# Patient Record
Sex: MALE | Race: WHITE | NOT HISPANIC OR LATINO | Employment: FULL TIME | URBAN - METROPOLITAN AREA
[De-identification: names, ages, dates, MRNs, and addresses within clinical notes are randomized per-mention and may not be internally consistent; named-entity substitution may affect disease eponyms.]

---

## 2017-01-07 ENCOUNTER — TRANSCRIBE ORDERS (OUTPATIENT)
Dept: ADMINISTRATIVE | Facility: HOSPITAL | Age: 77
End: 2017-01-07

## 2017-01-07 ENCOUNTER — APPOINTMENT (OUTPATIENT)
Dept: LAB | Facility: HOSPITAL | Age: 77
End: 2017-01-07
Attending: INTERNAL MEDICINE
Payer: COMMERCIAL

## 2017-01-07 DIAGNOSIS — Z79.899 ENCOUNTER FOR LONG-TERM (CURRENT) USE OF OTHER MEDICATIONS: Primary | ICD-10-CM

## 2017-01-07 DIAGNOSIS — E55.9 UNSPECIFIED VITAMIN D DEFICIENCY: ICD-10-CM

## 2017-01-07 DIAGNOSIS — M35.3 POLYMYALGIA RHEUMATICA (HCC): ICD-10-CM

## 2017-01-07 DIAGNOSIS — Z79.899 ENCOUNTER FOR LONG-TERM (CURRENT) USE OF OTHER MEDICATIONS: ICD-10-CM

## 2017-01-07 LAB
25(OH)D3 SERPL-MCNC: 40.8 NG/ML (ref 30–100)
ALBUMIN SERPL BCP-MCNC: 3.2 G/DL (ref 3.5–5)
ALP SERPL-CCNC: 101 U/L (ref 46–116)
ALT SERPL W P-5'-P-CCNC: 20 U/L (ref 12–78)
ANION GAP SERPL CALCULATED.3IONS-SCNC: 8 MMOL/L (ref 4–13)
AST SERPL W P-5'-P-CCNC: 21 U/L (ref 5–45)
BASOPHILS # BLD AUTO: 0.05 THOUSANDS/ΜL (ref 0–0.1)
BASOPHILS NFR BLD AUTO: 1 % (ref 0–1)
BILIRUB SERPL-MCNC: 0.43 MG/DL (ref 0.2–1)
BUN SERPL-MCNC: 23 MG/DL (ref 5–25)
CALCIUM SERPL-MCNC: 8.9 MG/DL (ref 8.3–10.1)
CHLORIDE SERPL-SCNC: 104 MMOL/L (ref 100–108)
CO2 SERPL-SCNC: 27 MMOL/L (ref 21–32)
CREAT SERPL-MCNC: 1.2 MG/DL (ref 0.6–1.3)
CRP SERPL QL: 6.2 MG/L
EOSINOPHIL # BLD AUTO: 0.39 THOUSAND/ΜL (ref 0–0.61)
EOSINOPHIL NFR BLD AUTO: 4 % (ref 0–6)
ERYTHROCYTE [DISTWIDTH] IN BLOOD BY AUTOMATED COUNT: 14.4 % (ref 11.6–15.1)
ERYTHROCYTE [SEDIMENTATION RATE] IN BLOOD: 38 MM/HOUR (ref 0–10)
GFR SERPL CREATININE-BSD FRML MDRD: 58.9 ML/MIN/1.73SQ M
GLUCOSE SERPL-MCNC: 103 MG/DL (ref 65–140)
HCT VFR BLD AUTO: 39.2 % (ref 36.5–49.3)
HGB BLD-MCNC: 12.7 G/DL (ref 12–17)
LYMPHOCYTES # BLD AUTO: 1.3 THOUSANDS/ΜL (ref 0.6–4.47)
LYMPHOCYTES NFR BLD AUTO: 14 % (ref 14–44)
MCH RBC QN AUTO: 30.4 PG (ref 26.8–34.3)
MCHC RBC AUTO-ENTMCNC: 32.4 G/DL (ref 31.4–37.4)
MCV RBC AUTO: 94 FL (ref 82–98)
MONOCYTES # BLD AUTO: 1.22 THOUSAND/ΜL (ref 0.17–1.22)
MONOCYTES NFR BLD AUTO: 13 % (ref 4–12)
NEUTROPHILS # BLD AUTO: 6.2 THOUSANDS/ΜL (ref 1.85–7.62)
NEUTS SEG NFR BLD AUTO: 68 % (ref 43–75)
NRBC BLD AUTO-RTO: 0 /100 WBCS
PLATELET # BLD AUTO: 259 THOUSANDS/UL (ref 149–390)
PMV BLD AUTO: 10.3 FL (ref 8.9–12.7)
POTASSIUM SERPL-SCNC: 4.3 MMOL/L (ref 3.5–5.3)
PROT SERPL-MCNC: 7.5 G/DL (ref 6.4–8.2)
RBC # BLD AUTO: 4.18 MILLION/UL (ref 3.88–5.62)
SODIUM SERPL-SCNC: 139 MMOL/L (ref 136–145)
WBC # BLD AUTO: 9.16 THOUSAND/UL (ref 4.31–10.16)

## 2017-01-07 PROCEDURE — 85652 RBC SED RATE AUTOMATED: CPT

## 2017-01-07 PROCEDURE — 86140 C-REACTIVE PROTEIN: CPT

## 2017-01-07 PROCEDURE — 80053 COMPREHEN METABOLIC PANEL: CPT

## 2017-01-07 PROCEDURE — 36415 COLL VENOUS BLD VENIPUNCTURE: CPT

## 2017-01-07 PROCEDURE — 85025 COMPLETE CBC W/AUTO DIFF WBC: CPT

## 2017-01-07 PROCEDURE — 82306 VITAMIN D 25 HYDROXY: CPT

## 2017-02-10 ENCOUNTER — ALLSCRIPTS OFFICE VISIT (OUTPATIENT)
Dept: OTHER | Facility: OTHER | Age: 77
End: 2017-02-10

## 2017-04-15 ENCOUNTER — APPOINTMENT (OUTPATIENT)
Dept: LAB | Facility: HOSPITAL | Age: 77
End: 2017-04-15
Payer: COMMERCIAL

## 2017-04-15 DIAGNOSIS — I10 ESSENTIAL (PRIMARY) HYPERTENSION: ICD-10-CM

## 2017-04-15 DIAGNOSIS — I25.10 ATHEROSCLEROTIC HEART DISEASE OF NATIVE CORONARY ARTERY WITHOUT ANGINA PECTORIS: ICD-10-CM

## 2017-04-15 DIAGNOSIS — R73.03 PREDIABETES: ICD-10-CM

## 2017-04-15 DIAGNOSIS — E78.5 HYPERLIPIDEMIA: ICD-10-CM

## 2017-04-15 DIAGNOSIS — N40.0 ENLARGED PROSTATE WITHOUT LOWER URINARY TRACT SYMPTOMS (LUTS): ICD-10-CM

## 2017-04-15 LAB
ALBUMIN SERPL BCP-MCNC: 3.1 G/DL (ref 3.5–5)
ALP SERPL-CCNC: 94 U/L (ref 46–116)
ALT SERPL W P-5'-P-CCNC: 20 U/L (ref 12–78)
ANION GAP SERPL CALCULATED.3IONS-SCNC: 7 MMOL/L (ref 4–13)
AST SERPL W P-5'-P-CCNC: 19 U/L (ref 5–45)
BILIRUB SERPL-MCNC: 0.71 MG/DL (ref 0.2–1)
BUN SERPL-MCNC: 18 MG/DL (ref 5–25)
CALCIUM SERPL-MCNC: 8.8 MG/DL (ref 8.3–10.1)
CHLORIDE SERPL-SCNC: 105 MMOL/L (ref 100–108)
CHOLEST SERPL-MCNC: 129 MG/DL (ref 50–200)
CO2 SERPL-SCNC: 29 MMOL/L (ref 21–32)
CREAT SERPL-MCNC: 1.13 MG/DL (ref 0.6–1.3)
EST. AVERAGE GLUCOSE BLD GHB EST-MCNC: 131 MG/DL
GFR SERPL CREATININE-BSD FRML MDRD: >60 ML/MIN/1.73SQ M
GLUCOSE P FAST SERPL-MCNC: 95 MG/DL (ref 65–99)
HBA1C MFR BLD: 6.2 % (ref 4.2–6.3)
HDLC SERPL-MCNC: 64 MG/DL (ref 40–60)
LDLC SERPL CALC-MCNC: 54 MG/DL (ref 0–100)
POTASSIUM SERPL-SCNC: 4.1 MMOL/L (ref 3.5–5.3)
PROT SERPL-MCNC: 7.3 G/DL (ref 6.4–8.2)
SODIUM SERPL-SCNC: 141 MMOL/L (ref 136–145)
TRIGL SERPL-MCNC: 55 MG/DL

## 2017-04-15 PROCEDURE — 80053 COMPREHEN METABOLIC PANEL: CPT

## 2017-04-15 PROCEDURE — 80061 LIPID PANEL: CPT

## 2017-04-15 PROCEDURE — 36415 COLL VENOUS BLD VENIPUNCTURE: CPT

## 2017-04-15 PROCEDURE — 83036 HEMOGLOBIN GLYCOSYLATED A1C: CPT

## 2017-04-20 ENCOUNTER — ALLSCRIPTS OFFICE VISIT (OUTPATIENT)
Dept: OTHER | Facility: OTHER | Age: 77
End: 2017-04-20

## 2017-04-20 DIAGNOSIS — I10 ESSENTIAL (PRIMARY) HYPERTENSION: ICD-10-CM

## 2017-04-20 DIAGNOSIS — I25.10 ATHEROSCLEROTIC HEART DISEASE OF NATIVE CORONARY ARTERY WITHOUT ANGINA PECTORIS: ICD-10-CM

## 2017-04-20 DIAGNOSIS — N40.0 ENLARGED PROSTATE WITHOUT LOWER URINARY TRACT SYMPTOMS (LUTS): ICD-10-CM

## 2017-04-20 DIAGNOSIS — R73.03 PREDIABETES: ICD-10-CM

## 2017-04-20 DIAGNOSIS — E78.5 HYPERLIPIDEMIA: ICD-10-CM

## 2017-05-06 ENCOUNTER — APPOINTMENT (OUTPATIENT)
Dept: LAB | Facility: HOSPITAL | Age: 77
End: 2017-05-06
Attending: INTERNAL MEDICINE
Payer: COMMERCIAL

## 2017-05-06 ENCOUNTER — TRANSCRIBE ORDERS (OUTPATIENT)
Dept: ADMINISTRATIVE | Facility: HOSPITAL | Age: 77
End: 2017-05-06

## 2017-05-06 DIAGNOSIS — M35.3 POLYMYALGIA RHEUMATICA (HCC): Primary | ICD-10-CM

## 2017-05-06 DIAGNOSIS — M35.3 POLYMYALGIA RHEUMATICA (HCC): ICD-10-CM

## 2017-05-06 LAB
ALBUMIN SERPL BCP-MCNC: 3 G/DL (ref 3.5–5)
ALP SERPL-CCNC: 89 U/L (ref 46–116)
ALT SERPL W P-5'-P-CCNC: 21 U/L (ref 12–78)
ANION GAP SERPL CALCULATED.3IONS-SCNC: 7 MMOL/L (ref 4–13)
AST SERPL W P-5'-P-CCNC: 19 U/L (ref 5–45)
BASOPHILS # BLD AUTO: 0.05 THOUSANDS/ΜL (ref 0–0.1)
BASOPHILS NFR BLD AUTO: 1 % (ref 0–1)
BILIRUB SERPL-MCNC: 0.35 MG/DL (ref 0.2–1)
BUN SERPL-MCNC: 20 MG/DL (ref 5–25)
CALCIUM SERPL-MCNC: 9.3 MG/DL (ref 8.3–10.1)
CHLORIDE SERPL-SCNC: 103 MMOL/L (ref 100–108)
CO2 SERPL-SCNC: 30 MMOL/L (ref 21–32)
CREAT SERPL-MCNC: 1.33 MG/DL (ref 0.6–1.3)
CRP SERPL QL: 9.2 MG/L
EOSINOPHIL # BLD AUTO: 0.44 THOUSAND/ΜL (ref 0–0.61)
EOSINOPHIL NFR BLD AUTO: 5 % (ref 0–6)
ERYTHROCYTE [DISTWIDTH] IN BLOOD BY AUTOMATED COUNT: 15 % (ref 11.6–15.1)
ERYTHROCYTE [SEDIMENTATION RATE] IN BLOOD: 50 MM/HOUR (ref 0–10)
GFR SERPL CREATININE-BSD FRML MDRD: 52.3 ML/MIN/1.73SQ M
GLUCOSE P FAST SERPL-MCNC: 155 MG/DL (ref 65–99)
HCT VFR BLD AUTO: 39.1 % (ref 36.5–49.3)
HGB BLD-MCNC: 12.6 G/DL (ref 12–17)
LYMPHOCYTES # BLD AUTO: 1.28 THOUSANDS/ΜL (ref 0.6–4.47)
LYMPHOCYTES NFR BLD AUTO: 15 % (ref 14–44)
MCH RBC QN AUTO: 30.1 PG (ref 26.8–34.3)
MCHC RBC AUTO-ENTMCNC: 32.2 G/DL (ref 31.4–37.4)
MCV RBC AUTO: 93 FL (ref 82–98)
MONOCYTES # BLD AUTO: 0.98 THOUSAND/ΜL (ref 0.17–1.22)
MONOCYTES NFR BLD AUTO: 12 % (ref 4–12)
NEUTROPHILS # BLD AUTO: 5.76 THOUSANDS/ΜL (ref 1.85–7.62)
NEUTS SEG NFR BLD AUTO: 67 % (ref 43–75)
PLATELET # BLD AUTO: 338 THOUSANDS/UL (ref 149–390)
PMV BLD AUTO: 10.2 FL (ref 8.9–12.7)
POTASSIUM SERPL-SCNC: 4.3 MMOL/L (ref 3.5–5.3)
PROT SERPL-MCNC: 7.5 G/DL (ref 6.4–8.2)
RBC # BLD AUTO: 4.19 MILLION/UL (ref 3.88–5.62)
SODIUM SERPL-SCNC: 140 MMOL/L (ref 136–145)
WBC # BLD AUTO: 8.51 THOUSAND/UL (ref 4.31–10.16)

## 2017-05-06 PROCEDURE — 86140 C-REACTIVE PROTEIN: CPT

## 2017-05-06 PROCEDURE — 80053 COMPREHEN METABOLIC PANEL: CPT

## 2017-05-06 PROCEDURE — 85025 COMPLETE CBC W/AUTO DIFF WBC: CPT

## 2017-05-06 PROCEDURE — 36415 COLL VENOUS BLD VENIPUNCTURE: CPT

## 2017-05-06 PROCEDURE — 85652 RBC SED RATE AUTOMATED: CPT

## 2017-07-15 ENCOUNTER — APPOINTMENT (OUTPATIENT)
Dept: LAB | Facility: HOSPITAL | Age: 77
End: 2017-07-15
Payer: COMMERCIAL

## 2017-07-15 ENCOUNTER — TRANSCRIBE ORDERS (OUTPATIENT)
Dept: ADMINISTRATIVE | Facility: HOSPITAL | Age: 77
End: 2017-07-15

## 2017-07-15 ENCOUNTER — APPOINTMENT (OUTPATIENT)
Dept: LAB | Facility: HOSPITAL | Age: 77
End: 2017-07-15
Attending: INTERNAL MEDICINE
Payer: COMMERCIAL

## 2017-07-15 DIAGNOSIS — M35.3 POLYMYALGIA RHEUMATICA (HCC): Primary | ICD-10-CM

## 2017-07-15 DIAGNOSIS — R73.03 PREDIABETES: ICD-10-CM

## 2017-07-15 DIAGNOSIS — N40.0 ENLARGED PROSTATE WITHOUT LOWER URINARY TRACT SYMPTOMS (LUTS): ICD-10-CM

## 2017-07-15 DIAGNOSIS — I10 ESSENTIAL (PRIMARY) HYPERTENSION: ICD-10-CM

## 2017-07-15 DIAGNOSIS — E78.5 HYPERLIPIDEMIA: ICD-10-CM

## 2017-07-15 DIAGNOSIS — M35.3 POLYMYALGIA RHEUMATICA (HCC): ICD-10-CM

## 2017-07-15 DIAGNOSIS — I25.10 ATHEROSCLEROTIC HEART DISEASE OF NATIVE CORONARY ARTERY WITHOUT ANGINA PECTORIS: ICD-10-CM

## 2017-07-15 LAB
ALBUMIN SERPL BCP-MCNC: 3.5 G/DL (ref 3.5–5)
ALP SERPL-CCNC: 88 U/L (ref 46–116)
ALT SERPL W P-5'-P-CCNC: 22 U/L (ref 12–78)
ANION GAP SERPL CALCULATED.3IONS-SCNC: 4 MMOL/L (ref 4–13)
AST SERPL W P-5'-P-CCNC: 22 U/L (ref 5–45)
BILIRUB SERPL-MCNC: 0.74 MG/DL (ref 0.2–1)
BUN SERPL-MCNC: 19 MG/DL (ref 5–25)
CALCIUM SERPL-MCNC: 9.3 MG/DL (ref 8.3–10.1)
CHLORIDE SERPL-SCNC: 105 MMOL/L (ref 100–108)
CO2 SERPL-SCNC: 32 MMOL/L (ref 21–32)
CREAT SERPL-MCNC: 1.27 MG/DL (ref 0.6–1.3)
CRP SERPL QL: 4.1 MG/L
ERYTHROCYTE [SEDIMENTATION RATE] IN BLOOD: 34 MM/HOUR (ref 0–10)
EST. AVERAGE GLUCOSE BLD GHB EST-MCNC: 128 MG/DL
GFR SERPL CREATININE-BSD FRML MDRD: 55.1 ML/MIN/1.73SQ M
GLUCOSE P FAST SERPL-MCNC: 102 MG/DL (ref 65–99)
HBA1C MFR BLD: 6.1 % (ref 4.2–6.3)
POTASSIUM SERPL-SCNC: 5.4 MMOL/L (ref 3.5–5.3)
PROT SERPL-MCNC: 8 G/DL (ref 6.4–8.2)
SODIUM SERPL-SCNC: 141 MMOL/L (ref 136–145)

## 2017-07-15 PROCEDURE — 86140 C-REACTIVE PROTEIN: CPT

## 2017-07-15 PROCEDURE — 85652 RBC SED RATE AUTOMATED: CPT

## 2017-07-15 PROCEDURE — 80053 COMPREHEN METABOLIC PANEL: CPT

## 2017-07-15 PROCEDURE — 83036 HEMOGLOBIN GLYCOSYLATED A1C: CPT

## 2017-07-15 PROCEDURE — 36415 COLL VENOUS BLD VENIPUNCTURE: CPT

## 2017-07-20 ENCOUNTER — ALLSCRIPTS OFFICE VISIT (OUTPATIENT)
Dept: OTHER | Facility: OTHER | Age: 77
End: 2017-07-20

## 2017-08-29 ENCOUNTER — ALLSCRIPTS OFFICE VISIT (OUTPATIENT)
Dept: OTHER | Facility: OTHER | Age: 77
End: 2017-08-29

## 2017-09-02 ENCOUNTER — TRANSCRIBE ORDERS (OUTPATIENT)
Dept: ADMINISTRATIVE | Facility: HOSPITAL | Age: 77
End: 2017-09-02

## 2017-09-02 ENCOUNTER — APPOINTMENT (OUTPATIENT)
Dept: LAB | Facility: HOSPITAL | Age: 77
End: 2017-09-02
Attending: INTERNAL MEDICINE
Payer: COMMERCIAL

## 2017-09-02 DIAGNOSIS — M35.3 POLYMYALGIA RHEUMATICA (HCC): Primary | ICD-10-CM

## 2017-09-02 DIAGNOSIS — M35.3 POLYMYALGIA RHEUMATICA (HCC): ICD-10-CM

## 2017-09-02 LAB
CRP SERPL QL: 3.9 MG/L
ERYTHROCYTE [SEDIMENTATION RATE] IN BLOOD: 45 MM/HOUR (ref 0–10)

## 2017-09-02 PROCEDURE — 86140 C-REACTIVE PROTEIN: CPT

## 2017-09-02 PROCEDURE — 85652 RBC SED RATE AUTOMATED: CPT

## 2017-09-02 PROCEDURE — 36415 COLL VENOUS BLD VENIPUNCTURE: CPT

## 2017-09-06 ENCOUNTER — GENERIC CONVERSION - ENCOUNTER (OUTPATIENT)
Dept: OTHER | Facility: OTHER | Age: 77
End: 2017-09-06

## 2017-09-19 DIAGNOSIS — E78.5 HYPERLIPIDEMIA: ICD-10-CM

## 2017-10-21 ENCOUNTER — APPOINTMENT (OUTPATIENT)
Dept: LAB | Facility: HOSPITAL | Age: 77
End: 2017-10-21
Attending: INTERNAL MEDICINE
Payer: COMMERCIAL

## 2017-10-21 DIAGNOSIS — E78.5 HYPERLIPIDEMIA: ICD-10-CM

## 2017-10-21 LAB
ANION GAP SERPL CALCULATED.3IONS-SCNC: 3 MMOL/L (ref 4–13)
BUN SERPL-MCNC: 26 MG/DL (ref 5–25)
CALCIUM SERPL-MCNC: 9.1 MG/DL (ref 8.3–10.1)
CHLORIDE SERPL-SCNC: 105 MMOL/L (ref 100–108)
CO2 SERPL-SCNC: 30 MMOL/L (ref 21–32)
CREAT SERPL-MCNC: 1.29 MG/DL (ref 0.6–1.3)
GFR SERPL CREATININE-BSD FRML MDRD: 53 ML/MIN/1.73SQ M
GLUCOSE P FAST SERPL-MCNC: 100 MG/DL (ref 65–99)
POTASSIUM SERPL-SCNC: 4.7 MMOL/L (ref 3.5–5.3)
SODIUM SERPL-SCNC: 138 MMOL/L (ref 136–145)

## 2017-10-21 PROCEDURE — 80048 BASIC METABOLIC PNL TOTAL CA: CPT

## 2017-10-21 PROCEDURE — 36415 COLL VENOUS BLD VENIPUNCTURE: CPT

## 2017-10-25 ENCOUNTER — ALLSCRIPTS OFFICE VISIT (OUTPATIENT)
Dept: OTHER | Facility: OTHER | Age: 77
End: 2017-10-25

## 2017-10-25 DIAGNOSIS — I10 ESSENTIAL (PRIMARY) HYPERTENSION: ICD-10-CM

## 2017-10-25 DIAGNOSIS — E78.5 HYPERLIPIDEMIA: ICD-10-CM

## 2017-10-25 DIAGNOSIS — I25.10 ATHEROSCLEROTIC HEART DISEASE OF NATIVE CORONARY ARTERY WITHOUT ANGINA PECTORIS: ICD-10-CM

## 2017-10-25 DIAGNOSIS — R73.03 PREDIABETES: ICD-10-CM

## 2017-10-26 NOTE — PROGRESS NOTES
Assessment  1  Pre-diabetes (790 29) (R73 03)   2  Hyperlipidemia (272 4) (E78 5)   3  Benign essential hypertension (401 1) (I10)   4  3-vessel coronary artery disease (414 00) (I25 10)    Plan  3-vessel coronary artery disease, Benign essential hypertension, Hyperlipidemia,  Pre-diabetes    · (1) COMPREHENSIVE METABOLIC PANEL; Status:Active; Requested [id number];    · (1) HEMOGLOBIN A1C; Status:Active; Requested [id number];    · (1) LIPID PANEL FASTING W DIRECT LDL REFLEX; Status:Active; Requested  JOJ:[id number];    · Follow-up visit in 3 months Evaluation and Treatment  Follow-up  Status: Hold For -  Scheduling  Requested for: 40BMH3952  Flu vaccine need    · Stop: Fluzone High-Dose 0 5 ML Intramuscular Suspension Prefilled  Syringe    Discussion/Summary    Call if any problems  Continue present meds as directed  Call if any problems  HTN stable  Low cholesterol diet encouraged and to f-up with Cardiology as directed  F-up with urology for prostate issues  Monitor anemia, currently stable  Rec  low sugar diet for hx of prediabetes  Recheck 3 months with labs  Refused flu shot today  The patient, patient's family was counseled regarding  Possible side effects of new medications were reviewed with the patient/guardian today  The treatment plan was reviewed with the patient/guardian  The patient/guardian understands and agrees with the treatment plan      Chief Complaint  F/U HTN and glucose  ksd,cma   Patient is here today for follow up of chronic conditions described in HPI  History of Present Illness  PT is here today or a 3 month follow up of cholesterol and blood pressure  No cp or sob, or ha, Sees Cardiology and urology as directed for CAD and also BPH  Here with daughter  Hx of prediabetes and hyperlipidemia and HTN  Trying to eat healthy  Refuses flu shot today  Review of Systems    Constitutional: No fever or chills, feels well, no tiredness, no recent weight gain or weight loss     Eyes: No complaints of eye pain, no red eyes, no discharge from eyes, no itchy eyes  ENT: no complaints of earache, no hearing loss, no nosebleeds, no nasal discharge, no sore throat, no hoarseness  Cardiovascular: No complaints of slow heart rate, no fast heart rate, no chest pain, no palpitations, no leg claudication, no lower extremity-- and-- as noted in HPI  Respiratory: No complaints of shortness of breath, no wheezing, no cough, no SOB on exertion, no orthopnea or PND  Gastrointestinal: No complaints of abdominal pain, no constipation, no nausea or vomiting, no diarrhea or bloody stools  Genitourinary: No complaints of dysuria, no incontinence, no hesitancy, no nocturia, no genital lesion, no testicular pain  Musculoskeletal: No complaints of arthralgia, no myalgias, no joint swelling or stiffness, no limb pain or swelling  Integumentary: No complaints of skin rash or skin lesions, no itching, no skin wound, no dry skin  Neurological: No compliants of headache, no confusion, no convulsions, no numbness or tingling, no dizziness or fainting, no limb weakness, no difficulty walking  Psychiatric: Is not suicidal, no sleep disturbances, no anxiety or depression, no change in personality, no emotional problems  Endocrine: as noted in HPI  Hematologic/Lymphatic: No complaints of swollen glands, no swollen glands in the neck, does not bleed easily, no easy bruising  Active Problems  1  3-vessel coronary artery disease (414 00) (I25 10)   2  Anemia (285 9) (D64 9)   3  Arthralgia (719 40) (M25 50)   4  Arthritis pain (716 90) (M19 90)   5  Benign essential hypertension (401 1) (I10)   6  BPH (benign prostatic hypertrophy) (600 00) (N40 0)   7  CABG   8  Cardiomyopathy (425 4) (I42 9)   9  Chronic fatigue syndrome (780 71) (R53 82)   10  Colon cancer screening (V76 51) (Z12 11)   11  Congestive heart failure (428 0) (I50 9)   12  Elevated platelet count (348 03) (D47 3)   13   Glaucoma screening (V80 1) (Z13 5)   14  History of mitral valve repair (V15 1) (Z98 890)   15  Hyperlipidemia (272 4) (E78 5)   16  Ischemic cardiomyopathy (414 8) (I25 5)   17  Low back pain (724 2) (M54 5)   18  Mitral regurgitation (424 0) (I34 0)   19  Nonischemic cardiomyopathy (425 4) (I42 8)   20  Paroxysmal atrial fibrillation (427 31) (I48 0)   21  Paroxysmal supraventricular tachycardia (427 0) (I47 1)   22  Pre-diabetes (790 29) (R73 03)   23  Screening for depression (V79 0) (Z13 89)   24  Tachycardia (785 0) (R00 0)   25  Thrombocytosis (238 71) (D47 3)   26  UTI (urinary tract infection) (599 0) (N39 0)    Past Medical History  1  History of Need for immunization against influenza (V04 81) (Z23)   2  History of Prediabetes (790 29) (R73 09)   3  History of Screening for genitourinary condition (V81 6) (Z13 89)   4  History of Special screening examination for neoplasm of prostate (V76 44) (Z12 5)   5  History of Special screening for other neurological conditions (V80 09) (Z13 89)    Surgical History  1  History of CABG   2  CABG   3  History of Diagnostic Cystoscopy   4  History of Esophagogastroduodenoscopy With Biopsy   5  History of Mitral Valve Repair   6  History of Tracheostomy    Family History  Mother    1  Family history of Diabetes   2  Family history of Heart disease   3  Family history of Hypertension  Father    4  Family history of Colon cancer   5  Family history of Kidney disease  Sister    10  Family history of Heart disease  Brother    7  Family history of Heart disease  Paternal Aunt    6  Family history of Breast cancer   9  Family history of Cervical ca    Social History   · Being A Social Drinker   · Denied: History of Drug use   · Former smoker (V15 82) (H85 511)   · Marital History -  (V61 03)    Current Meds   1  Aspirin Low Dose 81 MG TABS; Take 1 tablet daily Recorded   2  Atorvastatin Calcium 40 MG Oral Tablet; Take 1 tablet daily;    Therapy: 01XXQ4384 to (Evaluate:64Oqm0758) Requested for: 92DGR7150; Last   Rx:45Awx3878 Ordered   3  [employer] High Potency Vitamin D 1000 UNIT TABS; Take as directed Recorded   4  Finasteride 5 MG Oral Tablet; TAKE 1 TABLET DAILY AS DIRECTED; Therapy: 35EEX1302 to (Evaluate:64Sfa3337)  Requested for: 05Hbm3185; Last   Rx:24Bhn9157 Ordered   5  Fish Oil 1200 MG Oral Capsule; TAKE AS DIRECTED; Therapy: 28MSP6823 to Recorded   6  Iron 325 (65 Fe) MG Oral Tablet; TAKE 1 TABLET TWICE DAILY WITH MEALS; Therapy: 97DAA9890 to Recorded   7  Lisinopril 5 MG Oral Tablet; TAKE 1 TABLET DAILY; Therapy: 27Amb5935 to (Evaluate:21Apr2018)  Requested for: 23Oct2017; Last   Rx:23Oct2017 Ordered   8  Metoprolol Tartrate 25 MG Oral Tablet; take 1/2 tablet twice a day  Requested for:   65SZK4364; Last Rx:65Hlx8971 Ordered   9  Multiple Vitamins TABS; Therapy: (Recorded:21Nov2014) to Recorded   10  Tamsulosin HCl - 0 4 MG Oral Capsule; TAKE 1 CAPSULE AT BEDTIME; Therapy: 91XAE3133 to (Evaluate:01Apr2018)  Requested for: 06Apr2017; Last    Rx:06Apr2017 Ordered    The medication list was reviewed and updated today  Allergies  1  No Known Drug Allergies    Vitals  Vital Signs    Recorded: 68JKU0949 91:20WE   Systolic 772   Diastolic 70   Height 5 ft 6 in   Weight 158 lb 4 oz   BMI Calculated 25 54   BSA Calculated 1 81     Physical Exam    Constitutional   General appearance: No acute distress, well appearing and well nourished  Eyes   Conjunctiva and lids: No swelling, erythema, or discharge  Pupils and irises: Equal, round and reactive to light  Ears, Nose, Mouth, and Throat   External inspection of ears and nose: Normal     Otoscopic examination: Tympanic membrance translucent with normal light reflex  Canals patent without erythema  Nasal mucosa, septum, and turbinates: Normal without edema or erythema  Oropharynx: Normal with no erythema, edema, exudate or lesions      Pulmonary   Respiratory effort: No increased work of breathing or signs of respiratory distress  Auscultation of lungs: Clear to auscultation, equal breath sounds bilaterally, no wheezes, no rales, no rhonci  Cardiovascular   Palpation of heart: Normal PMI, no thrills  Auscultation of heart: Normal rate and rhythm, normal S1 and S2, without murmurs  Examination of extremities for edema and/or varicosities: Normal     Carotid pulses: Normal     Lymphatic   Palpation of lymph nodes in neck: No lymphadenopathy  Musculoskeletal   Gait and station: Normal     Digits and nails: Normal without clubbing or cyanosis  Inspection/palpation of joints, bones, and muscles: Normal     Skin   Skin and subcutaneous tissue: Normal without rashes or lesions  Neurologic   Cranial nerves: Cranial nerves 2-12 intact  Reflexes: 2+ and symmetric  Sensation: No sensory loss  Psychiatric   Orientation to person, place and time: Normal     Mood and affect: Normal          Results/Data  PHQ-2 Adult Depression Screening 25Oct2017 09:19AM User, s     Test Name Result Flag Reference   PHQ-2 Adult Depression Score 0     Over the last two weeks, how often have you been bothered by any of the following problems? Little interest or pleasure in doing things: Not at all - 0  Feeling down, depressed, or hopeless: Not at all - 0   PHQ-2 Adult Depression Screening Negative         Health Management  Colon cancer screening   COLONOSCOPY; every 5 years; Last [phone]; Next Due: 31MAX8212;  Active    Future Appointments    Date/Time Provider Specialty Site   01/31/2018 09:30 AM Smita Ryan DO Family Medicine [facility]   12/13/2017 10:00 AM Annie Rios Urology [address] [facility] UROLOGY  [place]     Signatures   Electronically signed by : Jony García DO; Oct 25 2017  9:38AM EST                       (Author)

## 2017-12-13 ENCOUNTER — ALLSCRIPTS OFFICE VISIT (OUTPATIENT)
Dept: OTHER | Facility: OTHER | Age: 77
End: 2017-12-13

## 2017-12-15 NOTE — PROGRESS NOTES
Discussion/Summary  Discussion Summary:   51-year-old male with bilateral Tamarkin1  BPH2  History of UTIsThe patient's lower urinary tract symptoms are stable and he denies any recent UTIs  His PVR was 186 mL  Normally the patient double voids but he did not do so prior to his PVR today  Encouraged him to continue double voiding, tamsulosin use, and finasteride use  Will follow up in 1 year with a PVR at his visit  Patient understands and agrees to this treatment plan  All questions and concerns have been addressed answered  Goals and Barriers: The patient has the current Goals: Appropriate bladder emptying with double voiding  The patent has the current Barriers: None  Patient's Capacity to Self-Care: Patient is able to Self-Care  Chief Complaint  Chief Complaint Free Text Note Form: Pt here for darshana of BPH      History of Present Illness  HPI: Carlos Ayers is a 51-year-old male here for follow-up evaluation of his BPH  He presents today with no worsening of his lower urinary tract symptoms  He has nocturia 2-3 times per night and feels as though his stream quality varies  He also has history of urinary tract infections but denies any recent infections  PVR at his visit today was 186 mL  He continues on Flomax and finasteride  Review of Systems  Complete-Male Urology:  Constitutional: No fever or chills, feels well, no tiredness, no recent weight gain or weight loss  Respiratory: No complaints of shortness of breath, no wheezing, no cough, no SOB on exertion, no orthopnea or PND  Cardiovascular: No complaints of slow heart rate, no fast heart rate, no chest pain, no palpitations, no leg claudication, no lower extremity  Gastrointestinal: No complaints of abdominal pain, no constipation, no nausea or vomiting, no diarrhea or bloody stools    Genitourinary: stream varies, but-- no dysuria,-- no urinary hesitancy,-- no hematuria,-- no empty sensation,-- no incontinence-- and-- no feelings of urinary urgency--   The patient presents with complaints of nocturia (2-3 x's)  Musculoskeletal: No complaints of arthralgia, no myalgias, no joint swelling or stiffness, no limb pain or swelling  Integumentary: No complaints of skin rash or skin lesions, no itching, no skin wound, no dry skin  Hematologic/Lymphatic: No complaints of swollen glands, no swollen glands in the neck, does not bleed easily, no easy bruising  Neurological: No compliants of headache, no confusion, no convulsions, no numbness or tingling, no dizziness or fainting, no limb weakness, no difficulty walking  ROS Reviewed:   ROS reviewed  Active Problems  1  3-vessel coronary artery disease (414 00) (I25 10)   2  Anemia (285 9) (D64 9)   3  Arthralgia (719 40) (M25 50)   4  Arthritis pain (716 90) (M19 90)   5  Benign essential hypertension (401 1) (I10)   6  BPH (benign prostatic hypertrophy) (600 00) (N40 0)   7  CABG   8  Cardiomyopathy (425 4) (I42 9)   9  Chronic fatigue syndrome (780 71) (R53 82)   10  Colon cancer screening (V76 51) (Z12 11)   11  Congestive heart failure (428 0) (I50 9)   12  Elevated platelet count (418 43) (D47 3)   13  Flu vaccine need (V04 81) (Z23)   14  Glaucoma screening (V80 1) (Z13 5)   15  History of mitral valve repair (V15 1) (Z98 890)   16  Hyperlipidemia (272 4) (E78 5)   17  Ischemic cardiomyopathy (414 8) (I25 5)   18  Low back pain (724 2) (M54 5)   19  Mitral regurgitation (424 0) (I34 0)   20  Nonischemic cardiomyopathy (425 4) (I42 8)   21  Paroxysmal atrial fibrillation (427 31) (I48 0)   22  Paroxysmal supraventricular tachycardia (427 0) (I47 1)   23  Pre-diabetes (790 29) (R73 03)   24  Screening for depression (V79 0) (Z13 89)   25  Tachycardia (785 0) (R00 0)   26  Thrombocytosis (238 71) (D47 3)   27  UTI (urinary tract infection) (599 0) (N39 0)    Past Medical History  1  History of Need for immunization against influenza (V04 81) (Z23)   2  History of Prediabetes (790 29) (R73 09)   3  History of Screening for genitourinary condition (V81 6) (Z13 89)   4  History of Special screening examination for neoplasm of prostate (V76 44) (Z12 5)   5  History of Special screening for other neurological conditions (V80 09) (Z13 89)  Active Problems And Past Medical History Reviewed: The active problems and past medical history were reviewed and updated today  Surgical History  1  History of CABG   2  CABG   3  History of Diagnostic Cystoscopy   4  History of Esophagogastroduodenoscopy With Biopsy   5  History of Mitral Valve Repair   6  History of Tracheostomy  Surgical History Reviewed: The surgical history was reviewed and updated today  Family History  Mother    1  Family history of Diabetes   2  Family history of Heart disease   3  Family history of Hypertension  Father    4  Family history of Colon cancer   5  Family history of Kidney disease  Sister    10  Family history of Heart disease  Brother    7  Family history of Heart disease  Paternal Aunt    6  Family history of Breast cancer   9  Family history of Cervical ca  Family History Reviewed: The family history was reviewed and updated today  Social History   · Being A Social Drinker   · Denied: History of Drug use   · Former smoker (V15 82) (L12 566)   · Marital History -  (V61 03)  Social History Reviewed: The social history was reviewed and updated today  The social history was reviewed and is unchanged  Current Meds   1  Aspirin Low Dose 81 MG TABS; Take 1 tablet daily Recorded   2  Atorvastatin Calcium 40 MG Oral Tablet; Take 1 tablet daily; Therapy: 48ANX7582 to (Evaluate:13Oct2018)  Requested for: 77OFD8290; Last Rx:87Yin5545 Ordered   3  [employer] High Potency Vitamin D 1000 UNIT TABS; Take as directed Recorded   4  Finasteride 5 MG Oral Tablet; TAKE 1 TABLET DAILY AS DIRECTED; Therapy: 63DPW1599 to (Evaluate:20Oqu8410)  Requested for: 34Wpp9905; Last Rx:51Sag2470 Ordered   5   Fish Oil 1200 MG Oral Capsule; TAKE AS DIRECTED; Therapy: 72UGU5649 to Recorded   6  Iron 325 (65 Fe) MG Oral Tablet; TAKE 1 TABLET TWICE DAILY WITH MEALS; Therapy: 80DGF6087 to Recorded   7  Lisinopril 5 MG Oral Tablet; TAKE 1 TABLET DAILY; Therapy: 81Tjp1190 to (Evaluate:21Apr2018)  Requested for: 23Oct2017; Last Rx:23Oct2017 Ordered   8  Metoprolol Tartrate 25 MG Oral Tablet; take 1/2 tablet twice a day  Requested for: 76KMN5812; Last Rx:88Kzy4355 Ordered   9  Multiple Vitamins TABS; Therapy: (Recorded:21Nov2014) to Recorded   10  Tamsulosin HCl - 0 4 MG Oral Capsule; TAKE 1 CAPSULE AT BEDTIME; Therapy: 67KYG5654 to ([phone] 26)  Requested for: 06Apr2017; Last  Rx:06Apr2017 Ordered  Medication List Reviewed: The medication list was reviewed and updated today  Allergies  1  No Known Drug Allergies    Vitals  Vital Signs    Recorded: 63Hok4357 10:16AM   Heart Rate 80   Systolic 124   Diastolic 78   Weight 219 lb    BMI Calculated 25 66   BSA Calculated 1 81     Physical Exam   Constitutional  General appearance: No acute distress, well appearing and well nourished  Eyes  Conjunctiva and lids: No erythema, swelling or discharge  Ears, Nose, Mouth, and Throat  Hearing: Normal    Pulmonary  Respiratory effort: No increased work of breathing or signs of respiratory distress  Abdomen  Abdomen: Non-tender, no masses  Musculoskeletal  Gait and station: Normal    Psychiatric  Mood and affect: Normal        Procedure   Procedure: Bladder Ultrasound Post Void Residual    Equipment And Procedure: The patient unknown  U/S Findings: 186 39 ml with US        Future Appointments    Date/Time Provider Specialty Site   01/31/2018 09:30 AM Gerson Cunningham, [address] [facility]   12/13/2018 09:00 AM Cedric [facility] Urology  [place]AC[place]     Signatures   Electronically signed by : Ayesha Montalvo, ; Dec 13 2017 10:30AM EST                       (Author)    Electronically signed by : Ayesha Montalvo, ; Dec 13 2017 10:33AM EST                       (Author)    Electronically signed by : Josue Jacobson MD; Dec 13 2017  2:55PM EST

## 2018-01-02 ENCOUNTER — APPOINTMENT (OUTPATIENT)
Dept: LAB | Facility: HOSPITAL | Age: 78
End: 2018-01-02
Attending: INTERNAL MEDICINE
Payer: COMMERCIAL

## 2018-01-02 ENCOUNTER — TRANSCRIBE ORDERS (OUTPATIENT)
Dept: ADMINISTRATIVE | Facility: HOSPITAL | Age: 78
End: 2018-01-02

## 2018-01-02 DIAGNOSIS — M35.3 POLYMYALGIA RHEUMATICA (HCC): ICD-10-CM

## 2018-01-02 DIAGNOSIS — Z79.899 NEED FOR PROPHYLACTIC CHEMOTHERAPY: ICD-10-CM

## 2018-01-02 DIAGNOSIS — Z79.899 NEED FOR PROPHYLACTIC CHEMOTHERAPY: Primary | ICD-10-CM

## 2018-01-02 LAB
ALBUMIN SERPL BCP-MCNC: 3.1 G/DL (ref 3.5–5)
ALP SERPL-CCNC: 77 U/L (ref 46–116)
ALT SERPL W P-5'-P-CCNC: 19 U/L (ref 12–78)
ANION GAP SERPL CALCULATED.3IONS-SCNC: 7 MMOL/L (ref 4–13)
AST SERPL W P-5'-P-CCNC: 18 U/L (ref 5–45)
BASOPHILS # BLD AUTO: 0.06 THOUSANDS/ΜL (ref 0–0.1)
BASOPHILS NFR BLD AUTO: 1 % (ref 0–1)
BILIRUB SERPL-MCNC: 0.56 MG/DL (ref 0.2–1)
BUN SERPL-MCNC: 30 MG/DL (ref 5–25)
CALCIUM SERPL-MCNC: 9.2 MG/DL (ref 8.3–10.1)
CHLORIDE SERPL-SCNC: 107 MMOL/L (ref 100–108)
CO2 SERPL-SCNC: 27 MMOL/L (ref 21–32)
CREAT SERPL-MCNC: 1.39 MG/DL (ref 0.6–1.3)
CRP SERPL QL: 14.5 MG/L
EOSINOPHIL # BLD AUTO: 0.51 THOUSAND/ΜL (ref 0–0.61)
EOSINOPHIL NFR BLD AUTO: 7 % (ref 0–6)
ERYTHROCYTE [DISTWIDTH] IN BLOOD BY AUTOMATED COUNT: 14.9 % (ref 11.6–15.1)
ERYTHROCYTE [SEDIMENTATION RATE] IN BLOOD: 43 MM/HOUR (ref 0–10)
GFR SERPL CREATININE-BSD FRML MDRD: 49 ML/MIN/1.73SQ M
GLUCOSE P FAST SERPL-MCNC: 104 MG/DL (ref 65–99)
HCT VFR BLD AUTO: 37.7 % (ref 36.5–49.3)
HGB BLD-MCNC: 12.3 G/DL (ref 12–17)
LYMPHOCYTES # BLD AUTO: 1.52 THOUSANDS/ΜL (ref 0.6–4.47)
LYMPHOCYTES NFR BLD AUTO: 21 % (ref 14–44)
MCH RBC QN AUTO: 31 PG (ref 26.8–34.3)
MCHC RBC AUTO-ENTMCNC: 32.6 G/DL (ref 31.4–37.4)
MCV RBC AUTO: 95 FL (ref 82–98)
MONOCYTES # BLD AUTO: 0.84 THOUSAND/ΜL (ref 0.17–1.22)
MONOCYTES NFR BLD AUTO: 11 % (ref 4–12)
NEUTROPHILS # BLD AUTO: 4.47 THOUSANDS/ΜL (ref 1.85–7.62)
NEUTS SEG NFR BLD AUTO: 60 % (ref 43–75)
NRBC BLD AUTO-RTO: 0 /100 WBCS
PLATELET # BLD AUTO: 298 THOUSANDS/UL (ref 149–390)
PMV BLD AUTO: 10.1 FL (ref 8.9–12.7)
POTASSIUM SERPL-SCNC: 5.2 MMOL/L (ref 3.5–5.3)
PROT SERPL-MCNC: 7.6 G/DL (ref 6.4–8.2)
RBC # BLD AUTO: 3.97 MILLION/UL (ref 3.88–5.62)
SODIUM SERPL-SCNC: 141 MMOL/L (ref 136–145)
WBC # BLD AUTO: 7.4 THOUSAND/UL (ref 4.31–10.16)

## 2018-01-02 PROCEDURE — 85025 COMPLETE CBC W/AUTO DIFF WBC: CPT

## 2018-01-02 PROCEDURE — 85652 RBC SED RATE AUTOMATED: CPT

## 2018-01-02 PROCEDURE — 80053 COMPREHEN METABOLIC PANEL: CPT

## 2018-01-02 PROCEDURE — 86140 C-REACTIVE PROTEIN: CPT

## 2018-01-02 PROCEDURE — 36415 COLL VENOUS BLD VENIPUNCTURE: CPT

## 2018-01-12 VITALS
BODY MASS INDEX: 24.75 KG/M2 | HEART RATE: 72 BPM | DIASTOLIC BLOOD PRESSURE: 80 MMHG | WEIGHT: 158 LBS | SYSTOLIC BLOOD PRESSURE: 130 MMHG | RESPIRATION RATE: 15 BRPM

## 2018-01-13 VITALS
BODY MASS INDEX: 25.09 KG/M2 | HEIGHT: 66 IN | WEIGHT: 156.13 LBS | SYSTOLIC BLOOD PRESSURE: 122 MMHG | DIASTOLIC BLOOD PRESSURE: 76 MMHG

## 2018-01-14 VITALS
BODY MASS INDEX: 25.43 KG/M2 | DIASTOLIC BLOOD PRESSURE: 70 MMHG | HEIGHT: 66 IN | WEIGHT: 158.25 LBS | SYSTOLIC BLOOD PRESSURE: 122 MMHG

## 2018-01-14 VITALS
WEIGHT: 157 LBS | DIASTOLIC BLOOD PRESSURE: 70 MMHG | SYSTOLIC BLOOD PRESSURE: 120 MMHG | HEIGHT: 67 IN | BODY MASS INDEX: 24.64 KG/M2

## 2018-01-14 NOTE — PROGRESS NOTES
Assessment  Assessed    1  3-vessel coronary artery disease (414 00) (I25 10)   2  Cardiomyopathy (425 4) (I42 9)   3  Benign essential hypertension (401 1) (I10)   4  Paroxysmal atrial fibrillation (427 31) (I48 0)    Plan  3-vessel coronary artery disease, CABG    · Follow-up visit in 6 months Evaluation and Treatment  Follow-up  Status: Hold For -  Scheduling  Requested for: [id number]   Ordered; For: 3-vessel coronary artery disease, CABG; Ordered By: Hal Culp Performed:  Due: [id number]  3-vessel coronary artery disease, Cardiomyopathy    · ECHO COMPLETE WITH CONTRAST IF INDICATED, TTE / TRANSTHORACIC;  Status:Need Information - Financial Authorization; Requested for:[id number];    Perform:[facility]; WLU:[id number];OTXUPVH; For:3-vessel coronary artery disease, Cardiomyopathy; Ordered By:Consuelo Trinh; Discussion/Summary  Cardiology Discussion Summary Free Text Note Form [facility]:   Patient remained stable from cardiac standpoint  Fatigue most likely from medications, but has been present since surgery and not worse  No signs or symptoms of recurrent coronary insufficiency or valvular heart disease  Cardiac risk factors, well controlled  No immediate cardiac testing indicated  Medications, diet, activity, reviewed in detail  Patient advised to call if fatigue, significant only worsens, otherwise, will continue current medications  Patient asked to return in 6 months time for clinical reassessment and will repeat echocardiogram on that appointment to establish continued stability of LV function  Counseling Documentation With Imm: The patient, patient's family was counseled regarding diagnostic results, instructions for management, risk factor reductions, prognosis, patient and family education, impressions, risks and benefits of treatment options  total time of encounter was 30 minutes and 20 minutes was spent counseling     Medication SE Review and Pt Understands Tx: Possible side effects of new medications were reviewed with the patient/guardian today  The treatment plan was reviewed with the patient/guardian  The patient/guardian understands and agrees with the treatment plan      Chief Complaint  Chief Complaint Free Text Note Form: 6 month follow up   Coronary disease and mitral regurgitation, status post bypass grafting, and MVR  No complaint   Chief Complaint Chronic Condition [facility]: Patient is here today for follow up of chronic conditions described in HPI  History of Present Illness  Cardiology HPI Free Text Note Form [facility]: 72-year-old, white male, with history of mitral valve repair, and coronary bypass surgery  Admitted for 24 hours to [facility] [place] [facility] [place] in [place] with with tachycardia  Appeared to be atrial tachycardia  No evidence of atrial fibrillation  Patient started on low-dose metoprolol, with good control of cardiac rate and rhythm  No evidence of other cardiac issues, such as acute coronary syndrome, or CHF  Discharged in stable condition  Seen post hospital in stable condition  Now returns for 6 month clinical reassessment  No recurrent palpitations or tachycardia  Does complain of fatigue, which is chronic and unchanged from surgery  No new issues      Review of Systems  Cardiology Male ROS:     Cardiac: has swelling in the LEGS, but no chest pain, no rhythm problems, no fainting/blackouts, no heart murmur present, no palpitations present, no syncope/fainting, no AM fatigue and no witnessed apnea episodes  Skin: No complaints of nonhealing sores or skin rash  Genitourinary: frequent urination at night (2-3x per night) and prostate problems, but no recurrent urinary tract infections, no difficult urination, no blood in urine, no kidney stones, no loss of bladder control, no kidney problems and no erectile dysfunction   Psychological: No complaints of feeling depressed, anxiety, panic attacks, or difficulty concentrating     General: lack of energy/fatigue, but no trouble sleeping, no appetite changes, no changes in weight, no fever, no night sweats and no frequent infections  Respiratory: No complaints of shortness of breath, cough with sputum, or wheezing  HEENT: serious eye problems, but No complaints of serious problems, hearing problems, nose problems, throat problems, or snoring  , no hearing problems, no nose problems, no throat problems and no snoring   Gastrointestinal: No complaints of liver problems, nausea, vomiting, heartburn, constipation, bloody stools, diarrhea, problems swallowing, adbominal pain, or rectal bleeding  Hematologic: anemia, but No complaints of bleeding disorders, anemia, blood clots, or excessive brusing  , no bleeding disorders, no blood clots and no excessive bruising   Neurological: numbnes (hands, occasional), but No complaints of numbness, tingling, dizziness, weakness, seizures, headaches, syncope or fainting, AM fatigue, daytime sleepiness, no witnessed apnea episodes  , no tingling, no weakness, no seizures, no headaches, no dizziness, no diplopia and no daytime sleepiness   Musculoskeletal: swelling/pain, but no arthritis and no back pain   ROS Reviewed:   ROS reviewed  Active Problems  Problems    1  3-vessel coronary artery disease (414 00) (I25 10)   2  Anemia (285 9) (D64 9)   3  Arthralgia (719 40) (M25 50)   4  Arthritis pain (716 90) (M19 90)   5  Benign essential hypertension (401 1) (I10)   6  BPH (benign prostatic hypertrophy) (600 00) (N40 0)   7  CABG   8  Cardiomyopathy (425 4) (I42 9)   9  Chronic fatigue syndrome (780 71) (R53 82)   10  Colon cancer screening (V76 51) (Z12 11)   11  Congestive heart failure (428 0) (I50 9)   12  Elevated platelet count (525 94) (D47 3)   13  Glaucoma screening (V80 1) (Z13 5)   14  History of mitral valve repair (V15 1) (Z98 89)   15  Hyperlipidemia (272 4) (E78 5)   16  Lower back pain (724 2) (M54 5)   17  Mitral regurgitation (424 0) (I34 0)   18   Nonischemic cardiomyopathy (425  4) (I42 9)   19  Paroxysmal atrial fibrillation (427 31) (I48 0)   20  Paroxysmal supraventricular tachycardia (427 0) (I47 1)   21  Prediabetes (790 29) (R73 09)   22  Tachycardia (785 0) (R00 0)   23  Thrombocytosis (238 71) (D47 3)   24  UTI (urinary tract infection) (599 0) (N39 0)    Past Medical History  Problems    1  History of Need for immunization against influenza (V04 81) (Z23)   2  History of Prediabetes (790 29) (R73 09)   3  History of Screening for depression (V79 0) (Z13 89)   4  History of Screening for genitourinary condition (V81 6) (Z13 89)   5  History of Special screening examination for neoplasm of prostate (V76 44) (Z12 5)   6  History of Special screening for other neurological conditions (V80 09) (Z13 89)  Active Problems And Past Medical History Reviewed: The active problems and past medical history were reviewed and updated today  Surgical History  Problems    1  History of CABG   2  CABG   3  History of Diagnostic Cystoscopy   4  History of Esophagogastroduodenoscopy With Biopsy   5  History of Mitral Valve Repair   6  History of Tracheostomy  Surgical History Reviewed: The surgical history was reviewed and updated today  Family History  Mother    1  Family history of Diabetes   2  Family history of Heart disease   3  Family history of Hypertension  Father    4  Family history of Colon cancer   5  Family history of Kidney disease  Sister    10  Family history of Heart disease  Brother    7  Family history of Heart disease  Paternal Aunt    6  Family history of Breast cancer   9  Family history of Cervical ca  Family History Reviewed: The family history was reviewed and updated today  Social History  Problems    · Being A Social Drinker   · Denied: History of Drug use   · Former smoker (V15 82) (Z24 722)   · Marital History -  (V61 03)  Social History Reviewed: The social history was reviewed and updated today   The social history was reviewed and is unchanged  Current Meds   1  Aspirin Low Dose 81 MG Oral Tablet; Take 1 tablet daily Recorded   2  Atorvastatin Calcium 40 MG Oral Tablet; Take 1 tablet daily; Therapy: 92JVN5110 to (Evaluate:78Kcs5473)  Requested for: 38JOD0194; Last   Rx:90Nug6218 Ordered   3  [employer] High Potency Vitamin D 1000 UNIT TABS; Take as directed Recorded   4  Finasteride 5 MG Oral Tablet; TAKE 1 TABLET DAILY AS DIRECTED; Therapy: 81FKD7660 to (Jaron Shi)  Requested for: 29DLE0531; Last   Rx:36Njz3543 Ordered   5  Fish Oil 1200 MG Oral Capsule; TAKE AS DIRECTED; Therapy: 94OHQ2807 to Recorded   6  Iron 325 (65 Fe) MG Oral Tablet; TAKE 1 TABLET DAILY AS DIRECTED; Therapy: 16LUJ3242 to Recorded   7  Meloxicam 7 5 MG Oral Tablet; TAKE 1 TABLET BY MOUTH EVERY DAY AS NEEDED FOR   PAIN;   Therapy: 25ILV7610 to (Last Rx:51Wsi7725)  Requested for: 86Ftn6389 Ordered   8  Metoprolol Tartrate 25 MG Oral Tablet; take 1/2 tablet twice a day  Requested for:   19Qvu2881; Last Rx:60Aex6791 Ordered   9  Multiple Vitamins TABS; Therapy: (Recorded:86Mry4939) to Recorded   10  Tamsulosin HCl - 0 4 MG Oral Capsule; TAKE 1 CAPSULE Bedtime; Therapy: 12JUX3430 to (Jaron Shi)  Requested for: 64VYS8725; Last    Rx:92Ytd9455 Ordered  Medication List Reviewed: The medication list was reviewed and updated today  Allergies  Medication    1  No Known Drug Allergies    Vitals  Vital Signs [Data Includes: Current Encounter]    Recorded: 54SUU5721 08:22AM   Heart Rate 88   Pulse Quality Normal, L Radial   Respiration 16   Respiration Quality Normal   Systolic 534   Diastolic 78   Height 5 ft 7 in   Weight 153 lb 6 oz   BMI Calculated 24 02   BSA Calculated 1 81     Physical Exam    Constitutional   General appearance: No acute distress, well appearing and well nourished  Eyes   Conjunctiva and Sclera examination: Conjunctiva pink, sclera anicteric      Ears, Nose, Mouth, and Throat - Oropharynx: Clear, nares are clear, mucous membranes are moist    Neck   Neck and thyroid: Normal, supple, trachea midline, no thyromegaly  Pulmonary   Respiratory effort: No increased work of breathing or signs of respiratory distress  Auscultation of lungs: Clear to auscultation, no rales, no rhonchi, no wheezing, good air movement  Cardiovascular   Auscultation of heart: Normal rate and rhythm, normal S1 and S2, no murmurs  Carotid pulses: Normal, 2+ bilaterally  Peripheral vascular exam: Normal pulses throughout, no tenderness, erythema or swelling  Pedal pulses: Normal, 2+ bilaterally  Examination of extremities for edema and/or varicosities: Normal     Abdomen   Abdomen: Non-tender and no distention  Liver and spleen: No hepatomegaly or splenomegaly  Musculoskeletal Gait and station: Normal gait  Digits and nails: Normal without clubbing or cyanosis  Inspection/palpation of joints, bones, and muscles: Normal, ROM normal     Skin - Skin and subcutaneous tissue: Normal without rashes or lesions  Skin is warm and well perfused, normal turgor  Neurologic - Cranial nerves: II - XII intact  Speech: Normal     Psychiatric - Orientation to person, place, and time: Normal  Mood and affect: Normal       Results/Data  Diagnostic Studies Reviewed Cardio: I personally reviewed the recording/images in the office today  My interpretation follows  Lab Review: Total cholesterol 127, LDL 51  Hemoglobin A1c 5 8%  Chemistries, and CBC within normal limits      Health Management  Colon cancer screening   COLONOSCOPY; every 5 years; Last [phone]; Next Due: 21UMU6580;  Active    Future Appointments    Date/Time Provider Specialty Site   03/02/2016 09:30 AM Pearl Cortes, [address] [place] [address]   12/12/2016 09:45 AM Bre Zabala MD Urology [address]   Electronically signed by : Justus Silva DO; Jan 27 2016  9:23AM EST                       (Author)

## 2018-01-15 VITALS
HEART RATE: 76 BPM | DIASTOLIC BLOOD PRESSURE: 76 MMHG | WEIGHT: 156.38 LBS | RESPIRATION RATE: 14 BRPM | SYSTOLIC BLOOD PRESSURE: 130 MMHG | BODY MASS INDEX: 25.24 KG/M2

## 2018-01-23 VITALS
DIASTOLIC BLOOD PRESSURE: 78 MMHG | HEART RATE: 80 BPM | BODY MASS INDEX: 25.66 KG/M2 | SYSTOLIC BLOOD PRESSURE: 140 MMHG | WEIGHT: 159 LBS

## 2018-01-27 ENCOUNTER — APPOINTMENT (OUTPATIENT)
Dept: LAB | Facility: HOSPITAL | Age: 78
End: 2018-01-27
Payer: COMMERCIAL

## 2018-01-27 DIAGNOSIS — I10 ESSENTIAL (PRIMARY) HYPERTENSION: ICD-10-CM

## 2018-01-27 DIAGNOSIS — R73.03 PREDIABETES: ICD-10-CM

## 2018-01-27 DIAGNOSIS — E78.5 HYPERLIPIDEMIA: ICD-10-CM

## 2018-01-27 DIAGNOSIS — I25.10 ATHEROSCLEROTIC HEART DISEASE OF NATIVE CORONARY ARTERY WITHOUT ANGINA PECTORIS: ICD-10-CM

## 2018-01-27 LAB
ALBUMIN SERPL BCP-MCNC: 3.1 G/DL (ref 3.5–5)
ALP SERPL-CCNC: 75 U/L (ref 46–116)
ALT SERPL W P-5'-P-CCNC: 15 U/L (ref 12–78)
ANION GAP SERPL CALCULATED.3IONS-SCNC: 7 MMOL/L (ref 4–13)
AST SERPL W P-5'-P-CCNC: 18 U/L (ref 5–45)
BILIRUB SERPL-MCNC: 0.85 MG/DL (ref 0.2–1)
BUN SERPL-MCNC: 19 MG/DL (ref 5–25)
CALCIUM SERPL-MCNC: 8.5 MG/DL (ref 8.3–10.1)
CHLORIDE SERPL-SCNC: 104 MMOL/L (ref 100–108)
CHOLEST SERPL-MCNC: 144 MG/DL (ref 50–200)
CO2 SERPL-SCNC: 29 MMOL/L (ref 21–32)
CREAT SERPL-MCNC: 1.28 MG/DL (ref 0.6–1.3)
EST. AVERAGE GLUCOSE BLD GHB EST-MCNC: 120 MG/DL
GFR SERPL CREATININE-BSD FRML MDRD: 54 ML/MIN/1.73SQ M
GLUCOSE P FAST SERPL-MCNC: 94 MG/DL (ref 65–99)
HBA1C MFR BLD: 5.8 % (ref 4.2–6.3)
HDLC SERPL-MCNC: 54 MG/DL (ref 40–60)
LDLC SERPL CALC-MCNC: 74 MG/DL (ref 0–100)
POTASSIUM SERPL-SCNC: 4.6 MMOL/L (ref 3.5–5.3)
PROT SERPL-MCNC: 7.4 G/DL (ref 6.4–8.2)
SODIUM SERPL-SCNC: 140 MMOL/L (ref 136–145)
TRIGL SERPL-MCNC: 82 MG/DL

## 2018-01-27 PROCEDURE — 36415 COLL VENOUS BLD VENIPUNCTURE: CPT

## 2018-01-27 PROCEDURE — 80061 LIPID PANEL: CPT

## 2018-01-27 PROCEDURE — 83036 HEMOGLOBIN GLYCOSYLATED A1C: CPT

## 2018-01-27 PROCEDURE — 80053 COMPREHEN METABOLIC PANEL: CPT

## 2018-01-30 RX ORDER — MULTIVITAMIN
TABLET ORAL
COMMUNITY

## 2018-01-30 RX ORDER — AMOXICILLIN 500 MG
CAPSULE ORAL
COMMUNITY
Start: 2015-03-26 | End: 2019-05-08 | Stop reason: SDUPTHER

## 2018-01-30 RX ORDER — LISINOPRIL 5 MG/1
1 TABLET ORAL DAILY
COMMUNITY
Start: 2017-08-29 | End: 2018-03-12 | Stop reason: SDUPTHER

## 2018-01-30 RX ORDER — ATORVASTATIN CALCIUM 40 MG/1
1 TABLET, FILM COATED ORAL DAILY
COMMUNITY
Start: 2013-01-10 | End: 2018-10-09 | Stop reason: SDUPTHER

## 2018-01-30 RX ORDER — PNV NO.95/FERROUS FUM/FOLIC AC 28MG-0.8MG
1 TABLET ORAL 2 TIMES DAILY
COMMUNITY
Start: 2015-03-26

## 2018-01-30 RX ORDER — FINASTERIDE 5 MG/1
1 TABLET, FILM COATED ORAL DAILY
COMMUNITY
Start: 2014-12-13 | End: 2019-01-31 | Stop reason: SDUPTHER

## 2018-01-30 RX ORDER — TAMSULOSIN HYDROCHLORIDE 0.4 MG/1
1 CAPSULE ORAL
COMMUNITY
Start: 2014-10-10 | End: 2018-04-22 | Stop reason: SDUPTHER

## 2018-01-31 ENCOUNTER — OFFICE VISIT (OUTPATIENT)
Dept: FAMILY MEDICINE CLINIC | Facility: CLINIC | Age: 78
End: 2018-01-31
Payer: COMMERCIAL

## 2018-01-31 VITALS
DIASTOLIC BLOOD PRESSURE: 86 MMHG | WEIGHT: 156.2 LBS | SYSTOLIC BLOOD PRESSURE: 128 MMHG | HEIGHT: 67 IN | BODY MASS INDEX: 24.52 KG/M2

## 2018-01-31 DIAGNOSIS — I10 HYPERTENSION, UNSPECIFIED TYPE: ICD-10-CM

## 2018-01-31 DIAGNOSIS — E78.5 HYPERLIPIDEMIA, UNSPECIFIED HYPERLIPIDEMIA TYPE: ICD-10-CM

## 2018-01-31 DIAGNOSIS — R73.03 PREDIABETES: ICD-10-CM

## 2018-01-31 DIAGNOSIS — Z12.11 SCREENING FOR COLON CANCER: Primary | ICD-10-CM

## 2018-01-31 PROCEDURE — 99214 OFFICE O/P EST MOD 30 MIN: CPT | Performed by: FAMILY MEDICINE

## 2018-01-31 NOTE — PATIENT INSTRUCTIONS
Call if any problems  Continue present meds as directed  Call if any problems  HTN stable  Low cholesterol diet encouraged and to f-up with Cardiology as directed  F-up with urology for prostate issues  Monitor anemia, currently stable  Rec  low sugar diet for hx of prediabetes  Recheck 3 months

## 2018-01-31 NOTE — PROGRESS NOTES
Assessment/Plan:    No problem-specific Assessment & Plan notes found for this encounter  Diagnoses and all orders for this visit:    Screening for colon cancer  -     Cancel: Ambulatory referral to Gastroenterology; Future    Hypertension, unspecified type  Comments:  HTN stable, take Lisinopril as directed  Prediabetes  Comments:  prediabetes stable and HGA1C at 5 8    Hyperlipidemia, unspecified hyperlipidemia type  Comments:  low cholesterol diet encouraged, lipids stable, call if any mm aches or cramps  Other orders  -     aspirin (ASPIRIN LOW DOSE) 81 MG tablet; Take 1 tablet by mouth daily  -     atorvastatin (LIPITOR) 40 mg tablet; Take 1 tablet by mouth daily  -     Cholecalciferol 1000 units tablet; Take by mouth  -     finasteride (PROSCAR) 5 mg tablet; Take 1 tablet by mouth daily  -     Omega-3 Fatty Acids (FISH OIL) 1200 MG CAPS; Take by mouth  -     Ferrous Sulfate (IRON) 325 (65 Fe) MG TABS; Take 1 tablet by mouth Twice daily  -     lisinopril (ZESTRIL) 5 mg tablet; Take 1 tablet by mouth daily  -     metoprolol tartrate (LOPRESSOR) 25 mg tablet; Take 0 5 tablets by mouth 2 (two) times a day  -     Multiple Vitamin Essential TABS; Take by mouth  -     tamsulosin (FLOMAX) 0 4 mg; Take 1 capsule by mouth          Subjective:      Patient ID: Mariaelena Mosher is a 68 y o  male  Here for routine medical f-up and to review labs, hx of prediabetes and HTN and hyperlipidemia  Here with daughter, no cp or sob, or ha  Review of Systems   Constitutional: Negative  HENT: Negative  Eyes: Negative  Respiratory: Negative  Cardiovascular: Negative  Gastrointestinal: Negative  Endocrine: Negative  Genitourinary: Negative  Musculoskeletal: Negative  Skin: Negative  Allergic/Immunologic: Negative  Neurological: Negative  Hematological: Negative  Psychiatric/Behavioral: Negative            Objective:     Physical Exam   Constitutional: He is oriented to person, place, and time  He appears well-developed and well-nourished  HENT:   Head: Normocephalic and atraumatic  Eyes: Conjunctivae and EOM are normal  Pupils are equal, round, and reactive to light  Neck: Normal range of motion  Neck supple  Cardiovascular: Normal rate, regular rhythm, normal heart sounds and intact distal pulses  Pulmonary/Chest: Effort normal and breath sounds normal    Musculoskeletal: Normal range of motion  Neurological: He is alert and oriented to person, place, and time  He has normal reflexes  Skin: Skin is warm and dry  Psychiatric: He has a normal mood and affect

## 2018-02-15 DIAGNOSIS — I25.810 CORONARY ARTERY DISEASE INVOLVING CORONARY BYPASS GRAFT OF NATIVE HEART WITHOUT ANGINA PECTORIS: Primary | ICD-10-CM

## 2018-02-15 NOTE — TELEPHONE ENCOUNTER
Patient requesting refill with [facility], [address]  90 day supply Metoprolol tart 25mg  1/2 tab bid

## 2018-03-12 ENCOUNTER — OFFICE VISIT (OUTPATIENT)
Dept: CARDIOLOGY CLINIC | Facility: CLINIC | Age: 78
End: 2018-03-12
Payer: COMMERCIAL

## 2018-03-12 VITALS
HEIGHT: 67 IN | WEIGHT: 154 LBS | BODY MASS INDEX: 24.17 KG/M2 | SYSTOLIC BLOOD PRESSURE: 138 MMHG | HEART RATE: 78 BPM | DIASTOLIC BLOOD PRESSURE: 82 MMHG

## 2018-03-12 DIAGNOSIS — I25.5 ACC/AHA STAGE C CONGESTIVE HEART FAILURE DUE TO ISCHEMIC CARDIOMYOPATHY (HCC): Primary | ICD-10-CM

## 2018-03-12 DIAGNOSIS — I50.9 ACC/AHA STAGE C CONGESTIVE HEART FAILURE DUE TO ISCHEMIC CARDIOMYOPATHY (HCC): Primary | ICD-10-CM

## 2018-03-12 DIAGNOSIS — I25.700 CORONARY ARTERY DISEASE INVOLVING CORONARY BYPASS GRAFT OF NATIVE HEART WITH UNSTABLE ANGINA PECTORIS (HCC): ICD-10-CM

## 2018-03-12 PROBLEM — Z98.890 S/P MITRAL VALVE REPAIR: Status: ACTIVE | Noted: 2018-03-12

## 2018-03-12 PROBLEM — Z95.1 HX OF CABG: Status: ACTIVE | Noted: 2018-03-12

## 2018-03-12 PROCEDURE — 99214 OFFICE O/P EST MOD 30 MIN: CPT | Performed by: INTERNAL MEDICINE

## 2018-03-12 RX ORDER — LISINOPRIL 10 MG/1
10 TABLET ORAL DAILY
Qty: 90 TABLET | Refills: 3 | Status: SHIPPED | OUTPATIENT
Start: 2018-03-12 | End: 2018-04-23 | Stop reason: SDUPTHER

## 2018-03-12 NOTE — PROGRESS NOTES
Heart Failure Outpatient Progress Note - Marva Sandoval 68 y o  male MRN: [id number]    @ Encounter: [id number]      Assessment/Plan:    Patient Active Problem List    Diagnosis Date Noted    Ischemic cardiomyopathy 03/12/2018    Hx of CABG 03/12/2018     1  Chronic systolic CHF, STage C, NYHA 2  Etiology: ICM  Echo 7/28/16: EF: 40%, LVEDd 5 9 cm    TODAY'S PLAN:    --2g sodium diet  - Daily weights    Neurohormonal Blockade:  --Beta-Blocker: metoprolol tartrate 12 5 mg BID  --ACEi, ARB or ARNi: lisinopril 5 mg   --Aldosterone Receptor Blocker:  --Diuretic: none    Sudden Cardiac Death Risk Reduction:  --ICD: none, EF > 35%    Electrical Resynchronization:  --Candidacy for BiV device: narrow QRS    Advanced Therapies (If appropriate): --Inotrope:  --LVAD/Transplant Candidacy:    # CAD with CABG x 3 2012TWI in 2,3 - no ischemic eval since CABG- EKG today- same abnormality but unchanged- pt declining stress test again- no cp or SOB  :  # hyperlipidemia: Jan 2018- HDL 54, LDL 74  Atorvastatin 40 mg daily  # Mitral valve repair with 26 mm CE annuloplasty ring   # Post op Trach and peg at that time  # polymyalgia rheumatica  #  anemia of chronic disease  # pre diabetes    HPI:   69 yo male with history of CAD with CABG x 3 in 2012 with mitral valve repair with ring, polymalgia rheumatica, BPH, ICM with last EF: 40% on June 2016 echo and moderate MR on the echo  He reportedly had VDRF after CABG with trach and PEG  Has not been admitted with heart failure  Last eval, pt declined stress test  On follow up 8/29 he denies SOB or CP  EKG today is abnormal but unchanged from FEb EKG  Interval History:  No hospitalizations    Past Medical History:   Diagnosis Date    Prediabetes        Review of Systems - Negative except no chest pain, no edema, no swelling, no dyspnea  No Known Allergies        Current Outpatient Prescriptions:     aspirin (ASPIRIN LOW DOSE) 81 MG tablet, Take 1 tablet by mouth daily, Disp: , Rfl:     atorvastatin (LIPITOR) 40 mg tablet, Take 1 tablet by mouth daily, Disp: , Rfl:     Cholecalciferol 1000 units tablet, Take by mouth, Disp: , Rfl:     Ferrous Sulfate (IRON) 325 (65 Fe) MG TABS, Take 1 tablet by mouth Twice daily, Disp: , Rfl:     finasteride (PROSCAR) 5 mg tablet, Take 1 tablet by mouth daily, Disp: , Rfl:     lisinopril (ZESTRIL) 5 mg tablet, Take 1 tablet by mouth daily, Disp: , Rfl:     metoprolol tartrate (LOPRESSOR) 25 mg tablet, Take 0 5 tablets (12 5 mg total) by mouth 2 (two) times a day, Disp: 90 tablet, Rfl: 2    Multiple Vitamin Essential TABS, Take by mouth, Disp: , Rfl:     Omega-3 Fatty Acids (FISH OIL) 1200 MG CAPS, Take by mouth, Disp: , Rfl:     tamsulosin (FLOMAX) 0 4 mg, Take 1 capsule by mouth, Disp: , Rfl:     Social History     Social History    Marital status:      Spouse name: N/A    Number of children: N/A    Years of education: N/A     Occupational History    Not on file       Social History Main Topics    Smoking status: Former Smoker    Smokeless tobacco: Not on file    Alcohol use Yes      Comment: rare    Drug use: No    Sexual activity: Not on file     Other Topics Concern    Not on file     Social History Narrative    No narrative on file       Family History   Problem Relation Age of Onset    Diabetes Mother     Heart disease Mother     Hypertension Mother     Colon cancer Father     Kidney disease Father     Heart disease Sister     Heart disease Brother     Breast cancer Paternal Aunt     Cervical cancer Paternal Aunt        Physical Exam:    Vitals:   Vitals:    03/12/18 0816   BP: 138/82   Pulse: 78     Wt Readings from Last 3 Encounters:   03/12/18 69 9 kg (154 lb)   01/31/18 70 9 kg (156 lb 3 2 oz)   12/13/17 72 1 kg (159 lb)         Physical Exam:    GEN: Minnie Taylor appears well, alert and oriented x 3, pleasant and cooperative   HEENT: pupils equal, round, and reactive to light; extraocular muscles intact  NECK: supple, no carotid bruits   HEART: regular rhythm, normal S1 and S2, no murmurs, clicks, gallops or rubs, JVP is    LUNGS: clear to auscultation bilaterally; no wheezes, rales, or rhonchi   ABDOMEN: normal bowel sounds, soft, no tenderness, no distention  EXTREMITIES: peripheral pulses normal; no clubbing, cyanosis, or edema  NEURO: no focal findings   SKIN: normal without suspicious lesions on exposed skin    Labs & Results:    Lab Results   Component Value Date    GLUCOSE 103 01/07/2017    CALCIUM 8 5 01/27/2018     01/27/2018    K 4 6 01/27/2018    CO2 29 01/27/2018     01/27/2018    BUN 19 01/27/2018    CREATININE 1 28 01/27/2018     Lab Results   Component Value Date    WBC 7 40 01/02/2018    HGB 12 3 01/02/2018    HCT 37 7 01/02/2018    MCV 95 01/02/2018     01/02/2018     No results found for: NTBNP   Lab Results   Component Value Date    CHOL 144 01/27/2018    CHOL 129 04/15/2017    CHOL 127 11/28/2015     Lab Results   Component Value Date    HDL 54 01/27/2018    HDL 64 (H) 04/15/2017    HDL 63 11/28/2015     Lab Results   Component Value Date    LDLCALC 74 01/27/2018    LDLCALC 54 04/15/2017    LDLCALC 51 11/28/2015     Lab Results   Component Value Date    TRIG 82 01/27/2018    TRIG 55 04/15/2017    TRIG 64 11/28/2015     No components found for: CHOLHDL      EKG personally reviewed by Gloria Beltran,   Counseling / Coordination of Care  Total floor / unit time spent today 25 minutes  Greater than 50% of total time was spent with the patient and / or family counseling and / or coordination of care  A description of the counseling / coordination of care: 15      Thank you for the opportunity to participate in the care of this patient    JEWEL AMEZQUITA [address]

## 2018-03-27 ENCOUNTER — HOSPITAL ENCOUNTER (OUTPATIENT)
Dept: NON INVASIVE DIAGNOSTICS | Facility: CLINIC | Age: 78
Discharge: HOME | End: 2018-03-27
Payer: COMMERCIAL

## 2018-03-27 DIAGNOSIS — I50.9 ACC/AHA STAGE C CONGESTIVE HEART FAILURE DUE TO ISCHEMIC CARDIOMYOPATHY (HCC): ICD-10-CM

## 2018-03-27 DIAGNOSIS — I25.5 ACC/AHA STAGE C CONGESTIVE HEART FAILURE DUE TO ISCHEMIC CARDIOMYOPATHY (HCC): ICD-10-CM

## 2018-03-27 PROCEDURE — 93306 TTE W/DOPPLER COMPLETE: CPT | Performed by: INTERNAL MEDICINE

## 2018-03-27 PROCEDURE — 93306 TTE W/DOPPLER COMPLETE: CPT

## 2018-03-30 ENCOUNTER — APPOINTMENT (OUTPATIENT)
Dept: LAB | Facility: HOSPITAL | Age: 78
End: 2018-03-30
Attending: INTERNAL MEDICINE
Payer: COMMERCIAL

## 2018-03-30 ENCOUNTER — TRANSCRIBE ORDERS (OUTPATIENT)
Dept: ADMINISTRATIVE | Facility: HOSPITAL | Age: 78
End: 2018-03-30

## 2018-03-30 DIAGNOSIS — M35.3 POLYMYALGIA RHEUMATICA (HCC): ICD-10-CM

## 2018-03-30 DIAGNOSIS — Z79.899 ENCOUNTER FOR LONG-TERM (CURRENT) USE OF MEDICATIONS: ICD-10-CM

## 2018-03-30 DIAGNOSIS — Z79.899 ENCOUNTER FOR LONG-TERM (CURRENT) USE OF MEDICATIONS: Primary | ICD-10-CM

## 2018-03-30 LAB
ALBUMIN SERPL BCP-MCNC: 3.2 G/DL (ref 3.5–5)
ALP SERPL-CCNC: 80 U/L (ref 46–116)
ALT SERPL W P-5'-P-CCNC: 17 U/L (ref 12–78)
ANION GAP SERPL CALCULATED.3IONS-SCNC: 7 MMOL/L (ref 4–13)
AST SERPL W P-5'-P-CCNC: 23 U/L (ref 5–45)
BASOPHILS # BLD AUTO: 0.05 THOUSANDS/ΜL (ref 0–0.1)
BASOPHILS NFR BLD AUTO: 1 % (ref 0–1)
BILIRUB SERPL-MCNC: 0.4 MG/DL (ref 0.2–1)
BUN SERPL-MCNC: 21 MG/DL (ref 5–25)
CALCIUM SERPL-MCNC: 8.9 MG/DL (ref 8.3–10.1)
CHLORIDE SERPL-SCNC: 106 MMOL/L (ref 100–108)
CO2 SERPL-SCNC: 27 MMOL/L (ref 21–32)
CREAT SERPL-MCNC: 1.19 MG/DL (ref 0.6–1.3)
CRP SERPL QL: 4.1 MG/L
EOSINOPHIL # BLD AUTO: 0.43 THOUSAND/ΜL (ref 0–0.61)
EOSINOPHIL NFR BLD AUTO: 5 % (ref 0–6)
ERYTHROCYTE [DISTWIDTH] IN BLOOD BY AUTOMATED COUNT: 14.8 % (ref 11.6–15.1)
ERYTHROCYTE [SEDIMENTATION RATE] IN BLOOD: 25 MM/HOUR (ref 0–10)
GFR SERPL CREATININE-BSD FRML MDRD: 59 ML/MIN/1.73SQ M
GLUCOSE SERPL-MCNC: 95 MG/DL (ref 65–140)
HCT VFR BLD AUTO: 37.8 % (ref 36.5–49.3)
HGB BLD-MCNC: 12.4 G/DL (ref 12–17)
LYMPHOCYTES # BLD AUTO: 1.77 THOUSANDS/ΜL (ref 0.6–4.47)
LYMPHOCYTES NFR BLD AUTO: 21 % (ref 14–44)
MCH RBC QN AUTO: 31 PG (ref 26.8–34.3)
MCHC RBC AUTO-ENTMCNC: 32.8 G/DL (ref 31.4–37.4)
MCV RBC AUTO: 95 FL (ref 82–98)
MONOCYTES # BLD AUTO: 0.94 THOUSAND/ΜL (ref 0.17–1.22)
MONOCYTES NFR BLD AUTO: 11 % (ref 4–12)
NEUTROPHILS # BLD AUTO: 5.31 THOUSANDS/ΜL (ref 1.85–7.62)
NEUTS SEG NFR BLD AUTO: 62 % (ref 43–75)
NRBC BLD AUTO-RTO: 0 /100 WBCS
PLATELET # BLD AUTO: 233 THOUSANDS/UL (ref 149–390)
PMV BLD AUTO: 10.7 FL (ref 8.9–12.7)
POTASSIUM SERPL-SCNC: 4.3 MMOL/L (ref 3.5–5.3)
PROT SERPL-MCNC: 7.2 G/DL (ref 6.4–8.2)
RBC # BLD AUTO: 4 MILLION/UL (ref 3.88–5.62)
SODIUM SERPL-SCNC: 140 MMOL/L (ref 136–145)
WBC # BLD AUTO: 8.5 THOUSAND/UL (ref 4.31–10.16)

## 2018-03-30 PROCEDURE — 85025 COMPLETE CBC W/AUTO DIFF WBC: CPT

## 2018-03-30 PROCEDURE — 80053 COMPREHEN METABOLIC PANEL: CPT

## 2018-03-30 PROCEDURE — 86140 C-REACTIVE PROTEIN: CPT

## 2018-03-30 PROCEDURE — 36415 COLL VENOUS BLD VENIPUNCTURE: CPT

## 2018-03-30 PROCEDURE — 85652 RBC SED RATE AUTOMATED: CPT

## 2018-04-22 DIAGNOSIS — N40.1 BENIGN PROSTATIC HYPERPLASIA WITH LOWER URINARY TRACT SYMPTOMS, SYMPTOM DETAILS UNSPECIFIED: Primary | ICD-10-CM

## 2018-04-22 RX ORDER — TAMSULOSIN HYDROCHLORIDE 0.4 MG/1
CAPSULE ORAL
Qty: 90 CAPSULE | Refills: 3 | Status: SHIPPED | OUTPATIENT
Start: 2018-04-22 | End: 2019-04-06 | Stop reason: SDUPTHER

## 2018-04-23 DIAGNOSIS — I50.9 ACC/AHA STAGE C CONGESTIVE HEART FAILURE DUE TO ISCHEMIC CARDIOMYOPATHY (HCC): ICD-10-CM

## 2018-04-23 DIAGNOSIS — I25.5 ACC/AHA STAGE C CONGESTIVE HEART FAILURE DUE TO ISCHEMIC CARDIOMYOPATHY (HCC): ICD-10-CM

## 2018-04-23 RX ORDER — LISINOPRIL 10 MG/1
5 TABLET ORAL DAILY
Qty: 90 TABLET | Refills: 2 | Status: SHIPPED | OUTPATIENT
Start: 2018-04-23 | End: 2018-10-09 | Stop reason: SDUPTHER

## 2018-05-01 ENCOUNTER — OFFICE VISIT (OUTPATIENT)
Dept: FAMILY MEDICINE CLINIC | Facility: CLINIC | Age: 78
End: 2018-05-01
Payer: COMMERCIAL

## 2018-05-01 VITALS
WEIGHT: 154.8 LBS | SYSTOLIC BLOOD PRESSURE: 120 MMHG | BODY MASS INDEX: 24.3 KG/M2 | HEIGHT: 67 IN | DIASTOLIC BLOOD PRESSURE: 78 MMHG

## 2018-05-01 DIAGNOSIS — E78.5 HYPERLIPIDEMIA, UNSPECIFIED HYPERLIPIDEMIA TYPE: ICD-10-CM

## 2018-05-01 DIAGNOSIS — I10 ESSENTIAL HYPERTENSION: Primary | ICD-10-CM

## 2018-05-01 DIAGNOSIS — R73.03 PREDIABETES: ICD-10-CM

## 2018-05-01 PROCEDURE — 99214 OFFICE O/P EST MOD 30 MIN: CPT | Performed by: FAMILY MEDICINE

## 2018-05-01 PROCEDURE — 1101F PT FALLS ASSESS-DOCD LE1/YR: CPT | Performed by: FAMILY MEDICINE

## 2018-05-01 NOTE — PATIENT INSTRUCTIONS
Here for f-up and doing well, Here for history of HTN and prediabetes and also hyperlipidemia  Recheck 3 months with labs  Low sugar and low cholesterol diet and diet and exercise encouraged to help  F-up with Urology as directed  F-up with Cardiology as directed for hx of CABG  He refused stress test by Cardiology recently

## 2018-05-01 NOTE — PROGRESS NOTES
Assessment/Plan:  Chief Complaint   Patient presents with    Follow-up     Patient Instructions   Here for f-up and doing well, Here for history of HTN and prediabetes and also hyperlipidemia  Recheck 3 months with labs  Low sugar and low cholesterol diet and diet and exercise encouraged to help  F-up with Urology as directed  F-up with Cardiology as directed for hx of CABG  He refused stress test by Cardiology recently  Diagnoses and all orders for this visit:    Essential hypertension  -     Comprehensive metabolic panel; Future    Hyperlipidemia, unspecified hyperlipidemia type  -     Comprehensive metabolic panel; Future    Prediabetes  -     Comprehensive metabolic panel; Future  -     HEMOGLOBIN A1C W/ EAG ESTIMATION          Subjective:      Patient ID: Jennifer Lewis is a 68 y o  male  Here for recheck and doing well and also has no cp or sob, or ha  Takes all meds as directed  Pt  With hx of prediabetes and hyperlipidemia, HTN  Takes Vitamin D as directed  Sees Urology as directed for prostate issues  The following portions of the patient's history were reviewed and updated as appropriate: allergies, current medications, past medical history and problem list     Review of Systems   Constitutional: Negative  HENT: Negative  Eyes: Negative  Respiratory: Negative  Cardiovascular: Negative  Gastrointestinal: Negative  Endocrine: Negative  Genitourinary: Negative  Musculoskeletal: Negative  Skin: Negative  Allergic/Immunologic: Negative  Neurological: Negative  Hematological: Negative  Psychiatric/Behavioral: Negative  Objective:      /78 (BP Location: Left arm, Patient Position: Sitting, Cuff Size: Standard)   Ht 5' 6 5" (1 689 m)   Wt 70 2 kg (154 lb 12 8 oz)   BMI 24 61 kg/m²          Physical Exam   Constitutional: He is oriented to person, place, and time  He appears well-developed and well-nourished     HENT:   Head: Normocephalic and atraumatic  Right Ear: External ear normal    Left Ear: External ear normal    Nose: Nose normal    Mouth/Throat: Oropharynx is clear and moist    Eyes: Conjunctivae and EOM are normal  Pupils are equal, round, and reactive to light  Neck: Normal range of motion  Neck supple  Cardiovascular: Normal rate, regular rhythm, normal heart sounds and intact distal pulses  Pulmonary/Chest: Effort normal and breath sounds normal    Musculoskeletal: Normal range of motion  Neurological: He is alert and oriented to person, place, and time  He has normal reflexes  Skin: Skin is warm and dry  Psychiatric: He has a normal mood and affect   His behavior is normal

## 2018-07-28 ENCOUNTER — APPOINTMENT (OUTPATIENT)
Dept: LAB | Facility: HOSPITAL | Age: 78
End: 2018-07-28
Payer: COMMERCIAL

## 2018-07-28 DIAGNOSIS — R73.03 PREDIABETES: ICD-10-CM

## 2018-07-28 DIAGNOSIS — I10 ESSENTIAL HYPERTENSION: ICD-10-CM

## 2018-07-28 DIAGNOSIS — E78.5 HYPERLIPIDEMIA, UNSPECIFIED HYPERLIPIDEMIA TYPE: ICD-10-CM

## 2018-07-28 LAB
ALBUMIN SERPL BCP-MCNC: 3.4 G/DL (ref 3.5–5)
ALP SERPL-CCNC: 85 U/L (ref 46–116)
ALT SERPL W P-5'-P-CCNC: 20 U/L (ref 12–78)
ANION GAP SERPL CALCULATED.3IONS-SCNC: 7 MMOL/L (ref 4–13)
AST SERPL W P-5'-P-CCNC: 21 U/L (ref 5–45)
BILIRUB SERPL-MCNC: 0.89 MG/DL (ref 0.2–1)
BUN SERPL-MCNC: 21 MG/DL (ref 5–25)
CALCIUM SERPL-MCNC: 9.6 MG/DL (ref 8.3–10.1)
CHLORIDE SERPL-SCNC: 103 MMOL/L (ref 100–108)
CO2 SERPL-SCNC: 30 MMOL/L (ref 21–32)
CREAT SERPL-MCNC: 1.24 MG/DL (ref 0.6–1.3)
EST. AVERAGE GLUCOSE BLD GHB EST-MCNC: 126 MG/DL
GFR SERPL CREATININE-BSD FRML MDRD: 56 ML/MIN/1.73SQ M
GLUCOSE P FAST SERPL-MCNC: 92 MG/DL (ref 65–99)
HBA1C MFR BLD: 6 % (ref 4.2–6.3)
POTASSIUM SERPL-SCNC: 4.5 MMOL/L (ref 3.5–5.3)
PROT SERPL-MCNC: 8 G/DL (ref 6.4–8.2)
SODIUM SERPL-SCNC: 140 MMOL/L (ref 136–145)

## 2018-07-28 PROCEDURE — 80053 COMPREHEN METABOLIC PANEL: CPT

## 2018-07-28 PROCEDURE — 36415 COLL VENOUS BLD VENIPUNCTURE: CPT | Performed by: FAMILY MEDICINE

## 2018-07-28 PROCEDURE — 83036 HEMOGLOBIN GLYCOSYLATED A1C: CPT | Performed by: FAMILY MEDICINE

## 2018-08-01 ENCOUNTER — OFFICE VISIT (OUTPATIENT)
Dept: FAMILY MEDICINE CLINIC | Facility: CLINIC | Age: 78
End: 2018-08-01
Payer: COMMERCIAL

## 2018-08-01 VITALS
BODY MASS INDEX: 24.14 KG/M2 | WEIGHT: 153.8 LBS | DIASTOLIC BLOOD PRESSURE: 72 MMHG | SYSTOLIC BLOOD PRESSURE: 126 MMHG | HEIGHT: 67 IN

## 2018-08-01 DIAGNOSIS — R73.03 PREDIABETES: ICD-10-CM

## 2018-08-01 DIAGNOSIS — E78.5 HYPERLIPIDEMIA, UNSPECIFIED HYPERLIPIDEMIA TYPE: ICD-10-CM

## 2018-08-01 DIAGNOSIS — I10 ESSENTIAL HYPERTENSION: Primary | ICD-10-CM

## 2018-08-01 DIAGNOSIS — I25.119 CORONARY ARTERY DISEASE WITH ANGINA PECTORIS, UNSPECIFIED VESSEL OR LESION TYPE, UNSPECIFIED WHETHER NATIVE OR TRANSPLANTED HEART (HCC): ICD-10-CM

## 2018-08-01 PROCEDURE — 99214 OFFICE O/P EST MOD 30 MIN: CPT | Performed by: FAMILY MEDICINE

## 2018-08-01 PROCEDURE — 3008F BODY MASS INDEX DOCD: CPT | Performed by: FAMILY MEDICINE

## 2018-08-01 NOTE — PROGRESS NOTES
Assessment/Plan:  Chief Complaint   Patient presents with    Follow-up    Hypertension    Hyperlipidemia     Patient Instructions   Here for f-up and doing well, Here for history of HTN and prediabetes and also hyperlipidemia  Recheck 6 months with labs and 3 months for BP check  Low sugar and low cholesterol diet and diet and exercise encouraged to help  F-up with Urology as directed  F-up with Cardiology as directed for hx of CABG  He refused stress test by Cardiology in past  His HGA1C is at 6 0 and is worse since last labs, encouraged low sugar diet  No problem-specific Assessment & Plan notes found for this encounter  Diagnoses and all orders for this visit:    Essential hypertension    Hyperlipidemia, unspecified hyperlipidemia type    Prediabetes    Coronary artery disease with angina pectoris, unspecified vessel or lesion type, unspecified whether native or transplanted heart ([facility] [address] )          Subjective:      Patient ID: Gavi Negro is a 68 y o  male  No cp or sob, or ha  Here for HTN and cholesterol recheck  No mm aches and doing well  Hypertension     Hyperlipidemia         The following portions of the patient's history were reviewed and updated as appropriate: allergies, current medications, past family history, past medical history, past social history, past surgical history and problem list     Review of Systems   Constitutional: Negative  HENT: Negative  Eyes: Negative  Respiratory: Negative  Cardiovascular: Negative  Gastrointestinal: Negative  Endocrine: Negative  Genitourinary: Negative  Musculoskeletal: Negative  Skin: Negative  Allergic/Immunologic: Negative  Neurological: Negative  Hematological: Negative  Psychiatric/Behavioral: Negative            Objective:      /72   Ht 5' 6 5" (1 689 m)   Wt 69 8 kg (153 lb 12 8 oz)   BMI 24 45 kg/m²          Physical Exam   Constitutional: He is oriented to person, place, and time  He appears well-developed and well-nourished  HENT:   Head: Normocephalic and atraumatic  Right Ear: External ear normal    Left Ear: External ear normal    Nose: Nose normal    Mouth/Throat: Oropharynx is clear and moist    Eyes: Conjunctivae and EOM are normal  Pupils are equal, round, and reactive to light  Neck: Normal range of motion  Neck supple  Cardiovascular: Normal rate, regular rhythm, normal heart sounds and intact distal pulses  Pulmonary/Chest: Effort normal and breath sounds normal    Musculoskeletal: Normal range of motion  Neurological: He is alert and oriented to person, place, and time  He has normal reflexes  Skin: Skin is warm and dry  Psychiatric: He has a normal mood and affect   His behavior is normal

## 2018-08-01 NOTE — PATIENT INSTRUCTIONS
Here for f-up and doing well, Here for history of HTN and prediabetes and also hyperlipidemia  Recheck 6 months with labs and 3 months for BP check  Low sugar and low cholesterol diet and diet and exercise encouraged to help  F-up with Urology as directed  F-up with Cardiology as directed for hx of CABG  He refused stress test by Cardiology in past  His HGA1C is at 6 0 and is worse since last labs, encouraged low sugar diet

## 2018-08-03 DIAGNOSIS — N40.0 BENIGN PROSTATIC HYPERPLASIA, UNSPECIFIED WHETHER LOWER URINARY TRACT SYMPTOMS PRESENT: Primary | ICD-10-CM

## 2018-08-06 RX ORDER — FINASTERIDE 5 MG/1
5 TABLET, FILM COATED ORAL DAILY
Qty: 90 TABLET | Refills: 1 | Status: SHIPPED | OUTPATIENT
Start: 2018-08-06 | End: 2018-10-16 | Stop reason: SDUPTHER

## 2018-09-29 ENCOUNTER — APPOINTMENT (OUTPATIENT)
Dept: LAB | Facility: HOSPITAL | Age: 78
End: 2018-09-29
Attending: INTERNAL MEDICINE
Payer: COMMERCIAL

## 2018-09-29 ENCOUNTER — TRANSCRIBE ORDERS (OUTPATIENT)
Dept: ADMINISTRATIVE | Facility: HOSPITAL | Age: 78
End: 2018-09-29

## 2018-09-29 DIAGNOSIS — E55.9 VITAMIN D DEFICIENCY DISEASE: ICD-10-CM

## 2018-09-29 DIAGNOSIS — M35.3 POLYMYALGIA RHEUMATICA (HCC): ICD-10-CM

## 2018-09-29 DIAGNOSIS — Z79.899 ENCOUNTER FOR LONG-TERM (CURRENT) USE OF MEDICATIONS: ICD-10-CM

## 2018-09-29 DIAGNOSIS — Z79.899 NEED FOR PROPHYLACTIC CHEMOTHERAPY: ICD-10-CM

## 2018-09-29 DIAGNOSIS — Z79.899 ENCOUNTER FOR LONG-TERM (CURRENT) USE OF MEDICATIONS: Primary | ICD-10-CM

## 2018-09-29 LAB
25(OH)D3 SERPL-MCNC: 50.7 NG/ML (ref 30–100)
ALBUMIN SERPL BCP-MCNC: 3.4 G/DL (ref 3.5–5)
ALP SERPL-CCNC: 89 U/L (ref 46–116)
ALT SERPL W P-5'-P-CCNC: 22 U/L (ref 12–78)
ANION GAP SERPL CALCULATED.3IONS-SCNC: 0 MMOL/L (ref 4–13)
AST SERPL W P-5'-P-CCNC: 21 U/L (ref 5–45)
BASOPHILS # BLD AUTO: 0.05 THOUSANDS/ΜL (ref 0–0.1)
BASOPHILS NFR BLD AUTO: 1 % (ref 0–1)
BILIRUB SERPL-MCNC: 0.42 MG/DL (ref 0.2–1)
BUN SERPL-MCNC: 24 MG/DL (ref 5–25)
CALCIUM SERPL-MCNC: 9.3 MG/DL (ref 8.3–10.1)
CHLORIDE SERPL-SCNC: 107 MMOL/L (ref 100–108)
CO2 SERPL-SCNC: 34 MMOL/L (ref 21–32)
CREAT SERPL-MCNC: 1.34 MG/DL (ref 0.6–1.3)
CRP SERPL QL: 4.7 MG/L
EOSINOPHIL # BLD AUTO: 0.45 THOUSAND/ΜL (ref 0–0.61)
EOSINOPHIL NFR BLD AUTO: 7 % (ref 0–6)
ERYTHROCYTE [DISTWIDTH] IN BLOOD BY AUTOMATED COUNT: 14.1 % (ref 11.6–15.1)
ERYTHROCYTE [SEDIMENTATION RATE] IN BLOOD: 33 MM/HOUR (ref 0–10)
GFR SERPL CREATININE-BSD FRML MDRD: 50 ML/MIN/1.73SQ M
GLUCOSE SERPL-MCNC: 103 MG/DL (ref 65–140)
HCT VFR BLD AUTO: 40.4 % (ref 36.5–49.3)
HGB BLD-MCNC: 13.2 G/DL (ref 12–17)
IMM GRANULOCYTES # BLD AUTO: 0.02 THOUSAND/UL (ref 0–0.2)
IMM GRANULOCYTES NFR BLD AUTO: 0 % (ref 0–2)
LYMPHOCYTES # BLD AUTO: 1.69 THOUSANDS/ΜL (ref 0.6–4.47)
LYMPHOCYTES NFR BLD AUTO: 24 % (ref 14–44)
MCH RBC QN AUTO: 31.4 PG (ref 26.8–34.3)
MCHC RBC AUTO-ENTMCNC: 32.7 G/DL (ref 31.4–37.4)
MCV RBC AUTO: 96 FL (ref 82–98)
MONOCYTES # BLD AUTO: 0.9 THOUSAND/ΜL (ref 0.17–1.22)
MONOCYTES NFR BLD AUTO: 13 % (ref 4–12)
NEUTROPHILS # BLD AUTO: 3.83 THOUSANDS/ΜL (ref 1.85–7.62)
NEUTS SEG NFR BLD AUTO: 55 % (ref 43–75)
NRBC BLD AUTO-RTO: 0 /100 WBCS
PLATELET # BLD AUTO: 246 THOUSANDS/UL (ref 149–390)
PMV BLD AUTO: 9.6 FL (ref 8.9–12.7)
POTASSIUM SERPL-SCNC: 5.8 MMOL/L (ref 3.5–5.3)
PROT SERPL-MCNC: 7.9 G/DL (ref 6.4–8.2)
RBC # BLD AUTO: 4.2 MILLION/UL (ref 3.88–5.62)
SODIUM SERPL-SCNC: 141 MMOL/L (ref 136–145)
WBC # BLD AUTO: 6.94 THOUSAND/UL (ref 4.31–10.16)

## 2018-09-29 PROCEDURE — 86140 C-REACTIVE PROTEIN: CPT

## 2018-09-29 PROCEDURE — 85025 COMPLETE CBC W/AUTO DIFF WBC: CPT

## 2018-09-29 PROCEDURE — 36415 COLL VENOUS BLD VENIPUNCTURE: CPT

## 2018-09-29 PROCEDURE — 82306 VITAMIN D 25 HYDROXY: CPT

## 2018-09-29 PROCEDURE — 85652 RBC SED RATE AUTOMATED: CPT

## 2018-09-29 PROCEDURE — 80053 COMPREHEN METABOLIC PANEL: CPT

## 2018-10-06 ENCOUNTER — TRANSCRIBE ORDERS (OUTPATIENT)
Dept: ADMINISTRATIVE | Facility: HOSPITAL | Age: 78
End: 2018-10-06

## 2018-10-06 ENCOUNTER — APPOINTMENT (OUTPATIENT)
Dept: LAB | Facility: HOSPITAL | Age: 78
End: 2018-10-06
Attending: INTERNAL MEDICINE
Payer: COMMERCIAL

## 2018-10-06 DIAGNOSIS — E87.5 HYPERPOTASSEMIA: Primary | ICD-10-CM

## 2018-10-06 DIAGNOSIS — E87.5 HYPERPOTASSEMIA: ICD-10-CM

## 2018-10-06 LAB — POTASSIUM SERPL-SCNC: 5.6 MMOL/L (ref 3.5–5.3)

## 2018-10-06 PROCEDURE — 84132 ASSAY OF SERUM POTASSIUM: CPT

## 2018-10-06 PROCEDURE — 36415 COLL VENOUS BLD VENIPUNCTURE: CPT

## 2018-10-09 DIAGNOSIS — I50.9 ACC/AHA STAGE C CONGESTIVE HEART FAILURE DUE TO ISCHEMIC CARDIOMYOPATHY (HCC): ICD-10-CM

## 2018-10-09 DIAGNOSIS — E78.5 HYPERLIPIDEMIA, UNSPECIFIED HYPERLIPIDEMIA TYPE: Primary | ICD-10-CM

## 2018-10-09 DIAGNOSIS — I25.5 ACC/AHA STAGE C CONGESTIVE HEART FAILURE DUE TO ISCHEMIC CARDIOMYOPATHY (HCC): ICD-10-CM

## 2018-10-09 RX ORDER — LISINOPRIL 10 MG/1
10 TABLET ORAL DAILY
Qty: 90 TABLET | Refills: 0 | Status: SHIPPED | OUTPATIENT
Start: 2018-10-09 | End: 2018-10-16 | Stop reason: SDUPTHER

## 2018-10-09 RX ORDER — ATORVASTATIN CALCIUM 40 MG/1
40 TABLET, FILM COATED ORAL DAILY
Qty: 90 TABLET | Refills: 3 | Status: SHIPPED | OUTPATIENT
Start: 2018-10-09 | End: 2019-10-01 | Stop reason: SDUPTHER

## 2018-10-09 NOTE — TELEPHONE ENCOUNTER
Fax request from [facility] 90 day supply  Atorvastatin 40mg daily  Lisinopril 10mg daily    Pending ov 11/9/18

## 2018-10-16 ENCOUNTER — OFFICE VISIT (OUTPATIENT)
Dept: FAMILY MEDICINE CLINIC | Facility: CLINIC | Age: 78
End: 2018-10-16
Payer: COMMERCIAL

## 2018-10-16 VITALS
DIASTOLIC BLOOD PRESSURE: 78 MMHG | SYSTOLIC BLOOD PRESSURE: 118 MMHG | HEIGHT: 67 IN | WEIGHT: 154.6 LBS | BODY MASS INDEX: 24.27 KG/M2

## 2018-10-16 DIAGNOSIS — Z23 NEED FOR PNEUMOCOCCAL VACCINATION: Primary | ICD-10-CM

## 2018-10-16 DIAGNOSIS — R73.03 PREDIABETES: ICD-10-CM

## 2018-10-16 DIAGNOSIS — I10 ESSENTIAL HYPERTENSION: ICD-10-CM

## 2018-10-16 DIAGNOSIS — Z23 NEED FOR INFLUENZA VACCINATION: ICD-10-CM

## 2018-10-16 DIAGNOSIS — I25.5 ACC/AHA STAGE C CONGESTIVE HEART FAILURE DUE TO ISCHEMIC CARDIOMYOPATHY (HCC): ICD-10-CM

## 2018-10-16 DIAGNOSIS — E87.5 HYPERKALEMIA: ICD-10-CM

## 2018-10-16 DIAGNOSIS — I50.9 ACC/AHA STAGE C CONGESTIVE HEART FAILURE DUE TO ISCHEMIC CARDIOMYOPATHY (HCC): ICD-10-CM

## 2018-10-16 PROCEDURE — 3008F BODY MASS INDEX DOCD: CPT | Performed by: FAMILY MEDICINE

## 2018-10-16 PROCEDURE — 99214 OFFICE O/P EST MOD 30 MIN: CPT | Performed by: FAMILY MEDICINE

## 2018-10-16 PROCEDURE — 1160F RVW MEDS BY RX/DR IN RCRD: CPT | Performed by: FAMILY MEDICINE

## 2018-10-16 RX ORDER — LISINOPRIL 5 MG/1
5 TABLET ORAL DAILY
Qty: 30 TABLET | Refills: 5 | Status: SHIPPED | OUTPATIENT
Start: 2018-10-16 | End: 2019-04-07 | Stop reason: SDUPTHER

## 2018-10-16 NOTE — PROGRESS NOTES
Assessment/Plan:  Chief Complaint   Patient presents with    Discuss Lab Work     Patient Instructions   Here for f-up and doing well, Here for history of HTN and prediabetes and also hyperlipidemia  Recheck 3 months for BP check  Low sugar and low cholesterol diet and diet and exercise encouraged to help  F-up with Urology as directed  F-up with Cardiology as directed for hx of CABG  He refused stress test by Cardiology in past  His HGA1C is at 6 0 and is worse since last labs, encouraged low sugar diet  No problem-specific Assessment & Plan notes found for this encounter  Diagnoses and all orders for this visit:    Need for pneumococcal vaccination  -     PNEUMOCOCCAL POLYSACCHARIDE VACCINE 23-VALENT =>1YO SQ IM    Need for influenza vaccination  -     influenza vaccine, [id number], high-dose, PF 0 5 mL, for patients 65 yr+ (FLUZONE HIGH-DOSE)    Hyperkalemia    Essential hypertension    Prediabetes  -     Comprehensive metabolic panel; Future  -     Hemoglobin A1C    ACC/AHA stage C congestive heart failure due to ischemic cardiomyopathy (HCC)  -     lisinopril (ZESTRIL) 5 mg tablet; Take 1 tablet (5 mg total) by mouth daily          Subjective:      Patient ID: Jannet Fishman is a 66 y o  male  Here for recheck and states his potassium was high  No cp or sob, or ha  He also has a hx of prediabetes and hyperlipidemia and HTN  His orthopedic doctor rec  He f-up with our office for elevated potassium which has gotten better recently  It is now 5 6 from 5 8  No palpitations  He does not eat any OJ or bananas daily  He sees Cardiology in 2 weeks  The following portions of the patient's history were reviewed and updated as appropriate: allergies, current medications, past family history, past medical history, past social history, past surgical history and problem list     Review of Systems   Constitutional: Negative  HENT: Negative  Eyes: Negative  Respiratory: Negative  Cardiovascular: Negative  Gastrointestinal: Negative  Endocrine: Negative  Genitourinary: Negative  Musculoskeletal: Negative  Skin: Negative  Allergic/Immunologic: Negative  Neurological: Negative  Hematological: Negative  Psychiatric/Behavioral: Negative  Objective:      /78 (BP Location: Left arm, Patient Position: Sitting, Cuff Size: Standard)   Ht 5' 6 5" (1 689 m)   Wt 70 1 kg (154 lb 9 6 oz)   BMI 24 58 kg/m²          Physical Exam   Constitutional: He is oriented to person, place, and time  He appears well-developed and well-nourished  HENT:   Head: Normocephalic and atraumatic  Right Ear: External ear normal    Left Ear: External ear normal    Nose: Nose normal    Mouth/Throat: Oropharynx is clear and moist    Eyes: Pupils are equal, round, and reactive to light  Conjunctivae and EOM are normal    Neck: Normal range of motion  Neck supple  Cardiovascular: Normal rate, regular rhythm, normal heart sounds and intact distal pulses  Pulmonary/Chest: Effort normal and breath sounds normal    Musculoskeletal: Normal range of motion  Neurological: He is alert and oriented to person, place, and time  He has normal reflexes  Skin: Skin is warm and dry  Psychiatric: He has a normal mood and affect   His behavior is normal

## 2018-10-16 NOTE — PATIENT INSTRUCTIONS
Here for f-up and doing well, Here for history of hyperkalemia recently and hx of HTN and prediabetes and also hyperlipidemia  Recheck 2 weeks for BP check with lower dose of lisinopril at 5 mg daily  F-up with Cardiology as directed for hx of CABG and also he will notify them of his elevated potassium

## 2018-10-20 ENCOUNTER — APPOINTMENT (OUTPATIENT)
Dept: LAB | Facility: HOSPITAL | Age: 78
End: 2018-10-20
Payer: COMMERCIAL

## 2018-10-20 DIAGNOSIS — R73.03 PREDIABETES: ICD-10-CM

## 2018-10-20 LAB
ALBUMIN SERPL BCP-MCNC: 3.3 G/DL (ref 3.5–5)
ALP SERPL-CCNC: 81 U/L (ref 46–116)
ALT SERPL W P-5'-P-CCNC: 25 U/L (ref 12–78)
ANION GAP SERPL CALCULATED.3IONS-SCNC: 5 MMOL/L (ref 4–13)
AST SERPL W P-5'-P-CCNC: 24 U/L (ref 5–45)
BILIRUB SERPL-MCNC: 0.68 MG/DL (ref 0.2–1)
BUN SERPL-MCNC: 25 MG/DL (ref 5–25)
CALCIUM SERPL-MCNC: 9.2 MG/DL (ref 8.3–10.1)
CHLORIDE SERPL-SCNC: 107 MMOL/L (ref 100–108)
CO2 SERPL-SCNC: 29 MMOL/L (ref 21–32)
CREAT SERPL-MCNC: 1.25 MG/DL (ref 0.6–1.3)
EST. AVERAGE GLUCOSE BLD GHB EST-MCNC: 128 MG/DL
GFR SERPL CREATININE-BSD FRML MDRD: 55 ML/MIN/1.73SQ M
GLUCOSE P FAST SERPL-MCNC: 90 MG/DL (ref 65–99)
HBA1C MFR BLD: 6.1 % (ref 4.2–6.3)
POTASSIUM SERPL-SCNC: 5 MMOL/L (ref 3.5–5.3)
PROT SERPL-MCNC: 7.5 G/DL (ref 6.4–8.2)
SODIUM SERPL-SCNC: 141 MMOL/L (ref 136–145)

## 2018-10-20 PROCEDURE — 83036 HEMOGLOBIN GLYCOSYLATED A1C: CPT | Performed by: FAMILY MEDICINE

## 2018-10-20 PROCEDURE — 80053 COMPREHEN METABOLIC PANEL: CPT

## 2018-10-20 PROCEDURE — 36415 COLL VENOUS BLD VENIPUNCTURE: CPT | Performed by: FAMILY MEDICINE

## 2018-11-07 ENCOUNTER — OFFICE VISIT (OUTPATIENT)
Dept: FAMILY MEDICINE CLINIC | Facility: CLINIC | Age: 78
End: 2018-11-07
Payer: COMMERCIAL

## 2018-11-07 VITALS
BODY MASS INDEX: 24.33 KG/M2 | HEIGHT: 67 IN | WEIGHT: 155 LBS | SYSTOLIC BLOOD PRESSURE: 132 MMHG | DIASTOLIC BLOOD PRESSURE: 86 MMHG

## 2018-11-07 DIAGNOSIS — N40.0 BENIGN PROSTATIC HYPERPLASIA, UNSPECIFIED WHETHER LOWER URINARY TRACT SYMPTOMS PRESENT: ICD-10-CM

## 2018-11-07 DIAGNOSIS — I25.5 ISCHEMIC CARDIOMYOPATHY: ICD-10-CM

## 2018-11-07 DIAGNOSIS — I10 ESSENTIAL HYPERTENSION: Primary | ICD-10-CM

## 2018-11-07 DIAGNOSIS — E78.5 HYPERLIPIDEMIA, UNSPECIFIED HYPERLIPIDEMIA TYPE: ICD-10-CM

## 2018-11-07 DIAGNOSIS — R73.03 PREDIABETES: ICD-10-CM

## 2018-11-07 PROCEDURE — 3075F SYST BP GE 130 - 139MM HG: CPT | Performed by: FAMILY MEDICINE

## 2018-11-07 PROCEDURE — 3079F DIAST BP 80-89 MM HG: CPT | Performed by: FAMILY MEDICINE

## 2018-11-07 PROCEDURE — 99214 OFFICE O/P EST MOD 30 MIN: CPT | Performed by: FAMILY MEDICINE

## 2018-11-07 PROCEDURE — 1036F TOBACCO NON-USER: CPT | Performed by: FAMILY MEDICINE

## 2018-11-07 NOTE — PROGRESS NOTES
Assessment/Plan:  Chief Complaint   Patient presents with    Follow-up    Hypertension     Patient Instructions   Here for f-up and doing well, Here for history of HTN and prediabetes and also hyperlipidemia  Recheck 3 months for BP check and updated labs for hyperlipidemia and prediabetes   Low sugar and low cholesterol diet and diet and exercise encouraged to help  F-up with Urology as directed - they were going to call him in the next month for hx of BPH  F-up with Cardiology as directed for hx of CABG this Friday  He refused stress test by Cardiology in past  His HGA1C is at 6 1 and is worse since last labs, encouraged low sugar diet  Call if any problems  No problem-specific Assessment & Plan notes found for this encounter  Diagnoses and all orders for this visit:    Essential hypertension  -     Comprehensive metabolic panel; Future    Prediabetes  -     Comprehensive metabolic panel; Future  -     Hemoglobin A1C    Ischemic cardiomyopathy    Hyperlipidemia, unspecified hyperlipidemia type  -     Comprehensive metabolic panel; Future  -     Lipid Panel with Direct LDL reflex; Future    Benign prostatic hyperplasia, unspecified whether lower urinary tract symptoms present          Subjective:      Patient ID: Kathleen Hi is a 66 y o  male  Here for f-up and doing well and does not get flu shot, takes all meds as directed and also he has a hx of         The following portions of the patient's history were reviewed and updated as appropriate: allergies, current medications, past family history, past medical history, past social history, past surgical history and problem list     Review of Systems   Constitutional: Negative  HENT: Negative  Eyes: Negative  Respiratory: Negative  Cardiovascular: Negative  Gastrointestinal: Negative  Endocrine: Negative  Genitourinary: Negative  BPH symptoms stable   Musculoskeletal: Negative  Skin: Negative  Allergic/Immunologic: Negative  Neurological: Negative  Hematological: Negative  Psychiatric/Behavioral: Negative  Objective:      /86   Ht 5' 7" (1 702 m)   Wt 70 3 kg (155 lb)   BMI 24 28 kg/m²          Physical Exam   Constitutional: He is oriented to person, place, and time  He appears well-developed and well-nourished  HENT:   Head: Normocephalic and atraumatic  Right Ear: External ear normal    Left Ear: External ear normal    Nose: Nose normal    Mouth/Throat: Oropharynx is clear and moist    Eyes: Pupils are equal, round, and reactive to light  Conjunctivae and EOM are normal    Neck: Normal range of motion  Neck supple  Cardiovascular: Normal rate, regular rhythm, normal heart sounds and intact distal pulses  Pulmonary/Chest: Effort normal and breath sounds normal    Musculoskeletal: Normal range of motion  Neurological: He is alert and oriented to person, place, and time  He has normal reflexes  Skin: Skin is warm and dry  Psychiatric: He has a normal mood and affect   His behavior is normal

## 2018-11-07 NOTE — PATIENT INSTRUCTIONS
Here for f-up and doing well, Here for history of HTN and prediabetes and also hyperlipidemia  Recheck 3 months for BP check and updated labs for hyperlipidemia and prediabetes   Low sugar and low cholesterol diet and diet and exercise encouraged to help  F-up with Urology as directed - they were going to call him in the next month for hx of BPH  F-up with Cardiology as directed for hx of CABG this Friday  He refused stress test by Cardiology in past  His HGA1C is at 6 1 and is worse since last labs, encouraged low sugar diet  Call if any problems

## 2018-11-08 DIAGNOSIS — I25.810 CORONARY ARTERY DISEASE INVOLVING CORONARY BYPASS GRAFT OF NATIVE HEART WITHOUT ANGINA PECTORIS: ICD-10-CM

## 2018-11-08 NOTE — PROGRESS NOTES
Heart Failure Outpatient Progress Note - Marlyn Sandoval 66 y o  male MRN: [id number]    @ Encounter: [id number]      Assessment/Plan:    Patient Active Problem List    Diagnosis Date Noted    Benign prostatic hyperplasia 11/07/2018    Hyperlipidemia 11/07/2018    Prediabetes 11/07/2018    Essential hypertension 11/07/2018    Ischemic cardiomyopathy 03/12/2018    Hx of CABG 03/12/2018    S/P mitral valve repair 03/12/2018     1  Chronic systolic CHF, STage C, NYHA 2  Etiology: ICM  Echo 3/27/18: EF: 45%, LVEDd 6 1 cm  Moderate to severe MR, eccentric  PASP 30 mmHg  Weight: 155 lbs, stable    Neurohormonal Blockade:  --Beta-Blocker: metoprolol tartrate 12 5 mg BID  --ACEi, ARB or ARNi: lisinopril 5 mg   --Aldosterone Receptor Blocker:  --Diuretic: none    Sudden Cardiac Death Risk Reduction:  --ICD: none, EF > 35%    Electrical Resynchronization:  --Candidacy for BiV device: narrow QRS    Advanced Therapies (If appropriate): --Inotrope:  --LVAD/Transplant Candidacy:    # CAD with CABG x 3 2012  TWI in 2,3 - no ischemic eval since CABG- EKG today- same abnormality but unchanged- pt declining stress test again- no cp or SOB  :  # hyperlipidemia: Jan 2018- HDL 54, LDL 74  Atorvastatin 40 mg daily  # Mitral valve repair with 26 mm CE annuloplasty ring   Has moderate to severe MR on 3/27/18 echo - eccentric  # Post op Trach and peg at that time  # polymyalgia rheumatica  #  anemia of chronic disease  # pre diabetes    TODAY'S PLAN:  No change in meds, stable  --2g sodium diet  - Daily weights    HPI:   65 yo male with history of CAD with CABG x 3 in 2012 with mitral valve repair with ring, polymalgia rheumatica, BPH, ICM with last EF: 40% on June 2016 echo and moderate MR on the echo  He reportedly had VDRF after CABG with trach and PEG  Has not been admitted with heart failure  Last eval, pt declined stress test  On follow up 8/29 he denies SOB or CP  EKG today is abnormal but unchanged from Feb EKG  Interval History:  K was 5 on 10/20, lisinopril down to 5 mg    Past Medical History:   Diagnosis Date    Prediabetes        Review of Systems - no new SOB, no lightheadedness, no chest pain  Still working    No Known Allergies    Current Outpatient Prescriptions:     aspirin (ASPIRIN LOW DOSE) 81 MG tablet, Take 1 tablet by mouth daily, Disp: , Rfl:     atorvastatin (LIPITOR) 40 mg tablet, Take 1 tablet (40 mg total) by mouth daily, Disp: 90 tablet, Rfl: 3    Cholecalciferol 1000 units tablet, Take by mouth, Disp: , Rfl:     Ferrous Sulfate (IRON) 325 (65 Fe) MG TABS, Take 1 tablet by mouth Twice daily, Disp: , Rfl:     finasteride (PROSCAR) 5 mg tablet, Take 1 tablet by mouth daily, Disp: , Rfl:     lisinopril (ZESTRIL) 5 mg tablet, Take 1 tablet (5 mg total) by mouth daily, Disp: 30 tablet, Rfl: 5    metoprolol tartrate (LOPRESSOR) 25 mg tablet, TAKE 0 5 TABLETS (12 5 MG TOTAL) BY MOUTH 2 (TWO) TIMES A DAY, Disp: 90 tablet, Rfl: 2    Multiple Vitamin Essential TABS, Take by mouth, Disp: , Rfl:     Omega-3 Fatty Acids (FISH OIL) 1200 MG CAPS, Take by mouth, Disp: , Rfl:     tamsulosin (FLOMAX) 0 4 mg, TAKE 1 CAPSULE AT BEDTIME, Disp: 90 capsule, Rfl: 3    Social History     Social History    Marital status:      Spouse name: N/A    Number of children: N/A    Years of education: N/A     Occupational History    Not on file       Social History Main Topics    Smoking status: Former Smoker    Smokeless tobacco: Never Used    Alcohol use Yes      Comment: rare    Drug use: No    Sexual activity: Not on file     Other Topics Concern    Not on file     Social History Narrative    No narrative on file       Family History   Problem Relation Age of Onset    Diabetes Mother     Heart disease Mother     Hypertension Mother     Colon cancer Father     Kidney disease Father     Heart disease Sister     Heart disease Brother     Breast cancer Paternal Aunt     Cervical cancer Paternal Aunt        Physical Exam:    Vitals:   Vitals:    11/09/18 0806   BP: 115/70   Pulse: 79     Wt Readings from Last 3 Encounters:   11/09/18 70 7 kg (155 lb 12 8 oz)   11/07/18 70 3 kg (155 lb)   10/16/18 70 1 kg (154 lb 9 6 oz)       Physical Exam:    GEN: Winnie Alex appears well, alert and oriented x 3, pleasant and cooperative   HEENT: pupils equal, round, and reactive to light; extraocular muscles intact  NECK: supple, no carotid bruits   HEART: regular rhythm, normal S1 and S2, no murmurs, clicks, gallops or rubs, JVP is    LUNGS: clear to auscultation bilaterally; no wheezes, rales, or rhonchi   ABDOMEN: normal bowel sounds, soft, no tenderness, no distention  EXTREMITIES: peripheral pulses normal; no clubbing, cyanosis, or edema  NEURO: no focal findings   SKIN: normal without suspicious lesions on exposed skin    Labs & Results:    Lab Results   Component Value Date    GLUCOSE 104 01/08/2016    CALCIUM 9 2 10/20/2018     01/08/2016    K 5 0 10/20/2018    CO2 29 10/20/2018     10/20/2018    BUN 25 10/20/2018    CREATININE 1 25 10/20/2018     Lab Results   Component Value Date    WBC 6 94 09/29/2018    HGB 13 2 09/29/2018    HCT 40 4 09/29/2018    MCV 96 09/29/2018     09/29/2018     No results found for: NTBNP   Lab Results   Component Value Date    CHOL 127 11/28/2015    CHOL 105 10/11/2014    CHOL 132 07/14/2014     Lab Results   Component Value Date    HDL 54 01/27/2018    HDL 64 (H) 04/15/2017    HDL 63 11/28/2015     Lab Results   Component Value Date    LDLCALC 74 01/27/2018    LDLCALC 54 04/15/2017    LDLCALC 51 11/28/2015     Lab Results   Component Value Date    TRIG 82 01/27/2018    TRIG 55 04/15/2017    TRIG 64 11/28/2015     No results found for: CHOLHDL      EKG personally reviewed by Viktor Carver DO  Counseling / Coordination of Care  Total floor / unit time spent today 25 minutes    Greater than 50% of total time was spent with the patient and / or family counseling and / or coordination of care  A description of the counseling / coordination of care: 15      Thank you for the opportunity to participate in the care of this patient    JEWEL AMEZQUITA [address]

## 2018-11-09 ENCOUNTER — OFFICE VISIT (OUTPATIENT)
Dept: CARDIOLOGY CLINIC | Facility: CLINIC | Age: 78
End: 2018-11-09
Payer: COMMERCIAL

## 2018-11-09 VITALS
BODY MASS INDEX: 24.45 KG/M2 | HEART RATE: 79 BPM | HEIGHT: 67 IN | DIASTOLIC BLOOD PRESSURE: 70 MMHG | SYSTOLIC BLOOD PRESSURE: 115 MMHG | WEIGHT: 155.8 LBS

## 2018-11-09 DIAGNOSIS — Z98.890 S/P MITRAL VALVE REPAIR: ICD-10-CM

## 2018-11-09 DIAGNOSIS — I25.810 CORONARY ARTERY DISEASE INVOLVING CORONARY BYPASS GRAFT OF NATIVE HEART WITHOUT ANGINA PECTORIS: ICD-10-CM

## 2018-11-09 DIAGNOSIS — I25.10 CORONARY ARTERY DISEASE INVOLVING NATIVE CORONARY ARTERY OF NATIVE HEART WITHOUT ANGINA PECTORIS: ICD-10-CM

## 2018-11-09 DIAGNOSIS — I50.22 CHRONIC SYSTOLIC CHF (CONGESTIVE HEART FAILURE), NYHA CLASS 2 (HCC): Primary | ICD-10-CM

## 2018-11-09 DIAGNOSIS — I25.5 ISCHEMIC CARDIOMYOPATHY: ICD-10-CM

## 2018-11-09 PROCEDURE — 99214 OFFICE O/P EST MOD 30 MIN: CPT | Performed by: INTERNAL MEDICINE

## 2018-12-11 NOTE — PROGRESS NOTES
12/13/2018      Chief Complaint   Patient presents with    Benign Prostatic Hypertrophy     1 yr f/u       Assessment and Plan    66 y o  male managed by Dr Cailin Queen    1  BPH  -  mL and stable   - continue Flomax 0 4 mg nightly  - continue finasteride 5 mg daily  - the patient's lower urinary tract symptoms are stable, did discuss anticholinergic versus beta agonists with their risks, benefits, and side effect profiles, patient wishes to continue as is  - did also discuss being aggressive with cystoscopic evaluation but the patient also defers this    2  History of UTIs  - no recent infection    FU 1 year with PVR at visit       History of Present Illness  Shelbi Oglesby is a 66 y o  male here for follow up evaluation of BPH with incomplete bladder emptying and history of urinary tract infections  The patient's lower urinary tract symptoms remain stable  These in his AUA symptom score are listed below  His AUA score states that he is unhappy but he states that this is something he can live with and is only bothersome at times  He has had no recent urinary tract infections  His PVR is 198 mL  He continues on Flomax and finasteride  Review of Systems   Constitutional: Negative for activity change, chills and fever  Gastrointestinal: Negative for abdominal distention and abdominal pain  Musculoskeletal: Negative for back pain and gait problem  Psychiatric/Behavioral: Negative for behavioral problems and confusion  Urinary Incontinence Screening      Most Recent Value   Urinary Incontinence   Urinary Incontinence? Yes ["If I wait to long to get to the bathroom"]   Incomplete emptying? Yes   Urinary frequency? Yes   Urinary urgency? Yes   Urinary hesitancy? Yes   Dysuria (painful difficult urination)? Yes ["sometimes a burning feeling"]   Nocturia (waking up to use the bathroom)? Yes [twice per night]   Straining (having to push to go)?   Yes   Weak stream?  Yes   Intermittent stream?  Yes   Post void dribbling? Yes ["once in awhile"]          AUA SYMPTOM SCORE      Most Recent Value   AUA SYMPTOM SCORE   How often have you had a sensation of not emptying your bladder completely after you finished urinating? 4   How often have you had to urinate again less than two hours after you finished urinating? 2   How often have you found you stopped and started again several times when you urinate? 2   How often have you found it difficult to postpone urination? 4   How often have you had a weak urinary stream?  3   How often have you had to push or strain to begin urination? 1   How many times did you most typically get up to urinate from the time you went to bed at night until the time you got up in the morning? 2   Quality of Life: If you were to spend the rest of your life with your urinary condition just the way it is now, how would you feel about that?  5   AUA SYMPTOM SCORE  18          Past Medical History  Past Medical History:   Diagnosis Date    Prediabetes        Past Social History  Past Surgical History:   Procedure Laterality Date    CORONARY ARTERY BYPASS GRAFT      x3 LIMA-LAD, SVG-OM, SVG-DIAG with mitral valve repair by nicole    CYSTOSCOPY  12/13/2014    Diagnostic    ESOPHAGOGASTRODUODENOSCOPY  04/07/2015    with biopsy   Dr Salazar Staff      26 mm fatmata hernandez physi annuloplasty ring    TRACHEOSTOMY       History   Smoking Status    Former Smoker   Smokeless Tobacco    Never Used       Past Family History  Family History   Problem Relation Age of Onset    Diabetes Mother     Heart disease Mother     Hypertension Mother     Colon cancer Father     Kidney disease Father     Heart disease Sister     Heart disease Brother     Breast cancer Paternal Aunt     Cervical cancer Paternal Aunt        Past Social history  Social History     Social History    Marital status:      Spouse name: N/A    Number of children: N/A    Years of education: N/A     Occupational History    Not on file  Social History Main Topics    Smoking status: Former Smoker    Smokeless tobacco: Never Used    Alcohol use Yes      Comment: rare    Drug use: No    Sexual activity: Not on file     Other Topics Concern    Not on file     Social History Narrative    No narrative on file       Current Medications  Current Outpatient Prescriptions   Medication Sig Dispense Refill    aspirin (ASPIRIN LOW DOSE) 81 MG tablet Take 1 tablet by mouth daily      atorvastatin (LIPITOR) 40 mg tablet Take 1 tablet (40 mg total) by mouth daily 90 tablet 3    Cholecalciferol 1000 units tablet Take by mouth      Ferrous Sulfate (IRON) 325 (65 Fe) MG TABS Take 1 tablet by mouth Twice daily      finasteride (PROSCAR) 5 mg tablet Take 1 tablet by mouth daily      lisinopril (ZESTRIL) 5 mg tablet Take 1 tablet (5 mg total) by mouth daily 30 tablet 5    metoprolol tartrate (LOPRESSOR) 25 mg tablet Take 0 5 tablets (12 5 mg total) by mouth 2 (two) times a day 90 tablet 3    Multiple Vitamin Essential TABS Take by mouth      Omega-3 Fatty Acids (FISH OIL) 1200 MG CAPS Take by mouth      tamsulosin (FLOMAX) 0 4 mg TAKE 1 CAPSULE AT BEDTIME 90 capsule 3     No current facility-administered medications for this visit  Allergies  No Known Allergies      The following portions of the patient's history were reviewed and updated as appropriate: allergies, current medications, past medical history, past social history, past surgical history and problem list       Vitals  Vitals:    12/13/18 0843   BP: 122/70   BP Location: Left arm   Patient Position: Sitting   Cuff Size: Adult   Pulse: 70   Weight: 70 9 kg (156 lb 4 8 oz)   Height: 5' 7" (1 702 m)       Physical Exam  Constitutional   General appearance: Patient is seated and in no acute distress, well appearing and well nourished     Head and Face   Head and face: Normal     Eyes   Conjunctiva and lids: No erythema, swelling or discharge  Ears, Nose, Mouth, and Throat   Hearing: Normal     Pulmonary   Respiratory effort: No increased work of breathing or signs of respiratory distress  Cardiovascular   Examination of extremities for edema and/or varicosities: Normal     Abdomen   Abdomen: Non-tender, no masses  Musculoskeletal   Gait and station: Normal     Skin   Skin and subcutaneous tissue: Warm, dry, and intact  No visible lesions or rashes    Psychiatric   Judgment and insight: Normal  Recent and remote memory:  Normal  Mood and affect: Normal      Results  Recent Results (from the past 1 hour(s))   POCT Measure PVR    Collection Time: 12/13/18  8:51 AM   Result Value Ref Range    POST-VOID RESIDUAL VOLUME, ML  mL   ]  Lab Results   Component Value Date    PSA 0 4 12/03/2015    PSA 1 7 10/11/2014     Lab Results   Component Value Date    GLUCOSE 104 01/08/2016    CALCIUM 9 2 10/20/2018     01/08/2016    K 5 0 10/20/2018    CO2 29 10/20/2018     10/20/2018    BUN 25 10/20/2018    CREATININE 1 25 10/20/2018     Lab Results   Component Value Date    WBC 6 94 09/29/2018    HGB 13 2 09/29/2018    HCT 40 4 09/29/2018    MCV 96 09/29/2018     09/29/2018           Orders  Orders Placed This Encounter   Procedures    POCT Measure PVR

## 2018-12-13 ENCOUNTER — OFFICE VISIT (OUTPATIENT)
Dept: UROLOGY | Facility: CLINIC | Age: 78
End: 2018-12-13
Payer: COMMERCIAL

## 2018-12-13 VITALS
HEART RATE: 70 BPM | HEIGHT: 67 IN | WEIGHT: 156.3 LBS | SYSTOLIC BLOOD PRESSURE: 122 MMHG | BODY MASS INDEX: 24.53 KG/M2 | DIASTOLIC BLOOD PRESSURE: 70 MMHG

## 2018-12-13 DIAGNOSIS — N40.1 BENIGN PROSTATIC HYPERPLASIA WITH LOWER URINARY TRACT SYMPTOMS, SYMPTOM DETAILS UNSPECIFIED: Primary | ICD-10-CM

## 2018-12-13 LAB — POST-VOID RESIDUAL VOLUME, ML POC: 198 ML

## 2018-12-13 PROCEDURE — 51798 US URINE CAPACITY MEASURE: CPT | Performed by: PHYSICIAN ASSISTANT

## 2018-12-13 PROCEDURE — 99213 OFFICE O/P EST LOW 20 MIN: CPT | Performed by: PHYSICIAN ASSISTANT

## 2019-01-31 ENCOUNTER — TELEPHONE (OUTPATIENT)
Dept: UROLOGY | Facility: MEDICAL CENTER | Age: 79
End: 2019-01-31

## 2019-01-31 DIAGNOSIS — N40.1 BPH WITH OBSTRUCTION/LOWER URINARY TRACT SYMPTOMS: Primary | ICD-10-CM

## 2019-01-31 DIAGNOSIS — N13.8 BPH WITH OBSTRUCTION/LOWER URINARY TRACT SYMPTOMS: Primary | ICD-10-CM

## 2019-01-31 RX ORDER — FINASTERIDE 5 MG/1
5 TABLET, FILM COATED ORAL DAILY
Qty: 90 TABLET | Refills: 3 | Status: SHIPPED | OUTPATIENT
Start: 2019-01-31 | End: 2020-01-09 | Stop reason: SDUPTHER

## 2019-02-13 ENCOUNTER — OFFICE VISIT (OUTPATIENT)
Dept: FAMILY MEDICINE CLINIC | Facility: CLINIC | Age: 79
End: 2019-02-13
Payer: COMMERCIAL

## 2019-02-13 VITALS
HEIGHT: 67 IN | BODY MASS INDEX: 24.17 KG/M2 | SYSTOLIC BLOOD PRESSURE: 148 MMHG | HEART RATE: 66 BPM | DIASTOLIC BLOOD PRESSURE: 82 MMHG | WEIGHT: 154 LBS

## 2019-02-13 DIAGNOSIS — R73.03 PREDIABETES: Primary | ICD-10-CM

## 2019-02-13 DIAGNOSIS — E78.5 HYPERLIPIDEMIA, UNSPECIFIED HYPERLIPIDEMIA TYPE: ICD-10-CM

## 2019-02-13 DIAGNOSIS — I10 ESSENTIAL HYPERTENSION: ICD-10-CM

## 2019-02-13 PROCEDURE — 1036F TOBACCO NON-USER: CPT | Performed by: FAMILY MEDICINE

## 2019-02-13 PROCEDURE — 99214 OFFICE O/P EST MOD 30 MIN: CPT | Performed by: FAMILY MEDICINE

## 2019-02-13 PROCEDURE — 1160F RVW MEDS BY RX/DR IN RCRD: CPT | Performed by: FAMILY MEDICINE

## 2019-02-13 NOTE — PROGRESS NOTES
Assessment/Plan:  Chief Complaint   Patient presents with    Follow-up     regarding HTN, HL and pre diabetes  Patient Instructions   Here for f-up and doing well, Here for history of HTN and prediabetes and also hyperlipidemia  Recheck 3 months for BP check and updated labs for hyperlipidemia and prediabetes   Low sugar and low cholesterol diet and diet and exercise encouraged to help  F-up with Urology as directed for hx of BPH  F-up with Cardiology as directed for hx of CABG  He refused stress test by Cardiology in past  His HGA1C was 6 1 and needs rechecked at next office visit in 3 months, encouraged low sugar diet  Call if any problems  Take BP med as directed as he has yet to take it today  Refuses flu shot  No problem-specific Assessment & Plan notes found for this encounter  Diagnoses and all orders for this visit:    Prediabetes  -     Hemoglobin A1C  -     Comprehensive metabolic panel; Future    Hyperlipidemia, unspecified hyperlipidemia type  -     Hemoglobin A1C  -     Lipid Panel with Direct LDL reflex; Future    Essential hypertension  -     Comprehensive metabolic panel; Future          Subjective:      Patient ID: Kareem Reyes is a 66 y o  male  Follow-up (regarding HTN, HL and pre diabetes  ) Did not take it this am his BP med  No cp or sob, or ha  Here with daughter, has yet to take BP med today, he does see his cardiologist and urologist as directed  The following portions of the patient's history were reviewed and updated as appropriate: allergies, current medications, past family history, past medical history, past social history, past surgical history and problem list     Review of Systems   Constitutional: Negative  HENT: Negative  Eyes: Negative  Respiratory: Negative  Cardiovascular: Negative  Gastrointestinal: Negative  Endocrine: Negative  Genitourinary: Negative  Musculoskeletal: Negative  Skin: Negative  Allergic/Immunologic: Negative  Neurological: Negative  Hematological: Negative  Psychiatric/Behavioral: Negative  Objective:      /82 (BP Location: Left arm, Patient Position: Sitting, Cuff Size: Standard)   Pulse 66   Ht 5' 6 5" (1 689 m)   Wt 69 9 kg (154 lb)   BMI 24 48 kg/m²          Physical Exam   Constitutional: He is oriented to person, place, and time  He appears well-developed and well-nourished  HENT:   Head: Normocephalic and atraumatic  Right Ear: External ear normal    Left Ear: External ear normal    Nose: Nose normal    Mouth/Throat: Oropharynx is clear and moist    Eyes: Pupils are equal, round, and reactive to light  Conjunctivae and EOM are normal    Neck: Normal range of motion  Neck supple  Cardiovascular: Normal rate, regular rhythm, normal heart sounds and intact distal pulses  Pulmonary/Chest: Effort normal and breath sounds normal    Musculoskeletal: Normal range of motion  Neurological: He is alert and oriented to person, place, and time  He has normal reflexes  Skin: Skin is warm and dry  Psychiatric: He has a normal mood and affect   His behavior is normal

## 2019-02-13 NOTE — PATIENT INSTRUCTIONS
Here for f-up and doing well, Here for history of HTN and prediabetes and also hyperlipidemia  Recheck 3 months for BP check and updated labs for hyperlipidemia and prediabetes   Low sugar and low cholesterol diet and diet and exercise encouraged to help  F-up with Urology as directed for hx of BPH  F-up with Cardiology as directed for hx of CABG  He refused stress test by Cardiology in past  His HGA1C was 6 1 and needs rechecked at next office visit in 3 months, encouraged low sugar diet  Call if any problems  Take BP med as directed as he has yet to take it today  Refuses flu shot

## 2019-04-06 DIAGNOSIS — N40.1 BENIGN PROSTATIC HYPERPLASIA WITH LOWER URINARY TRACT SYMPTOMS, SYMPTOM DETAILS UNSPECIFIED: ICD-10-CM

## 2019-04-07 DIAGNOSIS — I25.5 ACC/AHA STAGE C CONGESTIVE HEART FAILURE DUE TO ISCHEMIC CARDIOMYOPATHY (HCC): ICD-10-CM

## 2019-04-07 DIAGNOSIS — I50.9 ACC/AHA STAGE C CONGESTIVE HEART FAILURE DUE TO ISCHEMIC CARDIOMYOPATHY (HCC): ICD-10-CM

## 2019-04-08 RX ORDER — TAMSULOSIN HYDROCHLORIDE 0.4 MG/1
CAPSULE ORAL
Qty: 90 CAPSULE | Refills: 3 | Status: SHIPPED | OUTPATIENT
Start: 2019-04-08 | End: 2020-01-09 | Stop reason: SDUPTHER

## 2019-04-08 RX ORDER — LISINOPRIL 5 MG/1
TABLET ORAL
Qty: 30 TABLET | Refills: 5 | Status: SHIPPED | OUTPATIENT
Start: 2019-04-08 | End: 2019-09-20 | Stop reason: SDUPTHER

## 2019-04-11 ENCOUNTER — OFFICE VISIT (OUTPATIENT)
Dept: CARDIOLOGY CLINIC | Facility: CLINIC | Age: 79
End: 2019-04-11
Payer: COMMERCIAL

## 2019-04-11 VITALS
DIASTOLIC BLOOD PRESSURE: 70 MMHG | BODY MASS INDEX: 24.74 KG/M2 | HEART RATE: 68 BPM | WEIGHT: 157.6 LBS | HEIGHT: 67 IN | SYSTOLIC BLOOD PRESSURE: 160 MMHG

## 2019-04-11 DIAGNOSIS — I50.22 CHRONIC SYSTOLIC CHF (CONGESTIVE HEART FAILURE), NYHA CLASS 2 (HCC): Primary | ICD-10-CM

## 2019-04-11 DIAGNOSIS — I25.5 ISCHEMIC CARDIOMYOPATHY: ICD-10-CM

## 2019-04-11 DIAGNOSIS — I25.10 CORONARY ARTERY DISEASE INVOLVING NATIVE CORONARY ARTERY OF NATIVE HEART WITHOUT ANGINA PECTORIS: ICD-10-CM

## 2019-04-11 DIAGNOSIS — Z98.890 S/P MITRAL VALVE REPAIR: ICD-10-CM

## 2019-04-11 PROCEDURE — 99214 OFFICE O/P EST MOD 30 MIN: CPT | Performed by: INTERNAL MEDICINE

## 2019-04-11 RX ORDER — CARVEDILOL 6.25 MG/1
6.25 TABLET ORAL 2 TIMES DAILY WITH MEALS
Qty: 60 TABLET | Refills: 3 | Status: SHIPPED | OUTPATIENT
Start: 2019-04-11 | End: 2019-08-08 | Stop reason: SDUPTHER

## 2019-05-04 ENCOUNTER — APPOINTMENT (OUTPATIENT)
Dept: LAB | Facility: HOSPITAL | Age: 79
End: 2019-05-04
Payer: COMMERCIAL

## 2019-05-04 DIAGNOSIS — I10 ESSENTIAL HYPERTENSION: ICD-10-CM

## 2019-05-04 DIAGNOSIS — E78.5 HYPERLIPIDEMIA, UNSPECIFIED HYPERLIPIDEMIA TYPE: ICD-10-CM

## 2019-05-04 DIAGNOSIS — R73.03 PREDIABETES: ICD-10-CM

## 2019-05-04 LAB
ALBUMIN SERPL BCP-MCNC: 3.2 G/DL (ref 3.5–5)
ALP SERPL-CCNC: 79 U/L (ref 46–116)
ALT SERPL W P-5'-P-CCNC: 21 U/L (ref 12–78)
ANION GAP SERPL CALCULATED.3IONS-SCNC: 8 MMOL/L (ref 4–13)
AST SERPL W P-5'-P-CCNC: 24 U/L (ref 5–45)
BILIRUB SERPL-MCNC: 0.65 MG/DL (ref 0.2–1)
BUN SERPL-MCNC: 20 MG/DL (ref 5–25)
CALCIUM SERPL-MCNC: 9.3 MG/DL (ref 8.3–10.1)
CHLORIDE SERPL-SCNC: 106 MMOL/L (ref 100–108)
CHOLEST SERPL-MCNC: 139 MG/DL (ref 50–200)
CO2 SERPL-SCNC: 28 MMOL/L (ref 21–32)
CREAT SERPL-MCNC: 1.24 MG/DL (ref 0.6–1.3)
EST. AVERAGE GLUCOSE BLD GHB EST-MCNC: 128 MG/DL
GFR SERPL CREATININE-BSD FRML MDRD: 55 ML/MIN/1.73SQ M
GLUCOSE P FAST SERPL-MCNC: 91 MG/DL (ref 65–99)
HBA1C MFR BLD: 6.1 % (ref 4.2–6.3)
HDLC SERPL-MCNC: 59 MG/DL (ref 40–60)
LDLC SERPL CALC-MCNC: 65 MG/DL (ref 0–100)
POTASSIUM SERPL-SCNC: 4.3 MMOL/L (ref 3.5–5.3)
PROT SERPL-MCNC: 7.4 G/DL (ref 6.4–8.2)
SODIUM SERPL-SCNC: 142 MMOL/L (ref 136–145)
TRIGL SERPL-MCNC: 75 MG/DL

## 2019-05-04 PROCEDURE — 80061 LIPID PANEL: CPT

## 2019-05-04 PROCEDURE — 83036 HEMOGLOBIN GLYCOSYLATED A1C: CPT | Performed by: FAMILY MEDICINE

## 2019-05-04 PROCEDURE — 80053 COMPREHEN METABOLIC PANEL: CPT

## 2019-05-04 PROCEDURE — 36415 COLL VENOUS BLD VENIPUNCTURE: CPT

## 2019-05-08 ENCOUNTER — OFFICE VISIT (OUTPATIENT)
Dept: FAMILY MEDICINE CLINIC | Facility: CLINIC | Age: 79
End: 2019-05-08
Payer: COMMERCIAL

## 2019-05-08 VITALS
BODY MASS INDEX: 24.58 KG/M2 | HEIGHT: 67 IN | DIASTOLIC BLOOD PRESSURE: 82 MMHG | SYSTOLIC BLOOD PRESSURE: 138 MMHG | WEIGHT: 156.6 LBS

## 2019-05-08 DIAGNOSIS — I10 ESSENTIAL HYPERTENSION: Primary | ICD-10-CM

## 2019-05-08 DIAGNOSIS — R73.03 PREDIABETES: ICD-10-CM

## 2019-05-08 DIAGNOSIS — N40.1 BENIGN PROSTATIC HYPERPLASIA WITH LOWER URINARY TRACT SYMPTOMS, SYMPTOM DETAILS UNSPECIFIED: ICD-10-CM

## 2019-05-08 DIAGNOSIS — Z98.890 S/P MITRAL VALVE REPAIR: ICD-10-CM

## 2019-05-08 DIAGNOSIS — Z01.818 PREOP EXAMINATION: ICD-10-CM

## 2019-05-08 DIAGNOSIS — I25.5 ISCHEMIC CARDIOMYOPATHY: ICD-10-CM

## 2019-05-08 DIAGNOSIS — Z95.1 HX OF CABG: ICD-10-CM

## 2019-05-08 DIAGNOSIS — E78.5 HYPERLIPIDEMIA, UNSPECIFIED HYPERLIPIDEMIA TYPE: ICD-10-CM

## 2019-05-08 DIAGNOSIS — H26.9 CATARACT OF BOTH EYES, UNSPECIFIED CATARACT TYPE: ICD-10-CM

## 2019-05-08 PROCEDURE — 99242 OFF/OP CONSLTJ NEW/EST SF 20: CPT | Performed by: FAMILY MEDICINE

## 2019-05-08 RX ORDER — MELOXICAM 7.5 MG/1
TABLET ORAL
COMMUNITY
End: 2020-12-10

## 2019-08-08 DIAGNOSIS — I50.22 CHRONIC SYSTOLIC CHF (CONGESTIVE HEART FAILURE), NYHA CLASS 2 (HCC): ICD-10-CM

## 2019-08-12 ENCOUNTER — OFFICE VISIT (OUTPATIENT)
Dept: FAMILY MEDICINE CLINIC | Facility: CLINIC | Age: 79
End: 2019-08-12
Payer: COMMERCIAL

## 2019-08-12 VITALS
BODY MASS INDEX: 24.2 KG/M2 | DIASTOLIC BLOOD PRESSURE: 76 MMHG | OXYGEN SATURATION: 99 % | TEMPERATURE: 97.7 F | HEIGHT: 67 IN | SYSTOLIC BLOOD PRESSURE: 124 MMHG | WEIGHT: 154.2 LBS | HEART RATE: 76 BPM

## 2019-08-12 DIAGNOSIS — I10 ESSENTIAL HYPERTENSION: Primary | ICD-10-CM

## 2019-08-12 DIAGNOSIS — E78.5 HYPERLIPIDEMIA, UNSPECIFIED HYPERLIPIDEMIA TYPE: ICD-10-CM

## 2019-08-12 DIAGNOSIS — M25.512 LEFT SHOULDER PAIN, UNSPECIFIED CHRONICITY: ICD-10-CM

## 2019-08-12 DIAGNOSIS — R73.03 PREDIABETES: ICD-10-CM

## 2019-08-12 PROCEDURE — 99214 OFFICE O/P EST MOD 30 MIN: CPT | Performed by: FAMILY MEDICINE

## 2019-08-12 RX ORDER — PREDNISONE 10 MG/1
TABLET ORAL
Qty: 21 TABLET | Refills: 0 | Status: SHIPPED | OUTPATIENT
Start: 2019-08-12 | End: 2019-10-21

## 2019-08-12 RX ORDER — CARVEDILOL 6.25 MG/1
6.25 TABLET ORAL 2 TIMES DAILY WITH MEALS
Qty: 60 TABLET | Refills: 5 | Status: SHIPPED | OUTPATIENT
Start: 2019-08-12 | End: 2020-01-08

## 2019-08-12 NOTE — PROGRESS NOTES
Assessment/Plan:  Chief Complaint   Patient presents with    Follow-up     Here for follow up of BP, complaining of a sore left shoulder  Patient Instructions   Take all meds as directed for HTN and low sugar diet encouraged for hx of prediabetes and also rec rechecking labs for prediabetes in 3 months  Trial of prednisone for left shoulder pain  Call if not better  Monitor stress in life, he has been feeling more stressed recently  F-up with urology as directed for BPH  No problem-specific Assessment & Plan notes found for this encounter  Diagnoses and all orders for this visit:    Essential hypertension  -     Comprehensive metabolic panel; Future    Prediabetes  -     Comprehensive metabolic panel; Future  -     Hemoglobin A1C    Hyperlipidemia, unspecified hyperlipidemia type  -     Comprehensive metabolic panel; Future    Left shoulder pain, unspecified chronicity  -     predniSONE 10 mg tablet; Take 60 mg po day#1, 50 mg po day#2, 40 mg po day#3, 30 mg po day#4, 20 mg po day#5m and 10 mg po day#6          Subjective:      Patient ID: Arpita Yañez is a 66 y o  male  Here for BP check and left shoulder pain  Hx of PMR and was given pain medication for this by Dr Macho Boone  The following portions of the patient's history were reviewed and updated as appropriate: allergies, current medications, past family history, past medical history, past social history, past surgical history and problem list     Review of Systems   Constitutional: Negative  HENT: Negative  Eyes: Negative  Respiratory: Negative  Cardiovascular: Negative  Gastrointestinal: Negative  Endocrine: Negative  Genitourinary: Negative  Musculoskeletal: Negative  Left shoulder pain   Skin: Negative  Allergic/Immunologic: Negative  Neurological: Negative  Hematological: Negative  Psychiatric/Behavioral: Negative            Objective:      /76   Pulse 76   Temp 97 7 °F (36 5 °C) (Temporal)   Ht 5' 6 5" (1 689 m)   Wt 69 9 kg (154 lb 3 2 oz)   SpO2 99%   BMI 24 52 kg/m²          Physical Exam   Constitutional: He is oriented to person, place, and time  He appears well-developed and well-nourished  HENT:   Head: Normocephalic and atraumatic  Right Ear: External ear normal    Left Ear: External ear normal    Nose: Nose normal    Mouth/Throat: Oropharynx is clear and moist    Eyes: Pupils are equal, round, and reactive to light  Conjunctivae and EOM are normal    Neck: Normal range of motion  Neck supple  Cardiovascular: Normal rate, regular rhythm, normal heart sounds and intact distal pulses  Pulmonary/Chest: Effort normal and breath sounds normal    Musculoskeletal: Normal range of motion  Neurological: He is alert and oriented to person, place, and time  He has normal reflexes  Skin: Skin is warm and dry  Psychiatric: He has a normal mood and affect   His behavior is normal

## 2019-08-12 NOTE — PATIENT INSTRUCTIONS
Take all meds as directed for HTN and low sugar diet encouraged for hx of prediabetes and also rec rechecking labs for prediabetes in 3 months  Trial of prednisone for left shoulder pain  Call if not better  Monitor stress in life, he has been feeling more stressed recently  F-up with urology as directed for BPH

## 2019-08-29 ENCOUNTER — OFFICE VISIT (OUTPATIENT)
Dept: FAMILY MEDICINE CLINIC | Facility: CLINIC | Age: 79
End: 2019-08-29
Payer: COMMERCIAL

## 2019-08-29 VITALS
OXYGEN SATURATION: 97 % | WEIGHT: 151 LBS | BODY MASS INDEX: 23.7 KG/M2 | TEMPERATURE: 97.7 F | SYSTOLIC BLOOD PRESSURE: 120 MMHG | HEIGHT: 67 IN | HEART RATE: 80 BPM | DIASTOLIC BLOOD PRESSURE: 62 MMHG

## 2019-08-29 DIAGNOSIS — J06.9 UPPER RESPIRATORY TRACT INFECTION, UNSPECIFIED TYPE: ICD-10-CM

## 2019-08-29 DIAGNOSIS — R05.9 COUGH: ICD-10-CM

## 2019-08-29 DIAGNOSIS — I10 ESSENTIAL HYPERTENSION: Primary | ICD-10-CM

## 2019-08-29 PROCEDURE — 1036F TOBACCO NON-USER: CPT | Performed by: FAMILY MEDICINE

## 2019-08-29 PROCEDURE — 1160F RVW MEDS BY RX/DR IN RCRD: CPT | Performed by: FAMILY MEDICINE

## 2019-08-29 PROCEDURE — 99213 OFFICE O/P EST LOW 20 MIN: CPT | Performed by: FAMILY MEDICINE

## 2019-08-29 PROCEDURE — 3074F SYST BP LT 130 MM HG: CPT | Performed by: FAMILY MEDICINE

## 2019-08-29 RX ORDER — AZITHROMYCIN 250 MG/1
TABLET, FILM COATED ORAL
Qty: 6 TABLET | Refills: 0 | Status: SHIPPED | OUTPATIENT
Start: 2019-08-29 | End: 2019-09-03

## 2019-08-29 NOTE — PROGRESS NOTES
Assessment/Plan:  Chief Complaint   Patient presents with    Cold Like Symptoms     has runny nose, cough, ears hurt for about 10 days  Patient Instructions   Rest and fluids, start abx and also start Dimetapp DM cold and cough prn, call if worse and take BP med as directed  Call if worse  No problem-specific Assessment & Plan notes found for this encounter  Diagnoses and all orders for this visit:    Essential hypertension    Upper respiratory tract infection, unspecified type  -     azithromycin (ZITHROMAX) 250 mg tablet; Take 2 tablets today then 1 tablet daily x 4 days    Cough  -     azithromycin (ZITHROMAX) 250 mg tablet; Take 2 tablets today then 1 tablet daily x 4 days          Subjective:      Patient ID: Marla Jordan is a 66 y o  male  Cold Like Symptoms (has runny nose, cough, ears hurt for about 10 days ) Poor taste  No cp or sob, or ha  The following portions of the patient's history were reviewed and updated as appropriate: allergies, current medications, past family history, past medical history, past social history, past surgical history and problem list     Review of Systems   Constitutional: Negative  HENT: Positive for ear pain and rhinorrhea  Eyes: Negative  Respiratory: Positive for cough  Cardiovascular: Negative  Gastrointestinal: Negative  Endocrine: Negative  Genitourinary: Negative  Musculoskeletal: Negative  Skin: Negative  Allergic/Immunologic: Negative  Neurological: Negative  Hematological: Negative  Psychiatric/Behavioral: Negative  Objective:      /62   Pulse 80   Temp 97 7 °F (36 5 °C) (Temporal)   Ht 5' 6 5" (1 689 m)   Wt 68 5 kg (151 lb)   SpO2 97%   BMI 24 01 kg/m²          Physical Exam   Constitutional: He is oriented to person, place, and time  He appears well-developed and well-nourished  HENT:   Head: Normocephalic and atraumatic     Right Ear: External ear normal    Left Ear: External ear normal    pnd and rhinorrhea   Eyes: Pupils are equal, round, and reactive to light  Conjunctivae and EOM are normal    Neck: Normal range of motion  Neck supple  Cardiovascular: Normal rate, regular rhythm, normal heart sounds and intact distal pulses  Pulmonary/Chest: Effort normal and breath sounds normal    cough   Musculoskeletal: Normal range of motion  Neurological: He is alert and oriented to person, place, and time  He has normal reflexes  Skin: Skin is warm and dry  Psychiatric: He has a normal mood and affect   His behavior is normal

## 2019-09-04 ENCOUNTER — OFFICE VISIT (OUTPATIENT)
Dept: FAMILY MEDICINE CLINIC | Facility: CLINIC | Age: 79
End: 2019-09-04
Payer: COMMERCIAL

## 2019-09-04 VITALS
HEART RATE: 93 BPM | BODY MASS INDEX: 22.7 KG/M2 | HEIGHT: 67 IN | SYSTOLIC BLOOD PRESSURE: 124 MMHG | DIASTOLIC BLOOD PRESSURE: 76 MMHG | TEMPERATURE: 97.8 F | WEIGHT: 144.6 LBS | OXYGEN SATURATION: 97 %

## 2019-09-04 DIAGNOSIS — I10 ESSENTIAL HYPERTENSION: Primary | ICD-10-CM

## 2019-09-04 DIAGNOSIS — J32.9 SINUSITIS, UNSPECIFIED CHRONICITY, UNSPECIFIED LOCATION: ICD-10-CM

## 2019-09-04 DIAGNOSIS — R05.9 COUGH: ICD-10-CM

## 2019-09-04 PROCEDURE — 3078F DIAST BP <80 MM HG: CPT | Performed by: FAMILY MEDICINE

## 2019-09-04 PROCEDURE — 3074F SYST BP LT 130 MM HG: CPT | Performed by: FAMILY MEDICINE

## 2019-09-04 PROCEDURE — 99213 OFFICE O/P EST LOW 20 MIN: CPT | Performed by: FAMILY MEDICINE

## 2019-09-04 RX ORDER — PREDNISONE 10 MG/1
TABLET ORAL
Qty: 21 TABLET | Refills: 0 | Status: SHIPPED | OUTPATIENT
Start: 2019-09-04 | End: 2019-10-21

## 2019-09-04 RX ORDER — CEFDINIR 300 MG/1
300 CAPSULE ORAL EVERY 12 HOURS SCHEDULED
Qty: 14 CAPSULE | Refills: 0 | Status: SHIPPED | OUTPATIENT
Start: 2019-09-04 | End: 2019-09-11

## 2019-09-04 NOTE — PATIENT INSTRUCTIONS
Rest and fluids, start cefdinir 300 mg po bid times 7 days and start prednisone 10 mg 6-5-4-3-2-1 and use Dimetapp DM cold and cough, and Flonase 2 sprays q nostril once daily  Take BP med as directed

## 2019-09-04 NOTE — PROGRESS NOTES
Assessment/Plan:  Chief Complaint   Patient presents with    Cold Like Symptoms     Complaining of worsening symptoms, pressure in his ears, sinuses draining, tightness in his chest, not eating well, losing weight, feeling weak  Only took 2 days of Azithromycin due to diarrhea  Patient Instructions   Rest and fluids, start cefdinir 300 mg po bid times 7 days and start prednisone 10 mg 6-5-4-3-2-1 and use Dimetapp DM cold and cough, and Flonase 2 sprays q nostril once daily  Take BP med as directed  No problem-specific Assessment & Plan notes found for this encounter  Diagnoses and all orders for this visit:    Essential hypertension    Sinusitis, unspecified chronicity, unspecified location  -     cefdinir (OMNICEF) 300 mg capsule; Take 1 capsule (300 mg total) by mouth every 12 (twelve) hours for 7 days  -     predniSONE 10 mg tablet; Take 60 mg po day#1, 50 mg po day#2, 40 mg po day#3, 30 mg po day#4, 20 mg po day#5m and 10 mg po day#6    Cough  -     cefdinir (OMNICEF) 300 mg capsule; Take 1 capsule (300 mg total) by mouth every 12 (twelve) hours for 7 days  -     predniSONE 10 mg tablet; Take 60 mg po day#1, 50 mg po day#2, 40 mg po day#3, 30 mg po day#4, 20 mg po day#5m and 10 mg po day#6          Subjective:      Patient ID: Aubrie Arroyo is a 78 y o  male  Cold Like Symptoms (Complaining of worsening symptoms, pressure in his ears, sinuses draining, tightness in his chest, not eating well, losing weight, feeling weak  Only took 2 days of Azithromycin due to diarrhea  ) No other complaints  The following portions of the patient's history were reviewed and updated as appropriate: allergies, current medications, past family history, past medical history, past social history, past surgical history and problem list     Review of Systems   Constitutional: Positive for appetite change (decreased appetite)     HENT: Positive for congestion, postnasal drip, rhinorrhea, sinus pressure and sinus pain  Pressure in ears and sinusitis and rhinorrhea   Eyes: Negative  Respiratory: Negative  Cardiovascular: Negative  Gastrointestinal: Negative  Endocrine: Negative  Genitourinary: Negative  Musculoskeletal: Negative  Skin: Negative  Allergic/Immunologic: Negative  Neurological: Negative  Hematological: Negative  Psychiatric/Behavioral: Negative  Objective:      /76   Pulse 93   Temp 97 8 °F (36 6 °C) (Temporal)   Ht 5' 6 5" (1 689 m)   Wt 65 6 kg (144 lb 9 6 oz)   SpO2 97%   BMI 22 99 kg/m²          Physical Exam   Constitutional: He is oriented to person, place, and time  He appears well-developed and well-nourished  HENT:   Head: Normocephalic and atraumatic  Right Ear: External ear normal    Left Ear: External ear normal    Sinus pressure and drainage    Eyes: Pupils are equal, round, and reactive to light  Conjunctivae and EOM are normal    Neck: Normal range of motion  Neck supple  Cardiovascular: Normal rate, regular rhythm, normal heart sounds and intact distal pulses  Pulmonary/Chest: Effort normal and breath sounds normal    cough   Musculoskeletal: Normal range of motion  Neurological: He is alert and oriented to person, place, and time  He has normal reflexes  Skin: Skin is warm and dry  Psychiatric: He has a normal mood and affect   His behavior is normal

## 2019-09-20 DIAGNOSIS — I50.9 ACC/AHA STAGE C CONGESTIVE HEART FAILURE DUE TO ISCHEMIC CARDIOMYOPATHY (HCC): ICD-10-CM

## 2019-09-20 DIAGNOSIS — I25.5 ACC/AHA STAGE C CONGESTIVE HEART FAILURE DUE TO ISCHEMIC CARDIOMYOPATHY (HCC): ICD-10-CM

## 2019-09-20 RX ORDER — LISINOPRIL 5 MG/1
5 TABLET ORAL DAILY
Qty: 90 TABLET | Refills: 1 | Status: SHIPPED | OUTPATIENT
Start: 2019-09-20 | End: 2020-03-18 | Stop reason: SDUPTHER

## 2019-10-01 DIAGNOSIS — E78.5 HYPERLIPIDEMIA, UNSPECIFIED HYPERLIPIDEMIA TYPE: ICD-10-CM

## 2019-10-01 RX ORDER — ATORVASTATIN CALCIUM 40 MG/1
TABLET, FILM COATED ORAL
Qty: 90 TABLET | Refills: 3 | Status: SHIPPED | OUTPATIENT
Start: 2019-10-01 | End: 2020-09-03 | Stop reason: SDUPTHER

## 2019-10-19 NOTE — PROGRESS NOTES
Heart Failure Outpatient Progress Note - Dilcia Sandoval 78 y o  male MRN: [id number]    @ Encounter: [id number]      Assessment/Plan:    Patient Active Problem List    Diagnosis Date Noted    Ischemic cardiomyopathy 03/12/2018     Priority: High    Benign prostatic hyperplasia 11/07/2018     Priority: Low    Hyperlipidemia 11/07/2018     Priority: Low    Prediabetes 11/07/2018     Priority: Low    Essential hypertension 11/07/2018     Priority: Low    Hx of CABG 03/12/2018     Priority: Low    S/P mitral valve repair 03/12/2018     Priority: Low     1  Chronic systolic CHF, Stage C, NYHA 2  Etiology: iCM  Echo 3/27/18: EF: 45%, LVEDd 6 1 cm  Moderate to severe MR, eccentric  PASP 30 mmHg  Weight: 139 lbs, weight is down, poor appetite    Neurohormonal Blockade:  --Beta-Blocker: coreg 6 25 mg BID  --ACEi, ARB or ARNi: lisinopril 5 mg   --Aldosterone Receptor Blocker:  --Diuretic: none     Sudden Cardiac Death Risk Reduction:  --ICD: none, EF > 35%    Electrical Resynchronization:  --Candidacy for BiV device: narrow QRS    Advanced Therapies (If appropriate): --Inotrope:  --LVAD/Transplant Candidacy:    # CAD with CABG x 3 2012  TWI in 2,3 - no ischemic eval since CABG- EKG today- same abnormality but unchanged- pt declining stress test again- no cp or SOB  :  # hyperlipidemia: 5/4/19- HDL 59, LDL 65  Atorvastatin 40 mg daily  # Mitral valve repair with 26 mm CE annuloplasty ring   Has moderate to severe MR on 3/27/18 echo - eccentric  # Post op Trach and peg at that time  # polymyalgia rheumatica  #  anemia of chronic disease  # pre diabetes    TODAY'S PLAN:  Blood work  No cardiac testing needed  --2g sodium diet  - Daily weights    HPI:   77 yo male with history of CAD with CABG x 3 in 2012 with mitral valve repair with ring, polymalgia rheumatica, BPH, ICM with last EF: 40% on June 2016 echo and moderate MR on the echo  He reportedly had VDRF after CABG with trach and PEG   Has not been admitted with heart failure  Last eval, pt declined stress test  On follow up 8/29 he denies SOB or CP  EKG today is abnormal but unchanged from Feb EKG  Interval History:  BP was high so changed metoprolol to coreg- now BP on low side    Review of Systems   Constitutional: Negative for activity change, appetite change, fatigue and unexpected weight change  HENT: Negative for congestion and nosebleeds  Eyes: Negative  Respiratory: Negative for cough, chest tightness and shortness of breath  Cardiovascular: Negative for chest pain, palpitations and leg swelling  Gastrointestinal: Negative for abdominal distention  Endocrine: Negative  Genitourinary: Negative  Musculoskeletal: Negative  Skin: Negative  Neurological: Negative for dizziness, syncope and weakness  Hematological: Negative  Psychiatric/Behavioral: Negative  Past Medical History:   Diagnosis Date    Prediabetes          No Known Allergies        Current Outpatient Medications:     aspirin (ASPIRIN LOW DOSE) 81 MG tablet, Take 1 tablet by mouth daily, Disp: , Rfl:     atorvastatin (LIPITOR) 40 mg tablet, TAKE 1 TABLET DAILY, Disp: 90 tablet, Rfl: 3    carvedilol (COREG) 6 25 mg tablet, Take 1 tablet (6 25 mg total) by mouth 2 (two) times a day with meals, Disp: 60 tablet, Rfl: 5    Cholecalciferol 1000 units tablet, Take by mouth, Disp: , Rfl:     Ferrous Sulfate (IRON) 325 (65 Fe) MG TABS, Take 1 tablet by mouth Twice daily, Disp: , Rfl:     finasteride (PROSCAR) 5 mg tablet, Take 1 tablet (5 mg total) by mouth daily, Disp: 90 tablet, Rfl: 3    lisinopril (ZESTRIL) 5 mg tablet, Take 1 tablet (5 mg total) by mouth daily, Disp: 90 tablet, Rfl: 1    Omega-3 Fatty Acids (FISH OIL OMEGA-3 PO), Fish Oil, Disp: , Rfl:     tamsulosin (FLOMAX) 0 4 mg, TAKE 1 CAPSULE AT BEDTIME, Disp: 90 capsule, Rfl: 3    meloxicam (MOBIC) 7 5 mg tablet, meloxicam 7 5 mg tablet, Disp: , Rfl:     Multiple Vitamin Essential TABS, Take by mouth, Disp: , Rfl:     Social History     Socioeconomic History    Marital status:      Spouse name: Not on file    Number of children: Not on file    Years of education: Not on file    Highest education level: Not on file   Occupational History    Not on file   Social Needs    Financial resource strain: Not on file    Food insecurity:     Worry: Not on file     Inability: Not on file    Transportation needs:     Medical: Not on file     Non-medical: Not on file   Tobacco Use    Smoking status: Former Smoker     Types: Cigarettes     Last attempt to quit:      Years since quittin 8    Smokeless tobacco: Never Used   Substance and Sexual Activity    Alcohol use: Yes     Comment: rare    Drug use: No    Sexual activity: Not on file   Lifestyle    Physical activity:     Days per week: Not on file     Minutes per session: Not on file    Stress: Not on file   Relationships    Social connections:     Talks on phone: Not on file     Gets together: Not on file     Attends Christianity service: Not on file     Active member of club or organization: Not on file     Attends meetings of clubs or organizations: Not on file     Relationship status: Not on file    Intimate partner violence:     Fear of current or ex partner: Not on file     Emotionally abused: Not on file     Physically abused: Not on file     Forced sexual activity: Not on file   Other Topics Concern    Not on file   Social History Narrative    Not on file       Family History   Problem Relation Age of Onset    Diabetes Mother     Heart disease Mother     Hypertension Mother     Colon cancer Father     Kidney disease Father     Heart disease Sister     Heart disease Brother     Breast cancer Paternal Aunt     Cervical cancer Paternal Aunt        Physical Exam:    Vitals:   Vitals:    10/21/19 0809   BP: 100/58   Pulse: 72     Wt Readings from Last 3 Encounters:   10/21/19 63 4 kg (139 lb 12 8 oz)   19 65 6 kg (144 lb 9 6 oz)   08/29/19 68 5 kg (151 lb)       Physical Exam:    GEN: Bryon Stone appears well, alert and oriented x 3, pleasant and cooperative   HEENT: pupils equal, round, and reactive to light; extraocular muscles intact  NECK: supple, no carotid bruits   HEART: regular rhythm, normal S1 and S2, no murmurs, clicks, gallops or rubs, JVP is    LUNGS: clear to auscultation bilaterally; no wheezes, rales, or rhonchi   ABDOMEN: normal bowel sounds, soft, no tenderness, no distention  EXTREMITIES: peripheral pulses normal; no clubbing, cyanosis, or edema  NEURO: no focal findings   SKIN: normal without suspicious lesions on exposed skin    Labs & Results:    Lab Results   Component Value Date    GLUCOSE 104 01/08/2016    CALCIUM 9 3 05/04/2019     01/08/2016    K 4 3 05/04/2019    CO2 28 05/04/2019     05/04/2019    BUN 20 05/04/2019    CREATININE 1 24 05/04/2019     Lab Results   Component Value Date    WBC 6 94 09/29/2018    HGB 13 2 09/29/2018    HCT 40 4 09/29/2018    MCV 96 09/29/2018     09/29/2018     No results found for: NTBNP   Lab Results   Component Value Date    CHOL 127 11/28/2015    CHOL 105 10/11/2014    CHOL 132 07/14/2014     Lab Results   Component Value Date    HDL 59 05/04/2019    HDL 54 01/27/2018    HDL 64 (H) 04/15/2017     Lab Results   Component Value Date    LDLCALC 65 05/04/2019    LDLCALC 74 01/27/2018    LDLCALC 54 04/15/2017     Lab Results   Component Value Date    TRIG 75 05/04/2019    TRIG 82 01/27/2018    TRIG 55 04/15/2017     No results found for: CHOLHDL      EKG personally reviewed by Simon Batista DO  Counseling / Coordination of Care  Total floor / unit time spent today 25 minutes  Greater than 50% of total time was spent with the patient and / or family counseling and / or coordination of care  A description of the counseling / coordination of care: 15      Thank you for the opportunity to participate in the care of this patient    JEWEL Mao ASHWIN Chan

## 2019-10-21 ENCOUNTER — OFFICE VISIT (OUTPATIENT)
Dept: CARDIOLOGY CLINIC | Facility: CLINIC | Age: 79
End: 2019-10-21
Payer: COMMERCIAL

## 2019-10-21 VITALS
BODY MASS INDEX: 21.94 KG/M2 | HEART RATE: 72 BPM | SYSTOLIC BLOOD PRESSURE: 100 MMHG | DIASTOLIC BLOOD PRESSURE: 58 MMHG | WEIGHT: 139.8 LBS | HEIGHT: 67 IN

## 2019-10-21 DIAGNOSIS — I25.5 ISCHEMIC CARDIOMYOPATHY: ICD-10-CM

## 2019-10-21 DIAGNOSIS — I25.10 CORONARY ARTERY DISEASE INVOLVING NATIVE CORONARY ARTERY OF NATIVE HEART WITHOUT ANGINA PECTORIS: ICD-10-CM

## 2019-10-21 DIAGNOSIS — I50.22 CHRONIC SYSTOLIC CHF (CONGESTIVE HEART FAILURE), NYHA CLASS 2 (HCC): Primary | ICD-10-CM

## 2019-10-21 PROCEDURE — 99214 OFFICE O/P EST MOD 30 MIN: CPT | Performed by: INTERNAL MEDICINE

## 2019-11-02 ENCOUNTER — APPOINTMENT (OUTPATIENT)
Dept: LAB | Facility: HOSPITAL | Age: 79
End: 2019-11-02
Payer: COMMERCIAL

## 2019-11-02 DIAGNOSIS — I10 ESSENTIAL HYPERTENSION: ICD-10-CM

## 2019-11-02 DIAGNOSIS — R73.03 PREDIABETES: ICD-10-CM

## 2019-11-02 DIAGNOSIS — E78.5 HYPERLIPIDEMIA, UNSPECIFIED HYPERLIPIDEMIA TYPE: ICD-10-CM

## 2019-11-02 LAB
ALBUMIN SERPL BCP-MCNC: 3.1 G/DL (ref 3.5–5)
ALP SERPL-CCNC: 71 U/L (ref 46–116)
ALT SERPL W P-5'-P-CCNC: 14 U/L (ref 12–78)
ANION GAP SERPL CALCULATED.3IONS-SCNC: 7 MMOL/L (ref 4–13)
AST SERPL W P-5'-P-CCNC: 25 U/L (ref 5–45)
BILIRUB SERPL-MCNC: 0.91 MG/DL (ref 0.2–1)
BUN SERPL-MCNC: 17 MG/DL (ref 5–25)
CALCIUM SERPL-MCNC: 9.6 MG/DL (ref 8.3–10.1)
CHLORIDE SERPL-SCNC: 104 MMOL/L (ref 100–108)
CO2 SERPL-SCNC: 29 MMOL/L (ref 21–32)
CREAT SERPL-MCNC: 1.13 MG/DL (ref 0.6–1.3)
EST. AVERAGE GLUCOSE BLD GHB EST-MCNC: 126 MG/DL
GFR SERPL CREATININE-BSD FRML MDRD: 61 ML/MIN/1.73SQ M
GLUCOSE P FAST SERPL-MCNC: 90 MG/DL (ref 65–99)
HBA1C MFR BLD: 6 % (ref 4.2–6.3)
POTASSIUM SERPL-SCNC: 4.5 MMOL/L (ref 3.5–5.3)
PROT SERPL-MCNC: 6.9 G/DL (ref 6.4–8.2)
SODIUM SERPL-SCNC: 140 MMOL/L (ref 136–145)

## 2019-11-02 PROCEDURE — 83036 HEMOGLOBIN GLYCOSYLATED A1C: CPT | Performed by: FAMILY MEDICINE

## 2019-11-02 PROCEDURE — 36415 COLL VENOUS BLD VENIPUNCTURE: CPT | Performed by: FAMILY MEDICINE

## 2019-11-02 PROCEDURE — 80053 COMPREHEN METABOLIC PANEL: CPT

## 2019-11-11 ENCOUNTER — OFFICE VISIT (OUTPATIENT)
Dept: FAMILY MEDICINE CLINIC | Facility: CLINIC | Age: 79
End: 2019-11-11
Payer: COMMERCIAL

## 2019-11-11 VITALS
OXYGEN SATURATION: 98 % | HEART RATE: 77 BPM | BODY MASS INDEX: 21.69 KG/M2 | HEIGHT: 66 IN | SYSTOLIC BLOOD PRESSURE: 120 MMHG | TEMPERATURE: 97.9 F | DIASTOLIC BLOOD PRESSURE: 78 MMHG | WEIGHT: 135 LBS

## 2019-11-11 DIAGNOSIS — R73.03 PREDIABETES: Primary | ICD-10-CM

## 2019-11-11 DIAGNOSIS — R63.4 WEIGHT LOSS: ICD-10-CM

## 2019-11-11 DIAGNOSIS — R43.2 LOSS OF TASTE: ICD-10-CM

## 2019-11-11 DIAGNOSIS — R09.82 PND (POST-NASAL DRIP): ICD-10-CM

## 2019-11-11 DIAGNOSIS — R63.0 LOSS OF APPETITE: ICD-10-CM

## 2019-11-11 DIAGNOSIS — E78.5 HYPERLIPIDEMIA, UNSPECIFIED HYPERLIPIDEMIA TYPE: ICD-10-CM

## 2019-11-11 DIAGNOSIS — J32.9 SINUSITIS, UNSPECIFIED CHRONICITY, UNSPECIFIED LOCATION: ICD-10-CM

## 2019-11-11 DIAGNOSIS — I10 ESSENTIAL HYPERTENSION: ICD-10-CM

## 2019-11-11 PROCEDURE — 1036F TOBACCO NON-USER: CPT | Performed by: FAMILY MEDICINE

## 2019-11-11 PROCEDURE — 99214 OFFICE O/P EST MOD 30 MIN: CPT | Performed by: FAMILY MEDICINE

## 2019-11-11 PROCEDURE — 1160F RVW MEDS BY RX/DR IN RCRD: CPT | Performed by: FAMILY MEDICINE

## 2019-11-11 RX ORDER — CEFDINIR 300 MG/1
300 CAPSULE ORAL EVERY 12 HOURS SCHEDULED
Qty: 14 CAPSULE | Refills: 0 | Status: SHIPPED | OUTPATIENT
Start: 2019-11-11 | End: 2019-11-18

## 2019-11-11 NOTE — PATIENT INSTRUCTIONS
Here for loss of taste and also loss of appetite  Recommend starting abx and also if not better consult ENT  Take BP med and also low sugar diet for hx of prediabetes  Take cholesterol med and follow low cholesterol diet as directed  May take Flonase prn sinusitis  Recheck in 3 months  Monitor weight loss

## 2019-11-11 NOTE — PROGRESS NOTES
Assessment/Plan:  Chief Complaint   Patient presents with    Follow-up     regarding HTN     Patient Instructions   Here for loss of taste and also loss of appetite  Recommend starting abx and also if not better consult ENT  Take BP med and also low sugar diet for hx of prediabetes  Take cholesterol med and follow low cholesterol diet as directed  May take Flonase prn sinusitis  Recheck in 3 months  Monitor weight loss  No problem-specific Assessment & Plan notes found for this encounter  Diagnoses and all orders for this visit:    Prediabetes    Hyperlipidemia, unspecified hyperlipidemia type    Essential hypertension    Loss of appetite    PND (post-nasal drip)    Sinusitis, unspecified chronicity, unspecified location  -     cefdinir (OMNICEF) 300 mg capsule; Take 1 capsule (300 mg total) by mouth every 12 (twelve) hours for 7 days    Loss of taste    Weight loss          Subjective:      Patient ID: Yessica Guerrapool is a 78 y o  male  Here for BP check and also complains of loss of taste possibly from sinusitis for 2 months  No cp or sob, or ha  Has tonja nose and pnd  His lack of taste has decreased his appetite  The following portions of the patient's history were reviewed and updated as appropriate: allergies, current medications, past family history, past medical history, past social history, past surgical history and problem list     Review of Systems   Constitutional: Positive for appetite change (loss)  HENT: Positive for postnasal drip and rhinorrhea  Loss of taste   Eyes: Negative  Respiratory: Negative  Cardiovascular: Negative  Gastrointestinal: Negative  Endocrine: Negative  Genitourinary: Negative  Musculoskeletal: Negative  Skin: Negative  Allergic/Immunologic: Negative  Neurological: Negative  Hematological: Negative  Psychiatric/Behavioral: Negative            Objective:      /78 (BP Location: Left arm, Patient Position: Sitting, Cuff Size: Standard)   Pulse 77   Temp 97 9 °F (36 6 °C) (Temporal)   Ht 5' 6 25" (1 683 m)   Wt 61 2 kg (135 lb)   SpO2 98%   BMI 21 63 kg/m²          Physical Exam   Constitutional: He is oriented to person, place, and time  He appears well-developed and well-nourished  HENT:   Head: Normocephalic and atraumatic  Right Ear: External ear normal    Left Ear: External ear normal    Nose: Nose normal    Mouth/Throat: Oropharynx is clear and moist    Eyes: Pupils are equal, round, and reactive to light  Conjunctivae and EOM are normal    Neck: Normal range of motion  Neck supple  Cardiovascular: Normal rate, regular rhythm, normal heart sounds and intact distal pulses  Pulmonary/Chest: Effort normal and breath sounds normal    Musculoskeletal: Normal range of motion  Neurological: He is alert and oriented to person, place, and time  He has normal reflexes  Skin: Skin is warm and dry  Psychiatric: He has a normal mood and affect   His behavior is normal

## 2020-01-08 DIAGNOSIS — I50.22 CHRONIC SYSTOLIC CHF (CONGESTIVE HEART FAILURE), NYHA CLASS 2 (HCC): ICD-10-CM

## 2020-01-08 PROBLEM — Z87.440 HISTORY OF UTI: Status: ACTIVE | Noted: 2020-01-08

## 2020-01-08 RX ORDER — CARVEDILOL 6.25 MG/1
TABLET ORAL
Qty: 60 TABLET | Refills: 5 | Status: SHIPPED | OUTPATIENT
Start: 2020-01-08 | End: 2020-07-20

## 2020-01-08 NOTE — PROGRESS NOTES
1/9/2020      Chief Complaint   Patient presents with    Benign Prostatic Hypertrophy       Assessment and Plan    78 y o  male managed by Dr Sania Pruitt    1  BPH  - PVR 157mL and stable   - continue Flomax 0 4 mg nightly  - continue finasteride 5 mg daily  - the patient's lower urinary tract symptoms are stable but he continues with some overactivity, he continues to defer further management of this, he is aware to call if he changes his mind in regards to this     2  History of UTIs  - no recent infection    FU 1 year with PVR at visit       History of Present Illness  Curtis Law is a 78 y o  male here for follow up evaluation of BPH and history of urinary tract infections  The patient presents today doing well  He continues on both Flomax and finasteride  His PVR is 157 mL in his AUA symptom score 17  He does have some overactivity of the bladder but he does not wish to pursue therapy for this  He has had no recent UTIs and is otherwise doing well  Review of Systems   Constitutional: Negative for activity change, chills and fever  Gastrointestinal: Negative for abdominal distention and abdominal pain  Musculoskeletal: Negative for back pain and gait problem  Psychiatric/Behavioral: Negative for behavioral problems and confusion  AUA SYMPTOM SCORE      Most Recent Value   AUA SYMPTOM SCORE   How often have you had a sensation of not emptying your bladder completely after you finished urinating? 4   How often have you had to urinate again less than two hours after you finished urinating? 2   How often have you found you stopped and started again several times when you urinate? 5   How often have you found it difficult to postpone urination? 0   How often have you had a weak urinary stream?  3   How often have you had to push or strain to begin urination?   0   How many times did you most typically get up to urinate from the time you went to bed at night until the time you got up in the morning? 3   Quality of Life: If you were to spend the rest of your life with your urinary condition just the way it is now, how would you feel about that?  2   AUA SYMPTOM SCORE  17          Past Medical History  Past Medical History:   Diagnosis Date    Prediabetes        Past Social History  Past Surgical History:   Procedure Laterality Date    CORONARY ARTERY BYPASS GRAFT      x3 LIMA-LAD, SVG-OM, SVG-DIAG with mitral valve repair by olenchock    CYSTOSCOPY  2014    Diagnostic    ESOPHAGOGASTRODUODENOSCOPY  2015    with biopsy   Dr Sibley Liter      26 mm fatmata hernandez physi annuloplasty ring    TRACHEOSTOMY       Social History     Tobacco Use   Smoking Status Former Smoker    Types: Cigarettes    Last attempt to quit: Saadia Feeling Years since quittin 0   Smokeless Tobacco Never Used       Past Family History  Family History   Problem Relation Age of Onset    Diabetes Mother     Heart disease Mother     Hypertension Mother     Colon cancer Father     Kidney disease Father     Heart disease Sister     Heart disease Brother     Breast cancer Paternal Aunt     Cervical cancer Paternal Aunt        Past Social history  Social History     Socioeconomic History    Marital status:      Spouse name: Not on file    Number of children: Not on file    Years of education: Not on file    Highest education level: Not on file   Occupational History    Not on file   Social Needs    Financial resource strain: Not on file    Food insecurity:     Worry: Not on file     Inability: Not on file    Transportation needs:     Medical: Not on file     Non-medical: Not on file   Tobacco Use    Smoking status: Former Smoker     Types: Cigarettes     Last attempt to quit:      Years since quittin 0    Smokeless tobacco: Never Used   Substance and Sexual Activity    Alcohol use: Yes     Comment: rare    Drug use: No    Sexual activity: Not on file Lifestyle    Physical activity:     Days per week: Not on file     Minutes per session: Not on file    Stress: Not on file   Relationships    Social connections:     Talks on phone: Not on file     Gets together: Not on file     Attends Jain service: Not on file     Active member of club or organization: Not on file     Attends meetings of clubs or organizations: Not on file     Relationship status: Not on file    Intimate partner violence:     Fear of current or ex partner: Not on file     Emotionally abused: Not on file     Physically abused: Not on file     Forced sexual activity: Not on file   Other Topics Concern    Not on file   Social History Narrative    Not on file       Current Medications  Current Outpatient Medications   Medication Sig Dispense Refill    aspirin (ASPIRIN LOW DOSE) 81 MG tablet Take 1 tablet by mouth daily      atorvastatin (LIPITOR) 40 mg tablet TAKE 1 TABLET DAILY 90 tablet 3    carvedilol (COREG) 6 25 mg tablet TAKE 1 TABLET BY MOUTH TWICE A DAY WITH MEALS 60 tablet 5    Cholecalciferol 1000 units tablet Take by mouth      Ferrous Sulfate (IRON) 325 (65 Fe) MG TABS Take 1 tablet by mouth Twice daily      finasteride (PROSCAR) 5 mg tablet Take 1 tablet (5 mg total) by mouth daily 90 tablet 4    lisinopril (ZESTRIL) 5 mg tablet Take 1 tablet (5 mg total) by mouth daily 90 tablet 1    Multiple Vitamin Essential TABS Take by mouth      Omega-3 Fatty Acids (FISH OIL OMEGA-3 PO) Fish Oil      tamsulosin (FLOMAX) 0 4 mg Take 1 capsule (0 4 mg total) by mouth daily at bedtime 90 capsule 4    meloxicam (MOBIC) 7 5 mg tablet meloxicam 7 5 mg tablet       No current facility-administered medications for this visit          Allergies  No Known Allergies      The following portions of the patient's history were reviewed and updated as appropriate: allergies, current medications, past medical history, past social history, past surgical history and problem list       Vitals  Vitals:    01/09/20 0749   BP: 132/78   BP Location: Left arm   Patient Position: Sitting   Cuff Size: Adult   Pulse: 68   Weight: 59 9 kg (132 lb)   Height: 5' 6" (1 676 m)       Physical Exam  Constitutional   General appearance: Patient is seated and in no acute distress, well appearing and well nourished  Head and Face   Head and face: Normal     Eyes   Conjunctiva and lids: No erythema, swelling or discharge  Ears, Nose, Mouth, and Throat   Hearing: Normal     Pulmonary   Respiratory effort: No increased work of breathing or signs of respiratory distress  Cardiovascular   Examination of extremities for edema and/or varicosities: Normal     Abdomen   Abdomen: Non-tender, no masses  Musculoskeletal   Gait and station: Normal     Skin   Skin and subcutaneous tissue: Warm, dry, and intact  No visible lesions or rashes    Psychiatric   Judgment and insight: Normal  Recent and remote memory:  Normal  Mood and affect: Normal      Results  Recent Results (from the past 1 hour(s))   POCT Measure PVR    Collection Time: 01/09/20  7:59 AM   Result Value Ref Range    POST-VOID RESIDUAL VOLUME, ML  mL   ]  Lab Results   Component Value Date    PSA 0 4 12/03/2015    PSA 1 7 10/11/2014     Lab Results   Component Value Date    GLUCOSE 104 01/08/2016    CALCIUM 9 6 11/02/2019     01/08/2016    K 4 5 11/02/2019    CO2 29 11/02/2019     11/02/2019    BUN 17 11/02/2019    CREATININE 1 13 11/02/2019     Lab Results   Component Value Date    WBC 6 94 09/29/2018    HGB 13 2 09/29/2018    HCT 40 4 09/29/2018    MCV 96 09/29/2018     09/29/2018       Orders  Orders Placed This Encounter   Procedures    POCT Measure PVR

## 2020-01-09 ENCOUNTER — OFFICE VISIT (OUTPATIENT)
Dept: UROLOGY | Facility: CLINIC | Age: 80
End: 2020-01-09
Payer: COMMERCIAL

## 2020-01-09 VITALS
BODY MASS INDEX: 21.21 KG/M2 | WEIGHT: 132 LBS | DIASTOLIC BLOOD PRESSURE: 78 MMHG | HEART RATE: 68 BPM | HEIGHT: 66 IN | SYSTOLIC BLOOD PRESSURE: 132 MMHG

## 2020-01-09 DIAGNOSIS — N13.8 BPH WITH OBSTRUCTION/LOWER URINARY TRACT SYMPTOMS: ICD-10-CM

## 2020-01-09 DIAGNOSIS — Z87.440 HISTORY OF UTI: Primary | ICD-10-CM

## 2020-01-09 DIAGNOSIS — N40.1 BENIGN PROSTATIC HYPERPLASIA WITH LOWER URINARY TRACT SYMPTOMS, SYMPTOM DETAILS UNSPECIFIED: ICD-10-CM

## 2020-01-09 DIAGNOSIS — N40.1 BPH WITH OBSTRUCTION/LOWER URINARY TRACT SYMPTOMS: ICD-10-CM

## 2020-01-09 LAB — POST-VOID RESIDUAL VOLUME, ML POC: 157 ML

## 2020-01-09 PROCEDURE — 99213 OFFICE O/P EST LOW 20 MIN: CPT | Performed by: PHYSICIAN ASSISTANT

## 2020-01-09 PROCEDURE — 51798 US URINE CAPACITY MEASURE: CPT | Performed by: PHYSICIAN ASSISTANT

## 2020-01-09 RX ORDER — FINASTERIDE 5 MG/1
5 TABLET, FILM COATED ORAL DAILY
Qty: 90 TABLET | Refills: 4 | Status: SHIPPED | OUTPATIENT
Start: 2020-01-09 | End: 2021-01-26 | Stop reason: SDUPTHER

## 2020-01-09 RX ORDER — TAMSULOSIN HYDROCHLORIDE 0.4 MG/1
0.4 CAPSULE ORAL
Qty: 90 CAPSULE | Refills: 4 | Status: SHIPPED | OUTPATIENT
Start: 2020-01-09 | End: 2021-01-26 | Stop reason: SDUPTHER

## 2020-02-11 ENCOUNTER — OFFICE VISIT (OUTPATIENT)
Dept: FAMILY MEDICINE CLINIC | Facility: CLINIC | Age: 80
End: 2020-02-11
Payer: COMMERCIAL

## 2020-02-11 ENCOUNTER — HOSPITAL ENCOUNTER (OUTPATIENT)
Dept: CT IMAGING | Facility: HOSPITAL | Age: 80
Discharge: HOME | End: 2020-02-11
Payer: COMMERCIAL

## 2020-02-11 ENCOUNTER — TELEPHONE (OUTPATIENT)
Dept: FAMILY MEDICINE CLINIC | Facility: CLINIC | Age: 80
End: 2020-02-11

## 2020-02-11 ENCOUNTER — APPOINTMENT (OUTPATIENT)
Dept: LAB | Facility: HOSPITAL | Age: 80
End: 2020-02-11
Payer: COMMERCIAL

## 2020-02-11 VITALS
BODY MASS INDEX: 20.6 KG/M2 | OXYGEN SATURATION: 98 % | HEIGHT: 66 IN | SYSTOLIC BLOOD PRESSURE: 122 MMHG | WEIGHT: 128.2 LBS | HEART RATE: 80 BPM | TEMPERATURE: 97.5 F | DIASTOLIC BLOOD PRESSURE: 60 MMHG

## 2020-02-11 DIAGNOSIS — R10.31 RLQ ABDOMINAL PAIN: ICD-10-CM

## 2020-02-11 DIAGNOSIS — R10.31 RLQ ABDOMINAL PAIN: Primary | ICD-10-CM

## 2020-02-11 DIAGNOSIS — E78.5 HYPERLIPIDEMIA, UNSPECIFIED HYPERLIPIDEMIA TYPE: ICD-10-CM

## 2020-02-11 DIAGNOSIS — R73.03 PREDIABETES: ICD-10-CM

## 2020-02-11 DIAGNOSIS — Z28.21 IMMUNIZATION NOT CARRIED OUT BECAUSE OF PATIENT REFUSAL: Primary | ICD-10-CM

## 2020-02-11 DIAGNOSIS — R30.0 DYSURIA: ICD-10-CM

## 2020-02-11 DIAGNOSIS — Z87.440 HISTORY OF UTI: Primary | ICD-10-CM

## 2020-02-11 DIAGNOSIS — I10 ESSENTIAL HYPERTENSION: ICD-10-CM

## 2020-02-11 LAB
ALBUMIN SERPL BCP-MCNC: 3.2 G/DL (ref 3.5–5)
ALP SERPL-CCNC: 73 U/L (ref 46–116)
ALT SERPL W P-5'-P-CCNC: 17 U/L (ref 12–78)
ANION GAP SERPL CALCULATED.3IONS-SCNC: 4 MMOL/L (ref 4–13)
AST SERPL W P-5'-P-CCNC: 16 U/L (ref 5–45)
BACTERIA UR QL AUTO: ABNORMAL /HPF
BASOPHILS # BLD AUTO: 0.05 THOUSANDS/ΜL (ref 0–0.1)
BASOPHILS NFR BLD AUTO: 1 % (ref 0–1)
BILIRUB SERPL-MCNC: 0.51 MG/DL (ref 0.2–1)
BILIRUB UR QL STRIP: NEGATIVE
BUN SERPL-MCNC: 21 MG/DL (ref 5–25)
CALCIUM SERPL-MCNC: 9.2 MG/DL (ref 8.3–10.1)
CHLORIDE SERPL-SCNC: 105 MMOL/L (ref 100–108)
CLARITY UR: ABNORMAL
CO2 SERPL-SCNC: 32 MMOL/L (ref 21–32)
COLOR UR: YELLOW
CREAT SERPL-MCNC: 1.2 MG/DL (ref 0.6–1.3)
EOSINOPHIL # BLD AUTO: 0.38 THOUSAND/ΜL (ref 0–0.61)
EOSINOPHIL NFR BLD AUTO: 4 % (ref 0–6)
ERYTHROCYTE [DISTWIDTH] IN BLOOD BY AUTOMATED COUNT: 13.2 % (ref 11.6–15.1)
GFR SERPL CREATININE-BSD FRML MDRD: 57 ML/MIN/1.73SQ M
GLUCOSE SERPL-MCNC: 124 MG/DL (ref 65–140)
GLUCOSE UR STRIP-MCNC: NEGATIVE MG/DL
HCT VFR BLD AUTO: 41 % (ref 36.5–49.3)
HGB BLD-MCNC: 13.1 G/DL (ref 12–17)
HGB UR QL STRIP.AUTO: ABNORMAL
IMM GRANULOCYTES # BLD AUTO: 0.02 THOUSAND/UL (ref 0–0.2)
IMM GRANULOCYTES NFR BLD AUTO: 0 % (ref 0–2)
KETONES UR STRIP-MCNC: NEGATIVE MG/DL
LEUKOCYTE ESTERASE UR QL STRIP: ABNORMAL
LYMPHOCYTES # BLD AUTO: 1.36 THOUSANDS/ΜL (ref 0.6–4.47)
LYMPHOCYTES NFR BLD AUTO: 16 % (ref 14–44)
MCH RBC QN AUTO: 31.6 PG (ref 26.8–34.3)
MCHC RBC AUTO-ENTMCNC: 32 G/DL (ref 31.4–37.4)
MCV RBC AUTO: 99 FL (ref 82–98)
MONOCYTES # BLD AUTO: 0.77 THOUSAND/ΜL (ref 0.17–1.22)
MONOCYTES NFR BLD AUTO: 9 % (ref 4–12)
NEUTROPHILS # BLD AUTO: 6.14 THOUSANDS/ΜL (ref 1.85–7.62)
NEUTS SEG NFR BLD AUTO: 70 % (ref 43–75)
NITRITE UR QL STRIP: NEGATIVE
NON-SQ EPI CELLS URNS QL MICRO: ABNORMAL /HPF
NRBC BLD AUTO-RTO: 0 /100 WBCS
PH UR STRIP.AUTO: 6 [PH]
PLATELET # BLD AUTO: 254 THOUSANDS/UL (ref 149–390)
PMV BLD AUTO: 9 FL (ref 8.9–12.7)
POTASSIUM SERPL-SCNC: 5.1 MMOL/L (ref 3.5–5.3)
PROT SERPL-MCNC: 7.4 G/DL (ref 6.4–8.2)
PROT UR STRIP-MCNC: ABNORMAL MG/DL
RBC # BLD AUTO: 4.15 MILLION/UL (ref 3.88–5.62)
RBC #/AREA URNS AUTO: ABNORMAL /HPF
SODIUM SERPL-SCNC: 141 MMOL/L (ref 136–145)
SP GR UR STRIP.AUTO: 1.01 (ref 1–1.03)
UROBILINOGEN UR QL STRIP.AUTO: 0.2 E.U./DL
WBC # BLD AUTO: 8.72 THOUSAND/UL (ref 4.31–10.16)
WBC #/AREA URNS AUTO: ABNORMAL /HPF

## 2020-02-11 PROCEDURE — 85025 COMPLETE CBC W/AUTO DIFF WBC: CPT

## 2020-02-11 PROCEDURE — 1036F TOBACCO NON-USER: CPT | Performed by: FAMILY MEDICINE

## 2020-02-11 PROCEDURE — 87086 URINE CULTURE/COLONY COUNT: CPT

## 2020-02-11 PROCEDURE — 99214 OFFICE O/P EST MOD 30 MIN: CPT | Performed by: FAMILY MEDICINE

## 2020-02-11 PROCEDURE — 3008F BODY MASS INDEX DOCD: CPT | Performed by: FAMILY MEDICINE

## 2020-02-11 PROCEDURE — 74177 CT ABD & PELVIS W/CONTRAST: CPT

## 2020-02-11 PROCEDURE — 1160F RVW MEDS BY RX/DR IN RCRD: CPT | Performed by: FAMILY MEDICINE

## 2020-02-11 PROCEDURE — 3078F DIAST BP <80 MM HG: CPT | Performed by: FAMILY MEDICINE

## 2020-02-11 PROCEDURE — 3074F SYST BP LT 130 MM HG: CPT | Performed by: FAMILY MEDICINE

## 2020-02-11 PROCEDURE — 36415 COLL VENOUS BLD VENIPUNCTURE: CPT

## 2020-02-11 PROCEDURE — 81001 URINALYSIS AUTO W/SCOPE: CPT

## 2020-02-11 PROCEDURE — 80053 COMPREHEN METABOLIC PANEL: CPT

## 2020-02-11 RX ORDER — CIPROFLOXACIN 500 MG/1
500 TABLET, FILM COATED ORAL EVERY 12 HOURS SCHEDULED
Qty: 10 TABLET | Refills: 0 | Status: SHIPPED | OUTPATIENT
Start: 2020-02-11 | End: 2020-02-12 | Stop reason: SDUPTHER

## 2020-02-11 RX ADMIN — IOHEXOL 100 ML: 350 INJECTION, SOLUTION INTRAVENOUS at 11:24

## 2020-02-11 NOTE — PATIENT INSTRUCTIONS
Here for recheck and also pt  States he has occassional RLQ abdominal pain and some burning when he urinates only at start of urination  He currently has no abdominal pain and only occurs while at work  Rec checking labs and also CT scan abdomen for RLQ abdominal pain on occasion  Rec consult with General Surgery  He has lost some weight since last office visit  Take BP med and also low sugar diet for hx of prediabetes  Take cholesterol med and follow low cholesterol diet as directed  May take Flonase prn rhinitis  Recheck in 3 months  Monitor weight loss  F-up with urology as directed for enlarged prostate and pt  Continue to take his Flomax

## 2020-02-11 NOTE — TELEPHONE ENCOUNTER
----- Message from Se Valencia DO sent at 2/11/2020 11:15 AM EST -----  These labs are normal, please notify patient of normal results  CMP and cbc wnl, UA was abnormal and indicative of UTI, start abx Cipro 500 mg 1 po BID times 5 days as stated in recent task re abnormal UA

## 2020-02-11 NOTE — TELEPHONE ENCOUNTER
----- Message from David Ruff, DO sent at 2/11/2020 11:14 AM EST -----  Please call the patient regarding his abnormal result  Start Cipro 500 mg 1 po BID times 5 days for UTI as it has leuks and small blood and his dysuria may be from UTI

## 2020-02-11 NOTE — PROGRESS NOTES
Assessment/Plan:  Chief Complaint   Patient presents with    Follow-up     Here for routine follow up, having pain in his lower left abdomen off and on  Patient Instructions   Here for recheck and also pt  States he has occassional RLQ abdominal pain and some burning when he urinates only at start of urination  He currently has no abdominal pain and only occurs while at work  Rec checking labs and also CT scan abdomen for RLQ abdominal pain on occasion  Rec consult with General Surgery  He has lost some weight since last office visit  Take BP med and also low sugar diet for hx of prediabetes  Take cholesterol med and follow low cholesterol diet as directed  May take Flonase prn rhinitis  Recheck in 3 months  Monitor weight loss  F-up with urology as directed for enlarged prostate and pt  Continue to take his Flomax  No problem-specific Assessment & Plan notes found for this encounter  Diagnoses and all orders for this visit:    Immunization not carried out because of patient refusal  -     influenza vaccine, 2082-2932, high-dose, PF 0 5 mL (FLUZONE HIGH-DOSE)    Prediabetes  -     Comprehensive metabolic panel; Future    Hyperlipidemia, unspecified hyperlipidemia type  -     Comprehensive metabolic panel; Future    Essential hypertension  -     Comprehensive metabolic panel; Future    RLQ abdominal pain  -     UA w Reflex to Microscopic w Reflex to Culture -Lab Collect  -     Comprehensive metabolic panel; Future  -     CBC and differential; Future    Dysuria  -     UA w Reflex to Microscopic w Reflex to Culture -Lab Collect          Subjective:      Patient ID: Sylvia Chan is a 78 y o  male      Follow-up (Here for routine follow up, having pain in his lower left abdomen off and on ) Carried some heavy objects up steps a couple weeks ago, pain in right inguinal and lower right abdomen which starts and stops and only when working and currently not in office today and when urinating he gets a burning sensation  No fever or rectal bleeding  The following portions of the patient's history were reviewed and updated as appropriate: allergies, current medications, past family history, past medical history, past social history, past surgical history and problem list     Review of Systems   Constitutional: Negative  HENT: Negative  Eyes: Negative  Respiratory: Negative  Cardiovascular: Negative  Gastrointestinal: Positive for abdominal pain (right lower abdomen and inguinal area)  Endocrine: Negative  Genitourinary: Negative  Burning sensation when urinating only at start for 1-2 weeks  Musculoskeletal: Negative  Skin: Negative  Allergic/Immunologic: Negative  Neurological: Negative  Hematological: Negative  Psychiatric/Behavioral: Negative  Objective:      /60   Pulse 80   Temp 97 5 °F (36 4 °C) (Temporal)   Ht 5' 6" (1 676 m)   Wt 58 2 kg (128 lb 3 2 oz)   SpO2 98%   BMI 20 69 kg/m²          Physical Exam   Constitutional: He is oriented to person, place, and time  He appears well-developed and well-nourished  HENT:   Head: Normocephalic and atraumatic  Right Ear: External ear normal    Left Ear: External ear normal    Nose: Nose normal    Mouth/Throat: Oropharynx is clear and moist    Eyes: Pupils are equal, round, and reactive to light  Conjunctivae and EOM are normal    Neck: Normal range of motion  Neck supple  Cardiovascular: Normal rate, regular rhythm, normal heart sounds and intact distal pulses  Pulmonary/Chest: Effort normal and breath sounds normal    Abdominal: Soft  Bowel sounds are normal    Musculoskeletal: Normal range of motion  Neurological: He is alert and oriented to person, place, and time  He has normal reflexes  Skin: Skin is warm and dry  Psychiatric: He has a normal mood and affect   His behavior is normal

## 2020-02-12 ENCOUNTER — TELEPHONE (OUTPATIENT)
Dept: FAMILY MEDICINE CLINIC | Facility: CLINIC | Age: 80
End: 2020-02-12

## 2020-02-12 DIAGNOSIS — J90 PLEURAL EFFUSION: Primary | ICD-10-CM

## 2020-02-12 DIAGNOSIS — Z87.440 HISTORY OF UTI: ICD-10-CM

## 2020-02-12 DIAGNOSIS — K80.20 GALLSTONES: ICD-10-CM

## 2020-02-12 DIAGNOSIS — K44.9 HIATAL HERNIA: Primary | ICD-10-CM

## 2020-02-12 LAB — BACTERIA UR CULT: NORMAL

## 2020-02-12 RX ORDER — CIPROFLOXACIN 500 MG/1
500 TABLET, FILM COATED ORAL EVERY 12 HOURS SCHEDULED
Qty: 10 TABLET | Refills: 0 | Status: SHIPPED | OUTPATIENT
Start: 2020-02-12 | End: 2020-02-17

## 2020-02-21 ENCOUNTER — OFFICE VISIT (OUTPATIENT)
Dept: SURGERY | Facility: CLINIC | Age: 80
End: 2020-02-21
Payer: COMMERCIAL

## 2020-02-21 VITALS
RESPIRATION RATE: 18 BRPM | HEIGHT: 66 IN | TEMPERATURE: 97.3 F | DIASTOLIC BLOOD PRESSURE: 76 MMHG | WEIGHT: 126 LBS | SYSTOLIC BLOOD PRESSURE: 120 MMHG | HEART RATE: 78 BPM | BODY MASS INDEX: 20.25 KG/M2

## 2020-02-21 DIAGNOSIS — K40.90 INGUINAL HERNIA: Primary | ICD-10-CM

## 2020-02-21 DIAGNOSIS — K80.20 GALLSTONES: ICD-10-CM

## 2020-02-21 DIAGNOSIS — K44.9 HIATAL HERNIA: ICD-10-CM

## 2020-02-21 PROCEDURE — 99244 OFF/OP CNSLTJ NEW/EST MOD 40: CPT | Performed by: PHYSICIAN ASSISTANT

## 2020-02-21 NOTE — PROGRESS NOTES
Assessment/Plan:   Lola Portillo is a 78 y o male who is here for Diagnoses of Hiatal hernia and Gallstones were pertinent to this visit  Patient referred to us for evaluation of his abnormal CT scan and right lower quadrant abdominal pain  Patient with right lower quadrant abdominal pain associated with bulge  Pain is worse at night after walking  No associated symptoms of nausea, vomiting, change in bowel or bladder habits  Patient admits to poor appetite and weight loss and overall malaise  Patient pain in lower abdomen does not interfere with his activity of daily living  CT scan abdomen pelvis with findings of cholelithiasis with no acute cholecystitis or biliary ductal obstruction, there is a large hiatal hernia, there is a left pleural effusion and a fat containing inguinal in supra umbilical hernia noted  Plan:  Patient is not ready to schedule surgical intervention for his hernias and is waiting further workup of his pleural effusion  Because of his weight last would consider upper endoscopy and colonoscopy which can either be performed by us or he can follow up with his GI physician  Signs and symptoms of incarceration that would make hernia repair more urgent discussed with patient and his daughter  Patient is aware to return to office for further evaluation if becomes more symptomatic  Patient would need medical and cardiac clearance before surgical intervention  Incidental findings of gallstones  patient is asymptomatic from these will just continue to monitor for signs and symptoms of acute cholecystitis  Preoperative Clearance: Medical and Cardiac          ______________________________________________________  CC:Abdominal Pain (Gallbladder and hiatal hernia)    HPI:  Lola Portillo is a 78 y o male who was referred for evaluation of Abdominal Pain (Gallbladder and hiatal hernia)        Currently weight loss and poor appetite with right lower abdominal pain and abnormal CT scan findings  Right lower quadrant pain worse at night after walking and activity  Intermittent bulge noted  No difficulty swallowing or pain after eating  Rest improves pain  CT scan noted a high a large hiatal hernia, a supraumbilical hernia containing fat and I right inguinal hernia containing fat  There is also noted gallstones without acute changes  Colonoscopy 5 years ago with no polyps noted      ROS:  General ROS: negative  negative for - chills, fatigue, fever or night sweats, weight loss  Respiratory ROS: no cough, shortness of breath, or wheezing  Cardiovascular ROS: no chest pain or dyspnea on exertion  Genito-Urinary ROS: no dysuria, trouble voiding, or hematuria  Musculoskeletal ROS: negative for - gait disturbance, joint pain or muscle pain  Neurological ROS: no TIA or stroke symptoms  Gastrointestinal: see HPI  No abdominal pain, melena, BRB unless specified in HPI  Skin ROS: no new rashes or lesions   Lymphatic ROS: no new adenopathy noted by pt  GYN ROS: see HPI, no new GYN history or bleeding noted  Psy ROS: no new mental or behavioral disturbances       Patient Active Problem List   Diagnosis    Ischemic cardiomyopathy    Hx of CABG    S/P mitral valve repair    Benign prostatic hyperplasia    Hyperlipidemia    Prediabetes    Essential hypertension    History of UTI         Allergies:  Patient has no known allergies        Current Outpatient Medications:     aspirin (ASPIRIN LOW DOSE) 81 MG tablet, Take 1 tablet by mouth daily, Disp: , Rfl:     atorvastatin (LIPITOR) 40 mg tablet, TAKE 1 TABLET DAILY, Disp: 90 tablet, Rfl: 3    carvedilol (COREG) 6 25 mg tablet, TAKE 1 TABLET BY MOUTH TWICE A DAY WITH MEALS, Disp: 60 tablet, Rfl: 5    Cholecalciferol 1000 units tablet, Take by mouth, Disp: , Rfl:     Ferrous Sulfate (IRON) 325 (65 Fe) MG TABS, Take 1 tablet by mouth Twice daily, Disp: , Rfl:     finasteride (PROSCAR) 5 mg tablet, Take 1 tablet (5 mg total) by mouth daily, Disp: 90 tablet, Rfl: 4    lisinopril (ZESTRIL) 5 mg tablet, Take 1 tablet (5 mg total) by mouth daily, Disp: 90 tablet, Rfl: 1    Multiple Vitamin Essential TABS, Take by mouth, Disp: , Rfl:     Omega-3 Fatty Acids (FISH OIL OMEGA-3 PO), Fish Oil, Disp: , Rfl:     tamsulosin (FLOMAX) 0 4 mg, Take 1 capsule (0 4 mg total) by mouth daily at bedtime, Disp: 90 capsule, Rfl: 4    meloxicam (MOBIC) 7 5 mg tablet, meloxicam 7 5 mg tablet, Disp: , Rfl:     Past Medical History:   Diagnosis Date    Prediabetes        Past Surgical History:   Procedure Laterality Date    CORONARY ARTERY BYPASS GRAFT      x3 LIMA-LAD, SVG-OM, SVG-DIAG with mitral valve repair by olenchock    CYSTOSCOPY  12/13/2014    Diagnostic    ESOPHAGOGASTRODUODENOSCOPY  04/07/2015    with biopsy  Dr Gwendolyn Sahni      26 mm fatmata hernandez physi annuloplasty ring    TRACHEOSTOMY         Family History   Problem Relation Age of Onset    Diabetes Mother     Heart disease Mother     Hypertension Mother     Colon cancer Father     Kidney disease Father     Heart disease Sister     Heart disease Brother     Breast cancer Paternal Aunt     Cervical cancer Paternal Aunt         reports that he quit smoking about 42 years ago  His smoking use included cigarettes  He has never used smokeless tobacco  He reports that he drinks alcohol  He reports that he does not use drugs      Labs:   Lab Results   Component Value Date    WBC 8 72 02/11/2020    HGB 13 1 02/11/2020    HCT 41 0 02/11/2020    MCV 99 (H) 02/11/2020     02/11/2020     Lab Results   Component Value Date     01/08/2016    K 5 1 02/11/2020     02/11/2020    CO2 32 02/11/2020    ANIONGAP 8 01/08/2016    BUN 21 02/11/2020    CREATININE 1 20 02/11/2020    GLUCOSE 104 01/08/2016    GLUF 90 11/02/2019    CALCIUM 9 2 02/11/2020    AST 16 02/11/2020    ALT 17 02/11/2020    ALKPHOS 73 02/11/2020    PROT 7 5 01/08/2016    BILITOT 0 77 01/08/2016    EGFR 57 02/11/2020         Imaging: No new pertinent imaging studies  PHYSICAL EXAM    Vitals:    02/21/20 0850   BP: 120/76   Pulse: 78   Resp: 18   Temp: (!) 97 3 °F (36 3 °C)          PHYSICAL EXAM  General Appearance:    Alert, cooperative, no distress, healthy   Head:    Normocephalic without obvious abnormality   Eyes:    PERRL, conjunctiva/corneas clear, EOM's intact        Neck:   Supple, no adenopathy, no JVD   Back:     Symmetric, no spinal or CVA tenderness   Lungs:     Clear to auscultation bilaterally, no wheezing or rhonchi   Heart:    Regular rate and rhythm, S1 and S2 normal, no murmur   Abdomen:     Soft, NTND, small RIH noted nontender, +BS   Extremities:   Extremities normal  No clubbing, cyanosis or edema   Psych:   Normal Affect   Neurologic:   CNII-XII intact  Strength symmetric, speech intact                                           Some portions of this record may have been generated with voice recognition software  There may be translation, syntax,  or grammatical errors  Occasional wrong word or "sound-a-like" substitutions may have occurred due to the inherent limitations of the voice recognition software  Read the chart carefully and recognize, using context, where substitutions may have occurred  If you have any questions, please contact the dictating provider for clarification or correction, as needed  This encounter has been coded by a non-certified coder         Luis Porter MD    Date: 2/21/2020 Time: 9:19 AM

## 2020-02-25 ENCOUNTER — HOSPITAL ENCOUNTER (OUTPATIENT)
Dept: RADIOLOGY | Facility: HOSPITAL | Age: 80
Discharge: HOME | End: 2020-02-25
Payer: COMMERCIAL

## 2020-02-25 DIAGNOSIS — J90 PLEURAL EFFUSION: ICD-10-CM

## 2020-02-25 PROCEDURE — 71046 X-RAY EXAM CHEST 2 VIEWS: CPT

## 2020-02-28 DIAGNOSIS — J90 PLEURAL EFFUSION: Primary | ICD-10-CM

## 2020-03-02 ENCOUNTER — OFFICE VISIT (OUTPATIENT)
Dept: PULMONOLOGY | Facility: CLINIC | Age: 80
End: 2020-03-02
Payer: COMMERCIAL

## 2020-03-02 VITALS
BODY MASS INDEX: 20.76 KG/M2 | HEIGHT: 66 IN | OXYGEN SATURATION: 97 % | TEMPERATURE: 97.2 F | RESPIRATION RATE: 18 BRPM | HEART RATE: 75 BPM | DIASTOLIC BLOOD PRESSURE: 70 MMHG | WEIGHT: 129.2 LBS | SYSTOLIC BLOOD PRESSURE: 128 MMHG

## 2020-03-02 DIAGNOSIS — R09.82 POST-NASAL DRIP: Primary | ICD-10-CM

## 2020-03-02 DIAGNOSIS — J90 PLEURAL EFFUSION: ICD-10-CM

## 2020-03-02 PROCEDURE — 1160F RVW MEDS BY RX/DR IN RCRD: CPT | Performed by: INTERNAL MEDICINE

## 2020-03-02 PROCEDURE — 99244 OFF/OP CNSLTJ NEW/EST MOD 40: CPT | Performed by: INTERNAL MEDICINE

## 2020-03-02 RX ORDER — AZELASTINE 1 MG/ML
1 SPRAY, METERED NASAL 2 TIMES DAILY
Qty: 1 BOTTLE | Refills: 3 | Status: SHIPPED | OUTPATIENT
Start: 2020-03-02 | End: 2020-09-10 | Stop reason: ALTCHOICE

## 2020-03-02 NOTE — PROGRESS NOTES
Pulmonary Consultation   Shelby Sandoval 78 y o  male MRN: [id number]  3/2/2020      Assessment:    Pleural effusion on left  Mr  Sugar Georges presents for evaluation of a left pleural effusion  I reviewed the most recent CT  abdomen images and chest x-ray images with the patient and his daughter today  He underwent CABG in May 2012  Preoperative chest x-ray from that time showed normal lung fields without pleural effusion  Subsequently developed left-sided pleural effusion perioperatively  He had multiple chest x-rays performed that subsequently that showed stability of the left-sided pleural effusion  I uspected the effusion is loculated and related to the previous surgery  He has a limited tobacco history and quit several years ago  Denies any significant pulmonary symptoms  No underlying respiratory diseases  I doubt that the loculated effusion is giving n him much difficulty at this time  It does not require further follow-up  I offered to send him for full pulmonary function tests since he does have some shortness of breath in the mornings when he is getting going but he declines at this time  His biggest complaint today was his chronic postnasal drip  Plan  Does not require further radiographic follow-up for the left-sided chronic loculated pleural effusion  Certainly if he has new pulmonary symptoms that may dictate further assessment  Post-nasal drip  Patient reports chronic postnasal drip  Suspect allergic to allergic rhinitis versus nonallergic rhinitis  States the Flonase is not work for him in the past    Plan  Have prescribed Astelin  Given sample for NeilMed rinse kit and instructions to use with distilled water  Follow-up with primary care physician        Plan:    Diagnoses and all orders for this visit:    Post-nasal drip  -     azelastine (ASTELIN) 0 1 % nasal spray; 1 spray into each nostril 2 (two) times a day Use in each nostril as directed    Pleural effusion  - Ambulatory referral to Pulmonology        No follow-ups on file  History of Present Illness   HPI:  Rodrigo Stephens is a 78 y o  male with presumed hiatal hernia and gallstones  CT scan was initially performed to assess for right lower quadrant pain  Additionally CT of the scan showed left pleural effusion  I reviewed his chest imaging and the pleural effusion is chronic and was identified and chest x-ray dating back to 5/2012 post CT surgery  and remain unchanged on subsequent chest x-rays since 2015  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  He had a long hospitalization and required tracheostomy  He states that overall he is doing well  In the morning states that he feels "heavier" and has to get himself moving  This has been going on for the past year  He does report chronic post nasal drip  He has been precribed flonase that does not seem to help  Has also been treated with antibiotics as well  He has had some wheezing intermittntly  He does not report any significant allergy symptoms  Is not experiencing any fevers, chills or sweats  States he has not had history of pneumonia or respiratory issues  Does not have any acute complaints today  Accompanied by his daughter  Review of Systems   Constitutional: Negative for chills, fatigue and fever  HENT: Positive for postnasal drip  Negative for congestion, nosebleeds, rhinorrhea, sinus pressure and sore throat  Eyes: Negative for discharge, redness and itching  Respiratory: Positive for shortness of breath and wheezing  Negative for cough, choking, chest tightness and stridor  Cardiovascular: Negative for chest pain, palpitations and leg swelling  Gastrointestinal: Negative for blood in stool  Genitourinary: Negative for difficulty urinating and dysuria  Musculoskeletal: Negative for arthralgias, joint swelling and myalgias  Skin: Negative for color change and rash     Neurological: Negative for light-headedness and headaches  Hematological: Negative for adenopathy  Historical Information   Past Medical History:   Diagnosis Date    Prediabetes      Past Surgical History:   Procedure Laterality Date    CORONARY ARTERY BYPASS GRAFT      x3 LIMA-LAD, SVG-OM, SVG-DIAG with mitral valve repair by nicole    CYSTOSCOPY  12/13/2014    Diagnostic    ESOPHAGOGASTRODUODENOSCOPY  04/07/2015    with biopsy   Dr Shy Luna      26 mm fatmata hernandez physi annuloplasty ring    TRACHEOSTOMY       CABG 2012       Family History   Problem Relation Age of Onset    Diabetes Mother     Heart disease Mother     Hypertension Mother     Colon cancer Father     Kidney disease Father     Heart disease Sister     Heart disease Brother     Breast cancer Paternal Aunt     Cervical cancer Paternal Aunt      Residence: Has live in [place] his entire    Hobbies: watch tv  Occupation:  with [employer]  Tobacco: smoked from [employer] (1/2 pack day)  Pets: 3 cats   Travel: none       Meds/Allergies     Current Outpatient Medications:     aspirin (ASPIRIN LOW DOSE) 81 MG tablet, Take 1 tablet by mouth daily, Disp: , Rfl:     atorvastatin (LIPITOR) 40 mg tablet, TAKE 1 TABLET DAILY, Disp: 90 tablet, Rfl: 3    carvedilol (COREG) 6 25 mg tablet, TAKE 1 TABLET BY MOUTH TWICE A DAY WITH MEALS, Disp: 60 tablet, Rfl: 5    Cholecalciferol 1000 units tablet, Take by mouth, Disp: , Rfl:     Ferrous Sulfate (IRON) 325 (65 Fe) MG TABS, Take 1 tablet by mouth Twice daily, Disp: , Rfl:     finasteride (PROSCAR) 5 mg tablet, Take 1 tablet (5 mg total) by mouth daily, Disp: 90 tablet, Rfl: 4    lisinopril (ZESTRIL) 5 mg tablet, Take 1 tablet (5 mg total) by mouth daily, Disp: 90 tablet, Rfl: 1    Multiple Vitamin Essential TABS, Take by mouth, Disp: , Rfl:     Omega-3 Fatty Acids (FISH OIL OMEGA-3 PO), Fish Oil, Disp: , Rfl:     tamsulosin (FLOMAX) 0 4 mg, Take 1 capsule (0 4 mg total) by mouth daily at bedtime, Disp: 90 capsule, Rfl: 4    azelastine (ASTELIN) 0 1 % nasal spray, 1 spray into each nostril 2 (two) times a day Use in each nostril as directed, Disp: 1 Bottle, Rfl: 3    meloxicam (MOBIC) 7 5 mg tablet, meloxicam 7 5 mg tablet, Disp: , Rfl:   No Known Allergies    Vitals: Blood pressure 128/70, pulse 75, temperature (!) 97 2 °F (36 2 °C), resp  rate 18, height 5' 6" (1 676 m), weight 58 6 kg (129 lb 3 2 oz), SpO2 97 %  Body mass index is 20 85 kg/m²  Oxygen Therapy  SpO2: 97 %  Oxygen Therapy: None (Room air)      Physical Exam  Physical Exam   Constitutional: He is oriented to person, place, and time  He appears well-developed and well-nourished  No distress  HENT:   Head: Normocephalic and atraumatic  Right Ear: External ear normal    Left Ear: External ear normal    Nose: Nose normal    Mouth/Throat: Oropharynx is clear and moist  No oropharyngeal exudate  Eyes: Pupils are equal, round, and reactive to light  Conjunctivae and EOM are normal    Neck: Normal range of motion  No tracheal deviation present  Cardiovascular: Normal rate, regular rhythm, normal heart sounds and intact distal pulses  Exam reveals no gallop and no friction rub  No murmur heard  Pulmonary/Chest: Effort normal and breath sounds normal  No stridor  He has no wheezes  He has no rales  Musculoskeletal: He exhibits no edema  Lymphadenopathy:        Head (right side): No submental, no submandibular, no preauricular and no posterior auricular adenopathy present  Head (left side): No submental, no submandibular, no preauricular and no posterior auricular adenopathy present  He has no cervical adenopathy  Neurological: He is alert and oriented to person, place, and time  Skin: Skin is warm and dry  Psychiatric: He has a normal mood and affect  Vitals reviewed  Labs: I have personally reviewed pertinent lab results    Lab Results   Component Value Date    WBC 8 72 02/11/2020    HGB 13 1 02/11/2020    HCT 41 0 02/11/2020    MCV 99 (H) 02/11/2020     02/11/2020     Lab Results   Component Value Date    GLUCOSE 104 01/08/2016    CALCIUM 9 2 02/11/2020     01/08/2016    K 5 1 02/11/2020    CO2 32 02/11/2020     02/11/2020    BUN 21 02/11/2020    CREATININE 1 20 02/11/2020     No results found for: IGE  Lab Results   Component Value Date    ALT 17 02/11/2020    AST 16 02/11/2020    ALKPHOS 73 02/11/2020    BILITOT 0 77 01/08/2016       Imaging and other studies: I have personally reviewed pertinent reports  and I have personally reviewed pertinent films in PACS  Chest x-ray February 25, 2020    FINDINGS:     Normal heart size  CABG   MVR      No acute disease  Small chronic loculated left effusion and rounded atelectasis in the left lower lobe  No pneumothorax      Osseous structures appear within normal limits for patient age      IMPRESSION:     No acute cardiopulmonary disease      CT of the abdomen February 11, 2020  IMPRESSION:        1  Pancolonic diverticulosis without acute diverticulitis, appendicitis, or bowel obstruction  2   Cholelithiasis with no acute cholecystitis or biliary ductal obstruction  3   Prostatomegaly with trabeculation of the thickened bladder consistent with chronic outlet obstruction  4   Large hiatal hernia  5   Enhancing left pleural effusion, likely loculated with basilar atelectasis versus conglomerate fibrosis        Chest x-ray March 16 2015  Compared to chest x-ray from November 14, 2012  IMPRESSION- Small residual left basilar pleural effusion  Pulmonary function testing:       EKG, Pathology, and Other Studies: I have personally reviewed pertinent reports  and I have personally reviewed pertinent films in PACS   Echocardiogram 3/2018   EF 45%   RV function mildly reduced mildly dilated  Mild MV stenosis   No AS   No TV stenosis, moderate regurgitaiton  PA pressure 30 mmHg  KAROLINA De Santiago    [facility]

## 2020-03-02 NOTE — PATIENT INSTRUCTIONS
For NeilMed rinse kit use sterile water or distilled water  Can be used 1-2 times per day  Recommend using it prior to using intranasal medications

## 2020-03-02 NOTE — ASSESSMENT & PLAN NOTE
Patient reports chronic postnasal drip  Suspect allergic to allergic rhinitis versus nonallergic rhinitis  States the Flonase is not work for him in the past    Plan  Have prescribed Astelin  Given sample for NeilMed rinse kit and instructions to use with distilled water  Follow-up with primary care physician

## 2020-03-02 NOTE — ASSESSMENT & PLAN NOTE
Mr Bhaskar Mota presents for evaluation of a left pleural effusion  I reviewed the most recent CT  abdomen images and chest x-ray images with the patient and his daughter today  He underwent CABG in May 2012  Preoperative chest x-ray from that time showed normal lung fields without pleural effusion  Subsequently developed left-sided pleural effusion perioperatively  He had multiple chest x-rays performed that subsequently that showed stability of the left-sided pleural effusion  I uspected the effusion is loculated and related to the previous surgery  He has a limited tobacco history and quit several years ago  Denies any significant pulmonary symptoms  No underlying respiratory diseases  I doubt that the loculated effusion is giving n him much difficulty at this time  It does not require further follow-up  I offered to send him for full pulmonary function tests since he does have some shortness of breath in the mornings when he is getting going but he declines at this time  His biggest complaint today was his chronic postnasal drip  Plan  Does not require further radiographic follow-up for the left-sided chronic loculated pleural effusion  Certainly if he has new pulmonary symptoms that may dictate further assessment

## 2020-03-18 DIAGNOSIS — I50.9 ACC/AHA STAGE C CONGESTIVE HEART FAILURE DUE TO ISCHEMIC CARDIOMYOPATHY (HCC): ICD-10-CM

## 2020-03-18 DIAGNOSIS — I25.5 ACC/AHA STAGE C CONGESTIVE HEART FAILURE DUE TO ISCHEMIC CARDIOMYOPATHY (HCC): ICD-10-CM

## 2020-03-18 RX ORDER — LISINOPRIL 5 MG/1
5 TABLET ORAL DAILY
Qty: 90 TABLET | Refills: 1 | Status: SHIPPED | OUTPATIENT
Start: 2020-03-18 | End: 2020-08-13

## 2020-04-07 ENCOUNTER — TELEPHONE (OUTPATIENT)
Dept: CARDIOLOGY CLINIC | Facility: CLINIC | Age: 80
End: 2020-04-07

## 2020-05-20 ENCOUNTER — TELEPHONE (OUTPATIENT)
Dept: CARDIOLOGY CLINIC | Facility: CLINIC | Age: 80
End: 2020-05-20

## 2020-05-21 ENCOUNTER — OFFICE VISIT (OUTPATIENT)
Dept: CARDIOLOGY CLINIC | Facility: CLINIC | Age: 80
End: 2020-05-21
Payer: COMMERCIAL

## 2020-05-21 VITALS
BODY MASS INDEX: 19.82 KG/M2 | SYSTOLIC BLOOD PRESSURE: 104 MMHG | WEIGHT: 122.8 LBS | TEMPERATURE: 97.3 F | HEART RATE: 72 BPM | DIASTOLIC BLOOD PRESSURE: 68 MMHG

## 2020-05-21 DIAGNOSIS — I25.5 ISCHEMIC CARDIOMYOPATHY: Primary | ICD-10-CM

## 2020-05-21 DIAGNOSIS — I25.10 CORONARY ARTERY DISEASE INVOLVING NATIVE CORONARY ARTERY WITHOUT ANGINA PECTORIS, UNSPECIFIED WHETHER NATIVE OR TRANSPLANTED HEART: ICD-10-CM

## 2020-05-21 DIAGNOSIS — E78.2 MIXED HYPERLIPIDEMIA: ICD-10-CM

## 2020-05-21 DIAGNOSIS — I50.22 CHRONIC SYSTOLIC CHF (CONGESTIVE HEART FAILURE), NYHA CLASS 2 (HCC): ICD-10-CM

## 2020-05-21 PROCEDURE — 3078F DIAST BP <80 MM HG: CPT | Performed by: INTERNAL MEDICINE

## 2020-05-21 PROCEDURE — 1160F RVW MEDS BY RX/DR IN RCRD: CPT | Performed by: INTERNAL MEDICINE

## 2020-05-21 PROCEDURE — 1036F TOBACCO NON-USER: CPT | Performed by: INTERNAL MEDICINE

## 2020-05-21 PROCEDURE — 93000 ELECTROCARDIOGRAM COMPLETE: CPT | Performed by: INTERNAL MEDICINE

## 2020-05-21 PROCEDURE — 3074F SYST BP LT 130 MM HG: CPT | Performed by: INTERNAL MEDICINE

## 2020-05-21 PROCEDURE — 99214 OFFICE O/P EST MOD 30 MIN: CPT | Performed by: INTERNAL MEDICINE

## 2020-07-10 ENCOUNTER — OFFICE VISIT (OUTPATIENT)
Dept: FAMILY MEDICINE CLINIC | Facility: CLINIC | Age: 80
End: 2020-07-10
Payer: COMMERCIAL

## 2020-07-10 VITALS
HEIGHT: 66 IN | TEMPERATURE: 98 F | HEART RATE: 75 BPM | RESPIRATION RATE: 16 BRPM | OXYGEN SATURATION: 95 % | BODY MASS INDEX: 20.28 KG/M2 | DIASTOLIC BLOOD PRESSURE: 66 MMHG | SYSTOLIC BLOOD PRESSURE: 128 MMHG | WEIGHT: 126.2 LBS

## 2020-07-10 DIAGNOSIS — E78.5 HYPERLIPIDEMIA, UNSPECIFIED HYPERLIPIDEMIA TYPE: ICD-10-CM

## 2020-07-10 DIAGNOSIS — I10 ESSENTIAL HYPERTENSION: ICD-10-CM

## 2020-07-10 DIAGNOSIS — R43.2 LOSS OF TASTE: Primary | ICD-10-CM

## 2020-07-10 DIAGNOSIS — R09.82 POST-NASAL DRIP: ICD-10-CM

## 2020-07-10 DIAGNOSIS — R73.03 PREDIABETES: Primary | ICD-10-CM

## 2020-07-10 DIAGNOSIS — N40.1 BENIGN PROSTATIC HYPERPLASIA WITH LOWER URINARY TRACT SYMPTOMS, SYMPTOM DETAILS UNSPECIFIED: ICD-10-CM

## 2020-07-10 DIAGNOSIS — R43.2 LOSS OF TASTE: ICD-10-CM

## 2020-07-10 PROCEDURE — 3078F DIAST BP <80 MM HG: CPT | Performed by: FAMILY MEDICINE

## 2020-07-10 PROCEDURE — 3008F BODY MASS INDEX DOCD: CPT | Performed by: FAMILY MEDICINE

## 2020-07-10 PROCEDURE — 3074F SYST BP LT 130 MM HG: CPT | Performed by: FAMILY MEDICINE

## 2020-07-10 PROCEDURE — 3288F FALL RISK ASSESSMENT DOCD: CPT | Performed by: FAMILY MEDICINE

## 2020-07-10 PROCEDURE — 1101F PT FALLS ASSESS-DOCD LE1/YR: CPT | Performed by: FAMILY MEDICINE

## 2020-07-10 PROCEDURE — 99214 OFFICE O/P EST MOD 30 MIN: CPT | Performed by: FAMILY MEDICINE

## 2020-07-10 PROCEDURE — 1036F TOBACCO NON-USER: CPT | Performed by: FAMILY MEDICINE

## 2020-07-10 PROCEDURE — 1160F RVW MEDS BY RX/DR IN RCRD: CPT | Performed by: FAMILY MEDICINE

## 2020-07-10 NOTE — PROGRESS NOTES
Assessment/Plan:  Chief Complaint   Patient presents with    Follow-up     6 Month     Patient Instructions   Here for recheck and also pt  States he has occassional  Take BP med and also low sugar diet for hx of prediabetes  Take cholesterol med and follow low cholesterol diet as directed  May take Flonase prn rhinitis  Recheck in 3 months  Monitor weight loss, increased 4 pounds   F-up with urology as directed for enlarged prostate and pt  Continue to take his Flomax  Consult ENT for loss of taste and pnd  No problem-specific Assessment & Plan notes found for this encounter  Diagnoses and all orders for this visit:    Prediabetes  -     Comprehensive metabolic panel; Future  -     Hemoglobin A1C    Hyperlipidemia, unspecified hyperlipidemia type    Post-nasal drip    Essential hypertension  -     Comprehensive metabolic panel; Future    Benign prostatic hyperplasia with lower urinary tract symptoms, symptom details unspecified    Loss of taste          Subjective:      Patient ID: Nixon Calvillo is a 78 y o  male  Cant taste well, but can smell, occurring for awhile in past year  No cp or sob, or ha  The following portions of the patient's history were reviewed and updated as appropriate: allergies, current medications, past family history, past medical history, past social history, past surgical history and problem list     Review of Systems   Constitutional: Negative  HENT:        Taste is weak, bland taste  Eyes: Negative  Respiratory: Negative  Cardiovascular: Negative  Gastrointestinal: Negative  Endocrine: Negative  Genitourinary: Negative  Musculoskeletal: Negative  Skin: Negative  Allergic/Immunologic: Negative  Neurological: Negative  Hematological: Negative  Psychiatric/Behavioral: Negative            Objective:      /66 (BP Location: Left arm, Patient Position: Sitting, Cuff Size: Adult)   Pulse 75   Temp 98 °F (36 7 °C) (Temporal)   Resp 16   Ht 5' 6" (1 676 m)   Wt 57 2 kg (126 lb 3 2 oz)   SpO2 95%   BMI 20 37 kg/m²          Physical Exam   Constitutional: He is oriented to person, place, and time  He appears well-developed and well-nourished  HENT:   Head: Normocephalic and atraumatic  Right Ear: External ear normal    Left Ear: External ear normal    Nose: Nose normal    Mouth/Throat: Oropharynx is clear and moist    Eyes: Pupils are equal, round, and reactive to light  Conjunctivae and EOM are normal    Neck: Normal range of motion  Neck supple  Cardiovascular: Normal rate, regular rhythm, normal heart sounds and intact distal pulses  Pulmonary/Chest: Effort normal and breath sounds normal    Musculoskeletal: Normal range of motion  Neurological: He is alert and oriented to person, place, and time  He has normal reflexes  Skin: Skin is warm and dry  Psychiatric: He has a normal mood and affect   His behavior is normal

## 2020-07-10 NOTE — PATIENT INSTRUCTIONS
Here for recheck and also pt  States he has occassional  Take BP med and also low sugar diet for hx of prediabetes  Take cholesterol med and follow low cholesterol diet as directed  May take Flonase prn rhinitis  Recheck in 3 months  Monitor weight loss, increased 4 pounds   F-up with urology as directed for enlarged prostate and pt  Continue to take his Flomax   Consult ENT for loss of taste and pnd

## 2020-07-17 DIAGNOSIS — I50.22 CHRONIC SYSTOLIC CHF (CONGESTIVE HEART FAILURE), NYHA CLASS 2 (HCC): ICD-10-CM

## 2020-07-20 RX ORDER — CARVEDILOL 6.25 MG/1
TABLET ORAL
Qty: 180 TABLET | Refills: 3 | Status: SHIPPED | OUTPATIENT
Start: 2020-07-20 | End: 2021-07-25

## 2020-08-13 DIAGNOSIS — I25.5 ACC/AHA STAGE C CONGESTIVE HEART FAILURE DUE TO ISCHEMIC CARDIOMYOPATHY (HCC): ICD-10-CM

## 2020-08-13 DIAGNOSIS — I50.9 ACC/AHA STAGE C CONGESTIVE HEART FAILURE DUE TO ISCHEMIC CARDIOMYOPATHY (HCC): ICD-10-CM

## 2020-08-13 RX ORDER — LISINOPRIL 5 MG/1
5 TABLET ORAL DAILY
Qty: 90 TABLET | Refills: 1 | Status: SHIPPED | OUTPATIENT
Start: 2020-08-13 | End: 2021-04-01

## 2020-09-03 DIAGNOSIS — E78.5 HYPERLIPIDEMIA, UNSPECIFIED HYPERLIPIDEMIA TYPE: ICD-10-CM

## 2020-09-03 RX ORDER — ATORVASTATIN CALCIUM 40 MG/1
40 TABLET, FILM COATED ORAL DAILY
Qty: 90 TABLET | Refills: 3 | Status: SHIPPED | OUTPATIENT
Start: 2020-09-03 | End: 2021-07-25

## 2020-10-12 ENCOUNTER — OFFICE VISIT (OUTPATIENT)
Dept: FAMILY MEDICINE CLINIC | Facility: CLINIC | Age: 80
End: 2020-10-12
Payer: COMMERCIAL

## 2020-10-12 VITALS
HEIGHT: 66 IN | OXYGEN SATURATION: 96 % | HEART RATE: 69 BPM | BODY MASS INDEX: 20.03 KG/M2 | WEIGHT: 124.6 LBS | TEMPERATURE: 97.8 F | SYSTOLIC BLOOD PRESSURE: 128 MMHG | DIASTOLIC BLOOD PRESSURE: 78 MMHG

## 2020-10-12 DIAGNOSIS — E78.2 MIXED HYPERLIPIDEMIA: Primary | ICD-10-CM

## 2020-10-12 DIAGNOSIS — I10 ESSENTIAL HYPERTENSION: ICD-10-CM

## 2020-10-12 DIAGNOSIS — Z95.1 HX OF CABG: ICD-10-CM

## 2020-10-12 DIAGNOSIS — R73.03 PREDIABETES: ICD-10-CM

## 2020-10-12 DIAGNOSIS — N40.1 BENIGN PROSTATIC HYPERPLASIA WITH LOWER URINARY TRACT SYMPTOMS, SYMPTOM DETAILS UNSPECIFIED: ICD-10-CM

## 2020-10-12 PROCEDURE — 1160F RVW MEDS BY RX/DR IN RCRD: CPT | Performed by: FAMILY MEDICINE

## 2020-10-12 PROCEDURE — 99214 OFFICE O/P EST MOD 30 MIN: CPT | Performed by: FAMILY MEDICINE

## 2020-10-12 PROCEDURE — 1036F TOBACCO NON-USER: CPT | Performed by: FAMILY MEDICINE

## 2020-11-16 NOTE — PROGRESS NOTES
b Follow up with your pmd within 48 hours - show copies of ER results   Return to the ED if any worsening or persistent symptoms.       Abdominal Pain    WHAT YOU NEED TO KNOW:    Abdominal pain can be dull, achy, or sharp. You may have pain in one area of your abdomen, or in your entire abdomen. Your pain may be caused by a condition such as constipation, food sensitivity or poisoning, infection, or a blockage. Abdominal pain can also be from a hernia, appendicitis, or an ulcer. Liver, gallbladder, or kidney conditions can also cause abdominal pain. The cause of your abdominal pain may be unknown.     DISCHARGE INSTRUCTIONS:    Return to the emergency department if:   •You have new chest pain or shortness of breath.      •You have pulsing pain in your upper abdomen or lower back that suddenly becomes constant.      •Your pain is in the right lower abdominal area and worsens with movement.       •You have a fever over 100.4°F (38°C) or shaking chills.       •You are vomiting and cannot keep food or liquids down.       •Your pain does not improve or gets worse over the next 8 to 12 hours.       •You see blood in your vomit or bowel movements, or they look black and tarry.       •Your skin or the whites of your eyes turn yellow.       •You are a woman and have a large amount of vaginal bleeding that is not your monthly period.       Contact your healthcare provider if:   •You have pain in your lower back.       •You are a man and have pain in your testicles.      •You have pain when you urinate.       •You have questions or concerns about your condition or care.      Follow up with your healthcare provider within 24 hours or as directed: Write down your questions so you remember to ask them during your visits.     Medicines:   •Medicines may be given to calm your stomach and prevent vomiting or to decrease pain. Ask how to take pain medicine safely.      •Take your medicine as directed. Contact your healthcare provider if you think your medicine is not helping or if you have side effects. Tell him of her if you are allergic to any medicine. Keep a list of the medicines, vitamins, and herbs you take. Include the amounts, and when and why you take them. Bring the list or the pill bottles to follow-up visits. Carry your medicine list with you in case of an emergency.

## 2020-12-10 ENCOUNTER — OFFICE VISIT (OUTPATIENT)
Dept: CARDIOLOGY CLINIC | Facility: CLINIC | Age: 80
End: 2020-12-10
Payer: COMMERCIAL

## 2020-12-10 VITALS
BODY MASS INDEX: 19.32 KG/M2 | WEIGHT: 120.2 LBS | TEMPERATURE: 97.6 F | SYSTOLIC BLOOD PRESSURE: 120 MMHG | HEIGHT: 66 IN | DIASTOLIC BLOOD PRESSURE: 68 MMHG | RESPIRATION RATE: 16 BRPM | HEART RATE: 76 BPM

## 2020-12-10 DIAGNOSIS — Z98.890 H/O MITRAL VALVE REPAIR: ICD-10-CM

## 2020-12-10 DIAGNOSIS — I25.5 ISCHEMIC CARDIOMYOPATHY: Primary | ICD-10-CM

## 2020-12-10 DIAGNOSIS — I50.22 CHRONIC SYSTOLIC CHF (CONGESTIVE HEART FAILURE), NYHA CLASS 2 (HCC): ICD-10-CM

## 2020-12-10 DIAGNOSIS — E78.2 MIXED HYPERLIPIDEMIA: ICD-10-CM

## 2020-12-10 PROCEDURE — 3078F DIAST BP <80 MM HG: CPT | Performed by: INTERNAL MEDICINE

## 2020-12-10 PROCEDURE — 1036F TOBACCO NON-USER: CPT | Performed by: INTERNAL MEDICINE

## 2020-12-10 PROCEDURE — 99214 OFFICE O/P EST MOD 30 MIN: CPT | Performed by: INTERNAL MEDICINE

## 2020-12-10 PROCEDURE — 1160F RVW MEDS BY RX/DR IN RCRD: CPT | Performed by: INTERNAL MEDICINE

## 2020-12-10 PROCEDURE — 3074F SYST BP LT 130 MM HG: CPT | Performed by: INTERNAL MEDICINE

## 2021-01-09 ENCOUNTER — LAB (OUTPATIENT)
Dept: LAB | Facility: HOSPITAL | Age: 81
End: 2021-01-09
Payer: COMMERCIAL

## 2021-01-09 DIAGNOSIS — R73.03 PREDIABETES: ICD-10-CM

## 2021-01-09 DIAGNOSIS — E78.2 MIXED HYPERLIPIDEMIA: ICD-10-CM

## 2021-01-09 DIAGNOSIS — I10 ESSENTIAL HYPERTENSION: ICD-10-CM

## 2021-01-09 LAB
ALBUMIN SERPL BCP-MCNC: 3.2 G/DL (ref 3.5–5)
ALP SERPL-CCNC: 77 U/L (ref 46–116)
ALT SERPL W P-5'-P-CCNC: 15 U/L (ref 12–78)
ANION GAP SERPL CALCULATED.3IONS-SCNC: 6 MMOL/L (ref 4–13)
AST SERPL W P-5'-P-CCNC: 18 U/L (ref 5–45)
BILIRUB SERPL-MCNC: 0.69 MG/DL (ref 0.2–1)
BUN SERPL-MCNC: 22 MG/DL (ref 5–25)
CALCIUM ALBUM COR SERPL-MCNC: 10.3 MG/DL (ref 8.3–10.1)
CALCIUM SERPL-MCNC: 9.7 MG/DL (ref 8.3–10.1)
CHLORIDE SERPL-SCNC: 105 MMOL/L (ref 100–108)
CHOLEST SERPL-MCNC: 158 MG/DL (ref 50–200)
CO2 SERPL-SCNC: 30 MMOL/L (ref 21–32)
CREAT SERPL-MCNC: 1.19 MG/DL (ref 0.6–1.3)
EST. AVERAGE GLUCOSE BLD GHB EST-MCNC: 114 MG/DL
GFR SERPL CREATININE-BSD FRML MDRD: 57 ML/MIN/1.73SQ M
GLUCOSE P FAST SERPL-MCNC: 87 MG/DL (ref 65–99)
HBA1C MFR BLD: 5.6 %
HDLC SERPL-MCNC: 65 MG/DL
LDLC SERPL CALC-MCNC: 84 MG/DL (ref 0–100)
POTASSIUM SERPL-SCNC: 4.5 MMOL/L (ref 3.5–5.3)
PROT SERPL-MCNC: 7.5 G/DL (ref 6.4–8.2)
SODIUM SERPL-SCNC: 141 MMOL/L (ref 136–145)
TRIGL SERPL-MCNC: 47 MG/DL

## 2021-01-09 PROCEDURE — 36415 COLL VENOUS BLD VENIPUNCTURE: CPT | Performed by: FAMILY MEDICINE

## 2021-01-09 PROCEDURE — 80061 LIPID PANEL: CPT

## 2021-01-09 PROCEDURE — 83036 HEMOGLOBIN GLYCOSYLATED A1C: CPT | Performed by: FAMILY MEDICINE

## 2021-01-09 PROCEDURE — 80053 COMPREHEN METABOLIC PANEL: CPT

## 2021-01-15 ENCOUNTER — OFFICE VISIT (OUTPATIENT)
Dept: FAMILY MEDICINE CLINIC | Facility: CLINIC | Age: 81
End: 2021-01-15
Payer: COMMERCIAL

## 2021-01-15 VITALS
RESPIRATION RATE: 16 BRPM | BODY MASS INDEX: 19.7 KG/M2 | HEIGHT: 66 IN | HEART RATE: 72 BPM | SYSTOLIC BLOOD PRESSURE: 138 MMHG | OXYGEN SATURATION: 97 % | TEMPERATURE: 96.8 F | DIASTOLIC BLOOD PRESSURE: 72 MMHG | WEIGHT: 122.6 LBS

## 2021-01-15 DIAGNOSIS — E78.2 MIXED HYPERLIPIDEMIA: ICD-10-CM

## 2021-01-15 DIAGNOSIS — I25.5 ISCHEMIC CARDIOMYOPATHY: ICD-10-CM

## 2021-01-15 DIAGNOSIS — N40.1 BENIGN PROSTATIC HYPERPLASIA WITH LOWER URINARY TRACT SYMPTOMS, SYMPTOM DETAILS UNSPECIFIED: ICD-10-CM

## 2021-01-15 DIAGNOSIS — I10 ESSENTIAL HYPERTENSION: ICD-10-CM

## 2021-01-15 DIAGNOSIS — Z95.1 HX OF CABG: ICD-10-CM

## 2021-01-15 DIAGNOSIS — R43.2 TASTE IMPAIRMENT: Primary | ICD-10-CM

## 2021-01-15 PROCEDURE — 99214 OFFICE O/P EST MOD 30 MIN: CPT | Performed by: FAMILY MEDICINE

## 2021-01-15 NOTE — PROGRESS NOTES
Assessment/Plan:  Chief Complaint   Patient presents with    Follow-up     3 Month      Patient Instructions   Here for recheck and doing well except poor taste and would like 2nd opinion, consult [facility]  Rec taking bp and cholesterol med as these recent labs done were stable and prediabetes stable with hga1c was 5 6  F-up with cardiology as directed for hx of CABG  Follow CDC guidelines for covid 19 prevention  Refuses flu shot today  No problem-specific Assessment & Plan notes found for this encounter  Diagnoses and all orders for this visit:    Essential hypertension    Taste impairment    Mixed hyperlipidemia    Benign prostatic hyperplasia with lower urinary tract symptoms, symptom details unspecified    Hx of CABG    Ischemic cardiomyopathy          Subjective:      Patient ID: Anselmo Hernandez is a [de-identified] y o  male  Follow-up (3 Month ) patient is doing well and is a  at [employer]  He has a problem tasting food except red meat  Everything tastes bland and would like to get a second opinion re this  Saw ENT in past several months  No cp or sob, or ha  Takes all meds as directed and here to review labs showing normal hgba1c  Prediabetes better  Sees Cardiology as directed for hx of CABG and ischemic cardiomyopathy  The following portions of the patient's history were reviewed and updated as appropriate: allergies, current medications, past family history, past medical history, past social history, past surgical history and problem list     Review of Systems   Constitutional: Negative  HENT:        Poor taste   Eyes: Negative  Respiratory: Negative  Cardiovascular: Negative  Gastrointestinal: Negative  Endocrine: Negative  Genitourinary: Negative  Musculoskeletal: Negative  Skin: Negative  Allergic/Immunologic: Negative  Neurological: Negative  Hematological: Negative  Psychiatric/Behavioral: Negative            Objective:      BP 138/72 (BP Location: Left arm, Patient Position: Sitting, Cuff Size: Adult)   Pulse 72   Temp (!) 96 8 °F (36 °C) (Temporal)   Resp 16   Ht 5' 6" (1 676 m)   Wt 55 6 kg (122 lb 9 6 oz)   SpO2 97%   BMI 19 79 kg/m²          Physical Exam  Constitutional:       Appearance: He is well-developed  HENT:      Head: Normocephalic and atraumatic  Right Ear: External ear normal       Left Ear: External ear normal       Nose: Nose normal    Eyes:      Conjunctiva/sclera: Conjunctivae normal       Pupils: Pupils are equal, round, and reactive to light  Neck:      Musculoskeletal: Normal range of motion and neck supple  Cardiovascular:      Rate and Rhythm: Normal rate and regular rhythm  Heart sounds: Normal heart sounds  Pulmonary:      Effort: Pulmonary effort is normal       Breath sounds: Normal breath sounds  Musculoskeletal: Normal range of motion  Skin:     General: Skin is warm and dry  Neurological:      Mental Status: He is alert and oriented to person, place, and time  Deep Tendon Reflexes: Reflexes are normal and symmetric     Psychiatric:         Behavior: Behavior normal

## 2021-01-15 NOTE — PATIENT INSTRUCTIONS
Here for recheck and doing well except poor taste and would like 2nd opinion, consult [facility]  Rec taking bp and cholesterol med as these recent labs done were stable and prediabetes stable with hga1c was 5 6  F-up with cardiology as directed for hx of CABG  Follow CDC guidelines for covid 19 prevention  Refuses flu shot today

## 2021-01-22 NOTE — PROGRESS NOTES
Assessment and plan:       1  BPH  - ml  -increase Flomax to 0 8 mg nightly  -continue finasteride 5 mg daily  -nocturia 1-4 times; varies  -occasional weak stream    2  History of UTI  -urine dip positive lueks, blood, cloudy  -ua and culture sent today, will call with results  -reports intermittent urinary burning    3  Overactive bladder  -drinks 60-80 ounces of water per day  -drinks 1-2 sodas/day  -occasional urge incontinence     JARRED Briggs    History of Present Illness     Ezell Kocher is a [de-identified] y o  male here for follow up evaluation of BPH and history of urinary tract infections  The patient presents today doing well  He continues on both Flomax and finasteride  His PVR is 257 mL  Previous PVRs were 150-200 mL  Will increase flomax to 0 8mg to see if this improves his symptoms  Urine cloudy in the office today urine dip positive for leuks and blood; urinalysis and culture sent  He had a CT scan of the abdomen and pelvis in 02/11/2020 which revealed a thick walled bladder without perivesical inflammation likely sequela of chronic outlet obstruction    Laboratory     Lab Results   Component Value Date    BUN 22 01/09/2021    CREATININE 1 19 01/09/2021       No components found for: GFR    Lab Results   Component Value Date    GLUCOSE 104 01/08/2016    CALCIUM 9 7 01/09/2021     01/08/2016    K 4 5 01/09/2021    CO2 30 01/09/2021     01/09/2021       Lab Results   Component Value Date    WBC 8 72 02/11/2020    HGB 13 1 02/11/2020    HCT 41 0 02/11/2020    MCV 99 (H) 02/11/2020     02/11/2020       Lab Results   Component Value Date    PSA 0 4 12/03/2015    PSA 1 7 10/11/2014       Recent Results (from the past 1 hour(s))   POCT Measure PVR    Collection Time: 01/26/21  8:52 AM   Result Value Ref Range    POST-VOID RESIDUAL VOLUME, ML  mL   POCT urine dip    Collection Time: 01/26/21  8:56 AM   Result Value Ref Range    LEUKOCYTE ESTERASE,UA +++ NITRITE,UA -     SL AMB POCT UROBILINOGEN 0 2     POCT URINE PROTEIN ++      PH,UA 6 0     BLOOD,UA +++     SPECIFIC GRAVITY,UA 1 015     KETONES,UA -     BILIRUBIN,UA -     GLUCOSE, UA -      COLOR,UA yellow     CLARITY,UA cloudy        @RESULT(URINEMICROSCOPIC)@    @RESULT(URINECULTURE)@    Radiology       Review of Systems     Review of Systems   Constitutional: Negative for activity change, appetite change, chills, fatigue, fever and unexpected weight change  HENT: Negative for facial swelling  Eyes: Negative for discharge  Respiratory: Negative  Negative for cough and shortness of breath  Cardiovascular: Negative for chest pain and leg swelling  Gastrointestinal: Negative  Negative for abdominal distention, abdominal pain, constipation, diarrhea, nausea and vomiting  Endocrine: Negative  Genitourinary: Negative  Negative for decreased urine volume, difficulty urinating, dysuria, enuresis, flank pain, frequency, hematuria and urgency  Nocturia 1-4 times, weak stream intermittent   Musculoskeletal: Negative for back pain and myalgias  Skin: Negative for pallor and rash  Allergic/Immunologic: Negative  Negative for immunocompromised state  Neurological: Negative for facial asymmetry and speech difficulty  Psychiatric/Behavioral: Negative for agitation and confusion  Allergies     No Known Allergies    Physical Exam     Physical Exam  Constitutional:       General: He is not in acute distress  Appearance: Normal appearance  He is not ill-appearing, toxic-appearing or diaphoretic  HENT:      Head: Normocephalic and atraumatic  Eyes:      General: No scleral icterus  Pulmonary:      Effort: Pulmonary effort is normal  No respiratory distress  Abdominal:      General: Abdomen is flat  There is no distension  Palpations: Abdomen is soft  There is no mass  Tenderness: There is no abdominal tenderness   There is no right CVA tenderness, left CVA tenderness, guarding or rebound  Musculoskeletal:         General: No swelling  Right lower leg: No edema  Left lower leg: No edema  Skin:     General: Skin is warm and dry  Coloration: Skin is not jaundiced or pale  Findings: No rash  Neurological:      General: No focal deficit present  Mental Status: He is alert and oriented to person, place, and time        Gait: Gait normal    Psychiatric:         Mood and Affect: Mood normal          Behavior: Behavior normal          Vital Signs     Vitals:    01/26/21 0852   BP: 126/82   BP Location: Left arm   Patient Position: Sitting   Cuff Size: Adult   Pulse: 95   Weight: 55 3 kg (122 lb)   Height: 5' 6" (1 676 m)       Current Medications       Current Outpatient Medications:     aspirin (ASPIRIN LOW DOSE) 81 MG tablet, Take 1 tablet by mouth daily, Disp: , Rfl:     atorvastatin (LIPITOR) 40 mg tablet, Take 1 tablet (40 mg total) by mouth daily, Disp: 90 tablet, Rfl: 3    carvedilol (COREG) 6 25 mg tablet, TAKE 1 TABLET BY MOUTH TWICE A DAY WITH MEALS, Disp: 180 tablet, Rfl: 3    Cholecalciferol 1000 units tablet, Take 2,000 Units by mouth , Disp: , Rfl:     Ferrous Sulfate (IRON) 325 (65 Fe) MG TABS, Take 1 tablet by mouth Twice daily, Disp: , Rfl:     finasteride (PROSCAR) 5 mg tablet, Take 1 tablet (5 mg total) by mouth daily, Disp: 90 tablet, Rfl: 4    lisinopril (ZESTRIL) 5 mg tablet, TAKE 1 TABLET (5 MG TOTAL) BY MOUTH DAILY, Disp: 90 tablet, Rfl: 1    Multiple Vitamin Essential TABS, Take by mouth, Disp: , Rfl:     Omega-3 Fatty Acids (FISH OIL OMEGA-3 PO), Fish Oil, Disp: , Rfl:     tamsulosin (FLOMAX) 0 4 mg, Take 1 capsule (0 4 mg total) by mouth daily at bedtime, Disp: 90 capsule, Rfl: 4    ipratropium (ATROVENT) 0 06 % nasal spray, INHALE TWO SPRAYS INTO EACH NOSTRIL FOUR TIMES A DAY (Patient not taking: Reported on 1/15/2021), Disp: 15 mL, Rfl: 3    Active Problems     Patient Active Problem List   Diagnosis    Ischemic cardiomyopathy    Hx of CABG    S/P mitral valve repair    Benign prostatic hyperplasia    Hyperlipidemia    Prediabetes    Essential hypertension    History of UTI    Pleural effusion on left    Post-nasal drip       Past Medical History     Past Medical History:   Diagnosis Date    BPH (benign prostatic hyperplasia)     Hypercholesterolemia     Hypertension     Prediabetes        Surgical History     Past Surgical History:   Procedure Laterality Date    CORONARY ARTERY BYPASS GRAFT      x3 LIMA-LAD, SVG-OM, SVG-DIAG with mitral valve repair by olenchock    CYSTOSCOPY  2014    Diagnostic    ESOPHAGOGASTRODUODENOSCOPY  2015    with biopsy  Dr Jackie Pierce      26 mm fatmata hernandez physi annuloplasty ring    TRACHEOSTOMY      during his heart surgery       Family History     Family History   Problem Relation Age of Onset    Diabetes Mother     Heart disease Mother     Hypertension Mother     Colon cancer Father     Kidney disease Father     Heart disease Sister     Heart disease Brother     Breast cancer Paternal Aunt     Cervical cancer Paternal Aunt        Social History     Social History     Social History     Tobacco Use   Smoking Status Former Smoker    Packs/day: 0 50    Years: 16 00    Pack years: 8 00    Types: Cigarettes    Start date:     Quit date: 1    Years since quittin 0   Smokeless Tobacco Never Used       Past Surgical History:   Procedure Laterality Date    CORONARY ARTERY BYPASS GRAFT      x3 LIMA-LAD, SVG-OM, SVG-DIAG with mitral valve repair by olenchock    CYSTOSCOPY  2014    Diagnostic    ESOPHAGOGASTRODUODENOSCOPY  2015    with biopsy  Dr Jackie Pierce      26 mm famtata hernandez physi annuloplasty ring    TRACHEOSTOMY      during his heart surgery       Please note :  Voice dictation software has been used to create this document    There may be inadvertent transcription errors      [id number] [address]

## 2021-01-26 ENCOUNTER — OFFICE VISIT (OUTPATIENT)
Dept: UROLOGY | Facility: CLINIC | Age: 81
End: 2021-01-26
Payer: COMMERCIAL

## 2021-01-26 VITALS
DIASTOLIC BLOOD PRESSURE: 82 MMHG | SYSTOLIC BLOOD PRESSURE: 126 MMHG | HEART RATE: 95 BPM | WEIGHT: 122 LBS | BODY MASS INDEX: 19.61 KG/M2 | HEIGHT: 66 IN

## 2021-01-26 DIAGNOSIS — N40.1 BPH WITH OBSTRUCTION/LOWER URINARY TRACT SYMPTOMS: ICD-10-CM

## 2021-01-26 DIAGNOSIS — Z87.440 HISTORY OF UTI: ICD-10-CM

## 2021-01-26 DIAGNOSIS — N13.8 BPH WITH OBSTRUCTION/LOWER URINARY TRACT SYMPTOMS: ICD-10-CM

## 2021-01-26 DIAGNOSIS — N40.1 BENIGN PROSTATIC HYPERPLASIA WITH LOWER URINARY TRACT SYMPTOMS, SYMPTOM DETAILS UNSPECIFIED: Primary | ICD-10-CM

## 2021-01-26 LAB
AMORPH URATE CRY URNS QL MICRO: ABNORMAL /HPF
BACTERIA UR QL AUTO: ABNORMAL /HPF
BILIRUB UR QL STRIP: NEGATIVE
CLARITY UR: ABNORMAL
COLOR UR: YELLOW
GLUCOSE UR STRIP-MCNC: NEGATIVE MG/DL
HGB UR QL STRIP.AUTO: ABNORMAL
KETONES UR STRIP-MCNC: NEGATIVE MG/DL
LEUKOCYTE ESTERASE UR QL STRIP: ABNORMAL
NITRITE UR QL STRIP: NEGATIVE
NON-SQ EPI CELLS URNS QL MICRO: ABNORMAL /HPF
PH UR STRIP.AUTO: 6.5 [PH]
POST-VOID RESIDUAL VOLUME, ML POC: 257 ML
PROT UR STRIP-MCNC: ABNORMAL MG/DL
RBC #/AREA URNS AUTO: ABNORMAL /HPF
SL AMB  POCT GLUCOSE, UA: NORMAL
SL AMB LEUKOCYTE ESTERASE,UA: NORMAL
SL AMB POCT BILIRUBIN,UA: NORMAL
SL AMB POCT BLOOD,UA: NORMAL
SL AMB POCT CLARITY,UA: NORMAL
SL AMB POCT COLOR,UA: YELLOW
SL AMB POCT KETONES,UA: NORMAL
SL AMB POCT NITRITE,UA: NORMAL
SL AMB POCT PH,UA: 6
SL AMB POCT SPECIFIC GRAVITY,UA: 1.01
SL AMB POCT URINE PROTEIN: NORMAL
SL AMB POCT UROBILINOGEN: 0.2
SP GR UR STRIP.AUTO: 1.02 (ref 1–1.03)
UROBILINOGEN UR QL STRIP.AUTO: 0.2 E.U./DL
WBC #/AREA URNS AUTO: ABNORMAL /HPF

## 2021-01-26 PROCEDURE — 87186 SC STD MICRODIL/AGAR DIL: CPT | Performed by: NURSE PRACTITIONER

## 2021-01-26 PROCEDURE — 3074F SYST BP LT 130 MM HG: CPT | Performed by: NURSE PRACTITIONER

## 2021-01-26 PROCEDURE — 87077 CULTURE AEROBIC IDENTIFY: CPT | Performed by: NURSE PRACTITIONER

## 2021-01-26 PROCEDURE — 51798 US URINE CAPACITY MEASURE: CPT | Performed by: NURSE PRACTITIONER

## 2021-01-26 PROCEDURE — 81002 URINALYSIS NONAUTO W/O SCOPE: CPT | Performed by: NURSE PRACTITIONER

## 2021-01-26 PROCEDURE — 87086 URINE CULTURE/COLONY COUNT: CPT | Performed by: NURSE PRACTITIONER

## 2021-01-26 PROCEDURE — 81001 URINALYSIS AUTO W/SCOPE: CPT | Performed by: NURSE PRACTITIONER

## 2021-01-26 PROCEDURE — 1036F TOBACCO NON-USER: CPT | Performed by: NURSE PRACTITIONER

## 2021-01-26 PROCEDURE — 1160F RVW MEDS BY RX/DR IN RCRD: CPT | Performed by: NURSE PRACTITIONER

## 2021-01-26 PROCEDURE — 99213 OFFICE O/P EST LOW 20 MIN: CPT | Performed by: NURSE PRACTITIONER

## 2021-01-26 PROCEDURE — 3079F DIAST BP 80-89 MM HG: CPT | Performed by: NURSE PRACTITIONER

## 2021-01-26 RX ORDER — FINASTERIDE 5 MG/1
5 TABLET, FILM COATED ORAL DAILY
Qty: 90 TABLET | Refills: 4 | Status: SHIPPED | OUTPATIENT
Start: 2021-01-26 | End: 2021-02-22 | Stop reason: SDUPTHER

## 2021-01-26 RX ORDER — TAMSULOSIN HYDROCHLORIDE 0.4 MG/1
0.8 CAPSULE ORAL
Qty: 90 CAPSULE | Refills: 4 | Status: SHIPPED | OUTPATIENT
Start: 2021-01-26 | End: 2021-02-22 | Stop reason: SDUPTHER

## 2021-01-29 ENCOUNTER — TELEPHONE (OUTPATIENT)
Dept: UROLOGY | Facility: CLINIC | Age: 81
End: 2021-01-29

## 2021-01-29 ENCOUNTER — TELEPHONE (OUTPATIENT)
Dept: OTHER | Facility: HOSPITAL | Age: 81
End: 2021-01-29

## 2021-01-29 DIAGNOSIS — Z87.440 HISTORY OF UTI: Primary | ICD-10-CM

## 2021-01-29 LAB — BACTERIA UR CULT: ABNORMAL

## 2021-01-29 RX ORDER — AMOXICILLIN 500 MG/1
500 CAPSULE ORAL EVERY 8 HOURS SCHEDULED
Qty: 21 CAPSULE | Refills: 0 | Status: SHIPPED | OUTPATIENT
Start: 2021-01-29 | End: 2021-02-05

## 2021-01-29 NOTE — TELEPHONE ENCOUNTER
Called and left detailed message for patient stating that urine culture came back positive for infection patient to start taking antibiotic that was sent to pharmacy  Office number left in message

## 2021-01-29 NOTE — TELEPHONE ENCOUNTER
Called [phone] and  left a message for patient to please contact the office to speak with clinical in regards to his urine culture results

## 2021-02-22 DIAGNOSIS — N13.8 BPH WITH OBSTRUCTION/LOWER URINARY TRACT SYMPTOMS: ICD-10-CM

## 2021-02-22 DIAGNOSIS — N40.1 BENIGN PROSTATIC HYPERPLASIA WITH LOWER URINARY TRACT SYMPTOMS, SYMPTOM DETAILS UNSPECIFIED: ICD-10-CM

## 2021-02-22 DIAGNOSIS — N40.1 BPH WITH OBSTRUCTION/LOWER URINARY TRACT SYMPTOMS: ICD-10-CM

## 2021-02-22 RX ORDER — TAMSULOSIN HYDROCHLORIDE 0.4 MG/1
0.8 CAPSULE ORAL
Qty: 180 CAPSULE | Refills: 4 | Status: SHIPPED | OUTPATIENT
Start: 2021-02-22 | End: 2021-05-25

## 2021-02-22 RX ORDER — FINASTERIDE 5 MG/1
5 TABLET, FILM COATED ORAL DAILY
Qty: 90 TABLET | Refills: 4 | Status: SHIPPED | OUTPATIENT
Start: 2021-02-22 | End: 2022-01-27 | Stop reason: SDUPTHER

## 2021-02-22 NOTE — TELEPHONE ENCOUNTER
An Auto-fax Refill Request for Finasteride 5mg and Tamsulosin 0 4mg was received from [facility] mail order pharmacy  The patient was last seen on 1/26/21 by JARRED Black in the [facility] location; continuation of the medication was authorized at that time  Tamsulosin was increased to 0 8mg QHS  Both drugs were refills at the time of the office visit, however, they were sent to his old mail order provider    Both drugs were requeued to the NEW mail order pharmacy:  [facility], as requested and forwarded to the Advanced Practitioner covering the [facility] for approval

## 2021-04-01 DIAGNOSIS — I50.9 ACC/AHA STAGE C CONGESTIVE HEART FAILURE DUE TO ISCHEMIC CARDIOMYOPATHY (HCC): ICD-10-CM

## 2021-04-01 DIAGNOSIS — I25.5 ACC/AHA STAGE C CONGESTIVE HEART FAILURE DUE TO ISCHEMIC CARDIOMYOPATHY (HCC): ICD-10-CM

## 2021-04-01 RX ORDER — LISINOPRIL 5 MG/1
5 TABLET ORAL DAILY
Qty: 90 TABLET | Refills: 1 | Status: SHIPPED | OUTPATIENT
Start: 2021-04-01 | End: 2021-12-21

## 2021-04-16 ENCOUNTER — OFFICE VISIT (OUTPATIENT)
Dept: FAMILY MEDICINE CLINIC | Facility: CLINIC | Age: 81
End: 2021-04-16
Payer: COMMERCIAL

## 2021-04-16 VITALS
DIASTOLIC BLOOD PRESSURE: 80 MMHG | OXYGEN SATURATION: 99 % | TEMPERATURE: 97.1 F | RESPIRATION RATE: 16 BRPM | BODY MASS INDEX: 19.37 KG/M2 | HEART RATE: 78 BPM | HEIGHT: 67 IN | SYSTOLIC BLOOD PRESSURE: 132 MMHG | WEIGHT: 123.4 LBS

## 2021-04-16 DIAGNOSIS — E78.2 MIXED HYPERLIPIDEMIA: ICD-10-CM

## 2021-04-16 DIAGNOSIS — I10 ESSENTIAL HYPERTENSION: Primary | ICD-10-CM

## 2021-04-16 DIAGNOSIS — R73.03 PREDIABETES: ICD-10-CM

## 2021-04-16 DIAGNOSIS — Z95.1 HX OF CABG: ICD-10-CM

## 2021-04-16 DIAGNOSIS — Z12.11 SPECIAL SCREENING FOR MALIGNANT NEOPLASM OF COLON: ICD-10-CM

## 2021-04-16 DIAGNOSIS — N40.1 BENIGN PROSTATIC HYPERPLASIA WITH LOWER URINARY TRACT SYMPTOMS, SYMPTOM DETAILS UNSPECIFIED: ICD-10-CM

## 2021-04-16 PROCEDURE — 99214 OFFICE O/P EST MOD 30 MIN: CPT | Performed by: FAMILY MEDICINE

## 2021-04-16 PROCEDURE — 1036F TOBACCO NON-USER: CPT | Performed by: FAMILY MEDICINE

## 2021-04-16 PROCEDURE — 3288F FALL RISK ASSESSMENT DOCD: CPT | Performed by: FAMILY MEDICINE

## 2021-04-16 PROCEDURE — 3075F SYST BP GE 130 - 139MM HG: CPT | Performed by: FAMILY MEDICINE

## 2021-04-16 PROCEDURE — 3725F SCREEN DEPRESSION PERFORMED: CPT | Performed by: FAMILY MEDICINE

## 2021-04-16 PROCEDURE — 3079F DIAST BP 80-89 MM HG: CPT | Performed by: FAMILY MEDICINE

## 2021-04-16 PROCEDURE — 1160F RVW MEDS BY RX/DR IN RCRD: CPT | Performed by: FAMILY MEDICINE

## 2021-04-16 PROCEDURE — 1101F PT FALLS ASSESS-DOCD LE1/YR: CPT | Performed by: FAMILY MEDICINE

## 2021-04-16 NOTE — PROGRESS NOTES
Assessment/Plan:  Chief Complaint   Patient presents with    Well Check    Follow-up     Patient Instructions   Here for recheck and doing well except poor taste and would like 2nd opinion, consult [facility] next appt is next Wednesday  Rec taking bp and cholesterol med as these recent labs done were stable and prediabetes stable with hga1c was 5 6 on last labs done  F-up with cardiology as directed for hx of CABG  Follow CDC guidelines for covid 19 prevention  Refuses covid 19 vaccine today  Recheck in 6 months             No problem-specific Assessment & Plan notes found for this encounter  Diagnoses and all orders for this visit:    Essential hypertension    Prediabetes    Mixed hyperlipidemia    Hx of CABG    Benign prostatic hyperplasia with lower urinary tract symptoms, symptom details unspecified    Special screening for malignant neoplasm of colon  -     Ambulatory referral to Gastroenterology; Future          Subjective:      Patient ID: Nguyễn Billings is a [de-identified] y o  male  Well Check   Follow-up     Here for recheck and has appt to see ENT for lack of taste next week  Takes all meds as directed and is currently not yet interested in covid 19 vaccination  He also sees Cardiology as directed for hx of CABG  The following portions of the patient's history were reviewed and updated as appropriate: allergies, current medications, past family history, past medical history, past social history, past surgical history and problem list     Review of Systems   Constitutional: Negative  HENT: Negative  Eyes: Negative  Respiratory: Negative  Cardiovascular: Negative  Gastrointestinal: Negative  Endocrine: Negative  Genitourinary: Negative  Musculoskeletal: Negative  Skin: Negative  Allergic/Immunologic: Negative  Neurological: Negative  Hematological: Negative  Psychiatric/Behavioral: Negative            Objective:      /80   Pulse 78   Temp (!) 97 1 °F (36 2 °C) (Temporal)   Resp 16   Ht 5' 6 54" (1 69 m)   Wt 56 kg (123 lb 6 4 oz)   SpO2 99%   BMI 19 60 kg/m²          Physical Exam  Constitutional:       Appearance: He is well-developed  HENT:      Head: Normocephalic and atraumatic  Right Ear: External ear normal       Left Ear: External ear normal       Nose: Nose normal    Eyes:      Conjunctiva/sclera: Conjunctivae normal       Pupils: Pupils are equal, round, and reactive to light  Neck:      Musculoskeletal: Normal range of motion and neck supple  Cardiovascular:      Rate and Rhythm: Normal rate and regular rhythm  Heart sounds: Normal heart sounds  Pulmonary:      Effort: Pulmonary effort is normal       Breath sounds: Normal breath sounds  Musculoskeletal: Normal range of motion  Skin:     General: Skin is warm and dry  Neurological:      Mental Status: He is alert and oriented to person, place, and time  Deep Tendon Reflexes: Reflexes are normal and symmetric     Psychiatric:         Behavior: Behavior normal

## 2021-04-16 NOTE — PATIENT INSTRUCTIONS
Here for recheck and doing well except poor taste and would like 2nd opinion, consult [facility] E[facility] next appt is next Wednesday  Rec taking bp and cholesterol med as these recent labs done were stable and prediabetes stable with hga1c was 5 6 on last labs done  F-up with cardiology as directed for hx of CABG  Follow CDC guidelines for covid 19 prevention  Refuses covid 19 vaccine today    Recheck in 6 months

## 2021-04-27 ENCOUNTER — TELEPHONE (OUTPATIENT)
Dept: GASTROENTEROLOGY | Facility: CLINIC | Age: 81
End: 2021-04-27

## 2021-05-25 DIAGNOSIS — N40.1 BENIGN PROSTATIC HYPERPLASIA WITH LOWER URINARY TRACT SYMPTOMS, SYMPTOM DETAILS UNSPECIFIED: ICD-10-CM

## 2021-05-25 RX ORDER — TAMSULOSIN HYDROCHLORIDE 0.4 MG/1
CAPSULE ORAL
Qty: 180 CAPSULE | Refills: 3 | Status: SHIPPED | OUTPATIENT
Start: 2021-05-25 | End: 2021-05-28

## 2021-05-28 DIAGNOSIS — N40.1 BENIGN PROSTATIC HYPERPLASIA WITH LOWER URINARY TRACT SYMPTOMS, SYMPTOM DETAILS UNSPECIFIED: ICD-10-CM

## 2021-05-28 RX ORDER — TAMSULOSIN HYDROCHLORIDE 0.4 MG/1
CAPSULE ORAL
Qty: 180 CAPSULE | Refills: 3 | Status: SHIPPED | OUTPATIENT
Start: 2021-05-28 | End: 2021-06-02

## 2021-06-01 DIAGNOSIS — N40.1 BENIGN PROSTATIC HYPERPLASIA WITH LOWER URINARY TRACT SYMPTOMS, SYMPTOM DETAILS UNSPECIFIED: ICD-10-CM

## 2021-06-02 RX ORDER — TAMSULOSIN HYDROCHLORIDE 0.4 MG/1
CAPSULE ORAL
Qty: 180 CAPSULE | Refills: 3 | Status: SHIPPED | OUTPATIENT
Start: 2021-06-02 | End: 2022-01-27 | Stop reason: SDUPTHER

## 2021-06-11 ENCOUNTER — HOSPITAL ENCOUNTER (OUTPATIENT)
Dept: NON INVASIVE DIAGNOSTICS | Facility: HOSPITAL | Age: 81
Discharge: HOME | End: 2021-06-11
Attending: INTERNAL MEDICINE
Payer: COMMERCIAL

## 2021-06-11 DIAGNOSIS — I25.5 ISCHEMIC CARDIOMYOPATHY: ICD-10-CM

## 2021-06-11 DIAGNOSIS — Z98.890 H/O MITRAL VALVE REPAIR: ICD-10-CM

## 2021-06-11 DIAGNOSIS — I50.22 CHRONIC SYSTOLIC CHF (CONGESTIVE HEART FAILURE), NYHA CLASS 2 (HCC): ICD-10-CM

## 2021-06-11 PROCEDURE — 93306 TTE W/DOPPLER COMPLETE: CPT | Performed by: INTERNAL MEDICINE

## 2021-06-11 PROCEDURE — 93306 TTE W/DOPPLER COMPLETE: CPT

## 2021-06-14 ENCOUNTER — TELEPHONE (OUTPATIENT)
Dept: CARDIOLOGY CLINIC | Facility: CLINIC | Age: 81
End: 2021-06-14

## 2021-06-14 NOTE — TELEPHONE ENCOUNTER
Left message on Jaz's number   If she calls back, its about her fathers Echo results     ===View-only below this line===  ----- Message -----  From: Cherylene Cleaver, DO  Sent: 6/11/2021   3:03 PM EDT  To: Shayy Kim MA    There is no significant change from prior study- EF around 45% which is same, no change in treatment

## 2021-07-20 DIAGNOSIS — E78.5 HYPERLIPIDEMIA, UNSPECIFIED HYPERLIPIDEMIA TYPE: ICD-10-CM

## 2021-07-21 DIAGNOSIS — I50.22 CHRONIC SYSTOLIC CHF (CONGESTIVE HEART FAILURE), NYHA CLASS 2 (HCC): ICD-10-CM

## 2021-07-25 RX ORDER — ATORVASTATIN CALCIUM 40 MG/1
TABLET, FILM COATED ORAL
Qty: 90 TABLET | Refills: 3 | Status: SHIPPED | OUTPATIENT
Start: 2021-07-25 | End: 2021-09-02 | Stop reason: SDUPTHER

## 2021-07-25 RX ORDER — CARVEDILOL 6.25 MG/1
TABLET ORAL
Qty: 180 TABLET | Refills: 3 | Status: SHIPPED | OUTPATIENT
Start: 2021-07-25 | End: 2022-07-28

## 2021-08-31 NOTE — PROGRESS NOTES
Heart Failure Outpatient Progress Note - Pia Sandoval [de-identified] y o  male MRN: [id number]    @ Encounter: [id number]      Assessment/Plan:    Patient Active Problem List    Diagnosis Date Noted    Ischemic cardiomyopathy 03/12/2018    Benign prostatic hyperplasia 11/07/2018    Hyperlipidemia 11/07/2018    Prediabetes 11/07/2018    Essential hypertension 11/07/2018    Hx of CABG 03/12/2018    S/P mitral valve repair 03/12/2018    Pleural effusion on left 03/02/2020    Post-nasal drip 03/02/2020    History of UTI 01/08/2020     # Chronic HFmEF, Stage C, NYHA 2  Etiology: iCM    Weight: 121 lbs  NT proBNP:     Studies- personally reviewed by me  Echo 6/11/21:  LVEF: 45%, hypo of inferior wall  LVEDd:  RV: mildly dilated, mildly reduced  MV annuloplasty, moderate MR- anteriorly directed  Mild MS, mean gradient of 5 7 mmHg  Mild to moderate TR  PASP: 45     Echo 3/27/18:   LVEF: 45%  LVEDd 6 1 cm  Moderate to severe MR, eccentric  PASP 30 mmHg    TTE 7/28/16: LVEF: 40%, LVEDd 5 9cm, grade 2 DD  Moderate MR    Neurohormonal Blockade:  --Beta-Blocker: coreg 6 25 mg BID  --ACEi, ARB or ARNi: lisinopril 5 mg   --Aldosterone Receptor Blocker:  --Diuretic: none     Sudden Cardiac Death Risk Reduction:  --ICD: none, EF > 35%    Electrical Resynchronization:  --Candidacy for BiV device: narrow QRS    # CAD with CABG x 3 2012  TWI in 2,3 - no ischemic eval since CABG- EKG today- same abnormality but unchanged- pt declining stress test again- no cp or SOB  :no need for nitro tabs  Meds: asa, atorvastatin 40 mg daily, coreg 6 25 mg BID    # hyperlipidemia: Atorvastatin 40 mg daily  5/4/19- HDL 59, LDL 65    # Mitral valve repair with 26 mm CE annuloplasty ring   Has moderate to severe MR on 3/27/18 echo - eccentric    # Post op Trach and peg at that time  # polymyalgia rheumatica  #  anemia of chronic disease  # pre diabetes  # left pleural effusion- likely loculated- has seen Pulm    TODAY'S PLAN:  Continue asa, atorvastatin and coreg for CAD  Repeat labs, lipid profile, continue atorvastatin for previous lipid check at goal  Continue coreg and lisinopril for his HFmEF   --2g sodium diet  - Daily weights    HPI:   [de-identified] yo male with history of CAD with CABG x 3 in 2012 with mitral valve repair with ring, polymalgia rheumatica, BPH, ICM with last EF: 40% on June 2016 echo and moderate MR on the echo  He reportedly had VDRF after CABG with trach and PEG  Has not been admitted with heart failure  Last eval, pt declined stress test  On follow up 8/29 he denies SOB or CP  EKG today is abnormal but unchanged from Feb EKG  Pleural effusion felt to be loculated    Interval History:  Echo 6/11/21:  LVEF: 45%, hypo of inferior wall  LVEDd:  RV: mildly dilated, mildly reduced  MV annuloplasty, moderate MR- anteriorly directed  Mild MS, mean gradient of 5 7 mmHg  Mild to moderate TR  PASP: 45 mmHg    Mild OSHEA  No worsening symptoms    Review of Systems   Constitutional: Negative for activity change, appetite change, fatigue and unexpected weight change  HENT: Negative for congestion and nosebleeds  Eyes: Negative  Respiratory: Negative for cough, chest tightness and shortness of breath  Cardiovascular: Negative for chest pain, palpitations and leg swelling  Gastrointestinal: Negative for abdominal distention  Endocrine: Negative  Genitourinary: Negative  Musculoskeletal: Negative  Skin: Negative  Neurological: Negative for dizziness, syncope and weakness  Hematological: Negative  Psychiatric/Behavioral: Negative  Past Medical History:   Diagnosis Date    BPH (benign prostatic hyperplasia)     Hypercholesterolemia     Hypertension     Prediabetes          No Known Allergies        Current Outpatient Medications:     aspirin (ASPIRIN LOW DOSE) 81 MG tablet, Take 1 tablet by mouth daily, Disp: , Rfl:     atorvastatin (LIPITOR) 40 mg tablet, TAKE 1 TABLET BY MOUTH ONCE A DAY, Disp: 90 tablet, Rfl: 3    carvedilol (COREG) 6 25 mg tablet, TAKE 1 TABLET BY MOUTH TWICE A DAY WITH MEALS, Disp: 180 tablet, Rfl: 3    Cholecalciferol 1000 units tablet, Take 2,000 Units by mouth , Disp: , Rfl:     Ferrous Sulfate (IRON) 325 (65 Fe) MG TABS, Take 1 tablet by mouth Twice daily, Disp: , Rfl:     finasteride (PROSCAR) 5 mg tablet, Take 1 tablet (5 mg total) by mouth daily, Disp: 90 tablet, Rfl: 4    lisinopril (ZESTRIL) 5 mg tablet, TAKE 1 TABLET (5 MG TOTAL) BY MOUTH DAILY, Disp: 90 tablet, Rfl: 1    Multiple Vitamin Essential TABS, Take by mouth, Disp: , Rfl:     Omega-3 Fatty Acids (FISH OIL OMEGA-3 PO), Fish Oil, Disp: , Rfl:     tamsulosin (FLOMAX) 0 4 mg, TAKE TWO CAPSULES BY MOUTH AT BEDTIME, Disp: 180 capsule, Rfl: 3    Social History     Socioeconomic History    Marital status:      Spouse name: Not on file    Number of children: Not on file    Years of education: Not on file    Highest education level: Not on file   Occupational History    Not on file   Tobacco Use    Smoking status: Former Smoker     Packs/day: 0 50     Years: 16 00     Pack years: 8 00     Types: Cigarettes     Start date:      Quit date:      Years since quittin 6    Smokeless tobacco: Never Used   Vaping Use    Vaping Use: Never used   Substance and Sexual Activity    Alcohol use: Yes     Comment: rare    Drug use: No    Sexual activity: Not on file   Other Topics Concern    Not on file   Social History Narrative    Consumes 2-3 ups of tea per week     Social Determinants of Health     Financial Resource Strain:     Difficulty of Paying Living Expenses:    Food Insecurity:     Worried About Running Out of Food in the Last Year:     Ran Out of Food in the Last Year:    Transportation Needs:     Lack of Transportation (Medical):      Lack of Transportation (Non-Medical):    Physical Activity:     Days of Exercise per Week:     Minutes of Exercise per Session:    Stress:     Feeling of Stress :    Social Connections:     Frequency of Communication with Friends and Family:     Frequency of Social Gatherings with Friends and Family:     Attends Hindu Services:     Active Member of Clubs or Organizations:     Attends Club or Organization Meetings:     Marital Status:    Intimate Partner Violence:     Fear of Current or Ex-Partner:     Emotionally Abused:     Physically Abused:     Sexually Abused:        Family History   Problem Relation Age of Onset    Diabetes Mother     Heart disease Mother     Hypertension Mother     Colon cancer Father     Kidney disease Father     Heart disease Sister     Heart disease Brother     Breast cancer Paternal Aunt     Cervical cancer Paternal Aunt        Physical Exam:    Vitals:   Vitals:    09/02/21 0814   BP: 124/62   Pulse: 76   Resp: 16     Wt Readings from Last 3 Encounters:   09/02/21 55 kg (121 lb 3 2 oz)   04/16/21 56 kg (123 lb 6 4 oz)   01/26/21 55 3 kg (122 lb)       Physical Exam  Constitutional:       Appearance: He is well-developed  He is not diaphoretic  HENT:      Head: Normocephalic and atraumatic  Eyes:      Pupils: Pupils are equal, round, and reactive to light  Neck:      Vascular: No JVD  Cardiovascular:      Rate and Rhythm: Normal rate and regular rhythm  Heart sounds: Murmur (apical) heard  Pulmonary:      Effort: Pulmonary effort is normal       Breath sounds: Normal breath sounds  No rales  Abdominal:      General: Bowel sounds are normal  There is no distension  Palpations: Abdomen is soft  Musculoskeletal:         General: Normal range of motion  Cervical back: Normal range of motion  Skin:     General: Skin is warm and dry  Neurological:      Mental Status: He is alert and oriented to person, place, and time         Labs & Results:    Lab Results   Component Value Date    GLUCOSE 104 01/08/2016    CALCIUM 9 7 01/09/2021     01/08/2016    K 4 5 01/09/2021    CO2 30 01/09/2021    CL 105 01/09/2021    BUN 22 01/09/2021    CREATININE 1 19 01/09/2021     Lab Results   Component Value Date    WBC 8 72 02/11/2020    HGB 13 1 02/11/2020    HCT 41 0 02/11/2020    MCV 99 (H) 02/11/2020     02/11/2020     No results found for: NTBNP   Lab Results   Component Value Date    CHOL 127 11/28/2015    CHOL 105 10/11/2014    CHOL 132 07/14/2014     Lab Results   Component Value Date    HDL 65 01/09/2021    HDL 59 05/04/2019    HDL 54 01/27/2018     Lab Results   Component Value Date    LDLCALC 84 01/09/2021    LDLCALC 65 05/04/2019    LDLCALC 74 01/27/2018     Lab Results   Component Value Date    TRIG 47 01/09/2021    TRIG 75 05/04/2019    TRIG 82 01/27/2018     No results found for: CHOLHDL      EKG personally reviewed by Ansley Nunez DO  Counseling / Coordination of Care  Time spent today 25 minutes  Greater than 50% of total time was spent with the patient and / or family counseling and / or coordination of care  We discussed diagnoses, most recent studies, tests and any changes in treatment plan  Thank you for the opportunity to participate in the care of this patient      [address] [facility] PULMONARY HYPERTENSION  MEDICAL DIRECTOR OF [place]

## 2021-09-02 ENCOUNTER — OFFICE VISIT (OUTPATIENT)
Dept: CARDIOLOGY CLINIC | Facility: CLINIC | Age: 81
End: 2021-09-02
Payer: COMMERCIAL

## 2021-09-02 VITALS
DIASTOLIC BLOOD PRESSURE: 62 MMHG | HEIGHT: 67 IN | BODY MASS INDEX: 19.02 KG/M2 | HEART RATE: 76 BPM | SYSTOLIC BLOOD PRESSURE: 124 MMHG | WEIGHT: 121.2 LBS | RESPIRATION RATE: 16 BRPM

## 2021-09-02 DIAGNOSIS — E78.2 MIXED HYPERLIPIDEMIA: ICD-10-CM

## 2021-09-02 DIAGNOSIS — I10 ESSENTIAL HYPERTENSION: ICD-10-CM

## 2021-09-02 DIAGNOSIS — E78.5 HYPERLIPIDEMIA, UNSPECIFIED HYPERLIPIDEMIA TYPE: ICD-10-CM

## 2021-09-02 DIAGNOSIS — I50.22 CHRONIC SYSTOLIC CHF (CONGESTIVE HEART FAILURE), NYHA CLASS 2 (HCC): ICD-10-CM

## 2021-09-02 DIAGNOSIS — I47.1 PAROXYSMAL SUPRAVENTRICULAR TACHYCARDIA (HCC): ICD-10-CM

## 2021-09-02 DIAGNOSIS — Z98.890 S/P MITRAL VALVE REPAIR: ICD-10-CM

## 2021-09-02 DIAGNOSIS — I25.5 ISCHEMIC CARDIOMYOPATHY: Primary | ICD-10-CM

## 2021-09-02 PROBLEM — I47.10 PAROXYSMAL SUPRAVENTRICULAR TACHYCARDIA: Status: ACTIVE | Noted: 2021-09-02

## 2021-09-02 PROCEDURE — 1160F RVW MEDS BY RX/DR IN RCRD: CPT | Performed by: INTERNAL MEDICINE

## 2021-09-02 PROCEDURE — 99214 OFFICE O/P EST MOD 30 MIN: CPT | Performed by: INTERNAL MEDICINE

## 2021-09-02 PROCEDURE — 1036F TOBACCO NON-USER: CPT | Performed by: INTERNAL MEDICINE

## 2021-09-02 RX ORDER — ATORVASTATIN CALCIUM 40 MG/1
40 TABLET, FILM COATED ORAL DAILY
Qty: 90 TABLET | Refills: 3 | Status: SHIPPED | OUTPATIENT
Start: 2021-09-02 | End: 2022-07-20 | Stop reason: SDUPTHER

## 2021-10-16 ENCOUNTER — APPOINTMENT (OUTPATIENT)
Dept: LAB | Facility: HOSPITAL | Age: 81
End: 2021-10-16
Attending: INTERNAL MEDICINE
Payer: COMMERCIAL

## 2021-10-16 LAB
ALBUMIN SERPL BCP-MCNC: 2.7 G/DL (ref 3.5–5)
ALP SERPL-CCNC: 80 U/L (ref 46–116)
ALT SERPL W P-5'-P-CCNC: 16 U/L (ref 12–78)
ANION GAP SERPL CALCULATED.3IONS-SCNC: 4 MMOL/L (ref 4–13)
AST SERPL W P-5'-P-CCNC: 14 U/L (ref 5–45)
BASOPHILS # BLD AUTO: 0.06 THOUSANDS/ΜL (ref 0–0.1)
BASOPHILS NFR BLD AUTO: 1 % (ref 0–1)
BILIRUB SERPL-MCNC: 0.5 MG/DL (ref 0.2–1)
BUN SERPL-MCNC: 18 MG/DL (ref 5–25)
CALCIUM ALBUM COR SERPL-MCNC: 10.2 MG/DL (ref 8.3–10.1)
CALCIUM SERPL-MCNC: 9.2 MG/DL (ref 8.3–10.1)
CHLORIDE SERPL-SCNC: 104 MMOL/L (ref 100–108)
CHOLEST SERPL-MCNC: 135 MG/DL (ref 50–200)
CO2 SERPL-SCNC: 30 MMOL/L (ref 21–32)
CREAT SERPL-MCNC: 1.2 MG/DL (ref 0.6–1.3)
EOSINOPHIL # BLD AUTO: 0.34 THOUSAND/ΜL (ref 0–0.61)
EOSINOPHIL NFR BLD AUTO: 5 % (ref 0–6)
ERYTHROCYTE [DISTWIDTH] IN BLOOD BY AUTOMATED COUNT: 15.9 % (ref 11.6–15.1)
GFR SERPL CREATININE-BSD FRML MDRD: 56 ML/MIN/1.73SQ M
GLUCOSE P FAST SERPL-MCNC: 92 MG/DL (ref 65–99)
HCT VFR BLD AUTO: 34.5 % (ref 36.5–49.3)
HDLC SERPL-MCNC: 53 MG/DL
HGB BLD-MCNC: 10.6 G/DL (ref 12–17)
IMM GRANULOCYTES # BLD AUTO: 0.01 THOUSAND/UL (ref 0–0.2)
IMM GRANULOCYTES NFR BLD AUTO: 0 % (ref 0–2)
LDLC SERPL CALC-MCNC: 74 MG/DL (ref 0–100)
LYMPHOCYTES # BLD AUTO: 1.58 THOUSANDS/ΜL (ref 0.6–4.47)
LYMPHOCYTES NFR BLD AUTO: 23 % (ref 14–44)
MCH RBC QN AUTO: 27.5 PG (ref 26.8–34.3)
MCHC RBC AUTO-ENTMCNC: 30.7 G/DL (ref 31.4–37.4)
MCV RBC AUTO: 90 FL (ref 82–98)
MONOCYTES # BLD AUTO: 0.66 THOUSAND/ΜL (ref 0.17–1.22)
MONOCYTES NFR BLD AUTO: 9 % (ref 4–12)
NEUTROPHILS # BLD AUTO: 4.35 THOUSANDS/ΜL (ref 1.85–7.62)
NEUTS SEG NFR BLD AUTO: 62 % (ref 43–75)
NONHDLC SERPL-MCNC: 82 MG/DL
NRBC BLD AUTO-RTO: 0 /100 WBCS
PLATELET # BLD AUTO: 294 THOUSANDS/UL (ref 149–390)
PMV BLD AUTO: 9.8 FL (ref 8.9–12.7)
POTASSIUM SERPL-SCNC: 4.2 MMOL/L (ref 3.5–5.3)
PROT SERPL-MCNC: 7.5 G/DL (ref 6.4–8.2)
RBC # BLD AUTO: 3.85 MILLION/UL (ref 3.88–5.62)
SODIUM SERPL-SCNC: 138 MMOL/L (ref 136–145)
TRIGL SERPL-MCNC: 40 MG/DL
WBC # BLD AUTO: 7 THOUSAND/UL (ref 4.31–10.16)

## 2021-10-16 PROCEDURE — 80053 COMPREHEN METABOLIC PANEL: CPT | Performed by: INTERNAL MEDICINE

## 2021-10-16 PROCEDURE — 36415 COLL VENOUS BLD VENIPUNCTURE: CPT | Performed by: INTERNAL MEDICINE

## 2021-10-16 PROCEDURE — 85025 COMPLETE CBC W/AUTO DIFF WBC: CPT | Performed by: INTERNAL MEDICINE

## 2021-10-16 PROCEDURE — 80061 LIPID PANEL: CPT | Performed by: INTERNAL MEDICINE

## 2021-10-18 ENCOUNTER — OFFICE VISIT (OUTPATIENT)
Dept: FAMILY MEDICINE CLINIC | Facility: CLINIC | Age: 81
End: 2021-10-18
Payer: COMMERCIAL

## 2021-10-18 VITALS
BODY MASS INDEX: 18.93 KG/M2 | RESPIRATION RATE: 16 BRPM | DIASTOLIC BLOOD PRESSURE: 70 MMHG | TEMPERATURE: 97.3 F | SYSTOLIC BLOOD PRESSURE: 140 MMHG | HEART RATE: 69 BPM | OXYGEN SATURATION: 97 % | WEIGHT: 120.6 LBS | HEIGHT: 67 IN

## 2021-10-18 DIAGNOSIS — D64.9 ANEMIA, UNSPECIFIED TYPE: ICD-10-CM

## 2021-10-18 DIAGNOSIS — R09.82 POST-NASAL DRIP: ICD-10-CM

## 2021-10-18 DIAGNOSIS — R53.83 FATIGUE, UNSPECIFIED TYPE: ICD-10-CM

## 2021-10-18 DIAGNOSIS — Z95.1 HX OF CABG: ICD-10-CM

## 2021-10-18 DIAGNOSIS — I25.5 ISCHEMIC CARDIOMYOPATHY: ICD-10-CM

## 2021-10-18 DIAGNOSIS — Z12.12 SCREENING FOR COLORECTAL CANCER: ICD-10-CM

## 2021-10-18 DIAGNOSIS — I10 ESSENTIAL HYPERTENSION: Primary | ICD-10-CM

## 2021-10-18 DIAGNOSIS — Z12.11 SCREENING FOR COLORECTAL CANCER: ICD-10-CM

## 2021-10-18 DIAGNOSIS — R13.10 DYSPHAGIA, UNSPECIFIED TYPE: ICD-10-CM

## 2021-10-18 PROCEDURE — 99214 OFFICE O/P EST MOD 30 MIN: CPT | Performed by: FAMILY MEDICINE

## 2021-10-18 PROCEDURE — 3725F SCREEN DEPRESSION PERFORMED: CPT | Performed by: FAMILY MEDICINE

## 2021-10-18 PROCEDURE — 1160F RVW MEDS BY RX/DR IN RCRD: CPT | Performed by: FAMILY MEDICINE

## 2021-10-18 PROCEDURE — 1036F TOBACCO NON-USER: CPT | Performed by: FAMILY MEDICINE

## 2021-10-19 ENCOUNTER — TELEPHONE (OUTPATIENT)
Dept: HEMATOLOGY ONCOLOGY | Facility: CLINIC | Age: 81
End: 2021-10-19

## 2021-11-09 ENCOUNTER — CONSULT (OUTPATIENT)
Dept: HEMATOLOGY ONCOLOGY | Facility: CLINIC | Age: 81
End: 2021-11-09
Payer: COMMERCIAL

## 2021-11-09 VITALS
HEIGHT: 67 IN | OXYGEN SATURATION: 99 % | HEART RATE: 73 BPM | TEMPERATURE: 97.8 F | RESPIRATION RATE: 18 BRPM | WEIGHT: 119 LBS | DIASTOLIC BLOOD PRESSURE: 76 MMHG | SYSTOLIC BLOOD PRESSURE: 120 MMHG | BODY MASS INDEX: 18.68 KG/M2

## 2021-11-09 DIAGNOSIS — Z80.0 FAMILY HISTORY OF COLON CANCER IN FATHER: Primary | ICD-10-CM

## 2021-11-09 DIAGNOSIS — D64.9 ANEMIA, UNSPECIFIED TYPE: ICD-10-CM

## 2021-11-09 DIAGNOSIS — R53.83 FATIGUE, UNSPECIFIED TYPE: ICD-10-CM

## 2021-11-09 DIAGNOSIS — R63.4 WEIGHT LOSS: ICD-10-CM

## 2021-11-09 DIAGNOSIS — R13.19 OTHER DYSPHAGIA: ICD-10-CM

## 2021-11-09 DIAGNOSIS — K44.9 HIATAL HERNIA: ICD-10-CM

## 2021-11-09 PROCEDURE — 99244 OFF/OP CNSLTJ NEW/EST MOD 40: CPT | Performed by: PHYSICIAN ASSISTANT

## 2021-11-12 ENCOUNTER — CONSULT (OUTPATIENT)
Dept: GASTROENTEROLOGY | Facility: MEDICAL CENTER | Age: 81
End: 2021-11-12
Payer: COMMERCIAL

## 2021-11-12 ENCOUNTER — TELEPHONE (OUTPATIENT)
Dept: GASTROENTEROLOGY | Facility: MEDICAL CENTER | Age: 81
End: 2021-11-12

## 2021-11-12 VITALS
SYSTOLIC BLOOD PRESSURE: 137 MMHG | WEIGHT: 121.4 LBS | BODY MASS INDEX: 19.3 KG/M2 | HEART RATE: 81 BPM | TEMPERATURE: 96.6 F | DIASTOLIC BLOOD PRESSURE: 73 MMHG

## 2021-11-12 DIAGNOSIS — K44.9 HIATAL HERNIA: ICD-10-CM

## 2021-11-12 DIAGNOSIS — E46 PROTEIN-CALORIE MALNUTRITION, UNSPECIFIED SEVERITY (HCC): Primary | ICD-10-CM

## 2021-11-12 DIAGNOSIS — R63.4 WEIGHT LOSS: ICD-10-CM

## 2021-11-12 DIAGNOSIS — R13.19 OTHER DYSPHAGIA: ICD-10-CM

## 2021-11-12 DIAGNOSIS — D64.9 ANEMIA, UNSPECIFIED TYPE: ICD-10-CM

## 2021-11-12 DIAGNOSIS — Z80.0 FAMILY HISTORY OF COLON CANCER IN FATHER: ICD-10-CM

## 2021-11-12 PROCEDURE — 1036F TOBACCO NON-USER: CPT | Performed by: INTERNAL MEDICINE

## 2021-11-12 PROCEDURE — 99243 OFF/OP CNSLTJ NEW/EST LOW 30: CPT | Performed by: INTERNAL MEDICINE

## 2021-11-12 PROCEDURE — 1160F RVW MEDS BY RX/DR IN RCRD: CPT | Performed by: INTERNAL MEDICINE

## 2021-11-13 ENCOUNTER — APPOINTMENT (OUTPATIENT)
Dept: LAB | Facility: HOSPITAL | Age: 81
End: 2021-11-13
Payer: COMMERCIAL

## 2021-11-13 DIAGNOSIS — D64.9 ANEMIA, UNSPECIFIED TYPE: ICD-10-CM

## 2021-11-13 LAB
BASOPHILS # BLD AUTO: 0.07 THOUSANDS/ΜL (ref 0–0.1)
BASOPHILS NFR BLD AUTO: 1 % (ref 0–1)
EOSINOPHIL # BLD AUTO: 0.36 THOUSAND/ΜL (ref 0–0.61)
EOSINOPHIL NFR BLD AUTO: 6 % (ref 0–6)
ERYTHROCYTE [DISTWIDTH] IN BLOOD BY AUTOMATED COUNT: 16.5 % (ref 11.6–15.1)
FERRITIN SERPL-MCNC: 104 NG/ML (ref 8–388)
FOLATE SERPL-MCNC: >20 NG/ML (ref 3.1–17.5)
HCT VFR BLD AUTO: 34.4 % (ref 36.5–49.3)
HGB BLD-MCNC: 10.7 G/DL (ref 12–17)
IMM GRANULOCYTES # BLD AUTO: 0.01 THOUSAND/UL (ref 0–0.2)
IMM GRANULOCYTES NFR BLD AUTO: 0 % (ref 0–2)
IRON SATN MFR SERPL: 13 % (ref 20–50)
IRON SERPL-MCNC: 31 UG/DL (ref 65–175)
LYMPHOCYTES # BLD AUTO: 1.51 THOUSANDS/ΜL (ref 0.6–4.47)
LYMPHOCYTES NFR BLD AUTO: 25 % (ref 14–44)
MCH RBC QN AUTO: 27.4 PG (ref 26.8–34.3)
MCHC RBC AUTO-ENTMCNC: 31.1 G/DL (ref 31.4–37.4)
MCV RBC AUTO: 88 FL (ref 82–98)
MONOCYTES # BLD AUTO: 0.69 THOUSAND/ΜL (ref 0.17–1.22)
MONOCYTES NFR BLD AUTO: 11 % (ref 4–12)
NEUTROPHILS # BLD AUTO: 3.42 THOUSANDS/ΜL (ref 1.85–7.62)
NEUTS SEG NFR BLD AUTO: 57 % (ref 43–75)
NRBC BLD AUTO-RTO: 0 /100 WBCS
PLATELET # BLD AUTO: 286 THOUSANDS/UL (ref 149–390)
PMV BLD AUTO: 9.4 FL (ref 8.9–12.7)
RBC # BLD AUTO: 3.91 MILLION/UL (ref 3.88–5.62)
RETICS # AUTO: NORMAL 10*3/UL (ref 14356–105094)
RETICS # CALC: 0.69 % (ref 0.37–1.87)
T4 FREE SERPL-MCNC: 1.38 NG/DL (ref 0.76–1.46)
TIBC SERPL-MCNC: 240 UG/DL (ref 250–450)
TSH SERPL DL<=0.05 MIU/L-ACNC: 4.03 UIU/ML (ref 0.36–3.74)
VIT B12 SERPL-MCNC: 900 PG/ML (ref 100–900)
WBC # BLD AUTO: 6.06 THOUSAND/UL (ref 4.31–10.16)

## 2021-11-13 PROCEDURE — 82607 VITAMIN B-12: CPT

## 2021-11-13 PROCEDURE — 82728 ASSAY OF FERRITIN: CPT

## 2021-11-13 PROCEDURE — 83550 IRON BINDING TEST: CPT

## 2021-11-13 PROCEDURE — 84439 ASSAY OF FREE THYROXINE: CPT

## 2021-11-13 PROCEDURE — 82746 ASSAY OF FOLIC ACID SERUM: CPT

## 2021-11-13 PROCEDURE — 83540 ASSAY OF IRON: CPT

## 2021-11-13 PROCEDURE — 36415 COLL VENOUS BLD VENIPUNCTURE: CPT

## 2021-11-13 PROCEDURE — 85045 AUTOMATED RETICULOCYTE COUNT: CPT

## 2021-11-13 PROCEDURE — 84165 PROTEIN E-PHORESIS SERUM: CPT

## 2021-11-13 PROCEDURE — 84165 PROTEIN E-PHORESIS SERUM: CPT | Performed by: PATHOLOGY

## 2021-11-13 PROCEDURE — 85025 COMPLETE CBC W/AUTO DIFF WBC: CPT

## 2021-11-13 PROCEDURE — 82668 ASSAY OF ERYTHROPOIETIN: CPT

## 2021-11-13 PROCEDURE — 84443 ASSAY THYROID STIM HORMONE: CPT

## 2021-11-15 LAB
ALBUMIN SERPL ELPH-MCNC: 2.98 G/DL (ref 3.5–5)
ALBUMIN SERPL ELPH-MCNC: 44.5 % (ref 52–65)
ALPHA1 GLOB SERPL ELPH-MCNC: 0.36 G/DL (ref 0.1–0.4)
ALPHA1 GLOB SERPL ELPH-MCNC: 5.3 % (ref 2.5–5)
ALPHA2 GLOB SERPL ELPH-MCNC: 0.92 G/DL (ref 0.4–1.2)
ALPHA2 GLOB SERPL ELPH-MCNC: 13.8 % (ref 7–13)
BETA GLOB ABNORMAL SERPL ELPH-MCNC: 0.45 G/DL (ref 0.4–0.8)
BETA1 GLOB SERPL ELPH-MCNC: 6.7 % (ref 5–13)
BETA2 GLOB SERPL ELPH-MCNC: 7.4 % (ref 2–8)
BETA2+GAMMA GLOB SERPL ELPH-MCNC: 0.5 G/DL (ref 0.2–0.5)
GAMMA GLOB ABNORMAL SERPL ELPH-MCNC: 1.49 G/DL (ref 0.5–1.6)
GAMMA GLOB SERPL ELPH-MCNC: 22.3 % (ref 12–22)
IGG/ALB SER: 0.8 {RATIO} (ref 1.1–1.8)
PROT PATTERN SERPL ELPH-IMP: ABNORMAL
PROT SERPL-MCNC: 6.7 G/DL (ref 6.4–8.2)

## 2021-11-16 LAB — EPO SERPL-ACNC: 17.7 MIU/ML (ref 2.6–18.5)

## 2021-11-30 ENCOUNTER — TELEPHONE (OUTPATIENT)
Dept: HEMATOLOGY ONCOLOGY | Facility: HOSPITAL | Age: 81
End: 2021-11-30

## 2021-11-30 ENCOUNTER — OFFICE VISIT (OUTPATIENT)
Dept: HEMATOLOGY ONCOLOGY | Facility: CLINIC | Age: 81
End: 2021-11-30
Payer: COMMERCIAL

## 2021-11-30 VITALS
BODY MASS INDEX: 18.83 KG/M2 | WEIGHT: 120 LBS | HEIGHT: 67 IN | SYSTOLIC BLOOD PRESSURE: 126 MMHG | TEMPERATURE: 97.4 F | DIASTOLIC BLOOD PRESSURE: 76 MMHG | RESPIRATION RATE: 18 BRPM | OXYGEN SATURATION: 98 % | HEART RATE: 80 BPM

## 2021-11-30 DIAGNOSIS — D64.9 ANEMIA, UNSPECIFIED TYPE: Primary | ICD-10-CM

## 2021-11-30 DIAGNOSIS — E61.1 LOW SERUM IRON: ICD-10-CM

## 2021-11-30 PROCEDURE — 99214 OFFICE O/P EST MOD 30 MIN: CPT | Performed by: PHYSICIAN ASSISTANT

## 2021-12-21 DIAGNOSIS — I50.9 ACC/AHA STAGE C CONGESTIVE HEART FAILURE DUE TO ISCHEMIC CARDIOMYOPATHY (HCC): ICD-10-CM

## 2021-12-21 DIAGNOSIS — I25.5 ACC/AHA STAGE C CONGESTIVE HEART FAILURE DUE TO ISCHEMIC CARDIOMYOPATHY (HCC): ICD-10-CM

## 2021-12-21 RX ORDER — LISINOPRIL 5 MG/1
5 TABLET ORAL DAILY
Qty: 90 TABLET | Refills: 1 | Status: SHIPPED | OUTPATIENT
Start: 2021-12-21

## 2022-01-12 ENCOUNTER — TELEPHONE (OUTPATIENT)
Dept: GASTROENTEROLOGY | Facility: HOSPITAL | Age: 82
End: 2022-01-12

## 2022-01-13 ENCOUNTER — ANESTHESIA (OUTPATIENT)
Dept: GASTROENTEROLOGY | Facility: HOSPITAL | Age: 82
End: 2022-01-13

## 2022-01-13 ENCOUNTER — HOSPITAL ENCOUNTER (OUTPATIENT)
Dept: GASTROENTEROLOGY | Facility: HOSPITAL | Age: 82
Setting detail: OUTPATIENT SURGERY
Discharge: HOME | End: 2022-01-13
Attending: INTERNAL MEDICINE
Payer: COMMERCIAL

## 2022-01-13 ENCOUNTER — ANESTHESIA EVENT (OUTPATIENT)
Dept: GASTROENTEROLOGY | Facility: HOSPITAL | Age: 82
End: 2022-01-13

## 2022-01-13 VITALS
OXYGEN SATURATION: 100 % | DIASTOLIC BLOOD PRESSURE: 63 MMHG | SYSTOLIC BLOOD PRESSURE: 137 MMHG | RESPIRATION RATE: 16 BRPM | HEART RATE: 66 BPM | TEMPERATURE: 98.6 F

## 2022-01-13 DIAGNOSIS — R63.4 WEIGHT LOSS: ICD-10-CM

## 2022-01-13 DIAGNOSIS — R13.19 OTHER DYSPHAGIA: ICD-10-CM

## 2022-01-13 DIAGNOSIS — D64.9 ANEMIA, UNSPECIFIED TYPE: ICD-10-CM

## 2022-01-13 DIAGNOSIS — Z80.0 FAMILY HISTORY OF COLON CANCER IN FATHER: ICD-10-CM

## 2022-01-13 DIAGNOSIS — K44.9 HIATAL HERNIA: ICD-10-CM

## 2022-01-13 PROCEDURE — 45378 DIAGNOSTIC COLONOSCOPY: CPT | Performed by: INTERNAL MEDICINE

## 2022-01-13 PROCEDURE — 43239 EGD BIOPSY SINGLE/MULTIPLE: CPT | Performed by: INTERNAL MEDICINE

## 2022-01-13 PROCEDURE — 88305 TISSUE EXAM BY PATHOLOGIST: CPT | Performed by: PATHOLOGY

## 2022-01-13 RX ORDER — LIDOCAINE HYDROCHLORIDE 20 MG/ML
INJECTION, SOLUTION EPIDURAL; INFILTRATION; INTRACAUDAL; PERINEURAL AS NEEDED
Status: DISCONTINUED | OUTPATIENT
Start: 2022-01-13 | End: 2022-01-13

## 2022-01-13 RX ORDER — SODIUM CHLORIDE 9 MG/ML
125 INJECTION, SOLUTION INTRAVENOUS CONTINUOUS
Status: DISCONTINUED | OUTPATIENT
Start: 2022-01-13 | End: 2022-01-17 | Stop reason: HOSPADM

## 2022-01-13 RX ORDER — PROPOFOL 10 MG/ML
INJECTION, EMULSION INTRAVENOUS AS NEEDED
Status: DISCONTINUED | OUTPATIENT
Start: 2022-01-13 | End: 2022-01-13

## 2022-01-13 RX ADMIN — LIDOCAINE HYDROCHLORIDE 80 MG: 20 INJECTION, SOLUTION EPIDURAL; INFILTRATION; INTRACAUDAL; PERINEURAL at 11:09

## 2022-01-13 RX ADMIN — PROPOFOL 30 MG: 10 INJECTION, EMULSION INTRAVENOUS at 11:13

## 2022-01-13 RX ADMIN — PROPOFOL 30 MG: 10 INJECTION, EMULSION INTRAVENOUS at 11:23

## 2022-01-13 RX ADMIN — PROPOFOL 100 MG: 10 INJECTION, EMULSION INTRAVENOUS at 11:09

## 2022-01-13 RX ADMIN — Medication 40 MG: at 11:22

## 2022-01-13 RX ADMIN — Medication 40 MG: at 11:25

## 2022-01-13 RX ADMIN — SODIUM CHLORIDE 125 ML/HR: 0.9 INJECTION, SOLUTION INTRAVENOUS at 10:12

## 2022-01-13 RX ADMIN — PROPOFOL 30 MG: 10 INJECTION, EMULSION INTRAVENOUS at 11:18

## 2022-01-13 RX ADMIN — PROPOFOL 30 MG: 10 INJECTION, EMULSION INTRAVENOUS at 11:27

## 2022-01-13 NOTE — ANESTHESIA PREPROCEDURE EVALUATION
Procedure:  COLONOSCOPY  EGD    Relevant Problems   CARDIO  ef 45%   (+) Essential hypertension   (+) Hx of CABG   (+) Hyperlipidemia   (+) Paroxysmal supraventricular tachycardia (HCC)   (+) S/P mitral valve repair      /RENAL   (+) Benign prostatic hyperplasia      NEURO/PSYCH   (+) History of UTI      PULMONARY   (+) Pleural effusion on left      Other   (+) Ischemic cardiomyopathy        Physical Exam    Airway    Mallampati score: II  TM Distance: >3 FB  Neck ROM: full     Dental       Cardiovascular  Cardiovascular exam normal    Pulmonary  Pulmonary exam normal     Other Findings        Anesthesia Plan  ASA Score- 3     Anesthesia Type- IV sedation with anesthesia with ASA Monitors  Additional Monitors:   Airway Plan:           Plan Factors-    Chart reviewed  Patient is not a current smoker  Patient instructed to abstain from smoking on day of procedure  Patient did not smoke on day of surgery  Obstructive sleep apnea risk education given perioperatively  Induction- intravenous  Postoperative Plan-     Informed Consent- Anesthetic plan and risks discussed with patient

## 2022-01-13 NOTE — DISCHARGE INSTRUCTIONS
Upper Endoscopy   WHAT YOU NEED TO KNOW:   An upper endoscopy is also called an upper gastrointestinal (GI) endoscopy, or an esophagogastroduodenoscopy (EGD)  You may feel bloated, gassy, or have some abdominal discomfort after your procedure  Your throat may be sore for 24 to 36 hours  You may burp or pass gas from air that is still inside your body  DISCHARGE INSTRUCTIONS:   Call 911 if:   · You have sudden chest pain or trouble breathing  Seek care immediately if:   · You feel dizzy or faint  · You have trouble swallowing  · You have severe throat pain  · Your bowel movements are very dark or black  · Your abdomen is hard and firm and you have severe pain  · You vomit blood  Contact your healthcare provider if:   · You feel full or bloated and cannot burp or pass gas  · You have not had a bowel movement for 3 days after your procedure  · You have neck pain  · You have a fever or chills  · You have nausea or are vomiting  · You have a rash or hives  · You have questions or concerns about your endoscopy  Relieve a sore throat:  Suck on throat lozenges or crushed ice  Gargle with a small amount of warm salt water  Mix 1 teaspoon of salt and 1 cup of warm water to make salt water  Relieve gas and discomfort from bloating:  Lie on your right side with a heating pad on your abdomen  Take short walks to help pass gas  Eat small meals until bloating is relieved  Rest after your procedure:  Do not drive or make important decisions until the day after your procedure  Return to your normal activity as directed  You can usually return to work the day after your procedure  Follow up with your healthcare provider as directed:  Write down your questions so you remember to ask them during your visits  © Copyright [employer] 2021 Information is for End User's use only and may not be sold, redistributed or otherwise used for commercial purposes   All illustrations and images included in CareNotes® are the copyrighted property of [employer]  or Corbin Garnica   The above information is an  only  It is not intended as medical advice for individual conditions or treatments  Talk to your doctor, nurse or pharmacist before following any medical regimen to see if it is safe and effective for you  Colonoscopy   WHAT YOU NEED TO KNOW:   A colonoscopy is a procedure to examine the inside of your colon (intestine) with a scope  Polyps or tissue growths may have been removed during your colonoscopy  It is normal to feel bloated and to have some abdominal discomfort  You should be passing gas  If you have hemorrhoids or you had polyps removed, you may have a small amount of bleeding  DISCHARGE INSTRUCTIONS:   Seek care immediately if:   · You have a large amount of bright red blood in your bowel movements  · Your abdomen is hard and firm and you have severe pain  · You have sudden trouble breathing  Call your doctor if:   · You develop a rash or hives  · You have a fever within 24 hours of your procedure  · You have nausea and vomiting  · You feel anesthesia effects greater than 24 hours  · You have not had a bowel movement for 3 days after your procedure  · You have questions or concerns about your condition or care  After your colonoscopy:   · Do not lift, strain, or run  until your healthcare provider says it is okay  · Rest as much as possible  You have been given medicine to relax you  Do not  drive or make important decisions for at least 24 hours  Return to your normal activity as directed  · Relieve gas and discomfort from bloating  by lying on your left side with a heating pad on your abdomen  You may need to take short walks to help the gas move out  Eat small meals until bloating is relieved  If you had polyps removed: For 7 days after your procedure:  · Do not  take aspirin      · Do not  go on long car rides     Help prevent constipation:   · Eat a variety of healthy foods  Healthy foods include fruit, vegetables, whole-grain breads, low-fat dairy products, beans, lean meat, and fish  Ask if you need to be on a special diet  Your healthcare provider may recommend that you eat high-fiber foods such as cooked beans  Fiber helps you have regular bowel movements  · Drink liquids as directed  Adults should drink between 9 and 13 eight-ounce cups of liquid every day  Ask what amount is best for you  For most people, good liquids to drink are water, juice, and milk  · Exercise as directed  Talk to your healthcare provider about the best exercise plan for you  Exercise can help prevent constipation, decrease your blood pressure and improve your health  Follow up with your doctor as directed:  Write down your questions so you remember to ask them during your visits  © Copyright [employer] 2021 Information is for End User's use only and may not be sold, redistributed or otherwise used for commercial purposes  All illustrations and images included in CareNotes® are the copyrighted property of [employer]  or [facility] Jennifer Garnica   The above information is an  only  It is not intended as medical advice for individual conditions or treatments  Talk to your doctor, nurse or pharmacist before following any medical regimen to see if it is safe and effective for you

## 2022-01-13 NOTE — H&P
History and Physical -  [facility]  Livia Winters 80 y o  male MRN: [id number]                  HPI: Livia Winters is a 80y o  year old male who presents for iron deficiency anemia and weight loss  REVIEW OF SYSTEMS: Per the HPI, and otherwise unremarkable  Historical Information   Past Medical History:   Diagnosis Date    BPH (benign prostatic hyperplasia)     Hypercholesterolemia     Hypertension     Prediabetes      Past Surgical History:   Procedure Laterality Date    CORONARY ARTERY BYPASS GRAFT      x3 LIMA-LAD, SVG-OM, SVG-DIAG with mitral valve repair by olenchock    CYSTOSCOPY  2014    Diagnostic    ESOPHAGOGASTRODUODENOSCOPY  2015    with biopsy  Dr Elliot Gastelum      26 mm fatmata hernandez physi annuloplasty ring    TRACHEOSTOMY      during his heart surgery     Social History   Social History     Substance and Sexual Activity   Alcohol Use Yes    Comment: rare     Social History     Substance and Sexual Activity   Drug Use No     Social History     Tobacco Use   Smoking Status Former Smoker    Packs/day: 0 50    Years: 16 00    Pack years: 8 00    Types: Cigarettes    Start date:     Quit date: 1    Years since quittin 0   Smokeless Tobacco Never Used     Family History   Problem Relation Age of Onset    Diabetes Mother     Heart disease Mother     Hypertension Mother     Colon cancer Father     Kidney disease Father     Heart disease Sister     Heart disease Brother     Breast cancer Paternal Aunt     Cervical cancer Maternal Aunt     Colon cancer Paternal Grandfather        Meds/Allergies     (Not in a hospital admission)      No Known Allergies    Objective     There were no vitals taken for this visit  PHYSICAL EXAMINATION:    General Appearance:   Alert, cooperative, no distress   HEENT:  Normocephalic, atraumatic, anicteric  Neck supple, symmetrical, trachea midline     Lungs:   Equal chest rise and unlabored breathing, normal effort, no coughing  Cardiovascular:   No visualized JVD  Abdomen:   No abdominal distension  Skin:   No jaundice, rashes, or lesions  Musculoskeletal:   Normal range of motion visualized  Psych:  Normal affect and normal insight  Neuro:  Alert and appropriate  ASSESSMENT/PLAN:  This is a 80y o  year old male here for EGD and colonoscopy, and he is stable and optimized for his procedure

## 2022-01-19 DIAGNOSIS — R63.4 WEIGHT LOSS: Primary | ICD-10-CM

## 2022-01-19 DIAGNOSIS — D64.9 ANEMIA, UNSPECIFIED TYPE: ICD-10-CM

## 2022-01-20 NOTE — RESULT ENCOUNTER NOTE
Hi, I have not found an explanation for his weight loss and anemia based on his scopes  His daughter mentioned that he had been treated for PMR in the past   I ordered a CRP even though he does not have significant joint pain currently  Just a thought    -VT

## 2022-01-22 ENCOUNTER — APPOINTMENT (OUTPATIENT)
Dept: LAB | Facility: HOSPITAL | Age: 82
End: 2022-01-22
Payer: COMMERCIAL

## 2022-01-22 DIAGNOSIS — R63.4 WEIGHT LOSS: ICD-10-CM

## 2022-01-22 DIAGNOSIS — D64.9 ANEMIA, UNSPECIFIED TYPE: ICD-10-CM

## 2022-01-22 DIAGNOSIS — E61.1 LOW SERUM IRON: ICD-10-CM

## 2022-01-22 LAB
BASOPHILS # BLD AUTO: 0.06 THOUSANDS/ΜL (ref 0–0.1)
BASOPHILS NFR BLD AUTO: 1 % (ref 0–1)
CRP SERPL QL: 26.2 MG/L
EOSINOPHIL # BLD AUTO: 0.36 THOUSAND/ΜL (ref 0–0.61)
EOSINOPHIL NFR BLD AUTO: 5 % (ref 0–6)
ERYTHROCYTE [DISTWIDTH] IN BLOOD BY AUTOMATED COUNT: 18 % (ref 11.6–15.1)
FERRITIN SERPL-MCNC: 137 NG/ML (ref 8–388)
HCT VFR BLD AUTO: 31.9 % (ref 36.5–49.3)
HGB BLD-MCNC: 9.8 G/DL (ref 12–17)
IMM GRANULOCYTES # BLD AUTO: 0.01 THOUSAND/UL (ref 0–0.2)
IMM GRANULOCYTES NFR BLD AUTO: 0 % (ref 0–2)
IRON SATN MFR SERPL: 10 % (ref 20–50)
IRON SERPL-MCNC: 23 UG/DL (ref 65–175)
LYMPHOCYTES # BLD AUTO: 1.35 THOUSANDS/ΜL (ref 0.6–4.47)
LYMPHOCYTES NFR BLD AUTO: 20 % (ref 14–44)
MCH RBC QN AUTO: 26.8 PG (ref 26.8–34.3)
MCHC RBC AUTO-ENTMCNC: 30.7 G/DL (ref 31.4–37.4)
MCV RBC AUTO: 87 FL (ref 82–98)
MONOCYTES # BLD AUTO: 0.69 THOUSAND/ΜL (ref 0.17–1.22)
MONOCYTES NFR BLD AUTO: 10 % (ref 4–12)
NEUTROPHILS # BLD AUTO: 4.43 THOUSANDS/ΜL (ref 1.85–7.62)
NEUTS SEG NFR BLD AUTO: 64 % (ref 43–75)
NRBC BLD AUTO-RTO: 0 /100 WBCS
PLATELET # BLD AUTO: 323 THOUSANDS/UL (ref 149–390)
PMV BLD AUTO: 9.8 FL (ref 8.9–12.7)
RBC # BLD AUTO: 3.66 MILLION/UL (ref 3.88–5.62)
TIBC SERPL-MCNC: 226 UG/DL (ref 250–450)
WBC # BLD AUTO: 6.9 THOUSAND/UL (ref 4.31–10.16)

## 2022-01-22 PROCEDURE — 36415 COLL VENOUS BLD VENIPUNCTURE: CPT

## 2022-01-22 PROCEDURE — 86140 C-REACTIVE PROTEIN: CPT

## 2022-01-22 PROCEDURE — 83550 IRON BINDING TEST: CPT

## 2022-01-22 PROCEDURE — 82728 ASSAY OF FERRITIN: CPT

## 2022-01-22 PROCEDURE — 83540 ASSAY OF IRON: CPT

## 2022-01-22 PROCEDURE — 85025 COMPLETE CBC W/AUTO DIFF WBC: CPT

## 2022-01-25 ENCOUNTER — TELEPHONE (OUTPATIENT)
Dept: HEMATOLOGY ONCOLOGY | Facility: CLINIC | Age: 82
End: 2022-01-25

## 2022-01-27 ENCOUNTER — OFFICE VISIT (OUTPATIENT)
Dept: UROLOGY | Facility: CLINIC | Age: 82
End: 2022-01-27
Payer: COMMERCIAL

## 2022-01-27 VITALS
BODY MASS INDEX: 18.21 KG/M2 | HEART RATE: 70 BPM | SYSTOLIC BLOOD PRESSURE: 122 MMHG | HEIGHT: 67 IN | WEIGHT: 116 LBS | DIASTOLIC BLOOD PRESSURE: 70 MMHG

## 2022-01-27 DIAGNOSIS — N40.1 BENIGN PROSTATIC HYPERPLASIA WITH LOWER URINARY TRACT SYMPTOMS, SYMPTOM DETAILS UNSPECIFIED: ICD-10-CM

## 2022-01-27 DIAGNOSIS — N13.8 BPH WITH OBSTRUCTION/LOWER URINARY TRACT SYMPTOMS: Primary | ICD-10-CM

## 2022-01-27 DIAGNOSIS — N40.1 BPH WITH OBSTRUCTION/LOWER URINARY TRACT SYMPTOMS: Primary | ICD-10-CM

## 2022-01-27 LAB
POST-VOID RESIDUAL VOLUME, ML POC: 64 ML
SL AMB  POCT GLUCOSE, UA: NORMAL
SL AMB LEUKOCYTE ESTERASE,UA: NORMAL
SL AMB POCT BILIRUBIN,UA: NORMAL
SL AMB POCT BLOOD,UA: NORMAL
SL AMB POCT CLARITY,UA: NORMAL
SL AMB POCT COLOR,UA: YELLOW
SL AMB POCT KETONES,UA: NORMAL
SL AMB POCT NITRITE,UA: NORMAL
SL AMB POCT PH,UA: 6.5
SL AMB POCT SPECIFIC GRAVITY,UA: 1
SL AMB POCT URINE PROTEIN: NORMAL
SL AMB POCT UROBILINOGEN: 0.2

## 2022-01-27 PROCEDURE — 51798 US URINE CAPACITY MEASURE: CPT | Performed by: PHYSICIAN ASSISTANT

## 2022-01-27 PROCEDURE — 99214 OFFICE O/P EST MOD 30 MIN: CPT | Performed by: PHYSICIAN ASSISTANT

## 2022-01-27 PROCEDURE — 81002 URINALYSIS NONAUTO W/O SCOPE: CPT | Performed by: PHYSICIAN ASSISTANT

## 2022-01-27 PROCEDURE — 87077 CULTURE AEROBIC IDENTIFY: CPT | Performed by: PHYSICIAN ASSISTANT

## 2022-01-27 PROCEDURE — 87186 SC STD MICRODIL/AGAR DIL: CPT | Performed by: PHYSICIAN ASSISTANT

## 2022-01-27 PROCEDURE — 87086 URINE CULTURE/COLONY COUNT: CPT | Performed by: PHYSICIAN ASSISTANT

## 2022-01-27 RX ORDER — TAMSULOSIN HYDROCHLORIDE 0.4 MG/1
0.8 CAPSULE ORAL
Qty: 180 CAPSULE | Refills: 3 | Status: SHIPPED | OUTPATIENT
Start: 2022-01-27 | End: 2022-04-11

## 2022-01-27 RX ORDER — FINASTERIDE 5 MG/1
5 TABLET, FILM COATED ORAL DAILY
Qty: 90 TABLET | Refills: 3 | Status: SHIPPED | OUTPATIENT
Start: 2022-01-27 | End: 2022-04-11

## 2022-01-27 NOTE — PROGRESS NOTES
PVR 64ml    1/27/2022      Chief Complaint   Patient presents with    Follow-up    Benign Prostatic Hypertrophy         Assessment and Plan    80 y o  male managed by Dr Barboza Grand    1  BPH    He will continue the Flomax 0 8 mg q h s  And finasteride 5 mg daily  Symptoms are stable for many years, though not perfect not terrible either  Emptying his bladder much better with the double-dose alpha-blocker, PVR today 69 mL  Will send urine for culture today as he does have some leukocytes nitrites and blood on urinalysis and admits to with some intermittent dysuria  Will call with that result on Monday, otherwise follow-up in one year  History of Present Illness  Gustavo Koroma is a 80 y o  male here for evaluation of annual follow-up BPH  He has previous flexible cystoscopy 2015 showing a small median lobe of the prostate and bilobar hypertrophy  He has had moderate to severe BPH symptoms for many years  He has been on Flomax and finasteride for many years  Last year his Flomax was doubled he feels this helps some  Still wakes 2-3x per night and some daytime urgency  Postvoid residual measurement is certainly better, previous volumes around 200, today 69 mL residual   He does report occasional dysuria  Urinalysis suggestive of infection  Last culture was one year ago at visit grew Enterococcus  He has not had any fevers  He is not interested in surgical management of his BPH  He has no retention hematuria or incontinence  Review of Systems   Constitutional: Negative for activity change, appetite change, chills, fever and unexpected weight change  HENT: Negative  Respiratory: Negative  Negative for shortness of breath  Cardiovascular: Negative  Negative for chest pain  Gastrointestinal: Negative for abdominal pain, diarrhea, nausea and vomiting  Endocrine: Negative  Genitourinary: Positive for frequency and urgency   Negative for decreased urine volume, difficulty urinating, dysuria, flank pain, hematuria and testicular pain  Musculoskeletal: Negative for back pain and gait problem  Skin: Negative  Allergic/Immunologic: Negative  Neurological: Negative  Hematological: Negative for adenopathy  Does not bruise/bleed easily              Vitals  Vitals:    22 0828   BP: 122/70   BP Location: Left arm   Patient Position: Sitting   Cuff Size: Adult   Pulse: 70   Weight: 52 6 kg (116 lb)   Height: 5' 6 5" (1 689 m)       Physical Exam      Past History  Past Medical History:   Diagnosis Date    BPH (benign prostatic hyperplasia)     Hypercholesterolemia     Hypertension     Prediabetes      Social History     Socioeconomic History    Marital status:      Spouse name: None    Number of children: None    Years of education: None    Highest education level: None   Occupational History    None   Tobacco Use    Smoking status: Former Smoker     Packs/day: 0 50     Years: 16 00     Pack years: 8 00     Types: Cigarettes     Start date:      Quit date:      Years since quittin 1    Smokeless tobacco: Never Used   Vaping Use    Vaping Use: Never used   Substance and Sexual Activity    Alcohol use: Yes     Comment: rare    Drug use: No    Sexual activity: None   Other Topics Concern    None   Social History Narrative    Consumes 2-3 ups of tea per week     Social Determinants of Health     Financial Resource Strain: Not on file   Food Insecurity: Not on file   Transportation Needs: Not on file   Physical Activity: Not on file   Stress: Not on file   Social Connections: Not on file   Intimate Partner Violence: Not on file   Housing Stability: Not on file     Social History     Tobacco Use   Smoking Status Former Smoker    Packs/day: 0 50    Years: 16 00    Pack years: 8 00    Types: Cigarettes    Start date:     Quit date: 1    Years since quittin 1   Smokeless Tobacco Never Used     Family History   Problem Relation Age of Onset    Diabetes Mother     Heart disease Mother     Hypertension Mother     Colon cancer Father     Kidney disease Father     Heart disease Sister     Heart disease Brother     Breast cancer Paternal Aunt     Cervical cancer Maternal Aunt     Colon cancer Paternal Grandfather        The following portions of the patient's history were reviewed and updated as appropriate: allergies, current medications, past medical history, past social history, past surgical history and problem list     Results  No results found for this or any previous visit (from the past 1 hour(s)) ]  Lab Results   Component Value Date    PSA 0 4 12/03/2015    PSA 1 7 10/11/2014     Lab Results   Component Value Date    GLUCOSE 104 01/08/2016    CALCIUM 9 2 10/16/2021     01/08/2016    K 4 2 10/16/2021    CO2 30 10/16/2021     10/16/2021    BUN 18 10/16/2021    CREATININE 1 20 10/16/2021     Lab Results   Component Value Date    WBC 6 90 01/22/2022    HGB 9 8 (L) 01/22/2022    HCT 31 9 (L) 01/22/2022    MCV 87 01/22/2022     01/22/2022

## 2022-01-29 LAB — BACTERIA UR CULT: ABNORMAL

## 2022-01-30 ENCOUNTER — TELEPHONE (OUTPATIENT)
Dept: UROLOGY | Facility: AMBULATORY SURGERY CENTER | Age: 82
End: 2022-01-30

## 2022-01-30 DIAGNOSIS — N39.0 URINARY TRACT INFECTION WITHOUT HEMATURIA, SITE UNSPECIFIED: Primary | ICD-10-CM

## 2022-01-30 RX ORDER — LEVOFLOXACIN 500 MG/1
500 TABLET, FILM COATED ORAL EVERY 24 HOURS
Qty: 7 TABLET | Refills: 0 | Status: SHIPPED | OUTPATIENT
Start: 2022-01-30 | End: 2022-02-06

## 2022-01-31 ENCOUNTER — OFFICE VISIT (OUTPATIENT)
Dept: FAMILY MEDICINE CLINIC | Facility: CLINIC | Age: 82
End: 2022-01-31
Payer: COMMERCIAL

## 2022-01-31 VITALS
SYSTOLIC BLOOD PRESSURE: 140 MMHG | TEMPERATURE: 97.6 F | OXYGEN SATURATION: 99 % | DIASTOLIC BLOOD PRESSURE: 72 MMHG | BODY MASS INDEX: 18.8 KG/M2 | HEART RATE: 71 BPM | HEIGHT: 66 IN | WEIGHT: 117 LBS

## 2022-01-31 DIAGNOSIS — K44.9 HIATAL HERNIA: Primary | ICD-10-CM

## 2022-01-31 DIAGNOSIS — E46 PROTEIN-CALORIE MALNUTRITION, UNSPECIFIED SEVERITY (HCC): ICD-10-CM

## 2022-01-31 DIAGNOSIS — M35.3 PMR (POLYMYALGIA RHEUMATICA) (HCC): ICD-10-CM

## 2022-01-31 DIAGNOSIS — R79.82 ELEVATED C-REACTIVE PROTEIN (CRP): ICD-10-CM

## 2022-01-31 DIAGNOSIS — R63.4 WEIGHT LOSS: ICD-10-CM

## 2022-01-31 PROCEDURE — 99214 OFFICE O/P EST MOD 30 MIN: CPT | Performed by: FAMILY MEDICINE

## 2022-01-31 PROCEDURE — 1036F TOBACCO NON-USER: CPT | Performed by: FAMILY MEDICINE

## 2022-01-31 PROCEDURE — 3288F FALL RISK ASSESSMENT DOCD: CPT | Performed by: FAMILY MEDICINE

## 2022-01-31 PROCEDURE — 1160F RVW MEDS BY RX/DR IN RCRD: CPT | Performed by: FAMILY MEDICINE

## 2022-01-31 PROCEDURE — 1101F PT FALLS ASSESS-DOCD LE1/YR: CPT | Performed by: FAMILY MEDICINE

## 2022-01-31 NOTE — TELEPHONE ENCOUNTER
Called and made patient aware of urine culture results and that Levaquin was sent to his pharmacy on file  Patient verbalized understanding

## 2022-01-31 NOTE — TELEPHONE ENCOUNTER
Results of recent urine culture were positive for infection  Prescription for Levaquin sent to pharmacy

## 2022-01-31 NOTE — PROGRESS NOTES
Assessment/Plan:  Chief Complaint   Patient presents with    GI issues     Here to discuss his GI issues and recent GI appointment  Patient Instructions   Here for elevated CRP and hx of PMR and treated in past by Rheumatology  GI requested possible evaluation by Rheumatology as his CRP was elevated  Patient found to have large hiatal hernia  No problem-specific Assessment & Plan notes found for this encounter  Diagnoses and all orders for this visit:    Hiatal hernia    Weight loss  -     Ambulatory Referral to Rheumatology; Future    Elevated C-reactive protein (CRP)  -     Ambulatory Referral to Rheumatology; Future    PMR (polymyalgia rheumatica) ([facility] )  -     Ambulatory Referral to Rheumatology; Future    Protein-calorie malnutrition, unspecified severity ([facility] )  -     Ambulatory Referral to Rheumatology; Future          Subjective:      Patient ID: Kathleen Hi is a 80 y o  male  Here to discuss weight loss and recent elevated CRP ordeered by GI  He was found to have a large hiatal hernia  GI rec evaluation as he was treated for PMR in past and saw rheumatology in past by Dr Robin Valenzuela  The following portions of the patient's history were reviewed and updated as appropriate: allergies, current medications, past family history, past medical history, past social history, past surgical history and problem list     Review of Systems   Constitutional: Positive for appetite change (food does not have much of a taste)  HENT: Negative  Eyes: Negative  Respiratory: Negative  Cardiovascular: Negative  Gastrointestinal: Negative  Hiatal hernia   Endocrine: Negative  Genitourinary: Negative  Musculoskeletal: Negative  Skin: Negative  Allergic/Immunologic: Negative  Neurological: Negative  Hematological: Negative  Psychiatric/Behavioral: Negative            Objective:      /72   Pulse 71   Temp 97 6 °F (36 4 °C) (Temporal)   Ht 5' 6 25" (1 683 m)   Wt 53 1 kg (117 lb)   SpO2 99%   BMI 18 74 kg/m²          Physical Exam  Constitutional:       Appearance: He is well-developed  HENT:      Head: Normocephalic and atraumatic  Eyes:      Conjunctiva/sclera: Conjunctivae normal       Pupils: Pupils are equal, round, and reactive to light  Cardiovascular:      Rate and Rhythm: Normal rate and regular rhythm  Heart sounds: Normal heart sounds  Pulmonary:      Effort: Pulmonary effort is normal       Breath sounds: Normal breath sounds  Abdominal:      General: Abdomen is flat  Bowel sounds are normal       Palpations: Abdomen is soft  Musculoskeletal:         General: Normal range of motion  Cervical back: Normal range of motion and neck supple  Skin:     General: Skin is warm and dry  Neurological:      Mental Status: He is alert and oriented to person, place, and time  Deep Tendon Reflexes: Reflexes are normal and symmetric     Psychiatric:         Behavior: Behavior normal

## 2022-01-31 NOTE — PATIENT INSTRUCTIONS
Here for elevated CRP and hx of PMR and treated in past by Rheumatology  GI requested possible evaluation by Rheumatology as his CRP was elevated  Patient found to have large hiatal hernia

## 2022-02-01 ENCOUNTER — TELEPHONE (OUTPATIENT)
Dept: SURGICAL ONCOLOGY | Facility: CLINIC | Age: 82
End: 2022-02-01

## 2022-02-01 NOTE — TELEPHONE ENCOUNTER
Karen Ibarra returned call about making his upcoming appt a vitrual visit with Lynnette Catching  Confirmed phone number for visit as he does not have a smartphone  Updated appt note  He was agreeable

## 2022-02-07 NOTE — PROGRESS NOTES
Virtual Regular Visit    Verification of patient location:    Patient is located in the following state in which I hold an active license [place]      Assessment/Plan:    Problem List Items Addressed This Visit     None             Reason for visit is No chief complaint on file  Encounter provider Cherie Tabor PA-C    Provider located at [address] [place] [phone]      Recent Visits  Date Type Provider Dept   01/31/22 Office Visit Naveen Maurer, [address] [facility]   Showing recent visits within past 7 days and meeting all other requirements  Future Appointments  No visits were found meeting these conditions  Showing future appointments within next 150 days and meeting all other requirements       The patient was identified by name and date of birth  Dariela Oden was informed that this is a telemedicine visit and that the visit is being conducted through Telephone  My office door was closed  No one else was in the room  He acknowledged consent and understanding of privacy and security of the video platform  The patient has agreed to participate and understands they can discontinue the visit at any time  Patient is aware this is a billable service  Subjective  Dariela Oden is a 80 y o  male presents for follow up for anemia  Past medical history significant for hypertension, ischemic cardiomyopathy, paroxysmal SVT, BPH, hyperlipidemia, prediabetes      Historically patient's hemoglobin is in the 12-13 g range with a normal MCV  Most recent CBC in Oct 2021 shows drop in hemoglobin to 10  6  MCV remains normal  WBC and platelets are normal   CMP significant for mild elevation of calcium, 10 2   Patient has low albumin at 2 7   Total protein 7 5      He notes decrease in appetite over 2 years due to impaired taste and dysphagia   He was seen by ENT and states they never figured anything out      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      He was seen in follow up in Nov 2021  Reviewed that iron saturation and serum iron are decreased however TIBC and ferritin are okay  He could try to increase iron as he has been taking to any difficulty  Advised to at lunchtime and 1 in the evening  Erythropoeitin was 17 7  TSH was very mildly elevated however T4 was normal   However TSH historically in himself was always around 1 and now is 4  I sent to PCP for review  1/13/21 he had EGD and colonoscopy  colonoscopy was negative for bleeding concern  EGD showed large hiatal hernia  Normal stomach and duodenum  Past Medical History:   Diagnosis Date    BPH (benign prostatic hyperplasia)     Hypercholesterolemia     Hypertension     Prediabetes        Past Surgical History:   Procedure Laterality Date    CORONARY ARTERY BYPASS GRAFT      x3 LIMA-LAD, SVG-OM, SVG-DIAG with mitral valve repair by nicole    CYSTOSCOPY  12/13/2014    Diagnostic    ESOPHAGOGASTRODUODENOSCOPY  04/07/2015    with biopsy   Dr Elliot Gastelum      26 mm fatmata hrenandez physi annuloplasty ring    TRACHEOSTOMY      during his heart surgery       Current Outpatient Medications   Medication Sig Dispense Refill    aspirin (ASPIRIN LOW DOSE) 81 MG tablet Take 1 tablet by mouth daily      atorvastatin (LIPITOR) 40 mg tablet Take 1 tablet (40 mg total) by mouth daily 90 tablet 3    carvedilol (COREG) 6 25 mg tablet TAKE 1 TABLET BY MOUTH TWICE A DAY WITH MEALS 180 tablet 3    Cholecalciferol 1000 units tablet Take 2,000 Units by mouth       Cyanocobalamin (CVS VITAMIN B-12 PO) Take by mouth      Ferrous Sulfate (IRON) 325 (65 Fe) MG TABS Take 1 tablet by mouth Twice daily      finasteride (PROSCAR) 5 mg tablet Take 1 tablet (5 mg total) by mouth daily 90 tablet 3    lisinopril (ZESTRIL) 5 mg tablet TAKE 1 TABLET (5 MG TOTAL) BY MOUTH DAILY 90 tablet 1    Multiple Vitamin Essential TABS Take by mouth      Omega-3 Fatty Acids (FISH OIL OMEGA-3 PO) Fish Oil      tamsulosin (FLOMAX) 0 4 mg Take 2 capsules (0 8 mg total) by mouth daily at bedtime 180 capsule 3     No current facility-administered medications for this visit  No Known Allergies    Review of Systems   Constitutional: Positive for appetite change and fatigue  Respiratory: Positive for shortness of breath  Negative for cough  Cardiovascular: Negative for chest pain, palpitations and leg swelling  Gastrointestinal: Negative for abdominal pain, constipation, diarrhea, nausea and vomiting  Video Exam  There were no vitals filed for this visit  1  Anemia   80year old male presents for follow up for iron def anemia  He had repeat labs on 1/22/22  Hgb 9 8, MCV 87, platelets and WBC normal   Iron panel on 1/22/22 showed iron sat 10%, tibc 226, iron 23, ferritin 137; previously in Nov 2021 iron sat 12%, TIBC 240, iron 31, ferritin 104  EGD and colonoscopy without source of loss of blood; does have a hiatal hernia  He continues to take oral iron 3 times a day  He remains unchanged in symptoms x 3 years  He has hx of PMR and is going to follow up with Dr Dalton Xie next month  His iron panel is a mix of low iron and chronic disease  We discussed IV iron but he wishes to continue oral iron at this time  Follow up in 2 months  I spent 8 minutes directly with the patient during this visit    VIRTUAL VISIT DISCLAIMER      Alessia Parry verbally agrees to participate in [employer]  Pt is aware that [employer] could be limited without vital signs or the ability to perform a full hands-on physical exam  Raleigh Brown understands he or the provider may request at any time to terminate the video visit and request the patient to seek care or treatment in person

## 2022-02-08 ENCOUNTER — TELEMEDICINE (OUTPATIENT)
Dept: HEMATOLOGY ONCOLOGY | Facility: CLINIC | Age: 82
End: 2022-02-08
Payer: COMMERCIAL

## 2022-02-08 DIAGNOSIS — D64.9 ANEMIA, UNSPECIFIED TYPE: Primary | ICD-10-CM

## 2022-02-08 DIAGNOSIS — E61.1 LOW SERUM IRON: ICD-10-CM

## 2022-02-08 PROCEDURE — 99214 OFFICE O/P EST MOD 30 MIN: CPT | Performed by: PHYSICIAN ASSISTANT

## 2022-02-28 ENCOUNTER — TELEPHONE (OUTPATIENT)
Dept: FAMILY MEDICINE CLINIC | Facility: CLINIC | Age: 82
End: 2022-02-28

## 2022-02-28 NOTE — TELEPHONE ENCOUNTER
PT needs a refill of the Hydrochlorothiazide  Tablet 12 5 MG  Take one tablet by mouth 1x daily  Please send to [address] in [facility]

## 2022-03-12 ENCOUNTER — APPOINTMENT (OUTPATIENT)
Dept: LAB | Facility: HOSPITAL | Age: 82
End: 2022-03-12
Payer: COMMERCIAL

## 2022-03-12 DIAGNOSIS — E61.1 LOW SERUM IRON: ICD-10-CM

## 2022-03-12 DIAGNOSIS — M35.3 POLYMYALGIA RHEUMATICA (HCC): ICD-10-CM

## 2022-03-12 DIAGNOSIS — D50.9 IRON DEFICIENCY ANEMIA, UNSPECIFIED IRON DEFICIENCY ANEMIA TYPE: ICD-10-CM

## 2022-03-12 DIAGNOSIS — R79.82 ELEVATED C-REACTIVE PROTEIN (CRP): ICD-10-CM

## 2022-03-12 DIAGNOSIS — D64.9 ANEMIA, UNSPECIFIED TYPE: ICD-10-CM

## 2022-03-12 DIAGNOSIS — Z79.899 ENCOUNTER FOR LONG-TERM (CURRENT) USE OF OTHER MEDICATIONS: ICD-10-CM

## 2022-03-12 LAB
CK SERPL-CCNC: 63 U/L (ref 39–308)
CRP SERPL QL: 14.8 MG/L
ERYTHROCYTE [SEDIMENTATION RATE] IN BLOOD: 54 MM/HOUR (ref 0–19)
IGA SERPL-MCNC: 311 MG/DL (ref 70–400)
IGG SERPL-MCNC: 1710 MG/DL (ref 700–1600)

## 2022-03-12 PROCEDURE — 36415 COLL VENOUS BLD VENIPUNCTURE: CPT

## 2022-03-12 PROCEDURE — 86364 TISS TRNSGLTMNASE EA IG CLAS: CPT

## 2022-03-12 PROCEDURE — 86037 ANCA TITER EACH ANTIBODY: CPT

## 2022-03-12 PROCEDURE — 86038 ANTINUCLEAR ANTIBODIES: CPT

## 2022-03-12 PROCEDURE — 86140 C-REACTIVE PROTEIN: CPT

## 2022-03-12 PROCEDURE — 82784 ASSAY IGA/IGD/IGG/IGM EACH: CPT

## 2022-03-12 PROCEDURE — 83520 IMMUNOASSAY QUANT NOS NONAB: CPT

## 2022-03-12 PROCEDURE — 82550 ASSAY OF CK (CPK): CPT

## 2022-03-12 PROCEDURE — 85652 RBC SED RATE AUTOMATED: CPT

## 2022-03-14 LAB — RYE IGE QN: NEGATIVE

## 2022-03-15 LAB
C-ANCA TITR SER IF: NORMAL TITER
MYELOPEROXIDASE AB SER IA-ACNC: <9 U/ML (ref 0–9)
P-ANCA ATYPICAL TITR SER IF: NORMAL TITER
P-ANCA TITR SER IF: NORMAL TITER
PROTEINASE3 AB SER IA-ACNC: <3.5 U/ML (ref 0–3.5)
TTG IGA SER-ACNC: <2 U/ML (ref 0–3)

## 2022-03-22 NOTE — PROGRESS NOTES
Heart Failure Outpatient Progress Note - Heide Jeffrey 80 y o  male MRN: [id number]    @ Encounter: [id number]      Assessment/Plan:    Patient Active Problem List    Diagnosis Date Noted    Ischemic cardiomyopathy 03/12/2018    Benign prostatic hyperplasia 11/07/2018    Hyperlipidemia 11/07/2018    Prediabetes 11/07/2018    Essential hypertension 11/07/2018    Hx of CABG 03/12/2018    S/P mitral valve repair 03/12/2018    Protein-calorie malnutrition, unspecified severity ([facility] [address] ) 11/12/2021    Paroxysmal supraventricular tachycardia ([facility] [address] ) 09/02/2021    Pleural effusion on left 03/02/2020    Post-nasal drip 03/02/2020    History of UTI 01/08/2020     # Chronic HFmEF, Stage C, NYHA 2  Etiology: iCM    Weight: 115 lbs  NT proBNP:     Studies- personally reviewed by me  Echo 6/11/21:  LVEF: 45%, hypo of inferior wall  LVEDd:  RV: mildly dilated, mildly reduced  MV annuloplasty, moderate MR- anteriorly directed  Mild MS, mean gradient of 5 7 mmHg  Mild to moderate TR  PASP: 45     Echo 3/27/18:   LVEF: 45%  LVEDd 6 1 cm  Moderate to severe MR, eccentric  PASP 30 mmHg    TTE 7/28/16: LVEF: 40%, LVEDd 5 9cm, grade 2 DD  Moderate MR    Neurohormonal Blockade:  --Beta-Blocker: coreg 6 25 mg BID  --ACEi, ARB or ARNi: lisinopril 5 mg   --Aldosterone Receptor Blocker:  --Diuretic: none     Sudden Cardiac Death Risk Reduction:  --ICD: none, EF > 35%    Electrical Resynchronization:  --Candidacy for BiV device: narrow QRS    # CAD with CABG x 3 2012  TWI in 2,3 - no ischemic eval since CABG- EKG today- same abnormality but unchanged- pt declining stress test again- no cp or SOB  :no need for nitro tabs  Meds: asa, atorvastatin 40 mg daily, coreg 6 25 mg BID  Angina: none    # hyperlipidemia: Atorvastatin 40 mg daily  10/16/21: LDL 74, HDL 53  5/4/19- HDL 59, LDL 65    # Mitral valve repair with 26 mm CE annuloplasty ring   Has moderate to severe MR on 3/27/18 echo - eccentric    # Post op Trach and peg at that time  # polymyalgia rheumatica  #  anemia of chronic disease  # pre diabetes  # left pleural effusion- likely loculated- has seen Pulm    TODAY'S PLAN:  Continue asa, atorvastatin and coreg for CAD  continue atorvastatin for previous lipid check at goal  Continue coreg and lisinopril for his HFmEF   --2g sodium diet  - Daily weights    HPI:   81 yo male with history of CAD with CABG x 3 in 2012 with mitral valve repair with ring, polymalgia rheumatica, BPH, ICM with last EF: 40% on June 2016 echo and moderate MR on the echo  He reportedly had VDRF after CABG with trach and PEG  Has not been admitted with heart failure  Last eval, pt declined stress test  On follow up 8/29 he denies SOB or CP  EKG today is abnormal but unchanged from Feb EKG  Pleural effusion felt to be loculated    Interval History:  Losing a lot of weight  No appetite  Starting to feel weak  Colonoscopy 1/13/22: negative  CT Chest Abdomen, Pelvis: no masses seen    Review of Systems   Constitutional: Negative for activity change, appetite change, fatigue and unexpected weight change  HENT: Negative for congestion and nosebleeds  Eyes: Negative  Respiratory: Negative for cough, chest tightness and shortness of breath  Cardiovascular: Negative for chest pain, palpitations and leg swelling  Gastrointestinal: Negative for abdominal distention  Endocrine: Negative  Genitourinary: Negative  Musculoskeletal: Negative  Skin: Negative  Neurological: Negative for dizziness, syncope and weakness  Hematological: Negative  Psychiatric/Behavioral: Negative  Past Medical History:   Diagnosis Date    BPH (benign prostatic hyperplasia)     Hypercholesterolemia     Hypertension     Prediabetes          No Known Allergies        Current Outpatient Medications:     aspirin (ASPIRIN LOW DOSE) 81 MG tablet, Take 1 tablet by mouth daily, Disp: , Rfl:     atorvastatin (LIPITOR) 40 mg tablet, Take 1 tablet (40 mg total) by mouth daily, Disp: 90 tablet, Rfl: 3    carvedilol (COREG) 6 25 mg tablet, TAKE 1 TABLET BY MOUTH TWICE A DAY WITH MEALS, Disp: 180 tablet, Rfl: 3    Cholecalciferol 1000 units tablet, Take 2,000 Units by mouth , Disp: , Rfl:     Cyanocobalamin (CVS VITAMIN B-12 PO), Take by mouth, Disp: , Rfl:     Ferrous Sulfate (IRON) 325 (65 Fe) MG TABS, Take 1 tablet by mouth Twice daily, Disp: , Rfl:     finasteride (PROSCAR) 5 mg tablet, Take 1 tablet (5 mg total) by mouth daily, Disp: 90 tablet, Rfl: 3    lisinopril (ZESTRIL) 5 mg tablet, TAKE 1 TABLET (5 MG TOTAL) BY MOUTH DAILY, Disp: 90 tablet, Rfl: 1    Multiple Vitamin Essential TABS, Take by mouth, Disp: , Rfl:     Omega-3 Fatty Acids (FISH OIL OMEGA-3 PO), Fish Oil, Disp: , Rfl:     predniSONE 10 mg tablet, , Disp: , Rfl:     tamsulosin (FLOMAX) 0 4 mg, Take 2 capsules (0 8 mg total) by mouth daily at bedtime, Disp: 180 capsule, Rfl: 3    Social History     Socioeconomic History    Marital status:      Spouse name: Not on file    Number of children: Not on file    Years of education: Not on file    Highest education level: Not on file   Occupational History    Not on file   Tobacco Use    Smoking status: Former Smoker     Packs/day: 0 50     Years: 16 00     Pack years: 8 00     Types: Cigarettes     Start date:      Quit date:      Years since quittin 2    Smokeless tobacco: Never Used   Vaping Use    Vaping Use: Never used   Substance and Sexual Activity    Alcohol use: Yes     Comment: rare    Drug use: No    Sexual activity: Not on file   Other Topics Concern    Not on file   Social History Narrative    Consumes 2-3 ups of tea per week     Social Determinants of Health     Financial Resource Strain: Not on file   Food Insecurity: Not on file   Transportation Needs: Not on file   Physical Activity: Not on file   Stress: Not on file   Social Connections: Not on file   Intimate Partner Violence: Not on file   Housing Stability: Not on file       Family History   Problem Relation Age of Onset    Diabetes Mother     Heart disease Mother     Hypertension Mother     Colon cancer Father     Kidney disease Father     Heart disease Sister     Heart disease Brother     Breast cancer Paternal Aunt     Cervical cancer Maternal Aunt     Colon cancer Paternal Grandfather        Physical Exam:    Vitals:   Vitals:    03/24/22 0757   BP: 110/60   Pulse: 76   SpO2: 99%     Wt Readings from Last 3 Encounters:   03/24/22 52 4 kg (115 lb 9 6 oz)   01/31/22 53 1 kg (117 lb)   01/27/22 52 6 kg (116 lb)       Physical Exam  Constitutional:       Appearance: He is well-developed  He is not diaphoretic  HENT:      Head: Normocephalic and atraumatic  Eyes:      Pupils: Pupils are equal, round, and reactive to light  Neck:      Vascular: No JVD  Cardiovascular:      Rate and Rhythm: Normal rate and regular rhythm  Heart sounds: Murmur (apical) heard  Pulmonary:      Effort: Pulmonary effort is normal       Breath sounds: Normal breath sounds  No rales  Abdominal:      General: Bowel sounds are normal  There is no distension  Palpations: Abdomen is soft  Musculoskeletal:         General: Normal range of motion  Cervical back: Normal range of motion  Skin:     General: Skin is warm and dry  Neurological:      Mental Status: He is alert and oriented to person, place, and time         Labs & Results:    Lab Results   Component Value Date    GLUCOSE 104 01/08/2016    CALCIUM 9 2 10/16/2021     01/08/2016    K 4 2 10/16/2021    CO2 30 10/16/2021     10/16/2021    BUN 18 10/16/2021    CREATININE 1 20 10/16/2021     Lab Results   Component Value Date    WBC 6 90 01/22/2022    HGB 9 8 (L) 01/22/2022    HCT 31 9 (L) 01/22/2022    MCV 87 01/22/2022     01/22/2022     No results found for: NTBNP   Lab Results   Component Value Date    CHOL 127 11/28/2015    CHOL 105 10/11/2014    CHOL 132 07/14/2014 Lab Results   Component Value Date    HDL 53 10/16/2021    HDL 65 01/09/2021    HDL 59 05/04/2019     Lab Results   Component Value Date    LDLCALC 74 10/16/2021    LDLCALC 84 01/09/2021    LDLCALC 65 05/04/2019     Lab Results   Component Value Date    TRIG 40 10/16/2021    TRIG 47 01/09/2021    TRIG 75 05/04/2019     No results found for: CHOLHDL      EKG personally reviewed by Chester Schuster, DO  Counseling / Coordination of Care  Time spent today 25 minutes  Greater than 50% of total time was spent with the patient and / or family counseling and / or coordination of care  We discussed diagnoses, most recent studies, tests and any changes in treatment plan  Thank you for the opportunity to participate in the care of this patient      [address] [facility] PULMONARY HYPERTENSION  MEDICAL DIRECTOR OF [place]

## 2022-03-24 ENCOUNTER — OFFICE VISIT (OUTPATIENT)
Dept: CARDIOLOGY CLINIC | Facility: CLINIC | Age: 82
End: 2022-03-24
Payer: COMMERCIAL

## 2022-03-24 VITALS
SYSTOLIC BLOOD PRESSURE: 110 MMHG | WEIGHT: 115.6 LBS | HEART RATE: 76 BPM | OXYGEN SATURATION: 99 % | DIASTOLIC BLOOD PRESSURE: 60 MMHG | BODY MASS INDEX: 18.52 KG/M2

## 2022-03-24 DIAGNOSIS — I10 ESSENTIAL HYPERTENSION: ICD-10-CM

## 2022-03-24 DIAGNOSIS — Z98.890 S/P MITRAL VALVE REPAIR: ICD-10-CM

## 2022-03-24 DIAGNOSIS — Z95.1 HX OF CABG: Primary | ICD-10-CM

## 2022-03-24 DIAGNOSIS — I25.5 ISCHEMIC CARDIOMYOPATHY: ICD-10-CM

## 2022-03-24 PROCEDURE — 99214 OFFICE O/P EST MOD 30 MIN: CPT | Performed by: INTERNAL MEDICINE

## 2022-03-24 RX ORDER — PREDNISONE 10 MG/1
5 TABLET ORAL DAILY
COMMUNITY
Start: 2022-03-22

## 2022-04-07 ENCOUNTER — TELEPHONE (OUTPATIENT)
Dept: HEMATOLOGY ONCOLOGY | Facility: CLINIC | Age: 82
End: 2022-04-07

## 2022-04-10 DIAGNOSIS — N13.8 BPH WITH OBSTRUCTION/LOWER URINARY TRACT SYMPTOMS: ICD-10-CM

## 2022-04-10 DIAGNOSIS — N40.1 BENIGN PROSTATIC HYPERPLASIA WITH LOWER URINARY TRACT SYMPTOMS, SYMPTOM DETAILS UNSPECIFIED: ICD-10-CM

## 2022-04-10 DIAGNOSIS — N40.1 BPH WITH OBSTRUCTION/LOWER URINARY TRACT SYMPTOMS: ICD-10-CM

## 2022-04-11 RX ORDER — TAMSULOSIN HYDROCHLORIDE 0.4 MG/1
CAPSULE ORAL
Qty: 180 CAPSULE | Refills: 3 | Status: SHIPPED | OUTPATIENT
Start: 2022-04-11

## 2022-04-11 RX ORDER — FINASTERIDE 5 MG/1
TABLET, FILM COATED ORAL
Qty: 90 TABLET | Refills: 3 | Status: SHIPPED | OUTPATIENT
Start: 2022-04-11

## 2022-04-12 ENCOUNTER — OFFICE VISIT (OUTPATIENT)
Dept: HEMATOLOGY ONCOLOGY | Facility: CLINIC | Age: 82
End: 2022-04-12
Payer: COMMERCIAL

## 2022-04-12 VITALS
SYSTOLIC BLOOD PRESSURE: 104 MMHG | DIASTOLIC BLOOD PRESSURE: 64 MMHG | HEART RATE: 74 BPM | TEMPERATURE: 97.6 F | HEIGHT: 66 IN | WEIGHT: 117.5 LBS | OXYGEN SATURATION: 98 % | RESPIRATION RATE: 18 BRPM | BODY MASS INDEX: 18.88 KG/M2

## 2022-04-12 DIAGNOSIS — D64.9 ANEMIA, UNSPECIFIED TYPE: Primary | ICD-10-CM

## 2022-04-12 PROCEDURE — 99214 OFFICE O/P EST MOD 30 MIN: CPT | Performed by: PHYSICIAN ASSISTANT

## 2022-04-12 PROCEDURE — 1160F RVW MEDS BY RX/DR IN RCRD: CPT | Performed by: PHYSICIAN ASSISTANT

## 2022-04-12 NOTE — PROGRESS NOTES
Hematology/Oncology Outpatient Follow-up  Carisa Cunningham 80 y o  male 1940 [id number]    Date:  4/12/2022      Assessment and Plan:    1  Anemia, unspecified type  80-year-old male presents for follow-up regarding history of anemia  Patient's anemia workup has been evident of mild iron deficiency with chronic disease  He has history of PMR  He is presently seeing Rheumatology at [facility]  He states he is on prednisone 10 mg p o  Daily  He states he does not have a follow-up appointment  Recommend that he call to schedule 1 with them  He did not have repeat CBC iron panel prior to today's visit  He is recommended to complete this within the next month when he has labs for [facility] Rhe[facility]  He will follow-up in 3 months as well  - CBC and differential; Future  - Iron Panel (Includes Ferritin, Iron Sat%, Iron, and TIBC); Future  - CBC and differential; Future  - Iron Panel (Includes Ferritin, Iron Sat%, Iron, and TIBC); Future    HPI:  Carisa Cunningham is a 80 y o  male presents for follow up for anemia      Past medical history significant for hypertension, ischemic cardiomyopathy, paroxysmal SVT, BPH, hyperlipidemia, prediabetes      Historically patient's hemoglobin is in the 12-13 g range with a normal MCV  Most recent CBC in Oct 2021 shows drop in hemoglobin to 10  6  MCV remains normal  WBC and platelets are normal   CMP significant for mild elevation of calcium, 10 2   Patient has low albumin at 2 7   Total protein 7 5      He notes decrease in appetite over 2 years due to impaired taste and dysphagia  He was seen by ENT and states they never figured anything out      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       He was seen in follow up in Nov 2021  Reviewed that iron saturation and serum iron are decreased however TIBC and ferritin are okay  He could try to increase iron as he has been taking to any difficulty   Advised to at lunchtime and 1 in the evening      Erythropoeitin was 17 7        TSH was very mildly elevated however T4 was normal   However TSH historically in himself was always around 1 and now is 4  I sent to PCP for review       1/13/21 he had EGD and colonoscopy  colonoscopy was negative for bleeding concern  EGD showed large hiatal hernia  Normal stomach and duodenum  SPEP negative  Interval history: patient saw rheum for hx of PMR  Labs showed elevated sed rate and CRP  He states he was placed on prednisone 10 mg PO daily x 90 days and has completed 30 days of this     ROS: Review of Systems   Constitutional: Positive for fatigue  Negative for appetite change  Continues with no taste   Respiratory: Positive for shortness of breath (with climbing steps)  Negative for cough  Cardiovascular: Negative for palpitations and leg swelling  Gastrointestinal: Positive for constipation (over the last few weeks)  Negative for abdominal pain, diarrhea, nausea and vomiting  Genitourinary: Negative for difficulty urinating, dysuria and hematuria  Musculoskeletal: Negative for arthralgias  Skin: Negative  Neurological: Negative for dizziness, weakness, light-headedness, numbness and headaches  Hematological: Negative  Psychiatric/Behavioral: Negative  Past Medical History:   Diagnosis Date    BPH (benign prostatic hyperplasia)     Hypercholesterolemia     Hypertension     Prediabetes        Past Surgical History:   Procedure Laterality Date    CORONARY ARTERY BYPASS GRAFT      x3 LIMA-LAD, SVG-OM, SVG-DIAG with mitral valve repair by olenchock    CYSTOSCOPY  12/13/2014    Diagnostic    ESOPHAGOGASTRODUODENOSCOPY  04/07/2015    with biopsy   Dr Catherine Maza      26 mm fatmata hernandez physi annuloplasty ring    TRACHEOSTOMY      during his heart surgery       Social History     Socioeconomic History    Marital status:      Spouse name: Not on file    Number of children: Not on file  Years of education: Not on file    Highest education level: Not on file   Occupational History    Not on file   Tobacco Use    Smoking status: Former Smoker     Packs/day: 0 50     Years: 16 00     Pack years: 8 00     Types: Cigarettes     Start date: 46     Quit date:      Years since quittin 4    Smokeless tobacco: Never Used   Vaping Use    Vaping Use: Never used   Substance and Sexual Activity    Alcohol use: Yes     Comment: rare    Drug use: No    Sexual activity: Not on file   Other Topics Concern    Not on file   Social History Narrative    Consumes 2-3 ups of tea per week     Social Determinants of Health     Financial Resource Strain: Not on file   Food Insecurity: Not on file   Transportation Needs: Not on file   Physical Activity: Not on file   Stress: Not on file   Social Connections: Not on file   Intimate Partner Violence: Not on file   Housing Stability: Not on file       Family History   Problem Relation Age of Onset    Diabetes Mother     Heart disease Mother     Hypertension Mother     Colon cancer Father     Kidney disease Father     Heart disease Sister     Heart disease Brother     Breast cancer Paternal Aunt     Cervical cancer Maternal Aunt     Colon cancer Paternal Grandfather        No Known Allergies      Current Outpatient Medications:     aspirin (ASPIRIN LOW DOSE) 81 MG tablet, Take 1 tablet by mouth daily, Disp: , Rfl:     atorvastatin (LIPITOR) 40 mg tablet, Take 1 tablet (40 mg total) by mouth daily, Disp: 90 tablet, Rfl: 3    carvedilol (COREG) 6 25 mg tablet, TAKE 1 TABLET BY MOUTH TWICE A DAY WITH MEALS, Disp: 180 tablet, Rfl: 3    Cholecalciferol 1000 units tablet, Take 2,000 Units by mouth , Disp: , Rfl:     Cyanocobalamin (CVS VITAMIN B-12 PO), Take by mouth, Disp: , Rfl:     Ferrous Sulfate (IRON) 325 (65 Fe) MG TABS, Take 1 tablet by mouth Twice daily, Disp: , Rfl:     finasteride (PROSCAR) 5 mg tablet, TAKE 1 TABLET BY MOUTH ONCE A DAY, Disp: 90 tablet, Rfl: 3    lisinopril (ZESTRIL) 5 mg tablet, TAKE 1 TABLET (5 MG TOTAL) BY MOUTH DAILY, Disp: 90 tablet, Rfl: 1    Multiple Vitamin Essential TABS, Take by mouth, Disp: , Rfl:     Omega-3 Fatty Acids (FISH OIL OMEGA-3 PO), Fish Oil, Disp: , Rfl:     predniSONE 10 mg tablet, , Disp: , Rfl:     tamsulosin (FLOMAX) 0 4 mg, TAKE 2 CAPSULES BY MOUTH AT BEDTIME, Disp: 180 capsule, Rfl: 3      Physical Exam:  /64 (BP Location: Left arm)   Pulse 74   Temp 97 6 °F (36 4 °C) (Tympanic Core)   Resp 18   Ht 5' 6 25" (1 683 m)   Wt 53 3 kg (117 lb 8 oz)   SpO2 98%   BMI 18 82 kg/m²     Physical Exam  Vitals reviewed  Constitutional:       General: He is not in acute distress  Appearance: He is well-developed  He is not ill-appearing  HENT:      Head: Normocephalic and atraumatic  Eyes:      General: No scleral icterus  Conjunctiva/sclera: Conjunctivae normal    Cardiovascular:      Rate and Rhythm: Normal rate and regular rhythm  Heart sounds: Normal heart sounds  No murmur heard  Pulmonary:      Effort: Pulmonary effort is normal  No respiratory distress  Breath sounds: Normal breath sounds  Abdominal:      Palpations: Abdomen is soft  Tenderness: There is no abdominal tenderness  Musculoskeletal:         General: No tenderness  Normal range of motion  Cervical back: Normal range of motion and neck supple  Right lower leg: No edema  Left lower leg: No edema  Lymphadenopathy:      Cervical: No cervical adenopathy  Skin:     General: Skin is warm and dry  Neurological:      Mental Status: He is alert and oriented to person, place, and time  Cranial Nerves: No cranial nerve deficit     Psychiatric:         Mood and Affect: Mood normal          Behavior: Behavior normal          Labs:  Lab Results   Component Value Date    WBC 6 90 01/22/2022    HGB 9 8 (L) 01/22/2022    HCT 31 9 (L) 01/22/2022    MCV 87 01/22/2022     01/22/2022     Lab Results   Component Value Date     01/08/2016    K 4 2 10/16/2021     10/16/2021    CO2 30 10/16/2021    ANIONGAP 8 01/08/2016    BUN 18 10/16/2021    CREATININE 1 20 10/16/2021    GLUCOSE 104 01/08/2016    GLUF 92 10/16/2021    CALCIUM 9 2 10/16/2021    CORRECTEDCA 10 2 (H) 10/16/2021    AST 14 10/16/2021    ALT 16 10/16/2021    ALKPHOS 80 10/16/2021    PROT 7 5 01/08/2016    BILITOT 0 77 01/08/2016    EGFR 56 10/16/2021       Patient voiced understanding and agreement in the above discussion  Aware to contact our office with questions/symptoms in the interim  This note has been generated by voice recognition software system  Therefore, there may be spelling, grammar, and or syntax errors  Please contact if questions arise

## 2022-05-09 ENCOUNTER — OFFICE VISIT (OUTPATIENT)
Dept: FAMILY MEDICINE CLINIC | Facility: CLINIC | Age: 82
End: 2022-05-09
Payer: COMMERCIAL

## 2022-05-09 VITALS
HEIGHT: 66 IN | HEART RATE: 77 BPM | OXYGEN SATURATION: 97 % | DIASTOLIC BLOOD PRESSURE: 82 MMHG | BODY MASS INDEX: 19.35 KG/M2 | WEIGHT: 120.4 LBS | RESPIRATION RATE: 16 BRPM | SYSTOLIC BLOOD PRESSURE: 118 MMHG | TEMPERATURE: 97.6 F

## 2022-05-09 DIAGNOSIS — F41.9 ANXIETY: ICD-10-CM

## 2022-05-09 DIAGNOSIS — D64.9 ANEMIA, UNSPECIFIED TYPE: ICD-10-CM

## 2022-05-09 DIAGNOSIS — E46 PROTEIN-CALORIE MALNUTRITION, UNSPECIFIED SEVERITY (HCC): ICD-10-CM

## 2022-05-09 DIAGNOSIS — I10 ESSENTIAL HYPERTENSION: ICD-10-CM

## 2022-05-09 DIAGNOSIS — R73.03 PREDIABETES: ICD-10-CM

## 2022-05-09 DIAGNOSIS — Z95.1 HX OF CABG: ICD-10-CM

## 2022-05-09 DIAGNOSIS — N40.1 BENIGN PROSTATIC HYPERPLASIA WITH LOWER URINARY TRACT SYMPTOMS, SYMPTOM DETAILS UNSPECIFIED: ICD-10-CM

## 2022-05-09 DIAGNOSIS — R25.2 HAND CRAMPS: ICD-10-CM

## 2022-05-09 DIAGNOSIS — K40.20 BILATERAL INGUINAL HERNIA WITHOUT OBSTRUCTION OR GANGRENE, RECURRENCE NOT SPECIFIED: ICD-10-CM

## 2022-05-09 DIAGNOSIS — R25.2 CRAMPING OF FEET: ICD-10-CM

## 2022-05-09 DIAGNOSIS — E78.2 MIXED HYPERLIPIDEMIA: ICD-10-CM

## 2022-05-09 DIAGNOSIS — Z00.00 HEALTH CARE MAINTENANCE: Primary | ICD-10-CM

## 2022-05-09 PROCEDURE — 99397 PER PM REEVAL EST PAT 65+ YR: CPT | Performed by: FAMILY MEDICINE

## 2022-05-09 PROCEDURE — 1160F RVW MEDS BY RX/DR IN RCRD: CPT | Performed by: FAMILY MEDICINE

## 2022-05-09 PROCEDURE — 1036F TOBACCO NON-USER: CPT | Performed by: FAMILY MEDICINE

## 2022-05-09 RX ORDER — CITALOPRAM 10 MG/1
10 TABLET ORAL DAILY
Qty: 30 TABLET | Refills: 5 | Status: SHIPPED | OUTPATIENT
Start: 2022-05-09

## 2022-05-09 NOTE — ASSESSMENT & PLAN NOTE
Malnutrition Findings: appetite better and eating more and gained weight                                BMI Findings: Body mass index is 19 43 kg/m²

## 2022-05-09 NOTE — PROGRESS NOTES
Assessment/Plan:  Chief Complaint   Patient presents with    Follow-up     14 week check     Physical Exam     Patient Instructions   Here for general PE  Take all meds as directed and f-up with Cardiology and Rheumatology as directed for hx of cardiac and rheumatological issues PMR  F-up with Hematology as directed for anemia  F-up with urology as directed for BPH and OAB  Start Citalopram 10 mg daily for anxiety  Use sunscreen and monitor for ticks  Stay well hydrated and also rec eating healthy to keep weight stable  Paroxysmal supraventricular tachycardia ([facility] [address] )  Sees cardiology as directed     Protein-calorie malnutrition, unspecified severity ([facility] [address] )  Malnutrition Findings: appetite better and eating more and gained weight                                BMI Findings: Body mass index is 19 43 kg/m²  Diagnoses and all orders for this visit:    Health care maintenance    Anxiety  -     citalopram (CeleXA) 10 mg tablet; Take 1 tablet (10 mg total) by mouth daily    Hand cramps    Cramping of feet    Essential hypertension    Mixed hyperlipidemia    Protein-calorie malnutrition, unspecified severity ([facility])  Comments:  patient eating better and gaining weight    Prediabetes  Comments:  monitoring hx of prediabetes, last labs were stable  Benign prostatic hyperplasia with lower urinary tract symptoms, symptom details unspecified  Comments:  sees urology as directed for BPH and OAB    Hx of CABG  Comments:  sees Carduiology as directed    Bilateral inguinal hernia without obstruction or gangrene, recurrence not specified  Comments:  Patient does not want this surgically repaired, no complaints by patient today    Anemia, unspecified type  Comments:  sees Hematology as directed  Has labs ordered  Subjective:      Patient ID: Jony Gauthier is a 80 y o  male  Follow-up (14 week check )  Physical Exam     at [employer] and working there for 23 years   No plans on retiring  Patient with gas and constipation and has OA in hands and feet  Appetite better  Has anxiety all the time  Would like to try medication for anxiety  The following portions of the patient's history were reviewed and updated as appropriate: allergies, current medications, past family history, past medical history, past social history, past surgical history and problem list     Review of Systems   Constitutional: Negative  HENT: Negative  Eyes: Negative  Respiratory: Negative  Cardiovascular: Negative  Gastrointestinal: Negative  Endocrine: Negative  Genitourinary:        Patient has b/l inguinal hernia and does not want this surgically repaired   Musculoskeletal: Negative  Skin: Negative  Allergic/Immunologic: Negative  Neurological: Negative  Hematological: Negative  Psychiatric/Behavioral: Negative  Objective:      /82 (BP Location: Left arm, Patient Position: Sitting, Cuff Size: Adult)   Pulse 77   Temp 97 6 °F (36 4 °C) (Temporal)   Resp 16   Ht 5' 6" (1 676 m)   Wt 54 6 kg (120 lb 6 4 oz)   SpO2 97%   BMI 19 43 kg/m²          Physical Exam  Constitutional:       Appearance: He is well-developed  Comments: Appetite better     HENT:      Head: Normocephalic and atraumatic  Right Ear: External ear normal       Left Ear: External ear normal       Nose: Nose normal    Eyes:      Conjunctiva/sclera: Conjunctivae normal       Pupils: Pupils are equal, round, and reactive to light  Cardiovascular:      Rate and Rhythm: Normal rate and regular rhythm  Heart sounds: Normal heart sounds  Pulmonary:      Effort: Pulmonary effort is normal       Breath sounds: Normal breath sounds  Abdominal:      General: Abdomen is flat  Bowel sounds are normal       Palpations: Abdomen is soft        Comments: Has increased gas and gets constipated   Genitourinary:     Penis: Normal        Testes: Normal       Comments: B/l inguinal hernia  Musculoskeletal:         General: Normal range of motion  Cervical back: Normal range of motion and neck supple  Comments: OA of hands and feet    Skin:     General: Skin is warm and dry  Neurological:      Mental Status: He is alert and oriented to person, place, and time  Deep Tendon Reflexes: Reflexes are normal and symmetric     Psychiatric:         Behavior: Behavior normal

## 2022-05-09 NOTE — PATIENT INSTRUCTIONS
Here for general PE  Take all meds as directed and f-up with Cardiology and Rheumatology as directed for hx of cardiac and rheumatological issues PMR  F-up with Hematology as directed for anemia  F-up with urology as directed for BPH and OAB  Start Citalopram 10 mg daily for anxiety  Use sunscreen and monitor for ticks  Stay well hydrated and also rec eating healthy to keep weight stable

## 2022-05-31 ENCOUNTER — RA CDI HCC (OUTPATIENT)
Dept: OTHER | Facility: HOSPITAL | Age: 82
End: 2022-05-31

## 2022-05-31 NOTE — PROGRESS NOTES
[facility]  coding opportunities       Chart reviewed, no opportunity found: CHART REVIEWED, NO OPPORTUNITY FOUND        Patients Insurance        Commercial Insurance: [address] [place] [address]

## 2022-06-03 ENCOUNTER — APPOINTMENT (OUTPATIENT)
Dept: LAB | Facility: HOSPITAL | Age: 82
End: 2022-06-03
Payer: COMMERCIAL

## 2022-06-03 DIAGNOSIS — D64.9 ANEMIA, UNSPECIFIED TYPE: ICD-10-CM

## 2022-06-03 DIAGNOSIS — Z79.899 ENCOUNTER FOR LONG-TERM (CURRENT) USE OF OTHER MEDICATIONS: ICD-10-CM

## 2022-06-03 DIAGNOSIS — M35.3 POLYMYALGIA RHEUMATICA (HCC): ICD-10-CM

## 2022-06-03 LAB
ALBUMIN SERPL BCP-MCNC: 2.4 G/DL (ref 3.5–5)
ALP SERPL-CCNC: 59 U/L (ref 46–116)
ALT SERPL W P-5'-P-CCNC: 23 U/L (ref 12–78)
ANION GAP SERPL CALCULATED.3IONS-SCNC: 3 MMOL/L (ref 4–13)
AST SERPL W P-5'-P-CCNC: 16 U/L (ref 5–45)
BASOPHILS # BLD AUTO: 0.03 THOUSANDS/ΜL (ref 0–0.1)
BASOPHILS NFR BLD AUTO: 0 % (ref 0–1)
BILIRUB SERPL-MCNC: 0.35 MG/DL (ref 0.2–1)
BUN SERPL-MCNC: 23 MG/DL (ref 5–25)
CALCIUM ALBUM COR SERPL-MCNC: 9.9 MG/DL (ref 8.3–10.1)
CALCIUM SERPL-MCNC: 8.6 MG/DL (ref 8.3–10.1)
CHLORIDE SERPL-SCNC: 104 MMOL/L (ref 100–108)
CO2 SERPL-SCNC: 31 MMOL/L (ref 21–32)
CREAT SERPL-MCNC: 1.09 MG/DL (ref 0.6–1.3)
CRP SERPL QL: 6.8 MG/L
EOSINOPHIL # BLD AUTO: 0.19 THOUSAND/ΜL (ref 0–0.61)
EOSINOPHIL NFR BLD AUTO: 3 % (ref 0–6)
ERYTHROCYTE [DISTWIDTH] IN BLOOD BY AUTOMATED COUNT: 18 % (ref 11.6–15.1)
ERYTHROCYTE [SEDIMENTATION RATE] IN BLOOD: 25 MM/HOUR (ref 0–19)
FERRITIN SERPL-MCNC: 90 NG/ML (ref 8–388)
GFR SERPL CREATININE-BSD FRML MDRD: 63 ML/MIN/1.73SQ M
GLUCOSE P FAST SERPL-MCNC: 89 MG/DL (ref 65–99)
HCT VFR BLD AUTO: 40.4 % (ref 36.5–49.3)
HGB BLD-MCNC: 12.7 G/DL (ref 12–17)
IMM GRANULOCYTES # BLD AUTO: 0.03 THOUSAND/UL (ref 0–0.2)
IMM GRANULOCYTES NFR BLD AUTO: 0 % (ref 0–2)
IRON SATN MFR SERPL: 17 % (ref 20–50)
IRON SERPL-MCNC: 40 UG/DL (ref 65–175)
LYMPHOCYTES # BLD AUTO: 1.4 THOUSANDS/ΜL (ref 0.6–4.47)
LYMPHOCYTES NFR BLD AUTO: 19 % (ref 14–44)
MCH RBC QN AUTO: 28.9 PG (ref 26.8–34.3)
MCHC RBC AUTO-ENTMCNC: 31.4 G/DL (ref 31.4–37.4)
MCV RBC AUTO: 92 FL (ref 82–98)
MONOCYTES # BLD AUTO: 0.75 THOUSAND/ΜL (ref 0.17–1.22)
MONOCYTES NFR BLD AUTO: 10 % (ref 4–12)
NEUTROPHILS # BLD AUTO: 5.04 THOUSANDS/ΜL (ref 1.85–7.62)
NEUTS SEG NFR BLD AUTO: 68 % (ref 43–75)
NRBC BLD AUTO-RTO: 0 /100 WBCS
PLATELET # BLD AUTO: 257 THOUSANDS/UL (ref 149–390)
PMV BLD AUTO: 9.1 FL (ref 8.9–12.7)
POTASSIUM SERPL-SCNC: 5.4 MMOL/L (ref 3.5–5.3)
PROT SERPL-MCNC: 6.3 G/DL (ref 6.4–8.2)
RBC # BLD AUTO: 4.39 MILLION/UL (ref 3.88–5.62)
SODIUM SERPL-SCNC: 138 MMOL/L (ref 136–145)
TIBC SERPL-MCNC: 240 UG/DL (ref 250–450)
WBC # BLD AUTO: 7.44 THOUSAND/UL (ref 4.31–10.16)

## 2022-06-03 PROCEDURE — 82728 ASSAY OF FERRITIN: CPT

## 2022-06-03 PROCEDURE — 36415 COLL VENOUS BLD VENIPUNCTURE: CPT

## 2022-06-03 PROCEDURE — 85652 RBC SED RATE AUTOMATED: CPT

## 2022-06-03 PROCEDURE — 86140 C-REACTIVE PROTEIN: CPT

## 2022-06-03 PROCEDURE — 83550 IRON BINDING TEST: CPT

## 2022-06-03 PROCEDURE — 83540 ASSAY OF IRON: CPT

## 2022-06-03 PROCEDURE — 85025 COMPLETE CBC W/AUTO DIFF WBC: CPT

## 2022-06-03 PROCEDURE — 80053 COMPREHEN METABOLIC PANEL: CPT

## 2022-06-10 ENCOUNTER — OFFICE VISIT (OUTPATIENT)
Dept: FAMILY MEDICINE CLINIC | Facility: CLINIC | Age: 82
End: 2022-06-10
Payer: COMMERCIAL

## 2022-06-10 VITALS
HEIGHT: 66 IN | DIASTOLIC BLOOD PRESSURE: 70 MMHG | BODY MASS INDEX: 19.03 KG/M2 | TEMPERATURE: 97.2 F | HEART RATE: 60 BPM | WEIGHT: 118.4 LBS | OXYGEN SATURATION: 98 % | SYSTOLIC BLOOD PRESSURE: 128 MMHG

## 2022-06-10 DIAGNOSIS — F41.9 ANXIETY: Primary | ICD-10-CM

## 2022-06-10 DIAGNOSIS — I10 ESSENTIAL HYPERTENSION: ICD-10-CM

## 2022-06-10 DIAGNOSIS — E61.1 IRON DEFICIENCY: ICD-10-CM

## 2022-06-10 PROCEDURE — 3725F SCREEN DEPRESSION PERFORMED: CPT | Performed by: FAMILY MEDICINE

## 2022-06-10 PROCEDURE — 1160F RVW MEDS BY RX/DR IN RCRD: CPT | Performed by: FAMILY MEDICINE

## 2022-06-10 PROCEDURE — 1036F TOBACCO NON-USER: CPT | Performed by: FAMILY MEDICINE

## 2022-06-10 PROCEDURE — 99214 OFFICE O/P EST MOD 30 MIN: CPT | Performed by: FAMILY MEDICINE

## 2022-06-10 NOTE — PATIENT INSTRUCTIONS
Doing better with Citalopram 10- mg daily and reviewed labs showing improved anemia andiron levels, still low and encouraged to take iron as directed  Recheck in 3 months and rec taking BP med as directed as well which is stable

## 2022-06-10 NOTE — PROGRESS NOTES
Assessment/Plan:  Chief Complaint   Patient presents with    Follow-up     Patient is here for a follow up, he is doing about the same, has had some minimal improvement  Patient Instructions   Doing better with Citalopram 10- mg daily and reviewed labs showing improved anemia andiron levels, still low and encouraged to take iron as directed  Recheck in 3 months and rec taking BP med as directed as well which is stable  No problem-specific Assessment & Plan notes found for this encounter  Diagnoses and all orders for this visit:    Anxiety    Essential hypertension    Iron deficiency          Subjective:      Patient ID: Yessica Christian is a 80 y o  male  Follow-up (Patient is here for a follow up, he is doing about the same, has had some minimal improvement  )Here to review labs and overall feels better  Has hx of iron def and anemia which has greatly improved, no longer anemic on last labs  He sees Dr Tim Mota  The following portions of the patient's history were reviewed and updated as appropriate: allergies, current medications, past family history, past medical history, past social history, past surgical history and problem list     Review of Systems   Constitutional: Negative  HENT: Negative  Eyes: Negative  Respiratory: Negative  Cardiovascular: Negative  Gastrointestinal: Negative  Endocrine: Negative  Genitourinary: Negative  Musculoskeletal: Negative  Skin: Negative  Allergic/Immunologic: Negative  Neurological: Negative  Hematological: Negative  Psychiatric/Behavioral: Negative  Anxiety         Objective:      /70   Pulse 60   Temp (!) 97 2 °F (36 2 °C) (Temporal)   Ht 5' 6" (1 676 m)   Wt 53 7 kg (118 lb 6 4 oz)   SpO2 98%   BMI 19 11 kg/m²          Physical Exam  Constitutional:       Appearance: He is well-developed  HENT:      Head: Normocephalic and atraumatic        Right Ear: External ear normal  Left Ear: External ear normal       Nose: Nose normal    Eyes:      Conjunctiva/sclera: Conjunctivae normal       Pupils: Pupils are equal, round, and reactive to light  Cardiovascular:      Rate and Rhythm: Normal rate and regular rhythm  Heart sounds: Normal heart sounds  Pulmonary:      Effort: Pulmonary effort is normal       Breath sounds: Normal breath sounds  Musculoskeletal:         General: Normal range of motion  Cervical back: Normal range of motion and neck supple  Skin:     General: Skin is warm and dry  Neurological:      Mental Status: He is alert and oriented to person, place, and time  Deep Tendon Reflexes: Reflexes are normal and symmetric     Psychiatric:         Behavior: Behavior normal

## 2022-07-13 ENCOUNTER — APPOINTMENT (OUTPATIENT)
Dept: LAB | Facility: HOSPITAL | Age: 82
End: 2022-07-13
Payer: COMMERCIAL

## 2022-07-13 DIAGNOSIS — D64.9 ANEMIA, UNSPECIFIED TYPE: ICD-10-CM

## 2022-07-13 LAB
BASOPHILS # BLD AUTO: 0.07 THOUSANDS/ΜL (ref 0–0.1)
BASOPHILS NFR BLD AUTO: 1 % (ref 0–1)
EOSINOPHIL # BLD AUTO: 0.27 THOUSAND/ΜL (ref 0–0.61)
EOSINOPHIL NFR BLD AUTO: 4 % (ref 0–6)
ERYTHROCYTE [DISTWIDTH] IN BLOOD BY AUTOMATED COUNT: 16.2 % (ref 11.6–15.1)
FERRITIN SERPL-MCNC: 130 NG/ML (ref 8–388)
HCT VFR BLD AUTO: 36.7 % (ref 36.5–49.3)
HGB BLD-MCNC: 11.5 G/DL (ref 12–17)
IMM GRANULOCYTES # BLD AUTO: 0.05 THOUSAND/UL (ref 0–0.2)
IMM GRANULOCYTES NFR BLD AUTO: 1 % (ref 0–2)
IRON SATN MFR SERPL: 19 % (ref 20–50)
IRON SERPL-MCNC: 45 UG/DL (ref 65–175)
LYMPHOCYTES # BLD AUTO: 1.56 THOUSANDS/ΜL (ref 0.6–4.47)
LYMPHOCYTES NFR BLD AUTO: 21 % (ref 14–44)
MCH RBC QN AUTO: 29.4 PG (ref 26.8–34.3)
MCHC RBC AUTO-ENTMCNC: 31.3 G/DL (ref 31.4–37.4)
MCV RBC AUTO: 94 FL (ref 82–98)
MONOCYTES # BLD AUTO: 0.85 THOUSAND/ΜL (ref 0.17–1.22)
MONOCYTES NFR BLD AUTO: 11 % (ref 4–12)
NEUTROPHILS # BLD AUTO: 4.63 THOUSANDS/ΜL (ref 1.85–7.62)
NEUTS SEG NFR BLD AUTO: 62 % (ref 43–75)
NRBC BLD AUTO-RTO: 0 /100 WBCS
PLATELET # BLD AUTO: 272 THOUSANDS/UL (ref 149–390)
PMV BLD AUTO: 9.5 FL (ref 8.9–12.7)
RBC # BLD AUTO: 3.91 MILLION/UL (ref 3.88–5.62)
TIBC SERPL-MCNC: 237 UG/DL (ref 250–450)
WBC # BLD AUTO: 7.43 THOUSAND/UL (ref 4.31–10.16)

## 2022-07-13 PROCEDURE — 82728 ASSAY OF FERRITIN: CPT

## 2022-07-13 PROCEDURE — 36415 COLL VENOUS BLD VENIPUNCTURE: CPT

## 2022-07-13 PROCEDURE — 85025 COMPLETE CBC W/AUTO DIFF WBC: CPT

## 2022-07-13 PROCEDURE — 83550 IRON BINDING TEST: CPT

## 2022-07-13 PROCEDURE — 83540 ASSAY OF IRON: CPT

## 2022-07-19 ENCOUNTER — OFFICE VISIT (OUTPATIENT)
Dept: HEMATOLOGY ONCOLOGY | Facility: CLINIC | Age: 82
End: 2022-07-19
Payer: COMMERCIAL

## 2022-07-19 VITALS
SYSTOLIC BLOOD PRESSURE: 110 MMHG | HEART RATE: 71 BPM | RESPIRATION RATE: 18 BRPM | WEIGHT: 122 LBS | BODY MASS INDEX: 19.61 KG/M2 | TEMPERATURE: 98.3 F | HEIGHT: 66 IN | OXYGEN SATURATION: 98 % | DIASTOLIC BLOOD PRESSURE: 60 MMHG

## 2022-07-19 DIAGNOSIS — D64.9 ANEMIA, UNSPECIFIED TYPE: Primary | ICD-10-CM

## 2022-07-19 DIAGNOSIS — E61.1 LOW SERUM IRON: ICD-10-CM

## 2022-07-19 PROCEDURE — 1160F RVW MEDS BY RX/DR IN RCRD: CPT | Performed by: PHYSICIAN ASSISTANT

## 2022-07-19 PROCEDURE — 99214 OFFICE O/P EST MOD 30 MIN: CPT | Performed by: PHYSICIAN ASSISTANT

## 2022-07-19 NOTE — PROGRESS NOTES
Hematology/Oncology Outpatient Follow-up  Cristela Elmore 80 y o  male 1940 [id number]    Date:  7/19/2022      Assessment and Plan:    1  Anemia 2  Low serum iron  80year old male presents for follow up for history of anemia with chronic disease and low iron  He continues on oral iron BID and tolerates it well  He had EGD/colonsocopy which was negative  He was also found to have PMR  When treated for this with prednisone, hemoglobin also improved  Recommend continued oral iron BID as this is keeping hemoglobin normal  He would prefer to follow with PCP to limit less doctor appts  This is OK at this time  He can be referred back if needed  - CBC and differential; Future  - Iron Panel (Includes Ferritin, Iron Sat%, Iron, and TIBC); Future    HPI:  Nakul garnica 80 y  o  male presents for follow up for anemia      Past medical history significant for hypertension, ischemic cardiomyopathy, paroxysmal SVT, BPH, hyperlipidemia, prediabetes      Historically patient's hemoglobin is in the 12-13 g range with a normal MCV  Most recent CBC in Oct 2021 shows drop in hemoglobin to 10  6  MCV remains normal  WBC and platelets are normal   CMP significant for mild elevation of calcium, 10 2   Patient has low albumin at 2 7   Total protein 7 5      He notes decrease in appetite over 2 years due to impaired taste and dysphagia  He was seen by ENT and states they never figured anything out      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       He was seen in follow up in Nov 2021  Reviewed that iron saturation and serum iron are decreased however TIBC and ferritin are okay  He could try to increase iron as he has been taking to any difficulty  Advised to at lunchtime and 1 in the evening      Erythropoeitin was 17  7        TSH was very mildly elevated however T4 was normal   However TSH historically in himself was always around 1 and now is 4  I sent to PCP for review     21 he had EGD and colonoscopy  colonoscopy was negative for bleeding concern  EGD showed large hiatal hernia  Normal stomach and duodenum      SPEP negative  Patient saw rheum for hx of PMR  Labs showed elevated sed rate and CRP  He states he was placed on prednisone 10 mg PO daily  Interval history: he was seen by rheum in follow up in  and prednisone was decreased to 5 mg PO daily     ROS: Review of Systems   Constitutional: Positive for fatigue (chronic x years )  Respiratory: Positive for shortness of breath  Cardiovascular: Negative for chest pain, palpitations and leg swelling  Gastrointestinal: Negative for abdominal pain, constipation, diarrhea, nausea and vomiting  Genitourinary: Positive for frequency and urgency  Musculoskeletal: Negative for arthralgias  Skin: Negative  Neurological: Negative for dizziness, weakness, light-headedness and headaches  Hematological: Negative  Psychiatric/Behavioral: Negative  Past Medical History:   Diagnosis Date    BPH (benign prostatic hyperplasia)     Hypercholesterolemia     Hypertension     Prediabetes        Past Surgical History:   Procedure Laterality Date    CORONARY ARTERY BYPASS GRAFT      x3 LIMA-LAD, SVG-OM, SVG-DIAG with mitral valve repair by olenchock    CYSTOSCOPY  2014    Diagnostic    ESOPHAGOGASTRODUODENOSCOPY  2015    with biopsy   Dr Nitin Lopez      26 mm fatmata hernandez physi annuloplasty ring    TRACHEOSTOMY      during his heart surgery       Social History     Socioeconomic History    Marital status:      Spouse name: None    Number of children: None    Years of education: None    Highest education level: None   Occupational History    None   Tobacco Use    Smoking status: Former Smoker     Packs/day: 0 50     Years: 16 00     Pack years: 8 00     Types: Cigarettes     Start date:      Quit date:      Years since quittin 5    Smokeless tobacco: Never Used   Vaping Use    Vaping Use: Never used   Substance and Sexual Activity    Alcohol use: Yes     Comment: rare    Drug use: No    Sexual activity: None   Other Topics Concern    None   Social History Narrative    Consumes 2-3 ups of tea per week     Social Determinants of Health     Financial Resource Strain: Not on file   Food Insecurity: Not on file   Transportation Needs: Not on file   Physical Activity: Not on file   Stress: Not on file   Social Connections: Not on file   Intimate Partner Violence: Not on file   Housing Stability: Not on file       Family History   Problem Relation Age of Onset    Diabetes Mother     Heart disease Mother     Hypertension Mother     Colon cancer Father     Kidney disease Father     Heart disease Sister     Heart disease Brother     Breast cancer Paternal Aunt     Cervical cancer Maternal Aunt     Colon cancer Paternal Grandfather        No Known Allergies      Current Outpatient Medications:     aspirin 81 MG tablet, Take 1 tablet by mouth daily, Disp: , Rfl:     atorvastatin (LIPITOR) 40 mg tablet, Take 1 tablet (40 mg total) by mouth daily, Disp: 90 tablet, Rfl: 3    carvedilol (COREG) 6 25 mg tablet, TAKE 1 TABLET BY MOUTH TWICE A DAY WITH MEALS, Disp: 180 tablet, Rfl: 3    Cholecalciferol 1000 units tablet, Take 2,000 Units by mouth , Disp: , Rfl:     citalopram (CeleXA) 10 mg tablet, Take 1 tablet (10 mg total) by mouth daily, Disp: 30 tablet, Rfl: 5    Cyanocobalamin (CVS VITAMIN B-12 PO), Take by mouth, Disp: , Rfl:     Ferrous Sulfate (IRON) 325 (65 Fe) MG TABS, Take 1 tablet by mouth Twice daily, Disp: , Rfl:     finasteride (PROSCAR) 5 mg tablet, TAKE 1 TABLET BY MOUTH ONCE A DAY, Disp: 90 tablet, Rfl: 3    lisinopril (ZESTRIL) 5 mg tablet, TAKE 1 TABLET (5 MG TOTAL) BY MOUTH DAILY, Disp: 90 tablet, Rfl: 1    Multiple Vitamin Essential TABS, Take by mouth, Disp: , Rfl:     Omega-3 Fatty Acids (FISH OIL OMEGA-3 PO), Fish Oil, Disp: , Rfl:     predniSONE 10 mg tablet, Take 5 mg by mouth daily, Disp: , Rfl:     tamsulosin (FLOMAX) 0 4 mg, TAKE 2 CAPSULES BY MOUTH AT BEDTIME, Disp: 180 capsule, Rfl: 3      Physical Exam:  /60 (BP Location: Right arm, Patient Position: Sitting, Cuff Size: Adult)   Pulse 71   Temp 98 3 °F (36 8 °C) (Temporal)   Resp 18   Ht 5' 6" (1 676 m)   Wt 55 3 kg (122 lb)   SpO2 98%   BMI 19 69 kg/m²     Physical Exam  Vitals reviewed  Constitutional:       General: He is not in acute distress  Appearance: He is well-developed  He is not ill-appearing  HENT:      Head: Normocephalic and atraumatic  Eyes:      General: No scleral icterus  Conjunctiva/sclera: Conjunctivae normal    Cardiovascular:      Rate and Rhythm: Normal rate and regular rhythm  Heart sounds: Normal heart sounds  No murmur heard  Pulmonary:      Effort: Pulmonary effort is normal  No respiratory distress  Breath sounds: Normal breath sounds  Abdominal:      Palpations: Abdomen is soft  Tenderness: There is no abdominal tenderness  Musculoskeletal:         General: No tenderness  Normal range of motion  Cervical back: Normal range of motion and neck supple  Right lower leg: No edema  Left lower leg: No edema  Lymphadenopathy:      Cervical: No cervical adenopathy  Skin:     General: Skin is warm and dry  Neurological:      Mental Status: He is alert and oriented to person, place, and time  Cranial Nerves: No cranial nerve deficit  Psychiatric:         Mood and Affect: Mood normal          Behavior: Behavior normal        Labs:  Lab Results   Component Value Date    WBC 7 43 07/13/2022    HGB 11 5 (L) 07/13/2022    HCT 36 7 07/13/2022    MCV 94 07/13/2022     07/13/2022     Patient voiced understanding and agreement in the above discussion  Aware to contact our office with questions/symptoms in the interim       This note has been generated by voice recognition software system  Therefore, there may be spelling, grammar, and or syntax errors  Please contact if questions arise

## 2022-07-20 DIAGNOSIS — E78.5 HYPERLIPIDEMIA, UNSPECIFIED HYPERLIPIDEMIA TYPE: ICD-10-CM

## 2022-07-20 RX ORDER — ATORVASTATIN CALCIUM 40 MG/1
40 TABLET, FILM COATED ORAL DAILY
Qty: 90 TABLET | Refills: 3 | Status: SHIPPED | OUTPATIENT
Start: 2022-07-20

## 2022-07-28 DIAGNOSIS — I50.22 CHRONIC SYSTOLIC CHF (CONGESTIVE HEART FAILURE), NYHA CLASS 2 (HCC): ICD-10-CM

## 2022-07-28 RX ORDER — CARVEDILOL 6.25 MG/1
TABLET ORAL
Qty: 180 TABLET | Refills: 3 | Status: SHIPPED | OUTPATIENT
Start: 2022-07-28

## 2022-08-26 DIAGNOSIS — I50.9 ACC/AHA STAGE C CONGESTIVE HEART FAILURE DUE TO ISCHEMIC CARDIOMYOPATHY (HCC): ICD-10-CM

## 2022-08-26 DIAGNOSIS — I25.5 ACC/AHA STAGE C CONGESTIVE HEART FAILURE DUE TO ISCHEMIC CARDIOMYOPATHY (HCC): ICD-10-CM

## 2022-08-26 RX ORDER — LISINOPRIL 5 MG/1
TABLET ORAL
Qty: 90 TABLET | Refills: 1 | Status: SHIPPED | OUTPATIENT
Start: 2022-08-26

## 2022-09-03 ENCOUNTER — APPOINTMENT (OUTPATIENT)
Dept: LAB | Facility: HOSPITAL | Age: 82
End: 2022-09-03
Payer: COMMERCIAL

## 2022-09-03 DIAGNOSIS — D64.9 ANEMIA, UNSPECIFIED TYPE: ICD-10-CM

## 2022-09-03 DIAGNOSIS — M35.3 POLYMYALGIA RHEUMATICA (HCC): ICD-10-CM

## 2022-09-03 DIAGNOSIS — E55.9 VITAMIN D DEFICIENCY: ICD-10-CM

## 2022-09-03 DIAGNOSIS — E61.1 LOW SERUM IRON: ICD-10-CM

## 2022-09-03 DIAGNOSIS — Z79.899 ENCOUNTER FOR LONG-TERM (CURRENT) USE OF OTHER MEDICATIONS: ICD-10-CM

## 2022-09-03 LAB
25(OH)D3 SERPL-MCNC: 65.5 NG/ML (ref 30–100)
ALBUMIN SERPL BCP-MCNC: 2.6 G/DL (ref 3.5–5)
ALP SERPL-CCNC: 61 U/L (ref 46–116)
ALT SERPL W P-5'-P-CCNC: 17 U/L (ref 12–78)
ANION GAP SERPL CALCULATED.3IONS-SCNC: 6 MMOL/L (ref 4–13)
AST SERPL W P-5'-P-CCNC: 16 U/L (ref 5–45)
BASOPHILS # BLD AUTO: 0.04 THOUSANDS/ΜL (ref 0–0.1)
BASOPHILS NFR BLD AUTO: 1 % (ref 0–1)
BILIRUB SERPL-MCNC: 0.46 MG/DL (ref 0.2–1)
BUN SERPL-MCNC: 19 MG/DL (ref 5–25)
CALCIUM ALBUM COR SERPL-MCNC: 10 MG/DL (ref 8.3–10.1)
CALCIUM SERPL-MCNC: 8.9 MG/DL (ref 8.3–10.1)
CHLORIDE SERPL-SCNC: 104 MMOL/L (ref 96–108)
CO2 SERPL-SCNC: 32 MMOL/L (ref 21–32)
CREAT SERPL-MCNC: 1.16 MG/DL (ref 0.6–1.3)
CRP SERPL QL: 4.1 MG/L
EOSINOPHIL # BLD AUTO: 0.13 THOUSAND/ΜL (ref 0–0.61)
EOSINOPHIL NFR BLD AUTO: 2 % (ref 0–6)
ERYTHROCYTE [DISTWIDTH] IN BLOOD BY AUTOMATED COUNT: 15.6 % (ref 11.6–15.1)
ERYTHROCYTE [SEDIMENTATION RATE] IN BLOOD: 30 MM/HOUR (ref 0–19)
FERRITIN SERPL-MCNC: 80 NG/ML (ref 8–388)
GFR SERPL CREATININE-BSD FRML MDRD: 58 ML/MIN/1.73SQ M
GLUCOSE SERPL-MCNC: 91 MG/DL (ref 65–140)
HCT VFR BLD AUTO: 35.4 % (ref 36.5–49.3)
HGB BLD-MCNC: 11.5 G/DL (ref 12–17)
IMM GRANULOCYTES # BLD AUTO: 0.04 THOUSAND/UL (ref 0–0.2)
IMM GRANULOCYTES NFR BLD AUTO: 1 % (ref 0–2)
IRON SATN MFR SERPL: 31 % (ref 20–50)
IRON SERPL-MCNC: 69 UG/DL (ref 65–175)
LYMPHOCYTES # BLD AUTO: 1.41 THOUSANDS/ΜL (ref 0.6–4.47)
LYMPHOCYTES NFR BLD AUTO: 16 % (ref 14–44)
MCH RBC QN AUTO: 31.5 PG (ref 26.8–34.3)
MCHC RBC AUTO-ENTMCNC: 32.5 G/DL (ref 31.4–37.4)
MCV RBC AUTO: 97 FL (ref 82–98)
MONOCYTES # BLD AUTO: 0.64 THOUSAND/ΜL (ref 0.17–1.22)
MONOCYTES NFR BLD AUTO: 7 % (ref 4–12)
NEUTROPHILS # BLD AUTO: 6.46 THOUSANDS/ΜL (ref 1.85–7.62)
NEUTS SEG NFR BLD AUTO: 73 % (ref 43–75)
NRBC BLD AUTO-RTO: 0 /100 WBCS
PLATELET # BLD AUTO: 273 THOUSANDS/UL (ref 149–390)
PMV BLD AUTO: 9.6 FL (ref 8.9–12.7)
POTASSIUM SERPL-SCNC: 5 MMOL/L (ref 3.5–5.3)
PROT SERPL-MCNC: 6.6 G/DL (ref 6.4–8.4)
RBC # BLD AUTO: 3.65 MILLION/UL (ref 3.88–5.62)
SODIUM SERPL-SCNC: 142 MMOL/L (ref 135–147)
TIBC SERPL-MCNC: 222 UG/DL (ref 250–450)
WBC # BLD AUTO: 8.72 THOUSAND/UL (ref 4.31–10.16)

## 2022-09-03 PROCEDURE — 80053 COMPREHEN METABOLIC PANEL: CPT

## 2022-09-03 PROCEDURE — 82728 ASSAY OF FERRITIN: CPT

## 2022-09-03 PROCEDURE — 83540 ASSAY OF IRON: CPT

## 2022-09-03 PROCEDURE — 86140 C-REACTIVE PROTEIN: CPT

## 2022-09-03 PROCEDURE — 36415 COLL VENOUS BLD VENIPUNCTURE: CPT

## 2022-09-03 PROCEDURE — 85652 RBC SED RATE AUTOMATED: CPT

## 2022-09-03 PROCEDURE — 83550 IRON BINDING TEST: CPT

## 2022-09-03 PROCEDURE — 85025 COMPLETE CBC W/AUTO DIFF WBC: CPT

## 2022-09-03 PROCEDURE — 82306 VITAMIN D 25 HYDROXY: CPT

## 2022-09-16 ENCOUNTER — OFFICE VISIT (OUTPATIENT)
Dept: FAMILY MEDICINE CLINIC | Facility: CLINIC | Age: 82
End: 2022-09-16
Payer: COMMERCIAL

## 2022-09-16 VITALS
HEIGHT: 66 IN | DIASTOLIC BLOOD PRESSURE: 78 MMHG | TEMPERATURE: 97.3 F | HEART RATE: 62 BPM | SYSTOLIC BLOOD PRESSURE: 126 MMHG | WEIGHT: 122.8 LBS | OXYGEN SATURATION: 99 % | BODY MASS INDEX: 19.73 KG/M2 | RESPIRATION RATE: 16 BRPM

## 2022-09-16 DIAGNOSIS — E61.1 IRON DEFICIENCY: ICD-10-CM

## 2022-09-16 DIAGNOSIS — E46 PROTEIN-CALORIE MALNUTRITION, UNSPECIFIED SEVERITY (HCC): ICD-10-CM

## 2022-09-16 DIAGNOSIS — Z95.1 HX OF CABG: ICD-10-CM

## 2022-09-16 DIAGNOSIS — I10 ESSENTIAL HYPERTENSION: Primary | ICD-10-CM

## 2022-09-16 DIAGNOSIS — E78.2 MIXED HYPERLIPIDEMIA: ICD-10-CM

## 2022-09-16 DIAGNOSIS — F41.9 ANXIETY: ICD-10-CM

## 2022-09-16 DIAGNOSIS — I47.1 PAROXYSMAL SUPRAVENTRICULAR TACHYCARDIA (HCC): ICD-10-CM

## 2022-09-16 DIAGNOSIS — R09.82 POST-NASAL DRIP: ICD-10-CM

## 2022-09-16 DIAGNOSIS — I25.5 ISCHEMIC CARDIOMYOPATHY: ICD-10-CM

## 2022-09-16 PROBLEM — I50.22 CHRONIC SYSTOLIC CHF (CONGESTIVE HEART FAILURE), NYHA CLASS 2 (HCC): Status: ACTIVE | Noted: 2022-09-16

## 2022-09-16 PROCEDURE — 99214 OFFICE O/P EST MOD 30 MIN: CPT | Performed by: FAMILY MEDICINE

## 2022-09-16 NOTE — PROGRESS NOTES
Name: Kristopher Waters      : 1940      MRN: [id number]  Encounter Provider: Jacinto West DO  Encounter Date: 2022   Encounter department: [address] [place] [address]  Chief Complaint   Patient presents with    Follow-up     Recheck anxiety and BP     Patient Instructions   Here for recheck and gained weight, sees Cardiology as directed  Anxiety stable and BP stable  F-up with Cardiology for hx of CABG and also rec to eat healthy and stay active  May try Flonase OTC prn pnd and runny nose  Recheck in 6 months  Labs stable, slightly borderline anemic  Take iron as directed and f-up with Heme/onc as directed  Refuses flu shot today  Covid 19 vaccinated  Assessment & Plan     1  Essential hypertension    2  Anxiety    3  Mixed hyperlipidemia    4  Protein-calorie malnutrition, unspecified severity ([facility] [address] )  Comments:  gained weight, will monitor    5  Hx of CABG    6  Ischemic cardiomyopathy    7  Paroxysmal supraventricular tachycardia (HCC)  Comments:  sees cardiology    8  Post-nasal drip  Comments:  trial of Flonase prn      9  Iron deficiency           Subjective      Follow-up (Recheck anxiety and BP), anxiety increased  BP is stable and takes all meds as directed  Review of Systems   Constitutional: Negative  HENT: Negative  Eyes: Negative  Respiratory: Negative  Cardiovascular: Negative  Gastrointestinal: Negative  Endocrine: Negative  Genitourinary: Negative  Musculoskeletal: Negative  Skin: Negative  Allergic/Immunologic: Negative  Neurological: Negative  Hematological: Negative  Psychiatric/Behavioral: Negative           Anxiety       Current Outpatient Medications on File Prior to Visit   Medication Sig    aspirin 81 MG tablet Take 1 tablet by mouth daily    atorvastatin (LIPITOR) 40 mg tablet Take 1 tablet (40 mg total) by mouth daily    carvedilol (COREG) 6 25 mg tablet TAKE 1 TABLET BY MOUTH TWICE A DAY WITH MEALS    Cholecalciferol 1000 units tablet Take 2,000 Units by mouth     citalopram (CeleXA) 10 mg tablet Take 1 tablet (10 mg total) by mouth daily    Cyanocobalamin (CVS VITAMIN B-12 PO) Take by mouth    Ferrous Sulfate (IRON) 325 (65 Fe) MG TABS Take 1 tablet by mouth Twice daily    finasteride (PROSCAR) 5 mg tablet TAKE 1 TABLET BY MOUTH ONCE A DAY    lisinopril (ZESTRIL) 5 mg tablet TAKE 1 TABLET BY MOUTH ONCE A DAY    Multiple Vitamin Essential TABS Take by mouth    Omega-3 Fatty Acids (FISH OIL OMEGA-3 PO) Fish Oil    predniSONE 10 mg tablet Take 5 mg by mouth daily    tamsulosin (FLOMAX) 0 4 mg TAKE 2 CAPSULES BY MOUTH AT BEDTIME       Objective     /78 (BP Location: Left arm, Patient Position: Sitting, Cuff Size: Adult)   Pulse 62   Temp (!) 97 3 °F (36 3 °C) (Temporal)   Resp 16   Ht 5' 6" (1 676 m)   Wt 55 7 kg (122 lb 12 8 oz)   SpO2 99%   BMI 19 82 kg/m²     Physical Exam  Constitutional:       Appearance: He is well-developed  HENT:      Head: Normocephalic and atraumatic  Eyes:      Conjunctiva/sclera: Conjunctivae normal       Pupils: Pupils are equal, round, and reactive to light  Cardiovascular:      Rate and Rhythm: Normal rate and regular rhythm  Heart sounds: Normal heart sounds  Pulmonary:      Effort: Pulmonary effort is normal       Breath sounds: Normal breath sounds  Musculoskeletal:         General: Normal range of motion  Cervical back: Normal range of motion and neck supple  Skin:     General: Skin is warm and dry  Neurological:      Mental Status: He is alert and oriented to person, place, and time  Deep Tendon Reflexes: Reflexes are normal and symmetric     Psychiatric:         Behavior: Behavior normal       Comments: Anxiety         Poncho Ramires DO

## 2022-09-16 NOTE — PATIENT INSTRUCTIONS
Here for recheck and gained weight, sees Cardiology as directed  Anxiety stable and BP stable  F-up with Cardiology for hx of CABG and also rec to eat healthy and stay active  May try Flonase OTC prn pnd and runny nose  Recheck in 6 months  Labs stable, slightly borderline anemic  Take iron as directed and f-up with Heme/onc as directed  Refuses flu shot today  Covid 19 vaccinated

## 2022-09-27 NOTE — PROGRESS NOTES
Heart Failure Outpatient Progress Note - David Sandoval 80 y o  male MRN: [id number]    @ Encounter: [id number]      Assessment/Plan:    Patient Active Problem List    Diagnosis Date Noted    Ischemic cardiomyopathy 03/12/2018    Benign prostatic hyperplasia 11/07/2018    Hyperlipidemia 11/07/2018    Prediabetes 11/07/2018    Essential hypertension 11/07/2018    Hx of CABG 03/12/2018    S/P mitral valve repair 03/12/2018    Chronic systolic CHF (congestive heart failure), NYHA class 2 ([facility] ) 09/16/2022    Iron deficiency 06/10/2022    Anxiety 06/10/2022    Bilateral inguinal hernia without obstruction or gangrene 05/09/2022    Protein-calorie malnutrition, unspecified severity ([facility] ) 11/12/2021    Paroxysmal supraventricular tachycardia ([facility] ) 09/02/2021    Pleural effusion on left 03/02/2020    Post-nasal drip 03/02/2020    History of UTI 01/08/2020     # Chronic HFmEF, Stage C, NYHA 2  Etiology: iCM    Weight: 118 lbs  NT proBNP:     Studies- personally reviewed by me  Echo 6/11/21:  LVEF: 45%, hypo of inferior wall  LVEDd:  RV: mildly dilated, mildly reduced  MV annuloplasty, moderate MR- anteriorly directed  Mild MS, mean gradient of 5 7 mmHg  Mild to moderate TR  PASP: 45     Echo 3/27/18:   LVEF: 45%  LVEDd 6 1 cm  Moderate to severe MR, eccentric  PASP 30 mmHg    TTE 7/28/16: LVEF: 40%, LVEDd 5 9cm, grade 2 DD  Moderate MR    Neurohormonal Blockade:  --Beta-Blocker: coreg 6 25 mg BID  --ACEi, ARB or ARNi: lisinopril 5 mg   --Aldosterone Receptor Blocker:  --Diuretic: none     Sudden Cardiac Death Risk Reduction:  --ICD: none, EF > 35%    Electrical Resynchronization:  --Candidacy for BiV device: narrow QRS    # CAD with CABG x 3 2012  TWI in 2,3 - no ischemic eval since CABG- EKG today- same abnormality but unchanged- pt declining stress test again- no cp or SOB  :no need for nitro tabs  Meds: asa, atorvastatin 40 mg daily, coreg 6 25 mg BID  Angina: none    # hyperlipidemia: Atorvastatin 40 mg daily  10/16/21: LDL 74, HDL 53  5/4/19- HDL 59, LDL 65    # Mitral valve repair with 26 mm CE annuloplasty ring   Has moderate to severe MR on 3/27/18 echo - eccentric    # Post op Trach and peg at that time  # polymyalgia rheumatica  #  anemia of chronic disease, iron def  # pre diabetes  # left pleural effusion- likely loculated- has seen Pulm    TODAY'S PLAN:  Continue asa, atorvastatin and coreg for CAD  continue atorvastatin for previous lipid check at goal  Continue coreg and lisinopril for his HFmEF   --2g sodium diet  - Daily weights    HPI:   81 yo male with history of CAD with CABG x 3 in 2012 with mitral valve repair with ring, polymalgia rheumatica, BPH, ICM with last EF: 40% on June 2016 echo and moderate MR on the echo  He reportedly had VDRF after CABG with trach and PEG  Has not been admitted with heart failure  Last eval, pt declined stress test  On follow up 8/29 he denies SOB or CP  EKG today is abnormal but unchanged from Feb EKG  Pleural effusion felt to be loculated    Interval History:  F/u on wt loss, appetite- wt is stable  Still working 40 hours a week  Main complaint is lack of energy    Review of Systems   Constitutional: Positive for fatigue  Negative for activity change, appetite change and unexpected weight change  HENT: Negative for congestion and nosebleeds  Eyes: Negative  Respiratory: Negative for cough, chest tightness and shortness of breath  Cardiovascular: Negative for chest pain, palpitations and leg swelling  Gastrointestinal: Negative for abdominal distention  Endocrine: Negative  Genitourinary: Negative  Musculoskeletal: Negative  Skin: Negative  Neurological: Negative for dizziness, syncope and weakness  Hematological: Negative  Psychiatric/Behavioral: Negative            Past Medical History:   Diagnosis Date    BPH (benign prostatic hyperplasia)     Hypercholesterolemia     Hypertension     Prediabetes          No Known Allergies        Current Outpatient Medications:     aspirin 81 MG tablet, Take 1 tablet by mouth daily, Disp: , Rfl:     atorvastatin (LIPITOR) 40 mg tablet, Take 1 tablet (40 mg total) by mouth daily, Disp: 90 tablet, Rfl: 3    carvedilol (COREG) 6 25 mg tablet, TAKE 1 TABLET BY MOUTH TWICE A DAY WITH MEALS, Disp: 180 tablet, Rfl: 3    Cholecalciferol 1000 units tablet, Take 2,000 Units by mouth , Disp: , Rfl:     citalopram (CeleXA) 10 mg tablet, Take 1 tablet (10 mg total) by mouth daily, Disp: 90 tablet, Rfl: 1    Cyanocobalamin (CVS VITAMIN B-12 PO), Take by mouth, Disp: , Rfl:     Ferrous Sulfate (IRON) 325 (65 Fe) MG TABS, Take 1 tablet by mouth Twice daily, Disp: , Rfl:     finasteride (PROSCAR) 5 mg tablet, TAKE 1 TABLET BY MOUTH ONCE A DAY, Disp: 90 tablet, Rfl: 3    lisinopril (ZESTRIL) 5 mg tablet, TAKE 1 TABLET BY MOUTH ONCE A DAY, Disp: 90 tablet, Rfl: 1    Multiple Vitamin Essential TABS, Take by mouth, Disp: , Rfl:     Omega-3 Fatty Acids (FISH OIL OMEGA-3 PO), Fish Oil, Disp: , Rfl:     predniSONE 10 mg tablet, Take 5 mg by mouth daily, Disp: , Rfl:     tamsulosin (FLOMAX) 0 4 mg, TAKE 2 CAPSULES BY MOUTH AT BEDTIME, Disp: 180 capsule, Rfl: 3    Social History     Socioeconomic History    Marital status:      Spouse name: Not on file    Number of children: Not on file    Years of education: Not on file    Highest education level: Not on file   Occupational History    Not on file   Tobacco Use    Smoking status: Former Smoker     Packs/day: 0 50     Years: 16 00     Pack years: 8 00     Types: Cigarettes     Start date:      Quit date: Johnanna Oz     Years since quittin 7    Smokeless tobacco: Never Used   Vaping Use    Vaping Use: Never used   Substance and Sexual Activity    Alcohol use: Yes     Comment: rare    Drug use: No    Sexual activity: Not on file   Other Topics Concern    Not on file   Social History Narrative    Consumes 2-3 ups of tea per week Social Determinants of Health     Financial Resource Strain: Not on file   Food Insecurity: Not on file   Transportation Needs: Not on file   Physical Activity: Not on file   Stress: Not on file   Social Connections: Not on file   Intimate Partner Violence: Not on file   Housing Stability: Not on file       Family History   Problem Relation Age of Onset    Diabetes Mother     Heart disease Mother     Hypertension Mother     Colon cancer Father     Kidney disease Father     Heart disease Sister     Heart disease Brother     Breast cancer Paternal Aunt     Cervical cancer Maternal Aunt     Colon cancer Paternal Grandfather        Physical Exam:    Vitals:   Vitals:    09/29/22 0755   BP: 126/62   Pulse: 57   SpO2: 99%     Wt Readings from Last 3 Encounters:   09/29/22 53 5 kg (118 lb)   09/16/22 55 7 kg (122 lb 12 8 oz)   07/19/22 55 3 kg (122 lb)       Physical Exam  Constitutional:       Appearance: He is well-developed  He is not diaphoretic  HENT:      Head: Normocephalic and atraumatic  Eyes:      Pupils: Pupils are equal, round, and reactive to light  Neck:      Vascular: No JVD  Cardiovascular:      Rate and Rhythm: Normal rate and regular rhythm  Heart sounds: Murmur (apical) heard  Pulmonary:      Effort: Pulmonary effort is normal       Breath sounds: Normal breath sounds  No rales  Abdominal:      General: Bowel sounds are normal  There is no distension  Palpations: Abdomen is soft  Musculoskeletal:         General: Normal range of motion  Cervical back: Normal range of motion  Skin:     General: Skin is warm and dry  Neurological:      Mental Status: He is alert and oriented to person, place, and time         Labs & Results:    Lab Results   Component Value Date    GLUCOSE 104 01/08/2016    CALCIUM 8 9 09/03/2022     01/08/2016    K 5 0 09/03/2022    CO2 32 09/03/2022     09/03/2022    BUN 19 09/03/2022    CREATININE 1 16 09/03/2022     Lab Results Component Value Date    WBC 8 72 09/03/2022    HGB 11 5 (L) 09/03/2022    HCT 35 4 (L) 09/03/2022    MCV 97 09/03/2022     09/03/2022     No results found for: NTBNP   Lab Results   Component Value Date    CHOL 127 11/28/2015    CHOL 105 10/11/2014    CHOL 132 07/14/2014     Lab Results   Component Value Date    HDL 53 10/16/2021    HDL 65 01/09/2021    HDL 59 05/04/2019     Lab Results   Component Value Date    LDLCALC 74 10/16/2021    LDLCALC 84 01/09/2021    LDLCALC 65 05/04/2019     Lab Results   Component Value Date    TRIG 40 10/16/2021    TRIG 47 01/09/2021    TRIG 75 05/04/2019     No results found for: CHOLHDL      EKG personally reviewed by Kristyn Lilly  Counseling / Coordination of Care  Time spent today 25 minutes  Greater than 50% of total time was spent with the patient and / or family counseling and / or coordination of care  We discussed diagnoses, most recent studies, tests and any changes in treatment plan  Thank you for the opportunity to participate in the care of this patient      [facility] PULMONARY HYPERTENSION  MEDICAL DIRECTOR OF [place]

## 2022-09-28 DIAGNOSIS — F41.9 ANXIETY: ICD-10-CM

## 2022-09-28 RX ORDER — CITALOPRAM 10 MG/1
10 TABLET ORAL DAILY
Qty: 90 TABLET | Refills: 1 | Status: SHIPPED | OUTPATIENT
Start: 2022-09-28

## 2022-09-29 ENCOUNTER — OFFICE VISIT (OUTPATIENT)
Dept: CARDIOLOGY CLINIC | Facility: CLINIC | Age: 82
End: 2022-09-29
Payer: COMMERCIAL

## 2022-09-29 VITALS
BODY MASS INDEX: 18.96 KG/M2 | OXYGEN SATURATION: 99 % | HEIGHT: 66 IN | HEART RATE: 57 BPM | WEIGHT: 118 LBS | SYSTOLIC BLOOD PRESSURE: 126 MMHG | DIASTOLIC BLOOD PRESSURE: 62 MMHG

## 2022-09-29 DIAGNOSIS — Z95.1 HX OF CABG: ICD-10-CM

## 2022-09-29 DIAGNOSIS — E61.1 IRON DEFICIENCY: ICD-10-CM

## 2022-09-29 DIAGNOSIS — I10 ESSENTIAL HYPERTENSION: Primary | ICD-10-CM

## 2022-09-29 DIAGNOSIS — Z98.890 S/P MITRAL VALVE REPAIR: ICD-10-CM

## 2022-09-29 DIAGNOSIS — I25.5 ISCHEMIC CARDIOMYOPATHY: ICD-10-CM

## 2022-09-29 PROCEDURE — 99214 OFFICE O/P EST MOD 30 MIN: CPT | Performed by: INTERNAL MEDICINE

## 2022-09-29 PROCEDURE — 1160F RVW MEDS BY RX/DR IN RCRD: CPT | Performed by: INTERNAL MEDICINE

## 2022-10-26 NOTE — PATIENT INSTRUCTIONS
Rest and fluids, start abx and also start Dimetapp DM cold and cough prn, call if worse and take BP med as directed  Call if worse  [Sneakers] : lanre [FreeTextEntry1] : Patient presents today because of left 2nd toe ingrown nail. It is painful, thick and deformed. It is getting worse. The pain is about a 5/10 and it is becoming more of a problem.  Patient follows with Dr. Haider Camejo who he last saw 3 weeks ago.

## 2022-12-10 ENCOUNTER — APPOINTMENT (OUTPATIENT)
Dept: LAB | Facility: HOSPITAL | Age: 82
End: 2022-12-10

## 2022-12-10 DIAGNOSIS — M35.3 POLYMYALGIA RHEUMATICA (HCC): ICD-10-CM

## 2022-12-10 DIAGNOSIS — Z79.899 ENCOUNTER FOR LONG-TERM (CURRENT) USE OF OTHER MEDICATIONS: ICD-10-CM

## 2022-12-10 LAB
ALBUMIN SERPL BCP-MCNC: 2.8 G/DL (ref 3.5–5)
ALP SERPL-CCNC: 95 U/L (ref 46–116)
ALT SERPL W P-5'-P-CCNC: 19 U/L (ref 12–78)
ANION GAP SERPL CALCULATED.3IONS-SCNC: 3 MMOL/L (ref 4–13)
AST SERPL W P-5'-P-CCNC: 15 U/L (ref 5–45)
BASOPHILS # BLD AUTO: 0.06 THOUSANDS/ÂΜL (ref 0–0.1)
BASOPHILS NFR BLD AUTO: 1 % (ref 0–1)
BILIRUB SERPL-MCNC: 0.49 MG/DL (ref 0.2–1)
BUN SERPL-MCNC: 22 MG/DL (ref 5–25)
CALCIUM ALBUM COR SERPL-MCNC: 10.2 MG/DL (ref 8.3–10.1)
CALCIUM SERPL-MCNC: 9.2 MG/DL (ref 8.3–10.1)
CHLORIDE SERPL-SCNC: 109 MMOL/L (ref 96–108)
CO2 SERPL-SCNC: 30 MMOL/L (ref 21–32)
CREAT SERPL-MCNC: 1.09 MG/DL (ref 0.6–1.3)
CRP SERPL QL: 7.9 MG/L
EOSINOPHIL # BLD AUTO: 0.2 THOUSAND/ÂΜL (ref 0–0.61)
EOSINOPHIL NFR BLD AUTO: 3 % (ref 0–6)
ERYTHROCYTE [DISTWIDTH] IN BLOOD BY AUTOMATED COUNT: 14.1 % (ref 11.6–15.1)
ERYTHROCYTE [SEDIMENTATION RATE] IN BLOOD: 35 MM/HOUR (ref 0–19)
GFR SERPL CREATININE-BSD FRML MDRD: 62 ML/MIN/1.73SQ M
GLUCOSE P FAST SERPL-MCNC: 101 MG/DL (ref 65–99)
HCT VFR BLD AUTO: 39.5 % (ref 36.5–49.3)
HGB BLD-MCNC: 12.8 G/DL (ref 12–17)
IMM GRANULOCYTES # BLD AUTO: 0.01 THOUSAND/UL (ref 0–0.2)
IMM GRANULOCYTES NFR BLD AUTO: 0 % (ref 0–2)
LYMPHOCYTES # BLD AUTO: 1.37 THOUSANDS/ÂΜL (ref 0.6–4.47)
LYMPHOCYTES NFR BLD AUTO: 20 % (ref 14–44)
MCH RBC QN AUTO: 31.8 PG (ref 26.8–34.3)
MCHC RBC AUTO-ENTMCNC: 32.4 G/DL (ref 31.4–37.4)
MCV RBC AUTO: 98 FL (ref 82–98)
MONOCYTES # BLD AUTO: 0.77 THOUSAND/ÂΜL (ref 0.17–1.22)
MONOCYTES NFR BLD AUTO: 11 % (ref 4–12)
NEUTROPHILS # BLD AUTO: 4.33 THOUSANDS/ÂΜL (ref 1.85–7.62)
NEUTS SEG NFR BLD AUTO: 65 % (ref 43–75)
NRBC BLD AUTO-RTO: 0 /100 WBCS
PLATELET # BLD AUTO: 218 THOUSANDS/UL (ref 149–390)
PMV BLD AUTO: 9.9 FL (ref 8.9–12.7)
POTASSIUM SERPL-SCNC: 4.3 MMOL/L (ref 3.5–5.3)
PROT SERPL-MCNC: 7.1 G/DL (ref 6.4–8.4)
RBC # BLD AUTO: 4.03 MILLION/UL (ref 3.88–5.62)
SODIUM SERPL-SCNC: 142 MMOL/L (ref 135–147)
WBC # BLD AUTO: 6.74 THOUSAND/UL (ref 4.31–10.16)

## 2022-12-18 ENCOUNTER — HOSPITAL ENCOUNTER (EMERGENCY)
Facility: HOSPITAL | Age: 82
Discharge: HOME | End: 2022-12-18
Attending: EMERGENCY MEDICINE

## 2022-12-18 ENCOUNTER — APPOINTMENT (EMERGENCY)
Dept: CT IMAGING | Facility: HOSPITAL | Age: 82
End: 2022-12-18

## 2022-12-18 VITALS
WEIGHT: 119.49 LBS | SYSTOLIC BLOOD PRESSURE: 128 MMHG | RESPIRATION RATE: 16 BRPM | DIASTOLIC BLOOD PRESSURE: 66 MMHG | HEART RATE: 89 BPM | TEMPERATURE: 97.7 F | OXYGEN SATURATION: 97 % | BODY MASS INDEX: 19.29 KG/M2

## 2022-12-18 DIAGNOSIS — R31.9 HEMATURIA: Primary | ICD-10-CM

## 2022-12-18 LAB
ANION GAP SERPL CALCULATED.3IONS-SCNC: 6 MMOL/L (ref 4–13)
APTT PPP: 35 SECONDS (ref 23–37)
BACTERIA UR QL AUTO: ABNORMAL /HPF
BASOPHILS # BLD AUTO: 0.04 THOUSANDS/ÂΜL (ref 0–0.1)
BASOPHILS NFR BLD AUTO: 1 % (ref 0–1)
BILIRUB UR QL STRIP: NEGATIVE
BUN SERPL-MCNC: 17 MG/DL (ref 5–25)
CALCIUM SERPL-MCNC: 9.1 MG/DL (ref 8.3–10.1)
CHLORIDE SERPL-SCNC: 104 MMOL/L (ref 96–108)
CLARITY UR: ABNORMAL
CO2 SERPL-SCNC: 31 MMOL/L (ref 21–32)
COLOR UR: ABNORMAL
CREAT SERPL-MCNC: 1.19 MG/DL (ref 0.6–1.3)
EOSINOPHIL # BLD AUTO: 0.12 THOUSAND/ÂΜL (ref 0–0.61)
EOSINOPHIL NFR BLD AUTO: 2 % (ref 0–6)
ERYTHROCYTE [DISTWIDTH] IN BLOOD BY AUTOMATED COUNT: 14.1 % (ref 11.6–15.1)
GFR SERPL CREATININE-BSD FRML MDRD: 56 ML/MIN/1.73SQ M
GLUCOSE SERPL-MCNC: 106 MG/DL (ref 65–140)
GLUCOSE UR STRIP-MCNC: NEGATIVE MG/DL
HCT VFR BLD AUTO: 36.9 % (ref 36.5–49.3)
HGB BLD-MCNC: 11.4 G/DL (ref 12–17)
HGB UR QL STRIP.AUTO: ABNORMAL
IMM GRANULOCYTES # BLD AUTO: 0.01 THOUSAND/UL (ref 0–0.2)
IMM GRANULOCYTES NFR BLD AUTO: 0 % (ref 0–2)
INR PPP: 1.02 (ref 0.84–1.19)
KETONES UR STRIP-MCNC: NEGATIVE MG/DL
LEUKOCYTE ESTERASE UR QL STRIP: ABNORMAL
LYMPHOCYTES # BLD AUTO: 0.6 THOUSANDS/ÂΜL (ref 0.6–4.47)
LYMPHOCYTES NFR BLD AUTO: 10 % (ref 14–44)
MCH RBC QN AUTO: 30.2 PG (ref 26.8–34.3)
MCHC RBC AUTO-ENTMCNC: 30.9 G/DL (ref 31.4–37.4)
MCV RBC AUTO: 98 FL (ref 82–98)
MONOCYTES # BLD AUTO: 0.49 THOUSAND/ÂΜL (ref 0.17–1.22)
MONOCYTES NFR BLD AUTO: 9 % (ref 4–12)
NEUTROPHILS # BLD AUTO: 4.51 THOUSANDS/ÂΜL (ref 1.85–7.62)
NEUTS SEG NFR BLD AUTO: 78 % (ref 43–75)
NITRITE UR QL STRIP: NEGATIVE
NON-SQ EPI CELLS URNS QL MICRO: ABNORMAL /HPF
NRBC BLD AUTO-RTO: 0 /100 WBCS
PH UR STRIP.AUTO: 6.5 [PH]
PLATELET # BLD AUTO: 224 THOUSANDS/UL (ref 149–390)
PMV BLD AUTO: 9.8 FL (ref 8.9–12.7)
POTASSIUM SERPL-SCNC: 4 MMOL/L (ref 3.5–5.3)
PROT UR STRIP-MCNC: >=300 MG/DL
PROTHROMBIN TIME: 13.4 SECONDS (ref 11.6–14.5)
RBC # BLD AUTO: 3.77 MILLION/UL (ref 3.88–5.62)
RBC #/AREA URNS AUTO: ABNORMAL /HPF
SODIUM SERPL-SCNC: 141 MMOL/L (ref 135–147)
SP GR UR STRIP.AUTO: 1.02 (ref 1–1.03)
UROBILINOGEN UR QL STRIP.AUTO: 0.2 E.U./DL
WBC # BLD AUTO: 5.77 THOUSAND/UL (ref 4.31–10.16)
WBC #/AREA URNS AUTO: ABNORMAL /HPF

## 2022-12-18 RX ORDER — CEPHALEXIN 500 MG/1
500 CAPSULE ORAL EVERY 6 HOURS SCHEDULED
Qty: 28 CAPSULE | Refills: 0 | Status: SHIPPED | OUTPATIENT
Start: 2022-12-18 | End: 2022-12-25

## 2022-12-18 NOTE — ED PROVIDER NOTES
History  Chief Complaint   Patient presents with   • Blood in Urine     Pt reports he has been voiding blood since Friday, pain with urination  26-year-old male with history of hypertension, anemia presents to the ED with gross hematuria  He states this started yesterday  It happens every time he urinates  He states occasionally he will pass a clot  He has had dysuria but no urgency or frequency  No abdominal pain, fevers or chills  No flank pain  No nausea or vomiting  No trauma  No prior history of hematuria  He states he only takes aspirin daily  No other anticoagulation  History provided by:  Patient   used: No    Blood in Urine  This is a new problem  The current episode started yesterday  The problem is unchanged  He describes the hematuria as gross hematuria  The hematuria occurs throughout his entire urinary stream  His pain is at a severity of 2/10  He describes his urine color as dark red  Irritative symptoms do not include frequency, nocturia or urgency  Obstructive symptoms do not include dribbling, incomplete emptying, an intermittent stream, a slower stream, straining or a weak stream  Associated symptoms include dysuria  Pertinent negatives include no abdominal pain, bladder pain, bone pain, chills, fever, flank pain, genital pain, hesitancy, inability to urinate, nausea, urinary retention, vomiting or weight loss  His past medical history is significant for BPH and hypertension  There is no history of  trauma, kidney stones, recent infection, sickle cell disease or tobacco use  Risk factors include aspirin  Prior to Admission Medications   Prescriptions Last Dose Informant Patient Reported? Taking?    Cholecalciferol 1000 units tablet   Yes No   Sig: Take 2,000 Units by mouth    Cyanocobalamin ([employer]S VITAMIN B-12 PO)   Yes No   Sig: Take by mouth   Ferrous Sulfate (IRON) 325 (65 Fe) MG TABS   Yes No   Sig: Take 1 tablet by mouth Twice daily   Multiple Vitamin Essential TABS   Yes No   Sig: Take by mouth   Omega-3 Fatty Acids (FISH OIL OMEGA-3 PO)   Yes No   Sig: Fish Oil   aspirin 81 MG tablet   Yes No   Sig: Take 1 tablet by mouth daily   atorvastatin (LIPITOR) 40 mg tablet   No No   Sig: Take 1 tablet (40 mg total) by mouth daily   carvedilol (COREG) 6 25 mg tablet   No No   Sig: TAKE 1 TABLET BY MOUTH TWICE A DAY WITH MEALS   citalopram (CeleXA) 10 mg tablet   No No   Sig: Take 1 tablet (10 mg total) by mouth daily   finasteride (PROSCAR) 5 mg tablet   No No   Sig: TAKE 1 TABLET BY MOUTH ONCE A DAY   lisinopril (ZESTRIL) 5 mg tablet   No No   Sig: TAKE 1 TABLET BY MOUTH ONCE A DAY   predniSONE 10 mg tablet   Yes No   Sig: Take 5 mg by mouth daily   tamsulosin (FLOMAX) 0 4 mg   No No   Sig: TAKE 2 CAPSULES BY MOUTH AT BEDTIME      Facility-Administered Medications: None       Past Medical History:   Diagnosis Date   • BPH (benign prostatic hyperplasia)    • Hypercholesterolemia    • Hypertension    • Prediabetes        Past Surgical History:   Procedure Laterality Date   • CORONARY ARTERY BYPASS GRAFT      x3 LIMA-LAD, SVG-OM, SVG-DIAG with mitral valve repair by nicole   • CYSTOSCOPY  12/13/2014    Diagnostic   • ESOPHAGOGASTRODUODENOSCOPY  04/07/2015    with biopsy  Dr Yovani Milian   • MITRAL VALVE REPAIR      26 mm fatmata hernandez physi annuloplasty ring   • TRACHEOSTOMY      during his heart surgery       Family History   Problem Relation Age of Onset   • Diabetes Mother    • Heart disease Mother    • Hypertension Mother    • Colon cancer Father    • Kidney disease Father    • Heart disease Sister    • Heart disease Brother    • Breast cancer Paternal Aunt    • Cervical cancer Maternal Aunt    • Colon cancer Paternal Grandfather      I have reviewed and agree with the history as documented      E-Cigarette/Vaping   • E-Cigarette Use Never User      E-Cigarette/Vaping Substances     Social History     Tobacco Use   • Smoking status: Former     Packs/day: 0 50     Years: 16 00     Pack years: 8 00     Types: Cigarettes     Start date: 46     Quit date:      Years since quittin 9   • Smokeless tobacco: Never   Vaping Use   • Vaping Use: Never used   Substance Use Topics   • Alcohol use: Yes     Comment: rare   • Drug use: No       Review of Systems   Constitutional: Negative  Negative for activity change, appetite change, chills, fever, unexpected weight change and weight loss  HENT: Negative  Eyes: Negative  Respiratory: Negative  Cardiovascular: Negative  Gastrointestinal: Negative  Negative for abdominal pain, nausea and vomiting  Genitourinary: Positive for dysuria and hematuria  Negative for decreased urine volume, difficulty urinating, flank pain, frequency, genital sores, hesitancy, incomplete emptying, nocturia, penile discharge, penile pain, penile swelling, scrotal swelling, testicular pain and urgency  Musculoskeletal: Negative for neck pain  Skin: Negative  Allergic/Immunologic: Negative  Neurological: Negative  Negative for weakness, numbness and headaches  Hematological: Negative  Psychiatric/Behavioral: Negative  All other systems reviewed and are negative  Physical Exam  Physical Exam  Vitals and nursing note reviewed  Constitutional:       General: He is awake  He is not in acute distress  Appearance: Normal appearance  He is well-developed and normal weight  He is not ill-appearing, toxic-appearing or diaphoretic  HENT:      Head: Normocephalic and atraumatic  Right Ear: External ear normal       Left Ear: External ear normal       Nose: Nose normal    Eyes:      General: No scleral icterus  Conjunctiva/sclera: Conjunctivae normal    Neck:      Thyroid: No thyromegaly  Vascular: No JVD  Cardiovascular:      Rate and Rhythm: Normal rate and regular rhythm  Heart sounds: Normal heart sounds  No murmur heard  No gallop     Pulmonary:      Effort: Pulmonary effort is normal  No respiratory distress  Breath sounds: Normal breath sounds  No stridor  No wheezing, rhonchi or rales  Abdominal:      General: Bowel sounds are normal  There is no distension  Palpations: Abdomen is soft  There is no mass  Tenderness: There is no abdominal tenderness  Hernia: No hernia is present  Musculoskeletal:      Right lower leg: No edema  Left lower leg: No edema  Skin:     General: Skin is warm and dry  Coloration: Skin is not jaundiced or pale  Findings: No bruising, erythema, lesion or rash  Neurological:      General: No focal deficit present  Mental Status: He is alert and oriented to person, place, and time  Motor: No weakness  Deep Tendon Reflexes: Reflexes are normal and symmetric  Psychiatric:         Mood and Affect: Mood normal          Behavior: Behavior is cooperative  Vital Signs  ED Triage Vitals [12/18/22 1249]   Temperature Pulse Respirations Blood Pressure SpO2   97 7 °F (36 5 °C) 93 16 135/63 97 %      Temp Source Heart Rate Source Patient Position - Orthostatic VS BP Location FiO2 (%)   Oral Monitor Sitting Right arm --      Pain Score       --           Vitals:    12/18/22 1249   BP: 135/63   Pulse: 93   Patient Position - Orthostatic VS: Sitting         Visual Acuity      ED Medications  Medications - No data to display    Diagnostic Studies  Results Reviewed     Procedure Component Value Units Date/Time    CBC and differential [[id number]]     Lab Status: No result Specimen: Blood     Protime-INR [[id number]]     Lab Status: No result Specimen: Blood     APTT [[id number]]     Lab Status: No result Specimen: Blood     Basic metabolic panel [[id number]]     Lab Status: No result Specimen: Blood     UA w Reflex to Microscopic w Reflex to Culture [[id number]] Collected: 12/18/22 1509    Lab Status:  In process Specimen: Urine, Clean Catch Updated: 12/18/22 1512                 CT renal stone study abdomen pelvis without contrast    (Results Pending)              Procedures  Procedures         ED Course                               SBIRT 20yo+    Flowsheet Row Most Recent Value   SBIRT (23 yo +)    In order to provide better care to our patients, we are screening all of our patients for alcohol and drug use  Would it be okay to ask you these screening questions? No Filed at: 12/18/2022 1505                    MDM  Number of Diagnoses or Management Options  Diagnosis management comments: 20-year-old male presents to the ED with new onset hematuria starting yesterday  He states every time he urinates he has blood  He complains of dysuria but no other symptoms  No prior history of hematuria  On exam he is alert no distress  No abdominal tenderness on exam   We will check basic labs to rule out blood loss anemia, failure  Will check urine to rule out infection  Will CT stone study rule out possibility of kidney stone, pyelonephritis, renal cyst or bladder mass  Amount and/or Complexity of Data Reviewed  Clinical lab tests: ordered and reviewed  Tests in the radiology section of CPT®: ordered and reviewed  Independent visualization of images, tracings, or specimens: yes        Disposition  Final diagnoses:   None     ED Disposition     None      Follow-up Information    None         Patient's Medications   Discharge Prescriptions    No medications on file       No discharge procedures on file      PDMP Review     None          ED Provider  Electronically Signed by           Janny Cordon DO  12/18/22 9751

## 2022-12-19 LAB — BACTERIA UR CULT: NORMAL

## 2023-02-28 ENCOUNTER — OFFICE VISIT (OUTPATIENT)
Dept: UROLOGY | Facility: CLINIC | Age: 83
End: 2023-02-28

## 2023-02-28 VITALS
HEART RATE: 70 BPM | HEIGHT: 66 IN | BODY MASS INDEX: 19.13 KG/M2 | SYSTOLIC BLOOD PRESSURE: 124 MMHG | WEIGHT: 119 LBS | DIASTOLIC BLOOD PRESSURE: 80 MMHG

## 2023-02-28 DIAGNOSIS — R31.0 GROSS HEMATURIA: ICD-10-CM

## 2023-02-28 DIAGNOSIS — N40.1 BENIGN PROSTATIC HYPERPLASIA WITH LOWER URINARY TRACT SYMPTOMS, SYMPTOM DETAILS UNSPECIFIED: Primary | ICD-10-CM

## 2023-02-28 DIAGNOSIS — N13.39 OTHER HYDRONEPHROSIS: ICD-10-CM

## 2023-02-28 LAB
POST-VOID RESIDUAL VOLUME, ML POC: 182 ML
SL AMB  POCT GLUCOSE, UA: ABNORMAL
SL AMB LEUKOCYTE ESTERASE,UA: ABNORMAL
SL AMB POCT BILIRUBIN,UA: ABNORMAL
SL AMB POCT BLOOD,UA: ABNORMAL
SL AMB POCT CLARITY,UA: ABNORMAL
SL AMB POCT COLOR,UA: YELLOW
SL AMB POCT KETONES,UA: ABNORMAL
SL AMB POCT NITRITE,UA: ABNORMAL
SL AMB POCT PH,UA: 6
SL AMB POCT SPECIFIC GRAVITY,UA: 0.01
SL AMB POCT URINE PROTEIN: ABNORMAL
SL AMB POCT UROBILINOGEN: 0.2

## 2023-02-28 NOTE — PROGRESS NOTES
2/28/2023      Chief Complaint   Patient presents with   • Follow-up   • Benign Prostatic Hypertrophy         Assessment and Plan    80 y o  male managed by Dr Sathya Moctezuma    1  BPH  2  Gross hematuria  3  Right hydroureteronephrosis    I am concerned with his hematuria episode and abnormal CT  I recommend a followup CT with urogram phase imaging to further evaluate the right hydronephrosis and bladder nodularity concern for urothelial lesion with obstruction  Follow this with office cystourethroscopy by Dr Sathya Moctezuma in the coming weeks  He is very agreeable with this plan  History of Present Illness  Sarah Eubanks is a 80 y o  male here for evaluation of 1 year followup BPH on flomax 0 8mg and finasteride 5mg daily  Symptoms are well controlled  Empties better on meds with PVRs under 100ml  Had some mild symptoms from enterococcus uti last year treated and resolved  He presented to the ER in December 2022 with gross hematuria and dysuria for 1 day  Treated with keflex, culture was ultimately no growth  Symptoms resolved within one day and has not recurred he was under the impression of UTI  Unfortunately his CT was abnormal  I reviewed the images today there is mild distended trabeculated bladder with question of mass/nodule possibly his median lobe previously seen on cystoscopy 2015  There was also new right hydroureteronephrosis without stone  The right side wall looks more thickened I included a screenshot image below  He presents today in routine followup  Review of Systems   Constitutional: Negative for activity change, appetite change, chills, fever and unexpected weight change  HENT: Negative  Respiratory: Negative  Negative for shortness of breath  Cardiovascular: Negative  Negative for chest pain  Gastrointestinal: Negative for abdominal pain, diarrhea, nausea and vomiting  Endocrine: Negative      Genitourinary: Negative for decreased urine volume, difficulty urinating, dysuria, flank pain, frequency, hematuria, testicular pain and urgency  Musculoskeletal: Negative for back pain and gait problem  Skin: Negative  Allergic/Immunologic: Negative  Neurological: Negative  Hematological: Negative for adenopathy  Does not bruise/bleed easily  Vitals  Vitals:    23 0852   BP: 124/80   BP Location: Right arm   Patient Position: Sitting   Cuff Size: Adult   Pulse: 70   Weight: 54 kg (119 lb)   Height: 5' 6" (1 676 m)       Physical Exam  Vitals and nursing note reviewed  Constitutional:       General: He is not in acute distress  Appearance: He is well-developed  He is not diaphoretic  HENT:      Head: Normocephalic and atraumatic  Pulmonary:      Effort: Pulmonary effort is normal    Skin:     General: Skin is warm and dry  Neurological:      Mental Status: He is alert and oriented to person, place, and time        Gait: Gait normal    Psychiatric:         Speech: Speech normal          Behavior: Behavior normal            Past History  Past Medical History:   Diagnosis Date   • BPH (benign prostatic hyperplasia)    • Hypercholesterolemia    • Hypertension    • Prediabetes      Social History     Socioeconomic History   • Marital status:      Spouse name: None   • Number of children: None   • Years of education: None   • Highest education level: None   Occupational History   • None   Tobacco Use   • Smoking status: Former     Packs/day: 0 50     Years: 16 00     Pack years: 8 00     Types: Cigarettes     Start date:      Quit date:      Years since quittin 1   • Smokeless tobacco: Never   Vaping Use   • Vaping Use: Never used   Substance and Sexual Activity   • Alcohol use: Yes     Comment: rare   • Drug use: No   • Sexual activity: None   Other Topics Concern   • None   Social History Narrative    Consumes 2-3 ups of tea per week     Social Determinants of Health     Financial Resource Strain: Not on file   Food Insecurity: Not on file   Transportation Needs: Not on file   Physical Activity: Not on file   Stress: Not on file   Social Connections: Not on file   Intimate Partner Violence: Not on file   Housing Stability: Not on file     Social History     Tobacco Use   Smoking Status Former   • Packs/day: 0 50   • Years: 16 00   • Pack years: 8 00   • Types: Cigarettes   • Start date: 46   • Quit date: 1   • Years since quittin 1   Smokeless Tobacco Never     Family History   Problem Relation Age of Onset   • Diabetes Mother    • Heart disease Mother    • Hypertension Mother    • Colon cancer Father    • Kidney disease Father    • Heart disease Sister    • Heart disease Brother    • Breast cancer Paternal Aunt    • Cervical cancer Maternal Aunt    • Colon cancer Paternal Grandfather        The following portions of the patient's history were reviewed and updated as appropriate: allergies, current medications, past medical history, past social history, past surgical history and problem list     Results  Recent Results (from the past 1 hour(s))   POCT urine dip    Collection Time: 23  9:00 AM   Result Value Ref Range    LEUKOCYTE ESTERASE,UA +++     NITRITE,UA +     SL AMB POCT UROBILINOGEN 0 2     POCT URINE PROTEIN ++      PH,UA 6 0     BLOOD,UA +++     SPECIFIC GRAVITY,UA 0 015     KETONES,UA -     BILIRUBIN,UA -     GLUCOSE, UA -      COLOR,UA yellow     CLARITY,UA cloudy    POCT Measure PVR    Collection Time: 23  9:02 AM   Result Value Ref Range    POST-VOID RESIDUAL VOLUME, ML  mL   ]  Lab Results   Component Value Date    PSA 0 4 2015    PSA 1 7 10/11/2014     Lab Results   Component Value Date    GLUCOSE 104 2016    CALCIUM 9 1 2022     2016    K 4 0 2022    CO2 31 2022     2022    BUN 17 2022    CREATININE 1 19 2022     Lab Results   Component Value Date    WBC 5 77 2022    HGB 11 4 (L) 2022    HCT 36 9 12/18/2022    MCV 98 12/18/2022     12/18/2022

## 2023-03-07 ENCOUNTER — HOSPITAL ENCOUNTER (OUTPATIENT)
Dept: CT IMAGING | Facility: HOSPITAL | Age: 83
Discharge: HOME | End: 2023-03-07

## 2023-03-07 DIAGNOSIS — R31.0 GROSS HEMATURIA: ICD-10-CM

## 2023-03-07 DIAGNOSIS — N13.39 OTHER HYDRONEPHROSIS: ICD-10-CM

## 2023-03-07 RX ADMIN — IOHEXOL 100 ML: 350 INJECTION, SOLUTION INTRAVENOUS at 08:28

## 2023-03-15 ENCOUNTER — RA CDI HCC (OUTPATIENT)
Dept: OTHER | Facility: HOSPITAL | Age: 83
End: 2023-03-15

## 2023-03-15 NOTE — PROGRESS NOTES
[facility]  coding opportunities       Chart reviewed, no opportunity found: CHART REVIEWED, NO OPPORTUNITY FOUND        Patients Insurance        Commercial Insurance: [address] [place] [address]

## 2023-03-20 ENCOUNTER — OFFICE VISIT (OUTPATIENT)
Dept: FAMILY MEDICINE CLINIC | Facility: CLINIC | Age: 83
End: 2023-03-20

## 2023-03-20 VITALS
HEART RATE: 76 BPM | RESPIRATION RATE: 16 BRPM | OXYGEN SATURATION: 96 % | HEIGHT: 66 IN | SYSTOLIC BLOOD PRESSURE: 136 MMHG | TEMPERATURE: 96.3 F | WEIGHT: 117.8 LBS | BODY MASS INDEX: 18.93 KG/M2 | DIASTOLIC BLOOD PRESSURE: 80 MMHG

## 2023-03-20 DIAGNOSIS — I47.1 PAROXYSMAL SUPRAVENTRICULAR TACHYCARDIA (HCC): ICD-10-CM

## 2023-03-20 DIAGNOSIS — J31.0 CHRONIC RHINITIS: ICD-10-CM

## 2023-03-20 DIAGNOSIS — I10 ESSENTIAL HYPERTENSION: Primary | ICD-10-CM

## 2023-03-20 DIAGNOSIS — Z95.1 HX OF CABG: ICD-10-CM

## 2023-03-20 DIAGNOSIS — E78.2 MIXED HYPERLIPIDEMIA: ICD-10-CM

## 2023-03-20 DIAGNOSIS — F41.9 ANXIETY: ICD-10-CM

## 2023-03-20 DIAGNOSIS — N40.1 BENIGN PROSTATIC HYPERPLASIA WITH LOWER URINARY TRACT SYMPTOMS, SYMPTOM DETAILS UNSPECIFIED: ICD-10-CM

## 2023-03-20 DIAGNOSIS — I50.22 CHRONIC SYSTOLIC CHF (CONGESTIVE HEART FAILURE), NYHA CLASS 2 (HCC): ICD-10-CM

## 2023-03-20 NOTE — PATIENT INSTRUCTIONS
Here for recheck and overall stable except is seeing urology for cystoscopy  Take all meds as directed and see Cardiology  Here for recheck and lost weight, sees Cardiology as directed  Anxiety stable and BP stable  F-up with Cardiology for hx of CABG and also rec to eat healthy and stay active  Recheck in 6 months  Labs stable, slightly borderline anemic  Take iron as directed and did f-up with Heme/onc as directed in past and to be seen prn  Refuses prevnar 20 today  Covid 19 vaccinated  Patient has chronic rhinitis and does not like sprays and will call if interested in seeing ENT  CT scan renal protocol for hematuria reviewed:    IMPRESSION:     1 5 x 0 8 cm filling defect associated with the wall in the left posterior urinary bladder suspicious with mass  Cystoscopic correlation is advised  Diffuse urinary bladder wall trabeculation, likely sequela of chronic partial bladder outlet obstruction in the setting of prostatomegaly  Persistent mild to moderate right-sided hydroureteronephrosis to the level of the urinary bladder; no obstructing calculus identified  Mild diffuse right ureteral wall enhancement; ascending urinary tract infection is not excluded  Redemonstrated right inguinal hernia containing multiple nonobstructed small bowel loops  Cholelithiasis  No pericholecystic inflammation  Partially included large hiatal hernia  Partially included loculated left pleural effusion, similar to prior  Small right pleural effusion  The study was marked in EPIC for significant notification

## 2023-03-20 NOTE — PROGRESS NOTES
Name: Livia Winters      : 1940      MRN: [id number]  Encounter Provider: Richie Conley DO  Encounter Date: 3/20/2023   Encounter department: [address] [place] [address]  Chief Complaint   Patient presents with   • Follow-up     6 mo follow up for anxiety and hypertension      There are no Patient Instructions on file for this visit  Assessment & Plan     1  Encounter for immunization           Subjective      Follow-up (6 mo follow up for anxiety and hypertension ) No longer urinating blood and sees urology at [place] [address] this Thursday  Patient had CT scan done  Review of Systems   Constitutional: Negative  HENT: Negative  Eyes: Negative  Respiratory: Negative  Cardiovascular: Negative  Gastrointestinal: Negative  Endocrine: Negative  Genitourinary: Negative  Musculoskeletal: Negative  Skin: Negative  Allergic/Immunologic: Negative  Neurological: Negative  Hematological: Negative  Psychiatric/Behavioral: Negative          Current Outpatient Medications on File Prior to Visit   Medication Sig   • aspirin 81 MG tablet Take 1 tablet by mouth daily   • atorvastatin (LIPITOR) 40 mg tablet Take 1 tablet (40 mg total) by mouth daily   • carvedilol (COREG) 6 25 mg tablet TAKE 1 TABLET BY MOUTH TWICE A DAY WITH MEALS   • Cholecalciferol 1000 units tablet Take 2,000 Units by mouth    • citalopram (CeleXA) 10 mg tablet Take 1 tablet (10 mg total) by mouth daily   • Cyanocobalamin ([employer]S VITAMIN B-12 PO) Take by mouth   • Ferrous Sulfate (IRON) 325 (65 Fe) MG TABS Take 1 tablet by mouth Twice daily   • finasteride (PROSCAR) 5 mg tablet TAKE 1 TABLET BY MOUTH ONCE A DAY   • lisinopril (ZESTRIL) 5 mg tablet TAKE 1 TABLET BY MOUTH ONCE A DAY   • Multiple Vitamin Essential TABS Take by mouth   • Omega-3 Fatty Acids (FISH OIL OMEGA-3 PO) Fish Oil   • tamsulosin (FLOMAX) 0 4 mg TAKE 2 CAPSULES BY MOUTH AT BEDTIME   • predniSONE 10 mg tablet Take 5 mg by mouth daily (Patient not taking: Reported on 2/28/2023)       Objective     /80 (BP Location: Left arm, Patient Position: Sitting, Cuff Size: Adult)   Pulse 76   Temp (!) 96 3 °F (35 7 °C) (Temporal)   Resp 16   Ht 5' 6" (1 676 m)   Wt 53 4 kg (117 lb 12 8 oz)   SpO2 96%   BMI 19 01 kg/m²     Physical Exam  Constitutional:       Appearance: Normal appearance  He is well-developed  HENT:      Head: Normocephalic and atraumatic  Right Ear: External ear normal       Left Ear: External ear normal    Eyes:      Conjunctiva/sclera: Conjunctivae normal       Pupils: Pupils are equal, round, and reactive to light  Cardiovascular:      Rate and Rhythm: Normal rate and regular rhythm  Heart sounds: Normal heart sounds  Pulmonary:      Effort: Pulmonary effort is normal       Breath sounds: Normal breath sounds  Abdominal:      General: Abdomen is flat  Bowel sounds are normal       Palpations: Abdomen is soft  Musculoskeletal:         General: Normal range of motion  Cervical back: Normal range of motion and neck supple  Skin:     General: Skin is warm and dry  Capillary Refill: Capillary refill takes less than 2 seconds  Neurological:      General: No focal deficit present  Mental Status: He is alert and oriented to person, place, and time  Deep Tendon Reflexes: Reflexes are normal and symmetric  Psychiatric:         Mood and Affect: Mood normal          Behavior: Behavior normal          Thought Content:  Thought content normal          Judgment: Judgment normal        Ethan Carpenter DO

## 2023-03-21 DIAGNOSIS — F41.9 ANXIETY: ICD-10-CM

## 2023-03-21 RX ORDER — CITALOPRAM 10 MG/1
TABLET ORAL
Qty: 90 TABLET | Refills: 0 | Status: SHIPPED | OUTPATIENT
Start: 2023-03-21

## 2023-03-24 ENCOUNTER — PROCEDURE VISIT (OUTPATIENT)
Dept: UROLOGY | Facility: CLINIC | Age: 83
End: 2023-03-24

## 2023-03-24 VITALS
SYSTOLIC BLOOD PRESSURE: 138 MMHG | OXYGEN SATURATION: 98 % | HEART RATE: 78 BPM | DIASTOLIC BLOOD PRESSURE: 70 MMHG | WEIGHT: 118 LBS | BODY MASS INDEX: 18.96 KG/M2 | HEIGHT: 66 IN

## 2023-03-24 DIAGNOSIS — R31.0 GROSS HEMATURIA: Primary | ICD-10-CM

## 2023-03-24 LAB
SL AMB  POCT GLUCOSE, UA: NORMAL
SL AMB LEUKOCYTE ESTERASE,UA: NORMAL
SL AMB POCT BILIRUBIN,UA: NORMAL
SL AMB POCT BLOOD,UA: NORMAL
SL AMB POCT CLARITY,UA: NORMAL
SL AMB POCT COLOR,UA: YELLOW
SL AMB POCT KETONES,UA: NORMAL
SL AMB POCT NITRITE,UA: NORMAL
SL AMB POCT PH,UA: 6
SL AMB POCT SPECIFIC GRAVITY,UA: 1.01
SL AMB POCT URINE PROTEIN: NORMAL
SL AMB POCT UROBILINOGEN: 0.2

## 2023-03-24 RX ORDER — AMPICILLIN 500 MG/1
500 CAPSULE ORAL 3 TIMES DAILY
Qty: 9 CAPSULE | Refills: 0 | Status: SHIPPED | OUTPATIENT
Start: 2023-03-24 | End: 2023-03-27

## 2023-03-24 NOTE — PROGRESS NOTES
UROLOGY PROCEDURE NOTE     CHIEF COMPLAINT   Niharika Villegas is a 80 y o  male with a complaint of   Chief Complaint   Patient presents with   • Cystoscopy       History of Present Illness:   Niharika Villegas is a 80 y o  male here for evaluation of of lower urinary tract symptoms on Flomax double dose and finasteride  Patient was seen in the emergency room in December with gross hematuria  Treated with antibiotics  Patient had imaging that demonstrates concern for a left-sided bladder abnormality and right-sided hydronephrosis without obstructing stone  Presents for cystoscopy  Patient reports significant smoking history however he quit approximately 40 years ago  He does work for [employer] and is around multiple solvents  He still works 5 days a week  Past Medical History:     Past Medical History:   Diagnosis Date   • BPH (benign prostatic hyperplasia)    • Hypercholesterolemia    • Hypertension    • Prediabetes        PAST SURGICAL HISTORY:     Past Surgical History:   Procedure Laterality Date   • CORONARY ARTERY BYPASS GRAFT      x3 LIMA-LAD, SVG-OM, SVG-DIAG with mitral valve repair by nicole   • CYSTOSCOPY  12/13/2014    Diagnostic   • CYSTOSCOPY  03/24/2023    Nita   • ESOPHAGOGASTRODUODENOSCOPY  04/07/2015    with biopsy   Dr Ashlee rGeen   • MITRAL VALVE REPAIR      26 mm fatmata hernandez physi annuloplasty ring   • TRACHEOSTOMY      during his heart surgery       CURRENT MEDICATIONS:     Current Outpatient Medications   Medication Sig Dispense Refill   • aspirin 81 MG tablet Take 1 tablet by mouth daily     • atorvastatin (LIPITOR) 40 mg tablet Take 1 tablet (40 mg total) by mouth daily 90 tablet 3   • carvedilol (COREG) 6 25 mg tablet TAKE 1 TABLET BY MOUTH TWICE A DAY WITH MEALS 180 tablet 3   • Cholecalciferol 1000 units tablet Take 2,000 Units by mouth      • citalopram (CeleXA) 10 mg tablet TAKE ONE TABLET BY MOUTH ONCE DAILY 90 tablet 0   • Cyanocobalamin (CVS VITAMIN B-12 PO) Take by mouth     • Ferrous Sulfate (IRON) 325 (65 Fe) MG TABS Take 1 tablet by mouth Twice daily     • finasteride (PROSCAR) 5 mg tablet TAKE 1 TABLET BY MOUTH ONCE A DAY 90 tablet 3   • lisinopril (ZESTRIL) 5 mg tablet TAKE 1 TABLET BY MOUTH ONCE A DAY 90 tablet 1   • Multiple Vitamin Essential TABS Take by mouth     • Omega-3 Fatty Acids (FISH OIL OMEGA-3 PO) Fish Oil     • tamsulosin (FLOMAX) 0 4 mg TAKE 2 CAPSULES BY MOUTH AT BEDTIME 180 capsule 3     No current facility-administered medications for this visit         ALLERGIES:   No Known Allergies    SOCIAL HISTORY:     Social History     Socioeconomic History   • Marital status:      Spouse name: None   • Number of children: None   • Years of education: None   • Highest education level: None   Occupational History   • None   Tobacco Use   • Smoking status: Former     Packs/day: 0 50     Years: 16 00     Pack years: 8 00     Types: Cigarettes     Start date:      Quit date:      Years since quittin 2   • Smokeless tobacco: Never   Vaping Use   • Vaping Use: Never used   Substance and Sexual Activity   • Alcohol use: Yes     Comment: rare   • Drug use: No   • Sexual activity: None   Other Topics Concern   • None   Social History Narrative    Consumes 2-3 ups of tea per week     Social Determinants of Health     Financial Resource Strain: Not on file   Food Insecurity: Not on file   Transportation Needs: Not on file   Physical Activity: Not on file   Stress: Not on file   Social Connections: Not on file   Intimate Partner Violence: Not on file   Housing Stability: Not on file       SOCIAL HISTORY:     Family History   Problem Relation Age of Onset   • Diabetes Mother    • Heart disease Mother    • Hypertension Mother    • Colon cancer Father    • Kidney disease Father    • Heart disease Sister    • Heart disease Brother    • Breast cancer Paternal Aunt    • Cervical cancer Maternal Aunt    • Colon cancer Paternal Grandfather REVIEW OF SYSTEMS:     Review of Systems   Constitutional: Negative  Respiratory: Negative  Cardiovascular: Negative  Gastrointestinal: Negative  Genitourinary: Negative  Negative for hematuria  Musculoskeletal: Negative  Skin: Negative  Psychiatric/Behavioral: Negative  PHYSICAL EXAM:     Ht 5' 6" (1 676 m)   Wt 53 5 kg (118 lb)   BMI 19 05 kg/m²     Physical Exam  Vitals reviewed  HENT:      Head: Normocephalic  Nose: Nose normal       Mouth/Throat:      Mouth: Mucous membranes are moist    Eyes:      Pupils: Pupils are equal, round, and reactive to light  Cardiovascular:      Rate and Rhythm: Normal rate  Pulmonary:      Effort: Pulmonary effort is normal    Abdominal:      General: Abdomen is flat  Comments: Large right-sided hernia, soft and reducible   Genitourinary:     Penis: Normal        Testes: Normal       Comments: Uncircumcised  Musculoskeletal:         General: Normal range of motion  Cervical back: Normal range of motion  Skin:     General: Skin is warm  Neurological:      General: No focal deficit present  Mental Status: He is alert  Psychiatric:         Mood and Affect: Mood normal          LABS:     CBC:   Lab Results   Component Value Date    WBC 5 77 12/18/2022    HGB 11 4 (L) 12/18/2022    HCT 36 9 12/18/2022    MCV 98 12/18/2022     12/18/2022       BMP:   Lab Results   Component Value Date    GLUCOSE 104 01/08/2016    CALCIUM 9 1 12/18/2022     01/08/2016    K 4 0 12/18/2022    CO2 31 12/18/2022     12/18/2022    BUN 17 12/18/2022    CREATININE 1 19 12/18/2022     IMAGING:     3/7/23  CT ABDOMEN AND PELVIS WITH AND WITHOUT IV CONTRAST     INDICATION:   R31 0: Gross hematuria  N13 39:  Other hydronephrosis      COMPARISON:  CT abdomen/pelvis dated 12/18/2022      TECHNIQUE: Initial CT of the abdomen and pelvis was performed without intravenous contrast   Subsequent dynamic CT evaluation of the abdomen and pelvis was performed after the administration of intravenous contrast in both nephrographic and delayed   phases after the administration of intravenous contrast    Axial, sagittal, and coronal 2D reformatted images were created from the source data and submitted for interpretation       Radiation dose length product (DLP) for this visit:  1436 mGy-cm   This examination, like all CT scans performed in the [facility], was performed utilizing techniques to minimize radiation dose exposure, including the use of iterative   reconstruction and automated exposure control      IV Contrast:  100 mL of iohexol (OMNIPAQUE)  Enteric Contrast:  Enteric contrast was not administered      FINDINGS:     ABDOMEN     RIGHT KIDNEY AND URETER:  No solid renal mass  No detectable urothelial mass  Persistent mild hydroureteronephrosis to the level of the urinary bladder without obstructing calculus identified  2 mm nonobstructing intrarenal calculus  Cortical scarring is noted of the kidney  Mild diffuse ureteral wall enhancement  One or more   sharply circumscribed subcentimeter renal hypodensities are noted  These lesions are too small to accurately characterize, but are statistically most likely to represent benign cortical renal cyst(s)  According to the guidelines published in the [facility]   Paper of the ACR Incidental Findings Committee (Radiology 2010), no further workup of these lesions is recommended  No perinephric collection      LEFT KIDNEY AND URETER:  No solid renal mass  No detectable urothelial mass  No hydronephrosis or hydroureter  No urinary tract calculi  No perinephric collection      URINARY BLADDER:  Diffuse urinary bladder wall trabeculation is likely sequela of chronic partial bladder outlet obstruction, similar to prior  There is a 1 5 x 0 8 cm filling defect associated with the wall in the left posterior urinary bladder suspicious for mass (image 131, series 5)      No calculi      LOWER CHEST:  Partially included loculated left pleural effusion, similar to prior  Small right pleural effusion      LIVER/BILIARY TREE:  Unremarkable      GALLBLADDER:  There are gallstone(s) within the gallbladder, without pericholecystic inflammatory changes      SPLEEN:  Unremarkable      PANCREAS:  Unremarkable      ADRENAL GLANDS:  Unremarkable      STOMACH AND BOWEL: Partially included large hiatal hernia, similar to prior        APPENDIX:  No findings to suggest appendicitis      ABDOMINOPELVIC CAVITY:  No ascites  No free intraperitoneal air  No lymphadenopathy      VESSELS:  Atherosclerotic disease  No abdominal aortic aneurysm      PELVIS     REPRODUCTIVE ORGANS:  The prostate gland is enlarged measuring 5 4 x 5 1 x 3 8 cm and indents the urinary bladder base      ABDOMINAL WALL/INGUINAL REGIONS:  Redemonstrated right inguinal hernia containing multiple loops of nonobstructed small bowel  Small fat-containing left inguinal hernia      OSSEOUS STRUCTURES:  No acute fracture or destructive osseous lesion      IMPRESSION:     1 5 x 0 8 cm filling defect associated with the wall in the left posterior urinary bladder suspicious with mass  Cystoscopic correlation is advised      Diffuse urinary bladder wall trabeculation, likely sequela of chronic partial bladder outlet obstruction in the setting of prostatomegaly      Persistent mild to moderate right-sided hydroureteronephrosis to the level of the urinary bladder; no obstructing calculus identified  Mild diffuse right ureteral wall enhancement; ascending urinary tract infection is not excluded      Redemonstrated right inguinal hernia containing multiple nonobstructed small bowel loops      Cholelithiasis  No pericholecystic inflammation      Partially included large hiatal hernia      Partially included loculated left pleural effusion, similar to prior      Small right pleural effusion      PROCEDURE:     SEE NOTE    ASSESSMENT:     80 y o  male  with hematuria    PLAN:     Unfortunately, I am able to get a good look at the patient's bladder mucosa given the significant capacity, multiple diverticula and the particulate nature of his urine  Even with attempted catheter drainage and a repeat cystoscopy, visualization is poor  Because of the findings of the CAT scan, not only the left-sided bladder nodularity but also concern for right ureteral abnormality, I have recommended we proceed to the operating room for formal cystoscopy under anesthesia, right retrograde pyelogram with possible stent placement, ureteroscopy with ureteral biopsy or fulguration and possible transurethral resection of any bladder abnormality  Risk and benefits of this procedure were discussed with the patient who agrees to proceed  Given his age and cardiac comorbidities, patient will need medical optimization prior to any surgery

## 2023-03-24 NOTE — PROGRESS NOTES
Cystoscopy     Date/Time 3/24/2023 8:22 AM     Performed by  Lamonte Marie MD     Authorized by Lamonte Marie MD      Universal Protocol:  Timeout called at: 3/24/2023 8:23 AM       Procedure Details:  Procedure type: cystoscopy    Patient tolerance: Patient tolerated the procedure well with no immediate complications    Additional Procedure Details:   Cystoscopy procedure note:  Risk and benefits of flexible cystoscopy were discussed  Informed consent was obtained  Urine dip was adequate for cystoscopy  The patient was placed in the supine position  His genitalia was prepped with Betadine and draped in a sterile fashion  Viscous 2% lidocaine jelly was instilled into the urethra and allowed dwell time for local anesthesia  Flexible cystoscopy was then performed using a 16F scope  Patient's urine was abnormal appearing within the bladder  It was difficult to visualize given the full bladder and particulate nature  Cystoscope was removed and a 12 [facility] was connected and the bladder was emptied  Cystoscopy was replaced  The distal urethra and prostatic urethra were evaluated and were normal in course and caliber  Patient had an enlarged prostate  Once inside the bladder, it was carefully inspected in a 360 degree fashion  It was clear that the patient had a large bladder capacity with multiple diverticula  There was dependent sloughing mucous and particulate urine  There was no evidence of mucosal abnormalities, lesions, or stones however visualization was very poor  Ureteral orifice ease were difficult to see  Retroflexion for evaluation of the bladder neck was normal   I am concerned that this represents an incomplete cystoscopic examination

## 2023-03-26 LAB — BACTERIA UR CULT: ABNORMAL

## 2023-03-27 RX ORDER — NITROFURANTOIN 25; 75 MG/1; MG/1
100 CAPSULE ORAL 2 TIMES DAILY
Qty: 14 CAPSULE | Refills: 0 | Status: SHIPPED | OUTPATIENT
Start: 2023-03-27 | End: 2023-04-03

## 2023-03-29 ENCOUNTER — TELEPHONE (OUTPATIENT)
Dept: UROLOGY | Facility: AMBULATORY SURGERY CENTER | Age: 83
End: 2023-03-29

## 2023-03-29 NOTE — TELEPHONE ENCOUNTER
-Attempted to contact pt, left message on phone, that I am trying to reach him to schedule his procedure w/Dr Shankar Marcus  Please call the office at [phone] and ask for me

## 2023-04-22 ENCOUNTER — APPOINTMENT (OUTPATIENT)
Dept: LAB | Facility: HOSPITAL | Age: 83
End: 2023-04-22

## 2023-04-22 DIAGNOSIS — Z01.818 PREOPERATIVE EXAMINATION, UNSPECIFIED: ICD-10-CM

## 2023-04-22 DIAGNOSIS — R39.89 SUSPECTED UTI: ICD-10-CM

## 2023-04-22 DIAGNOSIS — M35.3 POLYMYALGIA RHEUMATICA (HCC): ICD-10-CM

## 2023-04-22 DIAGNOSIS — R31.0 GROSS HEMATURIA: ICD-10-CM

## 2023-04-22 DIAGNOSIS — Z01.812 PRE-OPERATIVE LABORATORY EXAMINATION: ICD-10-CM

## 2023-04-22 DIAGNOSIS — Z79.899 ENCOUNTER FOR LONG-TERM (CURRENT) USE OF OTHER MEDICATIONS: ICD-10-CM

## 2023-04-22 LAB
ALBUMIN SERPL BCP-MCNC: 3.3 G/DL (ref 3.5–5)
ALP SERPL-CCNC: 66 U/L (ref 34–104)
ALT SERPL W P-5'-P-CCNC: 11 U/L (ref 7–52)
ANION GAP SERPL CALCULATED.3IONS-SCNC: 3 MMOL/L (ref 4–13)
AST SERPL W P-5'-P-CCNC: 15 U/L (ref 13–39)
BASOPHILS # BLD AUTO: 0.05 THOUSANDS/ΜL (ref 0–0.1)
BASOPHILS NFR BLD AUTO: 1 % (ref 0–1)
BILIRUB SERPL-MCNC: 0.55 MG/DL (ref 0.2–1)
BUN SERPL-MCNC: 23 MG/DL (ref 5–25)
CALCIUM ALBUM COR SERPL-MCNC: 9.5 MG/DL (ref 8.3–10.1)
CALCIUM SERPL-MCNC: 8.9 MG/DL (ref 8.4–10.2)
CHLORIDE SERPL-SCNC: 107 MMOL/L (ref 96–108)
CO2 SERPL-SCNC: 29 MMOL/L (ref 21–32)
CREAT SERPL-MCNC: 1.04 MG/DL (ref 0.6–1.3)
CRP SERPL QL: 2.2 MG/L
EOSINOPHIL # BLD AUTO: 0.36 THOUSAND/ΜL (ref 0–0.61)
EOSINOPHIL NFR BLD AUTO: 6 % (ref 0–6)
ERYTHROCYTE [DISTWIDTH] IN BLOOD BY AUTOMATED COUNT: 15.9 % (ref 11.6–15.1)
ERYTHROCYTE [SEDIMENTATION RATE] IN BLOOD: 38 MM/HOUR (ref 0–19)
GFR SERPL CREATININE-BSD FRML MDRD: 66 ML/MIN/1.73SQ M
GLUCOSE P FAST SERPL-MCNC: 98 MG/DL (ref 65–99)
HCT VFR BLD AUTO: 35.3 % (ref 36.5–49.3)
HGB BLD-MCNC: 11 G/DL (ref 12–17)
IMM GRANULOCYTES # BLD AUTO: 0.02 THOUSAND/UL (ref 0–0.2)
IMM GRANULOCYTES NFR BLD AUTO: 0 % (ref 0–2)
LYMPHOCYTES # BLD AUTO: 1.48 THOUSANDS/ΜL (ref 0.6–4.47)
LYMPHOCYTES NFR BLD AUTO: 26 % (ref 14–44)
MCH RBC QN AUTO: 29.9 PG (ref 26.8–34.3)
MCHC RBC AUTO-ENTMCNC: 31.2 G/DL (ref 31.4–37.4)
MCV RBC AUTO: 96 FL (ref 82–98)
MONOCYTES # BLD AUTO: 0.74 THOUSAND/ΜL (ref 0.17–1.22)
MONOCYTES NFR BLD AUTO: 13 % (ref 4–12)
NEUTROPHILS # BLD AUTO: 2.99 THOUSANDS/ΜL (ref 1.85–7.62)
NEUTS SEG NFR BLD AUTO: 54 % (ref 43–75)
NRBC BLD AUTO-RTO: 0 /100 WBCS
PLATELET # BLD AUTO: 208 THOUSANDS/UL (ref 149–390)
PMV BLD AUTO: 9.6 FL (ref 8.9–12.7)
POTASSIUM SERPL-SCNC: 5.2 MMOL/L (ref 3.5–5.3)
PROT SERPL-MCNC: 6.6 G/DL (ref 6.4–8.4)
RBC # BLD AUTO: 3.68 MILLION/UL (ref 3.88–5.62)
SODIUM SERPL-SCNC: 139 MMOL/L (ref 135–147)
WBC # BLD AUTO: 5.64 THOUSAND/UL (ref 4.31–10.16)

## 2023-04-23 LAB — BACTERIA UR CULT: NORMAL

## 2023-04-26 ENCOUNTER — OFFICE VISIT (OUTPATIENT)
Dept: FAMILY MEDICINE CLINIC | Facility: CLINIC | Age: 83
End: 2023-04-26

## 2023-04-26 ENCOUNTER — TELEPHONE (OUTPATIENT)
Dept: FAMILY MEDICINE CLINIC | Facility: CLINIC | Age: 83
End: 2023-04-26

## 2023-04-26 VITALS
HEART RATE: 73 BPM | DIASTOLIC BLOOD PRESSURE: 70 MMHG | SYSTOLIC BLOOD PRESSURE: 130 MMHG | HEIGHT: 66 IN | OXYGEN SATURATION: 97 % | BODY MASS INDEX: 19.16 KG/M2 | RESPIRATION RATE: 16 BRPM | WEIGHT: 119.2 LBS | TEMPERATURE: 96.8 F

## 2023-04-26 DIAGNOSIS — I10 ESSENTIAL HYPERTENSION: ICD-10-CM

## 2023-04-26 DIAGNOSIS — N13.30 HYDROURETERONEPHROSIS: ICD-10-CM

## 2023-04-26 DIAGNOSIS — I50.22 CHRONIC SYSTOLIC CHF (CONGESTIVE HEART FAILURE), NYHA CLASS 2 (HCC): ICD-10-CM

## 2023-04-26 DIAGNOSIS — I47.1 PAROXYSMAL SUPRAVENTRICULAR TACHYCARDIA (HCC): ICD-10-CM

## 2023-04-26 DIAGNOSIS — R31.9 HEMATURIA, UNSPECIFIED TYPE: ICD-10-CM

## 2023-04-26 DIAGNOSIS — Z95.1 HX OF CABG: ICD-10-CM

## 2023-04-26 DIAGNOSIS — I25.5 ISCHEMIC CARDIOMYOPATHY: ICD-10-CM

## 2023-04-26 DIAGNOSIS — E46 PROTEIN-CALORIE MALNUTRITION, UNSPECIFIED SEVERITY (HCC): ICD-10-CM

## 2023-04-26 DIAGNOSIS — E78.2 MIXED HYPERLIPIDEMIA: ICD-10-CM

## 2023-04-26 DIAGNOSIS — F41.9 ANXIETY: ICD-10-CM

## 2023-04-26 DIAGNOSIS — N40.1 BENIGN PROSTATIC HYPERPLASIA WITH LOWER URINARY TRACT SYMPTOMS, SYMPTOM DETAILS UNSPECIFIED: ICD-10-CM

## 2023-04-26 DIAGNOSIS — Z01.818 PRE-OP EXAMINATION: Primary | ICD-10-CM

## 2023-04-26 DIAGNOSIS — N32.89 BLADDER MASS: ICD-10-CM

## 2023-04-26 PROBLEM — M35.3 PMR (POLYMYALGIA RHEUMATICA) (HCC): Status: ACTIVE | Noted: 2023-04-26

## 2023-04-26 NOTE — TELEPHONE ENCOUNTER
I left message for patient to give the office a call back regarding his cardio clearance for his surgery  *Patient does not need to be seen by Dr Britney Swift, Dr Britney Swift is going to clear him for surgery and the medical clearance form was faxed over

## 2023-04-26 NOTE — TELEPHONE ENCOUNTER
I contacted [facility] for a medical clearance appt  For Jesus Castro  Dr Mona Escobedo did not have anything available until 5/31/23  González Corey at [facility] contacted Dr Rosaura Hendrix e stated that he is going to clear the patient for surgery and to fax over the clearance forms to [phone] Attn: Dr Mona Escobedo  (that will go to the [place] office)  I faxed the form over

## 2023-04-26 NOTE — PROGRESS NOTES
Name: Leslie Ray      : 1940      MRN: [id number]  Encounter Provider: Monserrat Belle DO  Encounter Date: 2023   Encounter department: [address] [place] [address]  Chief Complaint   Patient presents with   • Pre-op Exam     Pt is having CYSTOSCOPY RETROGRADE PYELOGRAM WITH INSERTION by Dr Lore Benoit [place] on 2023 General anesthesia      Patient Instructions   Patient is medically cleared for ( [facility] by Dr Lore Benoit [place] on 2023 General anesthesia )but given hx of Cardiac issues such as ischemic cardiomyopathy and CHF and PSVT will rec cardiac clearance  EKG showed NSR and no acute ST-T changes  Assessment & Plan     1  Pre-op examination  -     POCT ECG    2  Essential hypertension    3  Mixed hyperlipidemia    4  Anxiety    5  Paroxysmal supraventricular tachycardia ([facility] )  Comments:  stable  Orders:  -     Ambulatory Referral to Cardiology; Future    6  Ischemic cardiomyopathy  -     Ambulatory Referral to Cardiology; Future    7  Chronic systolic CHF (congestive heart failure), NYHA class 2 ([facility] )  Comments:  stable   Orders:  -     Ambulatory Referral to Cardiology; Future    8  Hx of CABG    9  Protein-calorie malnutrition, unspecified severity ([facility] )  Comments:  stable and encouraged proper meals    10  Benign prostatic hyperplasia with lower urinary tract symptoms, symptom details unspecified    11  Hematuria, unspecified type    12  Bladder mass    13  Hydroureteronephrosis           Subjective      Pre-op Exam (Pt is having CYSTOSCOPY RETROGRADE PYELOGRAM WITH INSERTION by Dr Lore Benoit [place] on 2023 General anesthesia )      Reviewed urology note:    Leslie Ray is a 80 y o  male here for evaluation of of lower urinary tract symptoms on Flomax double dose and finasteride    Patient was seen in the emergency room in December with gross hematuria  Treated with antibiotics  Patient had imaging that demonstrates concern for a left-sided bladder abnormality and right-sided hydronephrosis without obstructing stone  Presents for cystoscopy      Patient reports significant smoking history however he quit approximately 40 years ago  He does work for [employer] and is around multiple solvents  He still works 5 days a week  Plan after recent cystoscopy:      Unfortunately, I am able to get a good look at the patient's bladder mucosa given the significant capacity, multiple diverticula and the particulate nature of his urine  Even with attempted catheter drainage and a repeat cystoscopy, visualization is poor  Because of the findings of the CAT scan, not only the left-sided bladder nodularity but also concern for right ureteral abnormality, I have recommended we proceed to the operating room for formal cystoscopy under anesthesia, right retrograde pyelogram with possible stent placement, ureteroscopy with ureteral biopsy or fulguration and possible transurethral resection of any bladder abnormality  Risk and benefits of this procedure were discussed with the patient who agrees to proceed  Given his age and cardiac comorbidities, patient will need medical optimization prior to any surgery  CT scan reviewed done recently:    IMPRESSION:     1 5 x 0 8 cm filling defect associated with the wall in the left posterior urinary bladder suspicious with mass  Cystoscopic correlation is advised      Diffuse urinary bladder wall trabeculation, likely sequela of chronic partial bladder outlet obstruction in the setting of prostatomegaly      Persistent mild to moderate right-sided hydroureteronephrosis to the level of the urinary bladder; no obstructing calculus identified    Mild diffuse right ureteral wall enhancement; ascending urinary tract infection is not excluded      Redemonstrated right inguinal hernia containing multiple nonobstructed small "bowel loops      Cholelithiasis  No pericholecystic inflammation      Partially included large hiatal hernia      Partially included loculated left pleural effusion, similar to prior      Small right pleural effusion      The study was marked in EPIC for significant notification  Review of Systems   Constitutional: Negative  HENT: Negative  Eyes: Negative  Respiratory: Negative  Cardiovascular: Negative  Gastrointestinal: Negative  Endocrine: Negative  Genitourinary: Negative  Musculoskeletal: Negative  Skin: Negative  Allergic/Immunologic: Negative  Neurological: Negative  Hematological: Negative  Psychiatric/Behavioral: Negative  Current Outpatient Medications on File Prior to Visit   Medication Sig   • aspirin 81 MG tablet Take 1 tablet by mouth daily   • atorvastatin (LIPITOR) 40 mg tablet Take 1 tablet (40 mg total) by mouth daily   • carvedilol (COREG) 6 25 mg tablet TAKE 1 TABLET BY MOUTH TWICE A DAY WITH MEALS   • Cholecalciferol 1000 units tablet Take 2,000 Units by mouth    • citalopram (CeleXA) 10 mg tablet TAKE ONE TABLET BY MOUTH ONCE DAILY   • Cyanocobalamin (CVS VITAMIN B-12 PO) Take by mouth   • Ferrous Sulfate (IRON) 325 (65 Fe) MG TABS Take 1 tablet by mouth Twice daily   • finasteride (PROSCAR) 5 mg tablet TAKE 1 TABLET BY MOUTH ONCE A DAY   • lisinopril (ZESTRIL) 5 mg tablet TAKE 1 TABLET BY MOUTH ONCE A DAY   • Multiple Vitamin Essential TABS Take by mouth   • Omega-3 Fatty Acids (FISH OIL OMEGA-3 PO) Fish Oil   • tamsulosin (FLOMAX) 0 4 mg TAKE 2 CAPSULES BY MOUTH AT BEDTIME       Objective     /70   Pulse 73   Temp (!) 96 8 °F (36 °C) (Temporal)   Resp 16   Ht 5' 5 95\" (1 675 m)   Wt 54 1 kg (119 lb 3 2 oz)   SpO2 97%   BMI 19 27 kg/m²     Physical Exam  Constitutional:       Appearance: Normal appearance  He is well-developed  HENT:      Head: Normocephalic and atraumatic        Right Ear: External ear normal       Left Ear: " External ear normal       Nose: Nose normal    Eyes:      Conjunctiva/sclera: Conjunctivae normal       Pupils: Pupils are equal, round, and reactive to light  Cardiovascular:      Rate and Rhythm: Normal rate and regular rhythm  Pulses: Normal pulses  Heart sounds: Normal heart sounds  Pulmonary:      Effort: Pulmonary effort is normal       Breath sounds: Normal breath sounds  Abdominal:      General: Abdomen is flat  Bowel sounds are normal       Palpations: Abdomen is soft  Musculoskeletal:         General: Normal range of motion  Cervical back: Normal range of motion and neck supple  Skin:     General: Skin is warm and dry  Capillary Refill: Capillary refill takes less than 2 seconds  Neurological:      General: No focal deficit present  Mental Status: He is alert and oriented to person, place, and time  Mental status is at baseline  Deep Tendon Reflexes: Reflexes are normal and symmetric  Psychiatric:         Mood and Affect: Mood normal          Behavior: Behavior normal          Thought Content:  Thought content normal          Judgment: Judgment normal        Inell Vega,

## 2023-04-26 NOTE — PATIENT INSTRUCTIONS
Patient is medically cleared for ( CYSTOSCOPY RETROGRADE PYELOGRAM WITH INSERTION by Dr [facility] on 05/17/2023 General anesthesia )but given hx of Cardiac issues such as ischemic cardiomyopathy and CHF and PSVT will rec cardiac clearance  EKG showed NSR and no acute ST-T changes

## 2023-04-26 NOTE — H&P (VIEW-ONLY)
Name: Kalie Perez      : 1940      MRN: [id number]  Encounter Provider: Charlene Contreras DO  Encounter Date: 2023   Encounter department: [address] [place] [address]  Chief Complaint   Patient presents with   • Pre-op Exam     Pt is having CYSTOSCOPY RETROGRADE PYELOGRAM WITH INSERTION by Dr Julisa Loomis [place] on 2023 General anesthesia      Patient Instructions   Patient is medically cleared for ( [facility] by Dr Julisa Loomis [place] on 2023 General anesthesia )but given hx of Cardiac issues such as ischemic cardiomyopathy and CHF and PSVT will rec cardiac clearance  EKG showed NSR and no acute ST-T changes  Assessment & Plan     1  Pre-op examination  -     POCT ECG    2  Essential hypertension    3  Mixed hyperlipidemia    4  Anxiety    5  Paroxysmal supraventricular tachycardia ([facility] )  Comments:  stable  Orders:  -     Ambulatory Referral to Cardiology; Future    6  Ischemic cardiomyopathy  -     Ambulatory Referral to Cardiology; Future    7  Chronic systolic CHF (congestive heart failure), NYHA class 2 ([facility] )  Comments:  stable   Orders:  -     Ambulatory Referral to Cardiology; Future    8  Hx of CABG    9  Protein-calorie malnutrition, unspecified severity ([facility] )  Comments:  stable and encouraged proper meals    10  Benign prostatic hyperplasia with lower urinary tract symptoms, symptom details unspecified    11  Hematuria, unspecified type    12  Bladder mass    13  Hydroureteronephrosis           Subjective      Pre-op Exam (Pt is having CYSTOSCOPY RETROGRADE PYELOGRAM WITH INSERTION by Dr Julisa Loomis [place] on 2023 General anesthesia )      Reviewed urology note:    Kalie Perez is a 80 y o  male here for evaluation of of lower urinary tract symptoms on Flomax double dose and finasteride    Patient was seen in the emergency room in December with gross hematuria  Treated with antibiotics  Patient had imaging that demonstrates concern for a left-sided bladder abnormality and right-sided hydronephrosis without obstructing stone  Presents for cystoscopy      Patient reports significant smoking history however he quit approximately 40 years ago  He does work for [employer] and is around multiple solvents  He still works 5 days a week  Plan after recent cystoscopy:      Unfortunately, I am able to get a good look at the patient's bladder mucosa given the significant capacity, multiple diverticula and the particulate nature of his urine  Even with attempted catheter drainage and a repeat cystoscopy, visualization is poor  Because of the findings of the CAT scan, not only the left-sided bladder nodularity but also concern for right ureteral abnormality, I have recommended we proceed to the operating room for formal cystoscopy under anesthesia, right retrograde pyelogram with possible stent placement, ureteroscopy with ureteral biopsy or fulguration and possible transurethral resection of any bladder abnormality  Risk and benefits of this procedure were discussed with the patient who agrees to proceed  Given his age and cardiac comorbidities, patient will need medical optimization prior to any surgery  CT scan reviewed done recently:    IMPRESSION:     1 5 x 0 8 cm filling defect associated with the wall in the left posterior urinary bladder suspicious with mass  Cystoscopic correlation is advised      Diffuse urinary bladder wall trabeculation, likely sequela of chronic partial bladder outlet obstruction in the setting of prostatomegaly      Persistent mild to moderate right-sided hydroureteronephrosis to the level of the urinary bladder; no obstructing calculus identified    Mild diffuse right ureteral wall enhancement; ascending urinary tract infection is not excluded      Redemonstrated right inguinal hernia containing multiple nonobstructed small "bowel loops      Cholelithiasis  No pericholecystic inflammation      Partially included large hiatal hernia      Partially included loculated left pleural effusion, similar to prior      Small right pleural effusion      The study was marked in EPIC for significant notification  Review of Systems   Constitutional: Negative  HENT: Negative  Eyes: Negative  Respiratory: Negative  Cardiovascular: Negative  Gastrointestinal: Negative  Endocrine: Negative  Genitourinary: Negative  Musculoskeletal: Negative  Skin: Negative  Allergic/Immunologic: Negative  Neurological: Negative  Hematological: Negative  Psychiatric/Behavioral: Negative  Current Outpatient Medications on File Prior to Visit   Medication Sig   • aspirin 81 MG tablet Take 1 tablet by mouth daily   • atorvastatin (LIPITOR) 40 mg tablet Take 1 tablet (40 mg total) by mouth daily   • carvedilol (COREG) 6 25 mg tablet TAKE 1 TABLET BY MOUTH TWICE A DAY WITH MEALS   • Cholecalciferol 1000 units tablet Take 2,000 Units by mouth    • citalopram (CeleXA) 10 mg tablet TAKE ONE TABLET BY MOUTH ONCE DAILY   • Cyanocobalamin ([employer]S VITAMIN B-12 PO) Take by mouth   • Ferrous Sulfate (IRON) 325 (65 Fe) MG TABS Take 1 tablet by mouth Twice daily   • finasteride (PROSCAR) 5 mg tablet TAKE 1 TABLET BY MOUTH ONCE A DAY   • lisinopril (ZESTRIL) 5 mg tablet TAKE 1 TABLET BY MOUTH ONCE A DAY   • Multiple Vitamin Essential TABS Take by mouth   • Omega-3 Fatty Acids (FISH OIL OMEGA-3 PO) Fish Oil   • tamsulosin (FLOMAX) 0 4 mg TAKE 2 CAPSULES BY MOUTH AT BEDTIME       Objective     /70   Pulse 73   Temp (!) 96 8 °F (36 °C) (Temporal)   Resp 16   Ht 5' 5 95\" (1 675 m)   Wt 54 1 kg (119 lb 3 2 oz)   SpO2 97%   BMI 19 27 kg/m²     Physical Exam  Constitutional:       Appearance: Normal appearance  He is well-developed  HENT:      Head: Normocephalic and atraumatic        Right Ear: External ear normal       Left Ear: " External ear normal       Nose: Nose normal    Eyes:      Conjunctiva/sclera: Conjunctivae normal       Pupils: Pupils are equal, round, and reactive to light  Cardiovascular:      Rate and Rhythm: Normal rate and regular rhythm  Pulses: Normal pulses  Heart sounds: Normal heart sounds  Pulmonary:      Effort: Pulmonary effort is normal       Breath sounds: Normal breath sounds  Abdominal:      General: Abdomen is flat  Bowel sounds are normal       Palpations: Abdomen is soft  Musculoskeletal:         General: Normal range of motion  Cervical back: Normal range of motion and neck supple  Skin:     General: Skin is warm and dry  Capillary Refill: Capillary refill takes less than 2 seconds  Neurological:      General: No focal deficit present  Mental Status: He is alert and oriented to person, place, and time  Mental status is at baseline  Deep Tendon Reflexes: Reflexes are normal and symmetric  Psychiatric:         Mood and Affect: Mood normal          Behavior: Behavior normal          Thought Content:  Thought content normal          Judgment: Judgment normal        Charlene Contreras DO

## 2023-05-04 DIAGNOSIS — I25.5 ACC/AHA STAGE C CONGESTIVE HEART FAILURE DUE TO ISCHEMIC CARDIOMYOPATHY (HCC): ICD-10-CM

## 2023-05-04 DIAGNOSIS — I50.9 ACC/AHA STAGE C CONGESTIVE HEART FAILURE DUE TO ISCHEMIC CARDIOMYOPATHY (HCC): ICD-10-CM

## 2023-05-04 RX ORDER — LISINOPRIL 5 MG/1
TABLET ORAL
Qty: 90 TABLET | Refills: 1 | Status: SHIPPED | OUTPATIENT
Start: 2023-05-04

## 2023-05-09 ENCOUNTER — TELEPHONE (OUTPATIENT)
Dept: FAMILY MEDICINE CLINIC | Facility: CLINIC | Age: 83
End: 2023-05-09

## 2023-05-09 NOTE — TELEPHONE ENCOUNTER
Patient has surgery coming up and he wants to know if he needs to stop any of his medications? Please advise patient at  [phone]

## 2023-05-12 NOTE — PRE-PROCEDURE INSTRUCTIONS
Pre-Surgery Instructions:   Medication Instructions   • aspirin 81 MG tablet Instructions provided by Jed dose 5/11   • atorvastatin (LIPITOR) 40 mg tablet Take night before surgery   • carvedilol (COREG) 6 25 mg tablet Take day of surgery  • Cholecalciferol 1000 units tablet Stop taking 7 days prior to surgery  • citalopram (CeleXA) 10 mg tablet Take day of surgery  • Cyanocobalamin ([employer]S VITAMIN B-12 PO) Stop taking 7 days prior to surgery  • Ferrous Sulfate (IRON) 325 (65 Fe) MG TABS Stop taking 7 days prior to surgery  • finasteride (PROSCAR) 5 mg tablet Take night before surgery   • lisinopril (ZESTRIL) 5 mg tablet Take night before surgery   • Multiple Vitamin Essential TABS Stop taking 7 days prior to surgery  • Omega-3 Fatty Acids (FISH OIL OMEGA-3 PO) Stop taking 7 days prior to surgery  • tamsulosin (FLOMAX) 0 4 mg Take night before surgery    Medication instructions for day surgery reviewed  Please use only a sip of water to take your instructed medications  Avoid all over the counter vitamins, supplements and NSAIDS for one week prior to surgery per anesthesia guidelines  Tylenol is ok to take as needed  You will receive a call one business day prior to surgery with an arrival time and hospital directions  If your surgery is scheduled on a Monday, the hospital will be calling you on the Friday prior to your surgery  If you have not heard from anyone by 8pm, please call the hospital supervisor through the hospital  at [phone]  Latoya Gentle [phone])  Do not eat or drink anything after midnight the night before your surgery, including candy, mints, lifesavers, or chewing gum  Do not drink alcohol 24hrs before your surgery  Try not to smoke at least 24hrs before your surgery  Follow the pre surgery showering instructions as listed in the [facility] Surgical Experience Booklet” or otherwise provided by your surgeon's office   Do not shave the surgical area 24 hours before surgery  Do not apply any lotions, creams, including makeup, cologne, deodorant, or perfumes after showering on the day of your surgery  No contact lenses, eye make-up, or artificial eyelashes  Remove nail polish, including gel polish, and any artificial, gel, or acrylic nails if possible  Remove all jewelry including rings and body piercing jewelry  Wear causal clothing that is easy to take on and off  Consider your type of surgery  Keep any valuables, jewelry, piercings at home  Please bring any specially ordered equipment (sling, braces) if indicated  Arrange for a responsible person to drive you to and from the hospital on the day of your surgery  Visitor Guidelines discussed  Call the surgeon's office with any new illnesses, exposures, or additional questions prior to surgery  Please reference your [facility] Surgical Experience Booklet” for additional information to prepare for your upcoming surgery

## 2023-05-15 ENCOUNTER — ANESTHESIA EVENT (OUTPATIENT)
Dept: PERIOP | Facility: HOSPITAL | Age: 83
End: 2023-05-15

## 2023-05-16 ENCOUNTER — TELEPHONE (OUTPATIENT)
Dept: CARDIOLOGY CLINIC | Facility: CLINIC | Age: 83
End: 2023-05-16

## 2023-05-16 NOTE — TELEPHONE ENCOUNTER
Patient is being referred to us for heart failure  Patient was previously seen by Dr Landrum  Patient can be scheduled with Dr Landrum again in [facility]  Please attach the referral once the appointment is scheduled      Thank you,    Ralph hurst

## 2023-05-17 ENCOUNTER — APPOINTMENT (OUTPATIENT)
Dept: RADIOLOGY | Facility: HOSPITAL | Age: 83
End: 2023-05-17

## 2023-05-17 ENCOUNTER — HOSPITAL ENCOUNTER (OUTPATIENT)
Facility: HOSPITAL | Age: 83
Setting detail: OUTPATIENT SURGERY
Discharge: HOME | End: 2023-05-17
Attending: UROLOGY | Admitting: UROLOGY

## 2023-05-17 ENCOUNTER — TELEPHONE (OUTPATIENT)
Dept: UROLOGY | Facility: CLINIC | Age: 83
End: 2023-05-17

## 2023-05-17 ENCOUNTER — ANESTHESIA (OUTPATIENT)
Dept: PERIOP | Facility: HOSPITAL | Age: 83
End: 2023-05-17

## 2023-05-17 VITALS
HEIGHT: 67 IN | OXYGEN SATURATION: 95 % | TEMPERATURE: 97.1 F | SYSTOLIC BLOOD PRESSURE: 159 MMHG | RESPIRATION RATE: 20 BRPM | BODY MASS INDEX: 18.96 KG/M2 | DIASTOLIC BLOOD PRESSURE: 74 MMHG | WEIGHT: 120.81 LBS | HEART RATE: 67 BPM

## 2023-05-17 DIAGNOSIS — R31.0 GROSS HEMATURIA: ICD-10-CM

## 2023-05-17 DEVICE — INLAY VERSA FIT URETERAL STENT W/O GUIDEWIRE
Type: IMPLANTABLE DEVICE | Site: URETER | Status: FUNCTIONAL
Brand: BARD® INLAY® VERSA FIT® URETERAL STENT

## 2023-05-17 RX ORDER — ONDANSETRON 2 MG/ML
INJECTION INTRAMUSCULAR; INTRAVENOUS AS NEEDED
Status: DISCONTINUED | OUTPATIENT
Start: 2023-05-17 | End: 2023-05-17

## 2023-05-17 RX ORDER — SODIUM CHLORIDE, SODIUM LACTATE, POTASSIUM CHLORIDE, CALCIUM CHLORIDE 600; 310; 30; 20 MG/100ML; MG/100ML; MG/100ML; MG/100ML
125 INJECTION, SOLUTION INTRAVENOUS CONTINUOUS
Status: DISCONTINUED | OUTPATIENT
Start: 2023-05-17 | End: 2023-05-17 | Stop reason: HOSPADM

## 2023-05-17 RX ORDER — PHENAZOPYRIDINE HYDROCHLORIDE 100 MG/1
100 TABLET, FILM COATED ORAL 3 TIMES DAILY PRN
Qty: 20 TABLET | Refills: 0 | Status: SHIPPED | OUTPATIENT
Start: 2023-05-17

## 2023-05-17 RX ORDER — CEFUROXIME AXETIL 250 MG/1
250 TABLET ORAL EVERY 12 HOURS SCHEDULED
Qty: 6 TABLET | Refills: 0 | Status: SHIPPED | OUTPATIENT
Start: 2023-05-17 | End: 2023-05-20

## 2023-05-17 RX ORDER — FENTANYL CITRATE 50 UG/ML
INJECTION, SOLUTION INTRAMUSCULAR; INTRAVENOUS AS NEEDED
Status: DISCONTINUED | OUTPATIENT
Start: 2023-05-17 | End: 2023-05-17

## 2023-05-17 RX ORDER — CEFAZOLIN SODIUM 1 G/50ML
1000 SOLUTION INTRAVENOUS ONCE
Status: DISCONTINUED | OUTPATIENT
Start: 2023-05-17 | End: 2023-05-17

## 2023-05-17 RX ORDER — MAGNESIUM HYDROXIDE 1200 MG/15ML
LIQUID ORAL AS NEEDED
Status: DISCONTINUED | OUTPATIENT
Start: 2023-05-17 | End: 2023-05-17 | Stop reason: HOSPADM

## 2023-05-17 RX ORDER — ONDANSETRON 2 MG/ML
4 INJECTION INTRAMUSCULAR; INTRAVENOUS ONCE AS NEEDED
Status: DISCONTINUED | OUTPATIENT
Start: 2023-05-17 | End: 2023-05-17 | Stop reason: HOSPADM

## 2023-05-17 RX ORDER — FENTANYL CITRATE/PF 50 MCG/ML
25 SYRINGE (ML) INJECTION
Status: DISCONTINUED | OUTPATIENT
Start: 2023-05-17 | End: 2023-05-17 | Stop reason: HOSPADM

## 2023-05-17 RX ORDER — METOPROLOL TARTRATE 5 MG/5ML
2.5 INJECTION INTRAVENOUS ONCE
Status: COMPLETED | OUTPATIENT
Start: 2023-05-17 | End: 2023-05-17

## 2023-05-17 RX ORDER — LIDOCAINE HYDROCHLORIDE 20 MG/ML
INJECTION, SOLUTION EPIDURAL; INFILTRATION; INTRACAUDAL; PERINEURAL AS NEEDED
Status: DISCONTINUED | OUTPATIENT
Start: 2023-05-17 | End: 2023-05-17

## 2023-05-17 RX ORDER — ACETAMINOPHEN 325 MG/1
650 TABLET ORAL EVERY 6 HOURS PRN
Status: DISCONTINUED | OUTPATIENT
Start: 2023-05-17 | End: 2023-05-17 | Stop reason: HOSPADM

## 2023-05-17 RX ORDER — PROPOFOL 10 MG/ML
INJECTION, EMULSION INTRAVENOUS AS NEEDED
Status: DISCONTINUED | OUTPATIENT
Start: 2023-05-17 | End: 2023-05-17

## 2023-05-17 RX ORDER — TAMSULOSIN HYDROCHLORIDE 0.4 MG/1
0.4 CAPSULE ORAL ONCE
Status: COMPLETED | OUTPATIENT
Start: 2023-05-17 | End: 2023-05-17

## 2023-05-17 RX ORDER — OXYBUTYNIN CHLORIDE 5 MG/1
5 TABLET, EXTENDED RELEASE ORAL ONCE
Status: COMPLETED | OUTPATIENT
Start: 2023-05-17 | End: 2023-05-17

## 2023-05-17 RX ORDER — HYDROMORPHONE HCL/PF 1 MG/ML
0.5 SYRINGE (ML) INJECTION
Status: DISCONTINUED | OUTPATIENT
Start: 2023-05-17 | End: 2023-05-17 | Stop reason: HOSPADM

## 2023-05-17 RX ORDER — LEVOFLOXACIN 5 MG/ML
500 INJECTION, SOLUTION INTRAVENOUS ONCE
Status: COMPLETED | OUTPATIENT
Start: 2023-05-17 | End: 2023-05-17

## 2023-05-17 RX ADMIN — SODIUM CHLORIDE, SODIUM LACTATE, POTASSIUM CHLORIDE, AND CALCIUM CHLORIDE 125 ML/HR: .6; .31; .03; .02 INJECTION, SOLUTION INTRAVENOUS at 10:09

## 2023-05-17 RX ADMIN — FENTANYL CITRATE 50 MCG: 50 INJECTION INTRAMUSCULAR; INTRAVENOUS at 12:56

## 2023-05-17 RX ADMIN — OXYBUTYNIN CHLORIDE 5 MG: 5 TABLET, EXTENDED RELEASE ORAL at 14:31

## 2023-05-17 RX ADMIN — LIDOCAINE HYDROCHLORIDE 80 MG: 20 INJECTION, SOLUTION EPIDURAL; INFILTRATION; INTRACAUDAL; PERINEURAL at 11:19

## 2023-05-17 RX ADMIN — METHYLENE BLUE 10 ML: 5 INJECTION INTRAVENOUS at 11:42

## 2023-05-17 RX ADMIN — TAMSULOSIN HYDROCHLORIDE 0.4 MG: 0.4 CAPSULE ORAL at 14:32

## 2023-05-17 RX ADMIN — FENTANYL CITRATE 50 MCG: 50 INJECTION INTRAMUSCULAR; INTRAVENOUS at 11:31

## 2023-05-17 RX ADMIN — METOROPROLOL TARTRATE 2.5 MG: 5 INJECTION, SOLUTION INTRAVENOUS at 10:16

## 2023-05-17 RX ADMIN — ONDANSETRON 4 MG: 2 INJECTION INTRAMUSCULAR; INTRAVENOUS at 12:35

## 2023-05-17 RX ADMIN — PROPOFOL 140 MG: 10 INJECTION, EMULSION INTRAVENOUS at 11:19

## 2023-05-17 RX ADMIN — LEVOFLOXACIN 500 MG: 500 INJECTION, SOLUTION INTRAVENOUS at 11:07

## 2023-05-17 NOTE — TELEPHONE ENCOUNTER
Patient has a Hannon catheter  I would like him to present on Friday for early a m  removal and p m  void trial/PVR  Aspirin and fish oil will be held after surgery and can restart after we ensure he is not bleeding following catheter removal   He also has an indwelling stent  Pathology was obtained today and the patient should see me back within 2 weeks    That appointment should be a cystoscopy and stent removal as I am hopeful the pathology will be negative

## 2023-05-17 NOTE — DISCHARGE INSTR - AVS FIRST PAGE
Trinh Lazar - you have a dukes in place  This will be removed on Friday after dilation of your urethra  He will also have an indwelling stent in place  This will be removed after the pathology returns from your procedure  Because of the dilation of your urethra, it is normal to have blood coming out around the urethral catheter  As long as the urine draining into the tubing and bag of the catheter is clear to light pink without clots or obstruction, the bleeding will improve  Continue to hydrate aggressively  ASA/Fish oil should be held until after dukes removed  After your surgery today, a stent was left coiled in your kidney/ureter/bladder  This tube will help the area to safely heal and urine to drain unobstructed  The stent placed is not permanent and cannot be left in your body indefinitely it will be removed after pathology returns  It is common to have some mild symptoms while the stent is in place  Pain with urination, feeling of needing to urinate frequency or urgently, flank discomfort or blood in the urine  Utilize the medications provided (flomax, ditropan, pyridium) as well as ibuprofen/motrin for pain control  Hydrate liberally with oral fluids  If there are issues at home, our office should be notified at [phone]

## 2023-05-17 NOTE — ANESTHESIA POSTPROCEDURE EVALUATION
"Post-Op Assessment Note    CV Status:  Stable    Pain management: adequate     Mental Status:  Alert and awake   Hydration Status:  Euvolemic   PONV Controlled:  Controlled   Airway Patency:  Patent      Post Op Vitals Reviewed: Yes      Staff: Anesthesiologist         No notable events documented      BP      Temp     Pulse     Resp      SpO2      /74   Pulse 67   Temp (!) 97 1 °F (36 2 °C) (Temporal)   Resp 20   Ht 5' 7\" (1 702 m)   Wt 54 8 kg (120 lb 13 oz)   SpO2 95%   BMI 18 92 kg/m²     "

## 2023-05-17 NOTE — OP NOTE
OPERATIVE REPORT  PATIENT NAME: Obi Leroy    :  1940  MRN: [id number]  Pt Location: [facility] OR ROOM 07    SURGERY DATE: 2023    Surgeon(s) and Role:     * Landon Contreras MD - Primary    Preop Diagnosis:  Gross hematuria [R31 0]    Post-Op Diagnosis Codes:     * Gross hematuria [R31 0]    Procedure(s):  Right - CYSTO  URETHERAL DILATION  RETROGRADE PYELOGRAM W/ INSERTION STENT URETERAL  Right - URETEROSCOPY  WITH URETERAL BRUSHING  (TURBT)    Specimen(s):  ID Type Source Tests Collected by Time Destination   1 : RIGHT DISTAL URETER  Brushing Ureter, Right NON-GYNECOLOGIC CYTOLOGY Landon Contreras MD 2023 1235    2 : bladder tumor Tissue Urinary Bladder TISSUE EXAM Landon Contreras MD 2023 1242        Estimated Blood Loss:   20 mL    Drains:  Urethral Catheter Other (Comment) 16 Fr  (Active)   Reasons to continue Urinary Catheter  Post-operative urological requirements 23 1304   Site Assessment Bleeding 23 1304   Number of days: 0       Ureteral Internal Stent Right ureter (Active)   Site Assessment CHAYITO 23 1304   Number of days: 0       Anesthesia Type:   General    Operative Indications:  Gross hematuria [R31 0]      Operative Findings:  1  Multiple areas of subcentimeter erythema on the right posterior lateral wall, right anterior lateral wall and posterior bladder towards the dome, resection less than 2 cm  2  Difficult to appreciate right ureteral orifice found after administration of methylene blue  3  Significant J hooking from enlarged prostate with tortuosity of the distal ureter  4  Ureteroscopic inspection without distinct abnormality, cytology and brushing of the distal ureter performed for cytology  5  Multilength stent placed in the right kidney  6   12 [place] tip Hannon placed with contrast instilled into the bladder for confirmation of position      Complications:   None    Procedure and Technique: Obi Leroy is a 80 y o  male with hematuria and imaging findings of bladder thickening and distal right ureteral obstruction  The patient was counseled regarding their options and ultimately opted to proceed with cystoscopy with possible bladder resection, right retrograde pyelogram with ureteroscopy and inspection of the collecting system  Risks and benefits of the procedure were described and the patient signed an informed consent  On 5/17/2023 , the patient proceeded to the operating room  They were laid supine on the operating room table and a pre-procedure time out was performed with all parties present and in agreement of the procedure planned and laterality  Patient received intravenous antibiotics in the form of Levaquin and sequential compression devices were placed on bilateral lower extremities  They were then induced with a LMA anesthetic  The patient was placed in dorsolithotomy with care to pad all pressure points  Perineum and genitalia were prepped and draped in a sterile fashion  Patient was noted to have distal urethral stenosis and so male urethral sounds were used to dilate to [facility]  25 Turkish cystoscope sheath was entered into the patient's bladder  Patient had an elevated bladder neck  We again identified significant bladder capacity with trabeculation  There were multiple areas of subcentimeter erythema in the bladder mucosa as described above  Careful inspection of the trigone was performed however given the trabeculation it was difficult to identify the ureteral orifice  We instructed anesthesia to assist us by administering intravenous methylene blue  Cystoscope was removed and a continuous flow resectoscope was placed  Loop resection of the subcentimeter areas in the geographic regions above was performed  Total resection less than 2 cm  Button electrode was then used to widely fulgurate these areas  There was good hemostasis    There was some trauma at the bladder neck and prostate and so additional hemostasis was obtained  Cystoscope was replaced into the bladder  We were now able to identify the right ureteral orifice  Bard Solo plus wire was introduced however there was significant coiling  5 [place] open-ended catheter was advanced over top  A retrograde pyelogram demonstrated significant tortuosity from J hooking in the distal ureter however we were able to navigate the wire more proximally  Once the wire was in the renal pelvis the 5 Citizen [place] catheter was advanced  Contrast was reinstilled  A Super Stiff wire was placed to allow for straightening of the ureter  Dual-lumen catheter allowed placement of a second working wire  Bard single use disposable ureteroscope was advanced over the working wire  Careful inspection of major and minor calyces was performed  There was moderate dilation of the calyces infundibula and renal pelvis  This is likely related to prolonged obstruction  [place] ureteroscopy was performed  In the distal ureter, areas of folding of the ureter were noted  The mucosa did not appear significantly concerning and there was no distinct papillary tumors  A urine cytology was aspirated through the scope and a cytology brush was used to scrape the mucosal lining  This was sent off as right distal ureteral cytology  The ureteroscope was removed again without evidence of a distinct urothelial tumor of the upper tract  A 7 Citizen [place] multilength stent was then placed into the right collecting system  We carefully inspected for bleeding  Hemostasis was adequate  12 [place] tip Hannon was then introduced in the patient's bladder  10 cc of sterile water placed in the balloon  A gentle cystogram was performed to confirm position of the Hannon  At the completion of the procedure, the patient was extubated and transferred to PACU in good condition  I was present for the entire procedure      Patient Disposition:  PACU         SIGNATURE: Michaela Goff Richard Villanueva MD  DATE: May 17, 2023  TIME: 1:17 PM

## 2023-05-17 NOTE — ANESTHESIA PREPROCEDURE EVALUATION
Procedure:  CYSTO RETROGRADE PYELOGRAM W/ INSERTION STENT URETERAL (Right: Bladder)  URETEROSCOPY, PSB URETERAL BIOPSY/FULGURATION (Right: Bladder)  (TURBT) (Bladder)    Relevant Problems   CARDIO  ef 45%   (+) Essential hypertension   (+) Hx of CABG   (+) Hyperlipidemia   (+) Paroxysmal supraventricular tachycardia (HCC)   (+) S/P mitral valve repair      /RENAL   (+) Benign prostatic hyperplasia      NEURO/PSYCH   (+) Anxiety   (+) History of UTI      PULMONARY   (+) Pleural effusion on left        Physical Exam    Airway    Mallampati score: II  TM Distance: >3 FB  Neck ROM: full     Dental       Cardiovascular  Rhythm: regular, Rate: normal, Cardiovascular exam normal    Pulmonary  Pulmonary exam normal Breath sounds clear to auscultation,     Other Findings        Anesthesia Plan  ASA Score- 3     Anesthesia Type- general with ASA Monitors  Additional Monitors:   Airway Plan:     Comment: Hgb 11 gms  GFR=66  EKG unchanged  Plan Factors-    Chart reviewed  EKG reviewed  Existing labs reviewed  Patient summary reviewed  Patient is not a current smoker  Patient instructed to abstain from smoking on day of procedure  Patient did not smoke on day of surgery  Obstructive sleep apnea risk education given perioperatively  Induction- intravenous  Postoperative Plan-     Informed Consent- Anesthetic plan and risks discussed with patient and daughter Dany Herzog

## 2023-05-17 NOTE — INTERVAL H&P NOTE
H&P reviewed  After examining the patient I find no changes in the patients condition since the H&P had been written  Appreciate notation from cardiology/heart failure team and PCP  Plan for cystoscopy, possible TURBT, RIGHT retrograde pyelogram with possible ureteroscopy and ureteroscopic biopsy/fulguration/stent placement  Risks and benefits reviewed      Vitals:    05/17/23 1005   BP: (!) 176/94   Pulse: 87   Resp: 16   Temp: 97 7 °F (36 5 °C)   SpO2: 95%

## 2023-05-18 NOTE — TELEPHONE ENCOUNTER
Contacted and spoke with the patient's daughter, Meliza Monahan, about scheduling an appointment for void trial tomorrow  Patient scheduled 0930 for dukes removal and then return at 230 pm for PVR

## 2023-05-18 NOTE — TELEPHONE ENCOUNTER
Post Op Note    Patricia Nagy is a 80 y o  male s/p cysto, urethral dilation, right URS with ureteral brushings and right stent insertion performed 5/17/2023  Patricia Nagy is a patient of Dr Jay Jay Romero and [facility]  Voicemail left for patient to please call the office to schedule void trail for tomorrow and follow up on how he is doing since the surgery

## 2023-05-19 ENCOUNTER — PROCEDURE VISIT (OUTPATIENT)
Dept: UROLOGY | Facility: CLINIC | Age: 83
End: 2023-05-19

## 2023-05-19 VITALS
BODY MASS INDEX: 19.46 KG/M2 | WEIGHT: 124 LBS | HEIGHT: 67 IN | HEART RATE: 76 BPM | SYSTOLIC BLOOD PRESSURE: 150 MMHG | RESPIRATION RATE: 20 BRPM | DIASTOLIC BLOOD PRESSURE: 60 MMHG

## 2023-05-19 DIAGNOSIS — N13.8 BPH WITH OBSTRUCTION/LOWER URINARY TRACT SYMPTOMS: Primary | ICD-10-CM

## 2023-05-19 DIAGNOSIS — N40.1 BPH WITH OBSTRUCTION/LOWER URINARY TRACT SYMPTOMS: Primary | ICD-10-CM

## 2023-05-19 LAB — POST-VOID RESIDUAL VOLUME, ML POC: 219 ML

## 2023-05-19 RX ORDER — CALCIUM CARBONATE/VITAMIN D3 600 MG-10
TABLET ORAL DAILY
COMMUNITY

## 2023-05-19 RX ORDER — ASPIRIN 81 MG/1
81 TABLET, CHEWABLE ORAL DAILY
COMMUNITY

## 2023-05-19 NOTE — PROGRESS NOTES
"5/19/2023    Amber Hermes Griffithtanja  1940  [id number]    Diagnosis  Chief Complaint    Gross Hematuria         Patient presents for void trial managed by Dr Marleny Bell  Has follow up appointment on 6/2/2023 for cysto/stent removal and pathology results  Procedure Dukes removal/voiding trial    Dukes catheter removed after deflation of an intact balloon  Patient tolerated well  Encouraged patient to hydrate well and return this afternoon for post void residual   he knows he may return early if uncomfortable and unable to urinate  Patient agrees to this plan  Patient returned this afternoon  Patient states able to void  Patient voided 0 ml while in office  Bladder ultrasound performed and PVR measured 219ml  Discussed PVR results with Dr Jesse Taylor  No need for reinsertion of dukes catheter  Patient to return as scheduled        Vitals:    05/19/23 0940   BP: 150/60   Pulse: 76   Resp: 20   Weight: 56 2 kg (124 lb)   Height: 5' 7\" (1 702 m)           Martina Abarca RN      "

## 2023-06-02 ENCOUNTER — PROCEDURE VISIT (OUTPATIENT)
Dept: UROLOGY | Facility: CLINIC | Age: 83
End: 2023-06-02

## 2023-06-02 VITALS
BODY MASS INDEX: 19.46 KG/M2 | HEART RATE: 66 BPM | DIASTOLIC BLOOD PRESSURE: 58 MMHG | HEIGHT: 67 IN | WEIGHT: 124 LBS | SYSTOLIC BLOOD PRESSURE: 118 MMHG

## 2023-06-02 DIAGNOSIS — N13.39 OTHER HYDRONEPHROSIS: Primary | ICD-10-CM

## 2023-06-02 RX ORDER — NITROFURANTOIN 25; 75 MG/1; MG/1
100 CAPSULE ORAL 2 TIMES DAILY
Qty: 6 CAPSULE | Refills: 0 | Status: SHIPPED | OUTPATIENT
Start: 2023-06-02

## 2023-06-02 NOTE — PROGRESS NOTES
Cystoscopy     Date/Time 6/2/2023 11:00 AM     Performed by  Ashley Munson MD   Authorized by Ashley Munson MD     Universal Protocol:  Timeout called at: 6/2/2023 11:09 AM       Procedure Details:  Procedure type: unilateral ureteral stent    Patient tolerance: Patient tolerated the procedure well with no immediate complications    Additional Procedure Details:   Cystoscopy with stent removal procedure note: The patient returns to the office today to undergo cystoscopy with stent removal  Risks and benefits of the procedure were discussed and informed consent was obtained  The patient was placed in the modified supine position  Genitalia was prepped with Betadine and draped in a sterile fashion  Viscous 2% lidocaine was used for local anesthesia  The flexible cystoscope was passed  The bladder was inspected  The stent was identified coming from the RIGHT ureteral orifice  The stent was grasped and easily removed fully intact without difficulty  Overall the patient tolerated the procedure

## 2023-06-02 NOTE — PROGRESS NOTES
"  UROLOGY PROCEDURE NOTE     CHIEF COMPLAINT   Alejandro Bridges is a 80 y o  male with a complaint of   Chief Complaint   Patient presents with   • Follow-up     Cysto stent removal  Path review       History of Present Illness:   Alejandro Bridges is a 80 y o  male here for evaluation of of lower urinary tract symptoms on Flomax double dose and finasteride  Patient was seen in the emergency room in December with gross hematuria  Treated with antibiotics  Patient had imaging that demonstrates concern for a left-sided bladder abnormality and right-sided hydronephrosis without obstructing stone  Presents for cystoscopy  Patient reports significant smoking history however he quit approximately 40 years ago  He does work for [employer] and is around multiple solvents  He still works 5 days a week  Past Medical History:     Past Medical History:   Diagnosis Date   • BPH (benign prostatic hyperplasia)    • Chronic pain disorder     back   • Coronary artery disease    • Gallstones    • History of tracheostomy     per pt had it \"after CABG surgery--was admitted for 53 days including Rehab\"   • Hx of CABG     per pt 7-8 yrs ago   • Hypercholesterolemia    • Hypertension    • Moderate exercise     alot of walking-works FT and also walks dog 2 miles daily usually   • Myocardial infarction ([address] )     per pt approx 7-8yrs ago --[facility] Dr Mikhail Leggett sees yearly   • Prediabetes    • Shortness of breath     per pt\"climbing steps or walking long distance\"   • Wears glasses        PAST SURGICAL HISTORY:     Past Surgical History:   Procedure Laterality Date   • CATARACT EXTRACTION Bilateral    • COLONOSCOPY     • CORONARY ARTERY BYPASS GRAFT      x3 LIMA-LAD, SVG-OM, SVG-DIAG with mitral valve repair by nicole   • CYSTOSCOPY  12/13/2014    Diagnostic   • CYSTOSCOPY  03/24/2023    Nita   • ESOPHAGOGASTRODUODENOSCOPY  04/07/2015    with biopsy   Dr Jefferson Hall   • [facility] RETROGRADE PYELOGRAM  5/17/2023   • MITRAL VALVE " REPAIR      26 mm fatmata hernandez physi annuloplasty ring   • OR CYSTO BLADDER W/URETERAL CATHETERIZATION Right 5/17/2023    Procedure: CYSTO, URETHERAL DILATION, RETROGRADE PYELOGRAM W/ INSERTION STENT URETERAL;  Surgeon: Aram Chow MD;  Location: [facility];  Service: Urology   • OR CYSTO W/REMOVAL OF LESIONS SMALL N/A 5/17/2023    Procedure: (TURBT);   Surgeon: Aram Chow MD;  Location[facility];  Service: Urology   • OR CYSTO/PYELOSCOPY BX&/FULGURATION PELIVC LESION Right 5/17/2023    Procedure: URETEROSCOPY,WITH URETERAL BRUSHING;  Surgeon: Aram Chow MD;  Location[facility];  Service: Urology   • TRACHEOSTOMY      during his heart surgery       CURRENT MEDICATIONS:     Current Outpatient Medications   Medication Sig Dispense Refill   • aspirin 81 mg chewable tablet Chew 81 mg daily     • atorvastatin (LIPITOR) 40 mg tablet Take 1 tablet (40 mg total) by mouth daily 90 tablet 3   • carvedilol (COREG) 6 25 mg tablet TAKE 1 TABLET BY MOUTH TWICE A DAY WITH MEALS 180 tablet 3   • citalopram (CeleXA) 10 mg tablet TAKE ONE TABLET BY MOUTH ONCE DAILY 90 tablet 0   • Cyanocobalamin ([employer] VITAMIN B-12 PO) Take by mouth     • Ferrous Sulfate (IRON) 325 (65 Fe) MG TABS Take 1 tablet by mouth Twice daily     • finasteride (PROSCAR) 5 mg tablet TAKE 1 TABLET BY MOUTH ONCE A DAY (Patient taking differently: every evening) 90 tablet 3   • lisinopril (ZESTRIL) 5 mg tablet TAKE 1 TABLET BY MOUTH ONCE A DAY (Patient taking differently: every evening) 90 tablet 1   • Multiple Vitamin Essential TABS Take by mouth     • nitrofurantoin (MACROBID) 100 mg capsule Take 1 capsule (100 mg total) by mouth 2 (two) times a day 6 capsule 0   • Omega 3 1200 MG CAPS Take by mouth in the morning     • tamsulosin (FLOMAX) 0 4 mg TAKE 2 CAPSULES BY MOUTH AT BEDTIME 180 capsule 3   • Cholecalciferol 1000 units tablet Take 2,000 Units by mouth  (Patient not taking: Reported on 5/19/2023)     • phenazopyridine (PYRIDIUM) 100 mg tablet Take 1 tablet (100 mg total) by mouth 3 (three) times a day as needed for bladder spasms (Patient not taking: Reported on 2023) 20 tablet 0     No current facility-administered medications for this visit  ALLERGIES:   No Known Allergies    SOCIAL HISTORY:     Social History     Socioeconomic History   • Marital status:      Spouse name: None   • Number of children: None   • Years of education: None   • Highest education level: None   Occupational History   • None   Tobacco Use   • Smoking status: Former     Packs/day: 0 50     Years: 16 00     Total pack years: 8 00     Types: Cigarettes     Start date:      Quit date:      Years since quittin 4   • Smokeless tobacco: Never   Vaping Use   • Vaping Use: Never used   Substance and Sexual Activity   • Alcohol use: Yes     Comment: per pt very rarely   • Drug use: No   • Sexual activity: None     Comment: defer   Other Topics Concern   • None   Social History Narrative    Consumes 2-3 ups of tea per week     Social Determinants of Health     Financial Resource Strain: Not on file   Food Insecurity: Not on file   Transportation Needs: Not on file   Physical Activity: Not on file   Stress: Not on file   Social Connections: Not on file   Intimate Partner Violence: Not on file   Housing Stability: Not on file       SOCIAL HISTORY:     Family History   Problem Relation Age of Onset   • Diabetes Mother    • Heart disease Mother    • Hypertension Mother    • Colon cancer Father    • Kidney disease Father    • Heart disease Sister    • Heart disease Brother    • Breast cancer Paternal Aunt    • Cervical cancer Maternal Aunt    • Colon cancer Paternal Grandfather        REVIEW OF SYSTEMS:     Review of Systems   Constitutional: Negative  Respiratory: Negative  Cardiovascular: Negative  Gastrointestinal: Negative  Genitourinary: Negative  Negative for hematuria  Musculoskeletal: Negative  Skin: Negative  "  Psychiatric/Behavioral: Negative  PHYSICAL EXAM:     /58 (BP Location: Left arm, Patient Position: Sitting, Cuff Size: Adult)   Pulse 66   Ht 5' 7\" (1 702 m)   Wt 56 2 kg (124 lb)   BMI 19 42 kg/m²     Physical Exam  Vitals reviewed  HENT:      Head: Normocephalic  Nose: Nose normal       Mouth/Throat:      Mouth: Mucous membranes are moist    Eyes:      Pupils: Pupils are equal, round, and reactive to light  Cardiovascular:      Rate and Rhythm: Normal rate  Pulmonary:      Effort: Pulmonary effort is normal    Abdominal:      General: Abdomen is flat  Comments: Large right-sided hernia, soft and reducible   Genitourinary:     Penis: Normal        Testes: Normal       Comments: Uncircumcised  Musculoskeletal:         General: Normal range of motion  Cervical back: Normal range of motion  Skin:     General: Skin is warm  Neurological:      General: No focal deficit present  Mental Status: He is alert  Psychiatric:         Mood and Affect: Mood normal          LABS:     CBC:   Lab Results   Component Value Date    HCT 35 3 (L) 04/22/2023    HGB 11 0 (L) 04/22/2023    MCV 96 04/22/2023     04/22/2023    WBC 5 64 04/22/2023       BMP:   Lab Results   Component Value Date    BUN 23 04/22/2023    CALCIUM 8 9 04/22/2023     04/22/2023    CO2 29 04/22/2023    CREATININE 1 04 04/22/2023    GLUCOSE 104 01/08/2016    K 5 2 04/22/2023     01/08/2016     IMAGING:     3/7/23  CT ABDOMEN AND PELVIS WITH AND WITHOUT IV CONTRAST     INDICATION:   R31 0: Gross hematuria  N13 39:  Other hydronephrosis      COMPARISON:  CT abdomen/pelvis dated 12/18/2022      TECHNIQUE: Initial CT of the abdomen and pelvis was performed without intravenous contrast   Subsequent dynamic CT evaluation of the abdomen and pelvis was performed after the administration of intravenous contrast in both nephrographic and delayed   phases after the administration of intravenous contrast    " Axial, sagittal, and coronal 2D reformatted images were created from the source data and submitted for interpretation       Radiation dose length product (DLP) for this visit:  1436 mGy-cm   This examination, like all CT scans performed in the [facility], was performed utilizing techniques to minimize radiation dose exposure, including the use of iterative   reconstruction and automated exposure control      IV Contrast:  100 mL of iohexol (OMNIPAQUE)  Enteric Contrast:  Enteric contrast was not administered      FINDINGS:     ABDOMEN     RIGHT KIDNEY AND URETER:  No solid renal mass  No detectable urothelial mass  Persistent mild hydroureteronephrosis to the level of the urinary bladder without obstructing calculus identified  2 mm nonobstructing intrarenal calculus  Cortical scarring is noted of the kidney  Mild diffuse ureteral wall enhancement  One or more   sharply circumscribed subcentimeter renal hypodensities are noted  These lesions are too small to accurately characterize, but are statistically most likely to represent benign cortical renal cyst(s)  According to the guidelines published in the [facility]   Paper of the ACR Incidental Findings Committee (Radiology 2010), no further workup of these lesions is recommended  No perinephric collection      LEFT KIDNEY AND URETER:  No solid renal mass  No detectable urothelial mass  No hydronephrosis or hydroureter  No urinary tract calculi  No perinephric collection      URINARY BLADDER:  Diffuse urinary bladder wall trabeculation is likely sequela of chronic partial bladder outlet obstruction, similar to prior  There is a 1 5 x 0 8 cm filling defect associated with the wall in the left posterior urinary bladder suspicious for mass (image 131, series 5)  No calculi      LOWER CHEST:  Partially included loculated left pleural effusion, similar to prior    Small right pleural effusion      LIVER/BILIARY TREE: Unremarkable      GALLBLADDER:  There are gallstone(s) within the gallbladder, without pericholecystic inflammatory changes      SPLEEN:  Unremarkable      PANCREAS:  Unremarkable      ADRENAL GLANDS:  Unremarkable      STOMACH AND BOWEL: Partially included large hiatal hernia, similar to prior        APPENDIX:  No findings to suggest appendicitis      ABDOMINOPELVIC CAVITY:  No ascites  No free intraperitoneal air  No lymphadenopathy      VESSELS:  Atherosclerotic disease  No abdominal aortic aneurysm      PELVIS     REPRODUCTIVE ORGANS:  The prostate gland is enlarged measuring 5 4 x 5 1 x 3 8 cm and indents the urinary bladder base      ABDOMINAL WALL/INGUINAL REGIONS:  Redemonstrated right inguinal hernia containing multiple loops of nonobstructed small bowel  Small fat-containing left inguinal hernia      OSSEOUS STRUCTURES:  No acute fracture or destructive osseous lesion      IMPRESSION:     1 5 x 0 8 cm filling defect associated with the wall in the left posterior urinary bladder suspicious with mass  Cystoscopic correlation is advised      Diffuse urinary bladder wall trabeculation, likely sequela of chronic partial bladder outlet obstruction in the setting of prostatomegaly      Persistent mild to moderate right-sided hydroureteronephrosis to the level of the urinary bladder; no obstructing calculus identified  Mild diffuse right ureteral wall enhancement; ascending urinary tract infection is not excluded      Redemonstrated right inguinal hernia containing multiple nonobstructed small bowel loops      Cholelithiasis  No pericholecystic inflammation      Partially included large hiatal hernia      Partially included loculated left pleural effusion, similar to prior      Small right pleural effusion  PATHOLOGY:     Preliminary Diagnosis   A    Bladder, tumor, transurethral resection:  -Inflamed and denuded urothelial mucosa with reactive change, negative for epithelial dysplasia or carcinoma   -Atypical B-cell rich lymphoid infiltrate present, cannot exclude reactive versus lymphoproliferative disorder (see note)  PROCEDURE:     SEE NOTE    ASSESSMENT:     80 y o  male  with hematuria    PLAN:     Stent was removed today  Patient is having some mild hematuria  I instructed him to hydrate liberally  Macrobid was prescribed for 3 days  Ultrasound will be performed within 1 month  I reviewed the preliminary pathology with the patient and his daughter  There is question of underlying B-cell lymphoma but additional staining is pending  I will contact the patient with the final results  Patient is already following with hematology for anemia and if there is concern for lymphoproliferative disorder, I will ask the patient be seen back for discussion of additional testing and management

## 2023-06-02 NOTE — LETTER
June 2, 2023     Jean Parry, [address]     Patient: Mateo Gonzalez   YOB: 1940   Date of Visit: 6/2/2023       Dear Dr Mini Maza: Thank you for referring Bonifacio Berumen to me for evaluation  Below are my notes for this consultation  If you have questions, please do not hesitate to call me  I look forward to following your patient along with you  Sincerely,        Danyel Javed MD        CC: Ronn Dawson [place]  MD Danyel Juan MD  6/2/2023 11:09 AM  Sign when Signing Visit     Cystoscopy     Date/Time 6/2/2023 11:00 AM     Performed by  Danyel Javed MD   Authorized by Danyel Javed MD     Universal Protocol:  Timeout called at: 6/2/2023 11:09 AM       Procedure Details:  Procedure type: unilateral ureteral stent    Patient tolerance: Patient tolerated the procedure well with no immediate complications    Additional Procedure Details:   Cystoscopy with stent removal procedure note: The patient returns to the office today to undergo cystoscopy with stent removal  Risks and benefits of the procedure were discussed and informed consent was obtained  The patient was placed in the modified supine position  Genitalia was prepped with Betadine and draped in a sterile fashion  Viscous 2% lidocaine was used for local anesthesia  The flexible cystoscope was passed  The bladder was inspected  The stent was identified coming from the RIGHT ureteral orifice  The stent was grasped and easily removed fully intact without difficulty  Overall the patient tolerated the procedure          Danyel Javed MD  6/2/2023 11:09 AM  Sign when Signing Visit    UROLOGY PROCEDURE NOTE     CHIEF COMPLAINT   Mateo Gonzalez is a 80 y o  male with a complaint of   Chief Complaint   Patient presents with   • Follow-up     Cysto stent removal  Path review       History of Present Illness: "  Erasmo Chung is a 80 y o  male here for evaluation of of lower urinary tract symptoms on Flomax double dose and finasteride  Patient was seen in the emergency room in December with gross hematuria  Treated with antibiotics  Patient had imaging that demonstrates concern for a left-sided bladder abnormality and right-sided hydronephrosis without obstructing stone  Presents for cystoscopy  Patient reports significant smoking history however he quit approximately 40 years ago  He does work for [employer] and is around multiple solvents  He still works 5 days a week  Past Medical History:     Past Medical History:   Diagnosis Date   • BPH (benign prostatic hyperplasia)    • Chronic pain disorder     back   • Coronary artery disease    • Gallstones    • History of tracheostomy     per pt had it \"after CABG surgery--was admitted for 53 days including Rehab\"   • Hx of CABG     per pt 7-8 yrs ago   • Hypercholesterolemia    • Hypertension    • Moderate exercise     alot of walking-works FT and also walks dog 2 miles daily usually   • Myocardial infarction ([address] )     per pt approx 7-8yrs ago --[facility] cardio Dr Vito Padgett sees yearly   • Prediabetes    • Shortness of breath     per pt\"climbing steps or walking long distance\"   • Wears glasses        PAST SURGICAL HISTORY:     Past Surgical History:   Procedure Laterality Date   • CATARACT EXTRACTION Bilateral    • COLONOSCOPY     • CORONARY ARTERY BYPASS GRAFT      x3 LIMA-LAD, SVG-OM, SVG-DIAG with mitral valve repair by nicole   • CYSTOSCOPY  12/13/2014    Diagnostic   • CYSTOSCOPY  03/24/2023    [facility]   • ESOPHAGOGASTRODUODENOSCOPY  04/07/2015    with biopsy   Dr Mary Anne Lou   • FL RETROGRADE PYELOGRAM  5/17/2023   • MITRAL VALVE REPAIR      26 mm fatmata hernandez physi annuloplasty ring   • ND CYSTO BLADDER W/URETERAL CATHETERIZATION Right 5/17/2023    Procedure: CYSTO, URETHERAL DILATION, RETROGRADE PYELOGRAM W/ INSERTION STENT URETERAL;  Surgeon: " Nury Lozano MD;  Location: [facility];  Service: Urology   • MN CYSTO W/REMOVAL OF LESIONS SMALL N/A 5/17/2023    Procedure: (TURBT);   Surgeon: Nury Lozano MD;  Location[facility];  Service: Urology   • MN CYSTO/PYELOSCOPY BX&/FULGURATION PELIVC LESION Right 5/17/2023    Procedure: URETEROSCOPY,WITH URETERAL BRUSHING;  Surgeon: Nury Lozano MD;  Location: [facility];  Service: Urology   • TRACHEOSTOMY      during his heart surgery       CURRENT MEDICATIONS:     Current Outpatient Medications   Medication Sig Dispense Refill   • aspirin 81 mg chewable tablet Chew 81 mg daily     • atorvastatin (LIPITOR) 40 mg tablet Take 1 tablet (40 mg total) by mouth daily 90 tablet 3   • carvedilol (COREG) 6 25 mg tablet TAKE 1 TABLET BY MOUTH TWICE A DAY WITH MEALS 180 tablet 3   • citalopram (CeleXA) 10 mg tablet TAKE ONE TABLET BY MOUTH ONCE DAILY 90 tablet 0   • Cyanocobalamin ([employer] VITAMIN B-12 PO) Take by mouth     • Ferrous Sulfate (IRON) 325 (65 Fe) MG TABS Take 1 tablet by mouth Twice daily     • finasteride (PROSCAR) 5 mg tablet TAKE 1 TABLET BY MOUTH ONCE A DAY (Patient taking differently: every evening) 90 tablet 3   • lisinopril (ZESTRIL) 5 mg tablet TAKE 1 TABLET BY MOUTH ONCE A DAY (Patient taking differently: every evening) 90 tablet 1   • Multiple Vitamin Essential TABS Take by mouth     • nitrofurantoin (MACROBID) 100 mg capsule Take 1 capsule (100 mg total) by mouth 2 (two) times a day 6 capsule 0   • Omega 3 1200 MG CAPS Take by mouth in the morning     • tamsulosin (FLOMAX) 0 4 mg TAKE 2 CAPSULES BY MOUTH AT BEDTIME 180 capsule 3   • Cholecalciferol 1000 units tablet Take 2,000 Units by mouth  (Patient not taking: Reported on 5/19/2023)     • phenazopyridine (PYRIDIUM) 100 mg tablet Take 1 tablet (100 mg total) by mouth 3 (three) times a day as needed for bladder spasms (Patient not taking: Reported on 6/2/2023) 20 tablet 0     No current facility-administered medications for this "visit  ALLERGIES:   No Known Allergies    SOCIAL HISTORY:     Social History     Socioeconomic History   • Marital status:      Spouse name: None   • Number of children: None   • Years of education: None   • Highest education level: None   Occupational History   • None   Tobacco Use   • Smoking status: Former     Packs/day: 0 50     Years: 16 00     Total pack years: 8 00     Types: Cigarettes     Start date:      Quit date:      Years since quittin 4   • Smokeless tobacco: Never   Vaping Use   • Vaping Use: Never used   Substance and Sexual Activity   • Alcohol use: Yes     Comment: per pt very rarely   • Drug use: No   • Sexual activity: None     Comment: defer   Other Topics Concern   • None   Social History Narrative    Consumes 2-3 ups of tea per week     Social Determinants of Health     Financial Resource Strain: Not on file   Food Insecurity: Not on file   Transportation Needs: Not on file   Physical Activity: Not on file   Stress: Not on file   Social Connections: Not on file   Intimate Partner Violence: Not on file   Housing Stability: Not on file       SOCIAL HISTORY:     Family History   Problem Relation Age of Onset   • Diabetes Mother    • Heart disease Mother    • Hypertension Mother    • Colon cancer Father    • Kidney disease Father    • Heart disease Sister    • Heart disease Brother    • Breast cancer Paternal Aunt    • Cervical cancer Maternal Aunt    • Colon cancer Paternal Grandfather        REVIEW OF SYSTEMS:     Review of Systems   Constitutional: Negative  Respiratory: Negative  Cardiovascular: Negative  Gastrointestinal: Negative  Genitourinary: Negative  Negative for hematuria  Musculoskeletal: Negative  Skin: Negative  Psychiatric/Behavioral: Negative            PHYSICAL EXAM:     /58 (BP Location: Left arm, Patient Position: Sitting, Cuff Size: Adult)   Pulse 66   Ht 5' 7\" (1 702 m)   Wt 56 2 kg (124 lb)   BMI 19 42 kg/m² " Physical Exam  Vitals reviewed  HENT:      Head: Normocephalic  Nose: Nose normal       Mouth/Throat:      Mouth: Mucous membranes are moist    Eyes:      Pupils: Pupils are equal, round, and reactive to light  Cardiovascular:      Rate and Rhythm: Normal rate  Pulmonary:      Effort: Pulmonary effort is normal    Abdominal:      General: Abdomen is flat  Comments: Large right-sided hernia, soft and reducible   Genitourinary:     Penis: Normal        Testes: Normal       Comments: Uncircumcised  Musculoskeletal:         General: Normal range of motion  Cervical back: Normal range of motion  Skin:     General: Skin is warm  Neurological:      General: No focal deficit present  Mental Status: He is alert  Psychiatric:         Mood and Affect: Mood normal          LABS:     CBC:   Lab Results   Component Value Date    HCT 35 3 (L) 04/22/2023    HGB 11 0 (L) 04/22/2023    MCV 96 04/22/2023     04/22/2023    WBC 5 64 04/22/2023       BMP:   Lab Results   Component Value Date    BUN 23 04/22/2023    CALCIUM 8 9 04/22/2023     04/22/2023    CO2 29 04/22/2023    CREATININE 1 04 04/22/2023    GLUCOSE 104 01/08/2016    K 5 2 04/22/2023     01/08/2016     IMAGING:     3/7/23  CT ABDOMEN AND PELVIS WITH AND WITHOUT IV CONTRAST     INDICATION:   R31 0: Gross hematuria  N13 39: Other hydronephrosis  COMPARISON:  CT abdomen/pelvis dated 12/18/2022  TECHNIQUE: Initial CT of the abdomen and pelvis was performed without intravenous contrast   Subsequent dynamic CT evaluation of the abdomen and pelvis was performed after the administration of intravenous contrast in both nephrographic and delayed   phases after the administration of intravenous contrast    Axial, sagittal, and coronal 2D reformatted images were created from the source data and submitted for interpretation  Radiation dose length product (DLP) for this visit:  1436 mGy-cm     This examination, like all CT scans performed in the [facility], was performed utilizing techniques to minimize radiation dose exposure, including the use of iterative   reconstruction and automated exposure control  IV Contrast:  100 mL of iohexol (OMNIPAQUE)  Enteric Contrast:  Enteric contrast was not administered  FINDINGS:     ABDOMEN     RIGHT KIDNEY AND URETER:  No solid renal mass  No detectable urothelial mass  Persistent mild hydroureteronephrosis to the level of the urinary bladder without obstructing calculus identified  2 mm nonobstructing intrarenal calculus  Cortical scarring is noted of the kidney  Mild diffuse ureteral wall enhancement  One or more   sharply circumscribed subcentimeter renal hypodensities are noted  These lesions are too small to accurately characterize, but are statistically most likely to represent benign cortical renal cyst(s)  According to the guidelines published in the Orlene Angela   Paper of the ACR Incidental Findings Committee (Radiology 2010), no further workup of these lesions is recommended  No perinephric collection  LEFT KIDNEY AND URETER:  No solid renal mass  No detectable urothelial mass  No hydronephrosis or hydroureter  No urinary tract calculi  No perinephric collection  URINARY BLADDER:  Diffuse urinary bladder wall trabeculation is likely sequela of chronic partial bladder outlet obstruction, similar to prior  There is a 1 5 x 0 8 cm filling defect associated with the wall in the left posterior urinary bladder suspicious for mass (image 131, series 5)  No calculi  LOWER CHEST:  Partially included loculated left pleural effusion, similar to prior  Small right pleural effusion  LIVER/BILIARY TREE:  Unremarkable  GALLBLADDER:  There are gallstone(s) within the gallbladder, without pericholecystic inflammatory changes  SPLEEN:  Unremarkable  PANCREAS:  Unremarkable  ADRENAL GLANDS:  Unremarkable       STOMACH AND BOWEL: Partially included large hiatal hernia, similar to prior  APPENDIX:  No findings to suggest appendicitis  ABDOMINOPELVIC CAVITY:  No ascites  No free intraperitoneal air  No lymphadenopathy  VESSELS:  Atherosclerotic disease  No abdominal aortic aneurysm  PELVIS     REPRODUCTIVE ORGANS:  The prostate gland is enlarged measuring 5 4 x 5 1 x 3 8 cm and indents the urinary bladder base  ABDOMINAL WALL/INGUINAL REGIONS:  Redemonstrated right inguinal hernia containing multiple loops of nonobstructed small bowel  Small fat-containing left inguinal hernia  OSSEOUS STRUCTURES:  No acute fracture or destructive osseous lesion  IMPRESSION:     1 5 x 0 8 cm filling defect associated with the wall in the left posterior urinary bladder suspicious with mass  Cystoscopic correlation is advised  Diffuse urinary bladder wall trabeculation, likely sequela of chronic partial bladder outlet obstruction in the setting of prostatomegaly  Persistent mild to moderate right-sided hydroureteronephrosis to the level of the urinary bladder; no obstructing calculus identified  Mild diffuse right ureteral wall enhancement; ascending urinary tract infection is not excluded  Redemonstrated right inguinal hernia containing multiple nonobstructed small bowel loops  Cholelithiasis  No pericholecystic inflammation  Partially included large hiatal hernia  Partially included loculated left pleural effusion, similar to prior  Small right pleural effusion  PATHOLOGY:     Preliminary Diagnosis   A  Bladder, tumor, transurethral resection:  -Inflamed and denuded urothelial mucosa with reactive change, negative for epithelial dysplasia or carcinoma   -Atypical B-cell rich lymphoid infiltrate present, cannot exclude reactive versus lymphoproliferative disorder (see note)  PROCEDURE:     SEE NOTE    ASSESSMENT:     80 y o  male  with hematuria    PLAN:     Stent was removed today  Patient is having some mild hematuria  I instructed him to hydrate liberally  Macrobid was prescribed for 3 days  Ultrasound will be performed within 1 month  I reviewed the preliminary pathology with the patient and his daughter  There is question of underlying B-cell lymphoma but additional staining is pending  I will contact the patient with the final results  Patient is already following with hematology for anemia and if there is concern for lymphoproliferative disorder, I will ask the patient be seen back for discussion of additional testing and management

## 2023-06-05 DIAGNOSIS — N40.1 BENIGN PROSTATIC HYPERPLASIA WITH LOWER URINARY TRACT SYMPTOMS, SYMPTOM DETAILS UNSPECIFIED: ICD-10-CM

## 2023-06-05 DIAGNOSIS — N13.8 BPH WITH OBSTRUCTION/LOWER URINARY TRACT SYMPTOMS: ICD-10-CM

## 2023-06-05 DIAGNOSIS — N40.1 BPH WITH OBSTRUCTION/LOWER URINARY TRACT SYMPTOMS: ICD-10-CM

## 2023-06-05 RX ORDER — FINASTERIDE 5 MG/1
5 TABLET, FILM COATED ORAL DAILY
Qty: 90 TABLET | Refills: 3 | Status: SHIPPED | OUTPATIENT
Start: 2023-06-05

## 2023-06-05 RX ORDER — TAMSULOSIN HYDROCHLORIDE 0.4 MG/1
0.8 CAPSULE ORAL
Qty: 180 CAPSULE | Refills: 3 | Status: SHIPPED | OUTPATIENT
Start: 2023-06-05

## 2023-06-06 DIAGNOSIS — F41.9 ANXIETY: ICD-10-CM

## 2023-06-06 RX ORDER — CITALOPRAM 10 MG/1
TABLET ORAL
Qty: 90 TABLET | Refills: 0 | Status: SHIPPED | OUTPATIENT
Start: 2023-06-06

## 2023-06-23 DIAGNOSIS — C83.09 SMALL B-CELL LYMPHOMA OF SOLID ORGAN EXCLUDING SPLEEN (HCC): Primary | ICD-10-CM

## 2023-07-03 ENCOUNTER — HOSPITAL ENCOUNTER (OUTPATIENT)
Dept: ULTRASOUND IMAGING | Facility: HOSPITAL | Age: 83
Discharge: HOME | End: 2023-07-03
Attending: UROLOGY
Payer: COMMERCIAL

## 2023-07-03 DIAGNOSIS — N13.39 OTHER HYDRONEPHROSIS: ICD-10-CM

## 2023-07-03 PROCEDURE — 76770 US EXAM ABDO BACK WALL COMP: CPT

## 2023-07-18 ENCOUNTER — HOSPITAL ENCOUNTER (OUTPATIENT)
Dept: NUCLEAR MEDICINE | Facility: HOSPITAL | Age: 83
Discharge: HOME | End: 2023-07-18
Payer: COMMERCIAL

## 2023-07-18 DIAGNOSIS — C83.09 SMALL B-CELL LYMPHOMA OF SOLID ORGAN EXCLUDING SPLEEN (HCC): ICD-10-CM

## 2023-07-18 LAB — GLUCOSE SERPL-MCNC: 91 MG/DL (ref 65–140)

## 2023-07-18 PROCEDURE — 82948 REAGENT STRIP/BLOOD GLUCOSE: CPT

## 2023-07-18 PROCEDURE — 78815 PET IMAGE W/CT SKULL-THIGH: CPT

## 2023-07-18 PROCEDURE — A9552 F18 FDG: HCPCS

## 2023-07-18 PROCEDURE — G1004 CDSM NDSC: HCPCS

## 2023-07-20 ENCOUNTER — TELEPHONE (OUTPATIENT)
Dept: HEMATOLOGY ONCOLOGY | Facility: CLINIC | Age: 83
End: 2023-07-20

## 2023-07-20 DIAGNOSIS — E61.1 LOW SERUM IRON: Primary | ICD-10-CM

## 2023-07-20 DIAGNOSIS — D64.9 ANEMIA, UNSPECIFIED TYPE: ICD-10-CM

## 2023-07-20 NOTE — TELEPHONE ENCOUNTER
Called patient to remind him to go for blood work before his appointment next week with Zee Bland. Orders placed in the system for him.

## 2023-07-24 ENCOUNTER — TELEPHONE (OUTPATIENT)
Dept: CARDIOLOGY CLINIC | Facility: CLINIC | Age: 83
End: 2023-07-24

## 2023-07-24 DIAGNOSIS — E78.5 HYPERLIPIDEMIA, UNSPECIFIED HYPERLIPIDEMIA TYPE: ICD-10-CM

## 2023-07-24 RX ORDER — ATORVASTATIN CALCIUM 40 MG/1
40 TABLET, FILM COATED ORAL DAILY
Qty: 90 TABLET | Refills: 2 | Status: SHIPPED | OUTPATIENT
Start: 2023-07-24

## 2023-07-25 ENCOUNTER — OFFICE VISIT (OUTPATIENT)
Dept: HEMATOLOGY ONCOLOGY | Facility: CLINIC | Age: 83
End: 2023-07-25

## 2023-07-25 VITALS
HEART RATE: 83 BPM | DIASTOLIC BLOOD PRESSURE: 62 MMHG | HEIGHT: 68 IN | SYSTOLIC BLOOD PRESSURE: 122 MMHG | TEMPERATURE: 97.8 F | BODY MASS INDEX: 18.11 KG/M2 | OXYGEN SATURATION: 95 % | RESPIRATION RATE: 18 BRPM | WEIGHT: 119.5 LBS

## 2023-07-25 DIAGNOSIS — C83.09 SMALL B-CELL LYMPHOMA OF SOLID ORGAN EXCLUDING SPLEEN (HCC): Primary | ICD-10-CM

## 2023-07-25 NOTE — PROGRESS NOTES
Hematology/Oncology Outpatient Follow-up  Joanna Martines 80 y.o. male 1940 [id number]    Date:  7/25/2023  Assessment and Plan:  1. Small B-cell lymphoma of solid organ   80-year-old male presents for follow-up visit. He has history of anemia related to chronic disease and low iron. He was last seen in our office in July 2022. He then had gross hematuria for which he saw urology. 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 Therefore a PET CT was completed. This has shown nonspecific metabolic activity normal size mediastinal and hilar nodes. Radiology stated chronic infection? Less likely neoplasm. There is diffuse activity throughout the colon at the level of the rectum. Patient had a previous colonoscopy. He has no new bowel complaints. He does not wish to proceed with another one at this time. The left bladder mass obscured by excretion of isotope. There is a chronic loculated left effusion. He is not of labs prior to today's visit. Labs are requested. He will complete at the end of this week. I reviewed with he and his daughter there is no findings on PET/CT to correspond with a B-cell lymphoma. If CBC is abnormal we could then proceed with flow cytometry of the blood. Update to be provided to pathologist and urology.    - LD,Blood; Future  - C-reactive protein; Future  - Sedimentation rate, automated; Future    HPI:  Billie Valera a 80 y. o. male presents for follow up for anemia.     Past medical history significant for hypertension, ischemic cardiomyopathy, paroxysmal SVT, BPH, hyperlipidemia, prediabetes.     Historically patient's hemoglobin is in the 12-13 g range with a normal MCV. Most recent CBC in Oct 2021 shows drop in hemoglobin to 10. 6.  MCV remains normal. WBC and platelets are normal.  CMP significant for mild elevation of calcium, 10.2.  Patient has low albumin at 2.7.  Total protein 7.5.     He notes decrease in appetite over 2 years due to impaired taste and dysphagia. He was seen by ENT and states they never figured anything out.     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      He was seen in follow up in Nov 2021. Reviewed that iron saturation and serum iron are decreased however TIBC and ferritin are okay. He could try to increase iron as he has been taking to any difficulty. Advised to at lunchtime and 1 in the evening.     Erythropoeitin was 17. 7.       TSH was very mildly elevated however T4 was normal.  However TSH historically in himself was always around 1 and now is 4. I sent to PCP for review.      1/13/21 he had EGD and colonoscopy. colonoscopy was negative for bleeding concern. EGD showed large hiatal hernia. Normal stomach and duodenum.     SPEP negative.     Patient saw rheum for hx of PMR. Labs showed elevated sed rate and CRP. He states he was placed on prednisone 10 mg PO daily. Interval history:  Patient presented to the emergency department in December 2020 with gross hematuria and dysuria. Per urology note patient imaging showed distended bladder with question of mass versus nodule. He then underwent a cystoscopy and ureteroscopy with ureteral brushings on 5/17/2023. Pathology of the mass and bladder was consistent of clonal B-cell proliferation suspicious for small B-cell lymphoma. Pathology recommended doing a whole-body imaging to look for other areas for possible additional biopsy. ROS: Review of Systems   Constitutional: Positive for appetite change (2/2 not having any taste x 5 years ) and fatigue (stable x over 1 year). Respiratory: Negative for cough and shortness of breath. Cardiovascular: Negative for chest pain, palpitations and leg swelling. Gastrointestinal: Negative for abdominal pain, constipation, diarrhea, nausea and vomiting. Genitourinary: Negative for difficulty urinating, dysuria and hematuria.    Musculoskeletal: Positive for back pain (right lower/lateral ). Negative for arthralgias. Skin: Negative. Neurological: Negative for dizziness, weakness, light-headedness and headaches. Hematological: Negative. Psychiatric/Behavioral: Negative. Past Medical History:   Diagnosis Date   • BPH (benign prostatic hyperplasia)    • Chronic pain disorder     back   • Coronary artery disease    • Gallstones    • History of tracheostomy     per pt had it "after CABG surgery--was admitted for 53 days including Rehab"   • Hx of CABG     per pt 7-8 yrs ago   • Hypercholesterolemia    • Hypertension    • Moderate exercise     alot of walking-works FT and also walks dog 2 miles daily usually   • Myocardial infarction ([address])     per pt approx 7-8yrs ago --[facility] cardio Dr Ilan Rankin sees yearly   • Prediabetes    • Shortness of breath     per pt"climbing steps or walking long distance"   • Wears glasses        Past Surgical History:   Procedure Laterality Date   • CATARACT EXTRACTION Bilateral    • COLONOSCOPY     • CORONARY ARTERY BYPASS GRAFT      x3 LIMA-LAD, SVG-OM, SVG-DIAG with mitral valve repair by nicole   • CYSTOSCOPY  12/13/2014    Diagnostic   • CYSTOSCOPY  03/24/2023    [facility]   • ESOPHAGOGASTRODUODENOSCOPY  04/07/2015    with biopsy. Dr Sesay Favors   • FL RETROGRADE PYELOGRAM  5/17/2023   • MITRAL VALVE REPAIR      26 mm fatmata hernandez physi annuloplasty ring   • MT CYSTO BLADDER W/URETERAL CATHETERIZATION Right 5/17/2023    Procedure: CYSTO, URETHERAL DILATION, RETROGRADE PYELOGRAM W/ INSERTION STENT URETERAL;  Surgeon: Mela Damon MD;  Location: [facility];  Service: Urology   • MT CYSTO W/REMOVAL OF LESIONS SMALL N/A 5/17/2023    Procedure: (TURBT);   Surgeon: Mela Damon MD;  Location[facility];  Service: Urology   • MT CYSTO/PYELOSCOPY BX&/FULGURATION PELIVC LESION Right 5/17/2023    Procedure: URETEROSCOPY,WITH URETERAL BRUSHING;  Surgeon: Mela Damon MD;  Location[facility];  Service: Urology   • TRACHEOSTOMY      during his heart surgery       Social History     Socioeconomic History   • Marital status:      Spouse name: Not on file   • Number of children: Not on file   • Years of education: Not on file   • Highest education level: Not on file   Occupational History   • Not on file   Tobacco Use   • Smoking status: Former     Packs/day: 0.50     Years: 16.00     Total pack years: 8.00     Types: Cigarettes     Start date: 46     Quit date: 1     Years since quittin.5   • Smokeless tobacco: Never   Vaping Use   • Vaping Use: Never used   Substance and Sexual Activity   • Alcohol use: Yes     Comment: per pt very rarely   • Drug use: No   • Sexual activity: Not on file     Comment: defer   Other Topics Concern   • Not on file   Social History Narrative    Consumes 2-3 ups of tea per week     Social Determinants of Health     Financial Resource Strain: Not on file   Food Insecurity: Not on file   Transportation Needs: Not on file   Physical Activity: Not on file   Stress: Not on file   Social Connections: Not on file   Intimate Partner Violence: Not on file   Housing Stability: Not on file       Family History   Problem Relation Age of Onset   • Diabetes Mother    • Heart disease Mother    • Hypertension Mother    • Colon cancer Father    • Kidney disease Father    • Heart disease Sister    • Heart disease Brother    • Breast cancer Paternal Aunt    • Cervical cancer Maternal Aunt    • Colon cancer Paternal Grandfather        No Known Allergies      Current Outpatient Medications:   •  aspirin 81 mg chewable tablet, Chew 81 mg daily, Disp: , Rfl:   •  atorvastatin (LIPITOR) 40 mg tablet, TAKE ONE TABLET BY MOUTH ONCE DAILY, Disp: 90 tablet, Rfl: 2  •  carvedilol (COREG) 6.25 mg tablet, TAKE 1 TABLET BY MOUTH TWICE A DAY WITH MEALS, Disp: 180 tablet, Rfl: 3  •  citalopram (CeleXA) 10 mg tablet, TAKE ONE TABLET BY MOUTH ONCE DAILY, Disp: 90 tablet, Rfl: 0  •  Cyanocobalamin ([employer] VITAMIN B-12 PO), Take by mouth, Disp: , Rfl:   •  Ferrous Sulfate (IRON) 325 (65 Fe) MG TABS, Take 1 tablet by mouth Twice daily, Disp: , Rfl:   •  finasteride (PROSCAR) 5 mg tablet, Take 1 tablet (5 mg total) by mouth daily, Disp: 90 tablet, Rfl: 3  •  lisinopril (ZESTRIL) 5 mg tablet, TAKE 1 TABLET BY MOUTH ONCE A DAY (Patient taking differently: every evening), Disp: 90 tablet, Rfl: 1  •  Multiple Vitamin Essential TABS, Take by mouth, Disp: , Rfl:   •  nitrofurantoin (MACROBID) 100 mg capsule, Take 1 capsule (100 mg total) by mouth 2 (two) times a day, Disp: 6 capsule, Rfl: 0  •  Omega 3 1200 MG CAPS, Take by mouth in the morning, Disp: , Rfl:   •  tamsulosin (FLOMAX) 0.4 mg, Take 2 capsules (0.8 mg total) by mouth daily at bedtime, Disp: 180 capsule, Rfl: 3  •  Cholecalciferol 1000 units tablet, Take 2,000 Units by mouth  (Patient not taking: Reported on 5/19/2023), Disp: , Rfl:   •  phenazopyridine (PYRIDIUM) 100 mg tablet, Take 1 tablet (100 mg total) by mouth 3 (three) times a day as needed for bladder spasms (Patient not taking: Reported on 6/2/2023), Disp: 20 tablet, Rfl: 0      Physical Exam:  /62 (BP Location: Left arm)   Pulse 83   Temp 97.8 °F (36.6 °C) (Tympanic)   Resp 18   Ht 5' 8" (1.727 m)   Wt 54.2 kg (119 lb 8 oz)   SpO2 95%   BMI 18.17 kg/m²     Physical Exam  Vitals reviewed. Constitutional:       General: He is not in acute distress. Appearance: He is well-developed. He is not ill-appearing. HENT:      Head: Normocephalic and atraumatic. Eyes:      General: No scleral icterus. Conjunctiva/sclera: Conjunctivae normal.   Cardiovascular:      Rate and Rhythm: Normal rate and regular rhythm. Heart sounds: Normal heart sounds. No murmur heard. Pulmonary:      Effort: Pulmonary effort is normal. No respiratory distress. Breath sounds: Normal breath sounds. Abdominal:      Palpations: Abdomen is soft. Tenderness: There is no abdominal tenderness.    Musculoskeletal: General: No tenderness. Normal range of motion. Cervical back: Normal range of motion and neck supple. Right lower leg: No edema. Left lower leg: No edema. Lymphadenopathy:      Cervical: No cervical adenopathy. Skin:     General: Skin is warm and dry. Neurological:      Mental Status: He is alert and oriented to person, place, and time. Cranial Nerves: No cranial nerve deficit. Psychiatric:         Mood and Affect: Mood normal.         Behavior: Behavior normal.       Labs:  Lab Results   Component Value Date    WBC 5.64 04/22/2023    HGB 11.0 (L) 04/22/2023    HCT 35.3 (L) 04/22/2023    MCV 96 04/22/2023     04/22/2023     I have spent 30 minutes with Patient  today in which greater than 50% of this time was spent in counseling/coordination of care regarding Diagnostic results, Risks and benefits of tx options, Instructions for management, Impressions, Counseling / Coordination of care, Documenting in the medical record, Reviewing / ordering tests, medicine, procedures  , Obtaining or reviewing history   and Communicating with other healthcare professionals . Patient voiced understanding and agreement in the above discussion. Aware to contact our office with questions/symptoms in the interim. This note has been generated by voice recognition software system. Therefore, there may be spelling, grammar, and or syntax errors. Please contact if questions arise.

## 2023-07-26 ENCOUNTER — DOCUMENTATION (OUTPATIENT)
Dept: HEMATOLOGY ONCOLOGY | Facility: CLINIC | Age: 83
End: 2023-07-26

## 2023-07-26 NOTE — PROGRESS NOTES
In-basket message received from Dr. Lj Sosa to add patient to urology [facility] on 8/1/2023. Chart reviewed and prep completed.

## 2023-08-01 ENCOUNTER — DOCUMENTATION (OUTPATIENT)
Dept: HEMATOLOGY ONCOLOGY | Facility: CLINIC | Age: 83
End: 2023-08-01

## 2023-08-01 ENCOUNTER — TELEPHONE (OUTPATIENT)
Dept: HEMATOLOGY ONCOLOGY | Facility: CLINIC | Age: 83
End: 2023-08-01

## 2023-08-01 ENCOUNTER — TELEPHONE (OUTPATIENT)
Dept: UROLOGY | Facility: CLINIC | Age: 83
End: 2023-08-01

## 2023-08-01 DIAGNOSIS — C83.09 SMALL B-CELL LYMPHOMA OF SOLID ORGAN EXCLUDING SPLEEN (HCC): Primary | ICD-10-CM

## 2023-08-01 NOTE — TELEPHONE ENCOUNTER
----- Message from Jeff Klein MD sent at 8/1/2023  8:19 AM EDT -----  Regarding: FW: follow up on pathology  Can we please arrange for this patient to see me back in November with renal/bladder US? Discussed today at Tumor Board.      ----- Message -----  From: Vanessa Renteria MD  Sent: 7/25/2023   3:18 PM EDT  To: Garcia Kowalski PA-C; #  Subject: RE: follow up on pathology                       From a pathology perspective, if this is the only disease site, then it could be a reactive process. 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 The concern would be a low level low grade B cell lymphoma which could be limited to the bladder. The only way that I could think to further evaluate is to make sure that any cause of chronic inflammation be treated then repeat biopsies to see if the B cell process persists. Santana Dodd    ----- Message -----  From: Jeff Klein MD  Sent: 7/25/2023   2:11 PM EDT  To: Garcia Kowalski PA-C; #  Subject: RE: follow up on pathology                       Thanks Adela Gonzalez,    I honestly had not planned on treating this patient like a standard urothelial carcinoma with surveillance scopes as I expected his bladder lymphoma to be only one site of disease requiring some form of systemic therapy. However, if you are not concerned about any other foci, I will present the patient at  Tumor Board to gain consensus about surveillance or other recs. TB Team - can we please add this patient for discussion? Next available?      ----- Message -----  From: Garcia Kowalski PA-C  Sent: 7/25/2023   1:34 PM EDT  To: Vanessa Renteria MD; Jeff Klein MD  Subject: follow up on pathology                           Hi,     We had spoken about this patient before; the concern of small B cell lymphoma in the bladder. PET/CT did not show concern of malignancy. He didn't have repeat labs prior to visit. If CBC-D is abnormal later this week (when he plans to complete) I could order flow cytometry of the blood but other than that, there is nothing else from our side to do. Any other recommendations Dr. Padma Hurtado? Dr. Radha Scott -- will you be doing another cysto eventually?     Thanks,   Buddy Centeno PA-C   Med Onc

## 2023-08-01 NOTE — TELEPHONE ENCOUNTER
Left message for patient. On to explain that his case was presented at  tumor board. He is advised to complete labs that were entered into the system at his visit as well as flow cytometry that was entered today. He is advised to call back the office.

## 2023-08-01 NOTE — PROGRESS NOTES
Oncology Navigator Note: Multidisciplinary Urology Case Review 8/1/2023        Diagnosis: Javad Padgett is a 80 y.o. male who was presented at the Urology Oncology Multidisciplinary Tumor Conference today. Scarlet Dunn presented to Urology in March 2023 for evaluation of lower urinary tract symptoms. Had been seen in ER in 12/2022 with gross hematuria. Imaging showed concern for left sided bladder abnormality. Pt s/p TURBT on 5/17/23:  Final Diagnosis   A. Bladder, tumor, transurethral resection:  -Clonal B cell proliferation, suspicious for small B-cell lymphoma (see note). -Negative for features of urothelial dysplasia or carcinoma. B-Cell gene rearrangement positive per [employer]    PET/CT done with no findings consistent with B-Cell Lymphoma        Recommendations of Group:  Flow cytometry, trend CBC. Follow-up with urology with cystoscopies on an as needed basis. Noted Kimmy Mills PA-C reached out to pt about obtaining flow cytometry      Upcoming Appointments:    Future Appointments   Date Time Provider [address]   9/20/2023  8:00 AM DO JASBIR Salazar PT  [facility]   9/26/2023  8:00 AM [address] [place], [place] [address]            Patient was discussed at the Multidisciplinary Urology Case review on 8/1/2023      NCCN guidelines were available for review. The final treatment plan will be left to the discretion of the patient and the treating physician. DISCLAIMERS:  TO THE TREATING PHYSICIAN:  This conference is a meeting of clinicians from various specialty areas who evaluate and discuss patients for whom a multidisciplinary treatment approach is being considered. Please note that the above opinion was a consensus of the conference attendees and is intended only to assist in quality care of the discussed patient.   The responsibility for follow up on the input given during the conference, along with any final decisions regarding plan of care, is that of the patient and the patient's provider. Accordingly, appointments have only been recommended based on this information and have NOT been scheduled unless otherwise noted. TO THE PATIENT:  This summary is a brief record of major aspects of your cancer treatment. You may choose to share a copy with any of your doctors or nurses. However, this is not a detailed or comprehensive record of your care.

## 2023-08-02 ENCOUNTER — APPOINTMENT (OUTPATIENT)
Dept: LAB | Facility: HOSPITAL | Age: 83
End: 2023-08-02
Payer: COMMERCIAL

## 2023-08-02 DIAGNOSIS — D64.9 ANEMIA, UNSPECIFIED TYPE: ICD-10-CM

## 2023-08-02 DIAGNOSIS — C83.09 SMALL B-CELL LYMPHOMA OF SOLID ORGAN EXCLUDING SPLEEN (HCC): ICD-10-CM

## 2023-08-02 DIAGNOSIS — R31.0 GROSS HEMATURIA: ICD-10-CM

## 2023-08-02 DIAGNOSIS — N13.39 OTHER HYDRONEPHROSIS: ICD-10-CM

## 2023-08-02 DIAGNOSIS — N40.1 BENIGN PROSTATIC HYPERPLASIA WITH LOWER URINARY TRACT SYMPTOMS, SYMPTOM DETAILS UNSPECIFIED: Primary | ICD-10-CM

## 2023-08-02 DIAGNOSIS — E61.1 LOW SERUM IRON: ICD-10-CM

## 2023-08-02 LAB
BASOPHILS # BLD AUTO: 0.06 THOUSANDS/ÂΜL (ref 0–0.1)
BASOPHILS NFR BLD AUTO: 1 % (ref 0–1)
CRP SERPL QL: 4.4 MG/L
EOSINOPHIL # BLD AUTO: 0.26 THOUSAND/ÂΜL (ref 0–0.61)
EOSINOPHIL NFR BLD AUTO: 4 % (ref 0–6)
ERYTHROCYTE [DISTWIDTH] IN BLOOD BY AUTOMATED COUNT: 15.5 % (ref 11.6–15.1)
ERYTHROCYTE [SEDIMENTATION RATE] IN BLOOD: 42 MM/HOUR (ref 0–19)
FERRITIN SERPL-MCNC: 40 NG/ML (ref 24–336)
HCT VFR BLD AUTO: 37.1 % (ref 36.5–49.3)
HGB BLD-MCNC: 11.6 G/DL (ref 12–17)
IMM GRANULOCYTES # BLD AUTO: 0.01 THOUSAND/UL (ref 0–0.2)
IMM GRANULOCYTES NFR BLD AUTO: 0 % (ref 0–2)
IRON SATN MFR SERPL: 19 % (ref 20–50)
IRON SERPL-MCNC: 54 UG/DL (ref 65–175)
LDH SERPL-CCNC: 178 U/L (ref 140–271)
LYMPHOCYTES # BLD AUTO: 1.56 THOUSANDS/ÂΜL (ref 0.6–4.47)
LYMPHOCYTES NFR BLD AUTO: 24 % (ref 14–44)
MCH RBC QN AUTO: 29.7 PG (ref 26.8–34.3)
MCHC RBC AUTO-ENTMCNC: 31.3 G/DL (ref 31.4–37.4)
MCV RBC AUTO: 95 FL (ref 82–98)
MONOCYTES # BLD AUTO: 0.94 THOUSAND/ÂΜL (ref 0.17–1.22)
MONOCYTES NFR BLD AUTO: 14 % (ref 4–12)
NEUTROPHILS # BLD AUTO: 3.7 THOUSANDS/ÂΜL (ref 1.85–7.62)
NEUTS SEG NFR BLD AUTO: 57 % (ref 43–75)
NRBC BLD AUTO-RTO: 0 /100 WBCS
PLATELET # BLD AUTO: 237 THOUSANDS/UL (ref 149–390)
PMV BLD AUTO: 9.6 FL (ref 8.9–12.7)
RBC # BLD AUTO: 3.9 MILLION/UL (ref 3.88–5.62)
TIBC SERPL-MCNC: 284 UG/DL (ref 250–450)
WBC # BLD AUTO: 6.53 THOUSAND/UL (ref 4.31–10.16)

## 2023-08-02 PROCEDURE — 36415 COLL VENOUS BLD VENIPUNCTURE: CPT

## 2023-08-02 PROCEDURE — 88185 FLOWCYTOMETRY/TC ADD-ON: CPT

## 2023-08-02 PROCEDURE — 82728 ASSAY OF FERRITIN: CPT

## 2023-08-02 PROCEDURE — 85652 RBC SED RATE AUTOMATED: CPT

## 2023-08-02 PROCEDURE — 86140 C-REACTIVE PROTEIN: CPT

## 2023-08-02 PROCEDURE — 83615 LACTATE (LD) (LDH) ENZYME: CPT

## 2023-08-02 PROCEDURE — 83540 ASSAY OF IRON: CPT

## 2023-08-02 PROCEDURE — 85025 COMPLETE CBC W/AUTO DIFF WBC: CPT

## 2023-08-02 PROCEDURE — 88184 FLOWCYTOMETRY/ TC 1 MARKER: CPT

## 2023-08-02 PROCEDURE — 83550 IRON BINDING TEST: CPT

## 2023-08-02 NOTE — TELEPHONE ENCOUNTER
Called and left another  for patient to return call. Order placed for renal US. If patient returns call, he will need an appointment scheduled with Dr. Nabil Miramontes in November and to be provided with central scheduling phone number to schedule US prior to visit.

## 2023-08-03 NOTE — TELEPHONE ENCOUNTER
Patient returned call and PSR scheduled him for follow up in November with Dr. Gilbert Burton. Aware to have renal US ptv.

## 2023-08-08 DIAGNOSIS — I50.22 CHRONIC SYSTOLIC CHF (CONGESTIVE HEART FAILURE), NYHA CLASS 2 (HCC): ICD-10-CM

## 2023-08-08 RX ORDER — CARVEDILOL 6.25 MG/1
TABLET ORAL
Qty: 180 TABLET | Refills: 3 | Status: SHIPPED | OUTPATIENT
Start: 2023-08-08

## 2023-08-09 LAB — SCAN RESULT: NORMAL

## 2023-08-18 ENCOUNTER — TELEPHONE (OUTPATIENT)
Dept: HEMATOLOGY ONCOLOGY | Facility: CLINIC | Age: 83
End: 2023-08-18

## 2023-08-18 NOTE — TELEPHONE ENCOUNTER
Reviewed notes and discussed with my physician assistant to check with Dr. Sharon Donovan if we have a definite diagnosis of lymphoma in the bladder and if not patient should have another cystoscopy and biopsy and if there is extranodal bladder lymphoma that could be radiated. I plan to send a note to Dr. Sharon Donovan to answer above question. Hi Dr. Sharon Donovan,  Do we have a definite diagnosis of lymphoma in the bladder in this case? Do you need more tissue for definitive diagnosis and if that is the case we could request the urologist to repeat cystoscopy and biopsy.   Thanks  Christine

## 2023-09-05 ENCOUNTER — TELEPHONE (OUTPATIENT)
Dept: UROLOGY | Facility: CLINIC | Age: 83
End: 2023-09-05

## 2023-09-05 NOTE — TELEPHONE ENCOUNTER
----- Message from Diana Hill MD sent at 8/25/2023 10:38 AM EDT -----  Regarding: RE:  Let’s plan to have a flexible biopsy forceps and bugbee available.  ----- Message -----  From: Gregg White, RN  Sent: 8/25/2023   8:48 AM EDT  To: Diana Hill MD; #    Dr Toño Bergman  Would you do a biopsy too? Thanks  ----- Message -----  From: Diana Hill MD  Sent: 8/24/2023   5:14 PM EDT  To: Gregg White, RN; #    Looks like 9/11/23 PM may open for hours. Could consider adding patient for cystoscopy at this time at [facility] request?    ----- Message -----  From: Max Landeros MD  Sent: 8/22/2023   5:30 PM EDT  To: Brandie Webb MD; Diana Hill MD    [facility], [place], operated by [facility],  If you could please do it sooner that will  help and will be appreciated. Thanks  Proothi  ----- Message -----  From: Diana Hill MD  Sent: 8/22/2023   2:31 PM EDT  To: Max Landeros MD; Brandie Webb MD    He is scheduled to see me 11/1 and I can scope that day. Dr. Holger Alvarado, I may not be able to move up much with office constraint but please let me know if you feel sooner scope is necessary and I can try.      ----- Message -----  From: Max Landeros MD  Sent: 8/19/2023  11:17 AM EDT  To: Brandie Webb MD; Diana Hill MD    Hi All,  Thank you Dr. Ren Min. I am requesting Dr. Harvinder Burks to perform another cystoscopy sooner rather than later and if we have a definite diagnosis of lymphoma patient could be worked up additionally and if f he has localized extranodal lymphoma in the bladder that could be radiated. Thanks  Proothi    ----- Message -----  From: Brandie Webb MD  Sent: 8/18/2023   8:39 AM EDT  To: Max Landeros MD    Based on the small sample size, partially distorted histology and potential for reactive changes I did not feel comfortable going all the way to confirmed lymphoma. But the pattern, immunoprofile and positive IGH gene rearrangement are all HIGHLY suggestive.   I think we really do need another biopsy with flow cytometry and possible genetic analysis to be absolutely certain.    ----- Message -----  From: Luz Elena Paul MD  Sent: 8/18/2023   7:16 AM EDT  To: Faith Armstrong MD    Hi Dr. Shea Julien,  Do we have a definite diagnosis of lymphoma in the bladder in this case? Do you need more tissue for definite diagnosis and if that is the case we could request the urologist to repeat cystoscopy and biopsy.   Thanks  Proothi

## 2023-09-05 NOTE — TELEPHONE ENCOUNTER
Voicemail left for the patient and daughter, Miles Guerra, to please call the office to schedule an appointment for cystoscopy, possible bladder biopsy on 9/11/2023 with Dr Iban Jacome.

## 2023-09-11 ENCOUNTER — PROCEDURE VISIT (OUTPATIENT)
Dept: UROLOGY | Facility: CLINIC | Age: 83
End: 2023-09-11
Payer: COMMERCIAL

## 2023-09-11 VITALS
WEIGHT: 119 LBS | OXYGEN SATURATION: 98 % | BODY MASS INDEX: 18.04 KG/M2 | SYSTOLIC BLOOD PRESSURE: 140 MMHG | DIASTOLIC BLOOD PRESSURE: 80 MMHG | HEIGHT: 68 IN | HEART RATE: 61 BPM

## 2023-09-11 DIAGNOSIS — N40.1 BPH WITH OBSTRUCTION/LOWER URINARY TRACT SYMPTOMS: Primary | ICD-10-CM

## 2023-09-11 DIAGNOSIS — D49.4 BLADDER TUMOR: ICD-10-CM

## 2023-09-11 DIAGNOSIS — N13.8 BPH WITH OBSTRUCTION/LOWER URINARY TRACT SYMPTOMS: Primary | ICD-10-CM

## 2023-09-11 LAB
SL AMB  POCT GLUCOSE, UA: NORMAL
SL AMB LEUKOCYTE ESTERASE,UA: NORMAL
SL AMB POCT BILIRUBIN,UA: NORMAL
SL AMB POCT BLOOD,UA: NORMAL
SL AMB POCT CLARITY,UA: CLEAR
SL AMB POCT COLOR,UA: YELLOW
SL AMB POCT KETONES,UA: NORMAL
SL AMB POCT NITRITE,UA: NORMAL
SL AMB POCT PH,UA: NORMAL
SL AMB POCT SPECIFIC GRAVITY,UA: 10.1
SL AMB POCT URINE PROTEIN: NORMAL
SL AMB POCT UROBILINOGEN: NORMAL

## 2023-09-11 PROCEDURE — 88364 INSITU HYBRIDIZATION (FISH): CPT | Performed by: PATHOLOGY

## 2023-09-11 PROCEDURE — 52234 CYSTOSCOPY AND TREATMENT: CPT | Performed by: UROLOGY

## 2023-09-11 PROCEDURE — 88365 INSITU HYBRIDIZATION (FISH): CPT | Performed by: PATHOLOGY

## 2023-09-11 PROCEDURE — 81002 URINALYSIS NONAUTO W/O SCOPE: CPT | Performed by: UROLOGY

## 2023-09-11 PROCEDURE — 88341 IMHCHEM/IMCYTCHM EA ADD ANTB: CPT | Performed by: PATHOLOGY

## 2023-09-11 PROCEDURE — 99024 POSTOP FOLLOW-UP VISIT: CPT | Performed by: UROLOGY

## 2023-09-11 PROCEDURE — 88342 IMHCHEM/IMCYTCHM 1ST ANTB: CPT | Performed by: PATHOLOGY

## 2023-09-11 PROCEDURE — 88305 TISSUE EXAM BY PATHOLOGIST: CPT | Performed by: PATHOLOGY

## 2023-09-11 RX ORDER — CEFUROXIME AXETIL 250 MG/1
250 TABLET ORAL EVERY 12 HOURS SCHEDULED
Qty: 6 TABLET | Refills: 0 | Status: SHIPPED | OUTPATIENT
Start: 2023-09-11 | End: 2023-09-14

## 2023-09-11 NOTE — PROGRESS NOTES
UROLOGY PROCEDURE NOTE     CHIEF COMPLAINT   Sandra Matthews is a 80 y.o. male with a complaint of   No chief complaint on file. History of Present Illness:   Sandra Matthews is a 80 y.o. male here for evaluation of of lower urinary tract symptoms on Flomax double dose and finasteride. Patient was seen in the emergency room in December with gross hematuria. Treated with antibiotics. Patient had imaging that demonstrates concern for a left-sided bladder abnormality and right-sided hydronephrosis without obstructing stone. Patient reports significant smoking history however he quit approximately 40 years ago. He does work for [employer] and is around multiple solvents. He still works 5 days a week. Patient was found to have an abnormal bladder lesion and underwent resection. This demonstrated lymphoma. Patient was referred back to hematology oncology and systemic work-up did not seem to indicate any additional sites of disease. In continued conversation with the oncology service, primarily Dr. Jamey Rick, additional cystourethroscopy was recommended to ensure that there was not any additional recurrent or persistent disease.     Past Medical History:     Past Medical History:   Diagnosis Date   • BPH (benign prostatic hyperplasia)    • Chronic pain disorder     back   • Coronary artery disease    • Gallstones    • History of tracheostomy     per pt had it "after CABG surgery--was admitted for 53 days including Rehab"   • Hx of CABG     per pt 7-8 yrs ago   • Hypercholesterolemia    • Hypertension    • Moderate exercise     alot of walking-works FT and also walks dog 2 miles daily usually   • Myocardial infarction ([address])     per pt approx 7-8yrs ago --[facility] Dr Karina Lynch sees yearly   • Prediabetes    • Shortness of breath     per pt"climbing steps or walking long distance"   • Wears glasses        PAST SURGICAL HISTORY:     Past Surgical History:   Procedure Laterality Date   • CATARACT EXTRACTION Bilateral    • COLONOSCOPY     • CORONARY ARTERY BYPASS GRAFT      x3 LIMA-LAD, SVG-OM, SVG-DIAG with mitral valve repair by nicole   • CYSTOSCOPY  12/13/2014    Diagnostic   • CYSTOSCOPY  03/24/2023    [facility]   • ESOPHAGOGASTRODUODENOSCOPY  04/07/2015    with biopsy. Dr Daysi Sierra   • FL RETROGRADE PYELOGRAM  5/17/2023   • MITRAL VALVE REPAIR      26 mm fatmata hernandez physi annuloplasty ring   • WY CYSTO BLADDER W/URETERAL CATHETERIZATION Right 5/17/2023    Procedure: CYSTO, URETHERAL DILATION, RETROGRADE PYELOGRAM W/ INSERTION STENT URETERAL;  Surgeon: Monico Toscano MD;  Location: [facility];  Service: Urology   • WY CYSTO W/REMOVAL OF LESIONS SMALL N/A 5/17/2023    Procedure: (TURBT);   Surgeon: Monico Toscano MD;  Location[facility];  Service: Urology   • WY CYSTO/PYELOSCOPY BX&/FULGURATION PELIVC LESION Right 5/17/2023    Procedure: URETEROSCOPY,WITH URETERAL BRUSHING;  Surgeon: Monico Toscano MD;  Location: [facility];  Service: Urology   • TRACHEOSTOMY      during his heart surgery       CURRENT MEDICATIONS:     Current Outpatient Medications   Medication Sig Dispense Refill   • aspirin 81 mg chewable tablet Chew 81 mg daily     • atorvastatin (LIPITOR) 40 mg tablet TAKE ONE TABLET BY MOUTH ONCE DAILY 90 tablet 2   • carvedilol (COREG) 6.25 mg tablet TAKE 1 TABLET BY MOUTH TWICE A DAY WITH MEALS 180 tablet 3   • Cholecalciferol 1000 units tablet Take 2,000 Units by mouth  (Patient not taking: Reported on 5/19/2023)     • citalopram (CeleXA) 10 mg tablet TAKE ONE TABLET BY MOUTH ONCE DAILY 90 tablet 0   • Cyanocobalamin ([employer] VITAMIN B-12 PO) Take by mouth     • Ferrous Sulfate (IRON) 325 (65 Fe) MG TABS Take 1 tablet by mouth Twice daily     • finasteride (PROSCAR) 5 mg tablet Take 1 tablet (5 mg total) by mouth daily 90 tablet 3   • lisinopril (ZESTRIL) 5 mg tablet TAKE 1 TABLET BY MOUTH ONCE A DAY (Patient taking differently: every evening) 90 tablet 1   • Multiple Vitamin Essential TABS Take by mouth     • nitrofurantoin (MACROBID) 100 mg capsule Take 1 capsule (100 mg total) by mouth 2 (two) times a day 6 capsule 0   • Omega 3 1200 MG CAPS Take by mouth in the morning     • phenazopyridine (PYRIDIUM) 100 mg tablet Take 1 tablet (100 mg total) by mouth 3 (three) times a day as needed for bladder spasms (Patient not taking: Reported on 2023) 20 tablet 0   • tamsulosin (FLOMAX) 0.4 mg Take 2 capsules (0.8 mg total) by mouth daily at bedtime 180 capsule 3     No current facility-administered medications for this visit.        ALLERGIES:   No Known Allergies    SOCIAL HISTORY:     Social History     Socioeconomic History   • Marital status:      Spouse name: Not on file   • Number of children: Not on file   • Years of education: Not on file   • Highest education level: Not on file   Occupational History   • Not on file   Tobacco Use   • Smoking status: Former     Packs/day: 0.50     Years: 16.00     Total pack years: 8.00     Types: Cigarettes     Start date: 46     Quit date: 1     Years since quittin.7   • Smokeless tobacco: Never   Vaping Use   • Vaping Use: Never used   Substance and Sexual Activity   • Alcohol use: Yes     Comment: per pt very rarely   • Drug use: No   • Sexual activity: Not on file     Comment: defer   Other Topics Concern   • Not on file   Social History Narrative    Consumes 2-3 ups of tea per week     Social Determinants of Health     Financial Resource Strain: Not on file   Food Insecurity: Not on file   Transportation Needs: Not on file   Physical Activity: Not on file   Stress: Not on file   Social Connections: Not on file   Intimate Partner Violence: Not on file   Housing Stability: Not on file       SOCIAL HISTORY:     Family History   Problem Relation Age of Onset   • Diabetes Mother    • Heart disease Mother    • Hypertension Mother    • Colon cancer Father    • Kidney disease Father    • Heart disease Sister    • Heart disease Brother    • Breast cancer Paternal Aunt    • Cervical cancer Maternal Aunt    • Colon cancer Paternal Grandfather        REVIEW OF SYSTEMS:     Review of Systems   Constitutional: Negative. Respiratory: Negative. Cardiovascular: Negative. Gastrointestinal: Negative. Genitourinary: Negative. Negative for hematuria. Musculoskeletal: Negative. Skin: Negative. Psychiatric/Behavioral: Negative. PHYSICAL EXAM:     There were no vitals taken for this visit. Physical Exam  Vitals reviewed. HENT:      Head: Normocephalic. Nose: Nose normal.      Mouth/Throat:      Mouth: Mucous membranes are moist.   Eyes:      Pupils: Pupils are equal, round, and reactive to light. Cardiovascular:      Rate and Rhythm: Normal rate. Pulmonary:      Effort: Pulmonary effort is normal.   Abdominal:      General: Abdomen is flat. Comments: Large right-sided hernia, soft and reducible   Genitourinary:     Penis: Normal.       Testes: Normal.      Comments: Uncircumcised  Musculoskeletal:         General: Normal range of motion. Cervical back: Normal range of motion. Skin:     General: Skin is warm. Neurological:      General: No focal deficit present. Mental Status: He is alert.    Psychiatric:         Mood and Affect: Mood normal.         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 Multiplanar attenuation corrected and non attenuation corrected PET images are available for interpretation, and contiguous, low dose, axial CT sections were obtained from the skull base through the femurs. Intravenous contrast material was not utilized. This examination, like all CT scans performed in the [facility], was performed utilizing techniques to minimize radiation dose exposure, including the use of iterative reconstruction and   automated exposure control.     Fasting serum glucose:  91   mg/dl     FINDINGS:  Mediastinal blood flow SUV max 2.4  Hepatic parenchyma SUV max 3.1  VISUALIZED BRAIN:  No acute abnormalities are seen.     HEAD/NECK:  There is a physiologic distribution of FDG. No FDG avid cervical adenopathy is seen. CT images: Unremarkable.     CHEST:  There is increased metabolic activity in right hilar node with SUV max of 5.2 image 78 with mild increased metabolic activity in left hilar node SUV max 3.2 image 75 and prevascular node on image 77. There are normal size lymph nodes. CT images: Cardiomegaly and atherosclerotic disease of the thoracic aorta. Small mediastinal and hilar nodes. Coronary artery calcifications. Loculated left effusion with pleural calcifications. Large hiatal hernia.     ABDOMEN:  Relatively diffuse increased metabolic activity is demonstrated throughout the colon of unclear etiology. CT images: Cholelithiasis. Atherosclerotic disease of the abdominal aorta without aneurysmal dilatation. Colonic diverticulosis. Large amount of stool in the colon. PELVIS:  Known bladder mass is obscured by excretion isotope at the level of the urinary bladder. No pelvic adenopathy. CT images: Diffuse thickening of the wall of the urinary bladder with trabeculations and right bladder wall calcification.  Prostate enlargement.     OSSEOUS STRUCTURES:  No FDG avid lesions are seen.  CT images: Dextroscoliosis of the lumbar spine. Spondylosis.     IMPRESSION:        1. Nonspecific metabolic activity in normal-sized mediastinal and hilar nodes which could be related to chronic infection, formation or less likely neoplasm. 2. Diffuse increased metabolic activity demonstrated throughout the colon to the level of the rectum which is difficult to interpret. Correlate with results of colonoscopy. 3. Left bladder mass obscured by excretion of isotope. 4. Chronic loculated left effusion with pleural calcifications and large hiatal hernia. PATHOLOGY:     Preliminary Diagnosis   A. Bladder, tumor, transurethral resection:  -Inflamed and denuded urothelial mucosa with reactive change, negative for epithelial dysplasia or carcinoma.  -Atypical B-cell rich lymphoid infiltrate present, cannot exclude reactive versus lymphoproliferative disorder (see note). PROCEDURE:     SEE NOTE    ASSESSMENT:     80 y.o. 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 I do not see any recurrent tumor. There is a very small area of erythema, less than 1/2 cm, in the left posterior bladder and an area of trabeculation. This did not appear papillary or velvety like carcinoma in situ but given the patient's history, I offered him office-based biopsy. 2 small cold cup biopsies were performed of the less than half centimeter area with Bugbee fulguration. Specimen was placed in formalin and was sent off to assess in the lab. I will treat the patient with a short course of antibiotics given the manipulation and recommend holding his aspirin over the next 48 hours. I will contact him when I have pathology.

## 2023-09-11 NOTE — PROGRESS NOTES
Cystoscopy     Date/Time 9/11/2023 3:00 PM     Performed by  Hossein Guerra MD   Authorized by Hossein Guerra MD     Universal Protocol:  Timeout called at: 9/11/2023 3:23 PM.      Procedure Details:  Procedure type: fulguration/excision, tumors of bladder    Patient tolerance: Patient tolerated the procedure well with no immediate complications    Additional Procedure Details:   Cystoscopy procedure note:  Risk and benefits of flexible cystoscopy were discussed. Informed consent was obtained. Urine dip was adequate for cystoscopy. The patient was placed in the supine position. His genitalia was prepped with Betadine and draped in a sterile fashion. Viscous 2% lidocaine jelly was instilled into the urethra and allowed dwell time for local anesthesia. Flexible cystoscopy was then performed using a 16F scope. Patient has meatal stenosis that was dilated to pass the scope. The distal urethra and prostatic urethra were evaluated and were normal in course and caliber. Once inside the bladder, it was carefully inspected in a 360 degree fashion. Patient is noted to have a capacious bladder with trabeculation. In the right lateral bladder surface the prior biopsy site had some dystrophic calcifications and healing but no signs of recurrent tumor or papillary disease. In the left posterior bladder with an area of trabeculation, there were small half centimeter areas of erythema. This did not appear velvety like carcinoma in situ nor was it raised or papillary. Because of the patient's history I offered him office-based biopsy. He was agreeable after discussion of risk benefits. Using a cold cup biopsy, 2 small pieces were avulsed biopsied. Using Bugbee electrocautery, the areas were fulgurated and there was good hemostasis. Specimen was passed off in formalin. Patient tolerated the procedure well.

## 2023-09-18 PROCEDURE — 88305 TISSUE EXAM BY PATHOLOGIST: CPT | Performed by: PATHOLOGY

## 2023-09-18 PROCEDURE — 88341 IMHCHEM/IMCYTCHM EA ADD ANTB: CPT | Performed by: PATHOLOGY

## 2023-09-18 PROCEDURE — 88342 IMHCHEM/IMCYTCHM 1ST ANTB: CPT | Performed by: PATHOLOGY

## 2023-09-18 PROCEDURE — 88364 INSITU HYBRIDIZATION (FISH): CPT | Performed by: PATHOLOGY

## 2023-09-18 PROCEDURE — 88365 INSITU HYBRIDIZATION (FISH): CPT | Performed by: PATHOLOGY

## 2023-09-20 ENCOUNTER — OFFICE VISIT (OUTPATIENT)
Dept: FAMILY MEDICINE CLINIC | Facility: CLINIC | Age: 83
End: 2023-09-20
Payer: COMMERCIAL

## 2023-09-20 VITALS
HEART RATE: 69 BPM | DIASTOLIC BLOOD PRESSURE: 80 MMHG | BODY MASS INDEX: 18.25 KG/M2 | HEIGHT: 68 IN | TEMPERATURE: 97.2 F | WEIGHT: 120.4 LBS | SYSTOLIC BLOOD PRESSURE: 120 MMHG | OXYGEN SATURATION: 96 %

## 2023-09-20 DIAGNOSIS — I10 ESSENTIAL HYPERTENSION: ICD-10-CM

## 2023-09-20 DIAGNOSIS — N40.1 BENIGN PROSTATIC HYPERPLASIA WITH LOWER URINARY TRACT SYMPTOMS, SYMPTOM DETAILS UNSPECIFIED: ICD-10-CM

## 2023-09-20 DIAGNOSIS — E78.2 MIXED HYPERLIPIDEMIA: ICD-10-CM

## 2023-09-20 DIAGNOSIS — I50.22 CHRONIC SYSTOLIC CHF (CONGESTIVE HEART FAILURE), NYHA CLASS 2 (HCC): ICD-10-CM

## 2023-09-20 DIAGNOSIS — M54.50 RIGHT LOW BACK PAIN, UNSPECIFIED CHRONICITY, UNSPECIFIED WHETHER SCIATICA PRESENT: Primary | ICD-10-CM

## 2023-09-20 DIAGNOSIS — F41.9 ANXIETY: ICD-10-CM

## 2023-09-20 PROCEDURE — 99214 OFFICE O/P EST MOD 30 MIN: CPT | Performed by: FAMILY MEDICINE

## 2023-09-20 NOTE — PATIENT INSTRUCTIONS
Here for recheck and BP stable. Rec xray low back for right low back pain and rec taking all meds as directed for hx of anxiety and CHF and HTN and BPH. Follow up with Cardiology and urology as directed. He recently had a biopsy done by urology. Patient refuses flu and covid and pneumonia vaccine.

## 2023-09-20 NOTE — PROGRESS NOTES
Name: Matias Gaytan      : 1940      MRN: [id number]  Encounter Provider: Santino Irwin DO  Encounter Date: 2023   Encounter department: [address] [place] [address]  Chief Complaint   Patient presents with   • Follow-up   • Hypertension     6m chk up , back pain     Patient Instructions   Here for recheck and BP stable. Rec xray low back for right low back pain and rec taking all meds as directed for hx of anxiety and CHF and HTN and BPH. Follow up with Cardiology and urology as directed. He recently had a biopsy done by urology. Patient refuses flu and covid and pneumonia vaccine. Assessment & Plan     1. Right low back pain, unspecified chronicity, unspecified whether sciatica present  -     XR spine lumbar 2 or 3 views injury; Future; Expected date: 2023    2. Essential hypertension  -     Comprehensive metabolic panel; Future; Expected date: 2024    3. Benign prostatic hyperplasia with lower urinary tract symptoms, symptom details unspecified    4. Mixed hyperlipidemia  -     Comprehensive metabolic panel; Future; Expected date: 2024  -     Lipid Panel with Direct LDL reflex; Future; Expected date: 2024    5. Anxiety  -     Comprehensive metabolic panel; Future; Expected date: 2024  -     CBC and differential; Future; Expected date: 2024    6. Chronic systolic CHF (congestive heart failure), NYHA class 2 ([facility])  Comments:  stable  Orders:  -     Comprehensive metabolic panel; Future; Expected date: 2024           Subjective      Follow-up  Hypertension (6m chk up , back pain)  Patient has pain when walking in right low back area a couple months ago. Tylenol does not work. Hx of HTN and hyperlipidemia, and BPH and anxiety and CHF - all stable. Sees cardiology as directed and urology. Review of Systems   Constitutional: Negative. HENT: Negative. Eyes: Negative. Respiratory: Negative. Cardiovascular: Negative. Gastrointestinal: Negative. Endocrine: Negative. Genitourinary: Negative. Musculoskeletal: Positive for back pain (low ). Skin: Negative. Allergic/Immunologic: Negative. Neurological: Negative. Hematological: Negative. Psychiatric/Behavioral: Negative. Current Outpatient Medications on File Prior to Visit   Medication Sig   • aspirin 81 mg chewable tablet Chew 81 mg daily   • atorvastatin (LIPITOR) 40 mg tablet TAKE ONE TABLET BY MOUTH ONCE DAILY   • carvedilol (COREG) 6.25 mg tablet TAKE 1 TABLET BY MOUTH TWICE A DAY WITH MEALS   • citalopram (CeleXA) 10 mg tablet TAKE ONE TABLET BY MOUTH ONCE DAILY   • Cyanocobalamin ([employer]S VITAMIN B-12 PO) Take by mouth   • Ferrous Sulfate (IRON) 325 (65 Fe) MG TABS Take 1 tablet by mouth Twice daily   • finasteride (PROSCAR) 5 mg tablet Take 1 tablet (5 mg total) by mouth daily   • Multiple Vitamin Essential TABS Take by mouth   • Omega 3 1200 MG CAPS Take by mouth in the morning   • tamsulosin (FLOMAX) 0.4 mg Take 2 capsules (0.8 mg total) by mouth daily at bedtime   • Cholecalciferol 1000 units tablet Take 2,000 Units by mouth  (Patient not taking: Reported on 5/19/2023)   • lisinopril (ZESTRIL) 5 mg tablet TAKE 1 TABLET BY MOUTH ONCE A DAY (Patient taking differently: every evening)   • phenazopyridine (PYRIDIUM) 100 mg tablet Take 1 tablet (100 mg total) by mouth 3 (three) times a day as needed for bladder spasms (Patient not taking: Reported on 6/2/2023)   • [DISCONTINUED] nitrofurantoin (MACROBID) 100 mg capsule Take 1 capsule (100 mg total) by mouth 2 (two) times a day (Patient not taking: Reported on 9/11/2023)       Objective     /80   Pulse 69   Temp (!) 97.2 °F (36.2 °C) (Temporal)   Ht 5' 8" (1.727 m)   Wt 54.6 kg (120 lb 6.4 oz)   SpO2 96%   BMI 18.31 kg/m²     Physical Exam  Constitutional:       Appearance: He is well-developed.    Eyes:      Conjunctiva/sclera: Conjunctivae normal.      Pupils: Pupils are equal, round, and reactive to light. Cardiovascular:      Rate and Rhythm: Normal rate and regular rhythm. Pulses: Normal pulses. Heart sounds: Normal heart sounds. Pulmonary:      Effort: Pulmonary effort is normal.      Breath sounds: Normal breath sounds. Musculoskeletal:         General: Normal range of motion. Cervical back: Normal range of motion and neck supple. Comments: Right low back pain   Skin:     General: Skin is warm and dry. Capillary Refill: Capillary refill takes less than 2 seconds. Neurological:      General: No focal deficit present. Mental Status: He is alert and oriented to person, place, and time. Mental status is at baseline. Deep Tendon Reflexes: Reflexes are normal and symmetric. Psychiatric:         Mood and Affect: Mood normal.         Behavior: Behavior normal.         Thought Content:  Thought content normal.         Judgment: Judgment normal.       Neville Meckel,

## 2023-09-26 ENCOUNTER — OFFICE VISIT (OUTPATIENT)
Dept: HEMATOLOGY ONCOLOGY | Facility: CLINIC | Age: 83
End: 2023-09-26
Payer: COMMERCIAL

## 2023-09-26 VITALS
HEART RATE: 78 BPM | HEIGHT: 67 IN | BODY MASS INDEX: 18.83 KG/M2 | WEIGHT: 120 LBS | RESPIRATION RATE: 18 BRPM | TEMPERATURE: 96.9 F | DIASTOLIC BLOOD PRESSURE: 80 MMHG | OXYGEN SATURATION: 96 % | SYSTOLIC BLOOD PRESSURE: 132 MMHG

## 2023-09-26 DIAGNOSIS — D64.9 ANEMIA, UNSPECIFIED TYPE: Primary | ICD-10-CM

## 2023-09-26 PROCEDURE — 99214 OFFICE O/P EST MOD 30 MIN: CPT | Performed by: PHYSICIAN ASSISTANT

## 2023-09-26 NOTE — PROGRESS NOTES
Hematology/Oncology Outpatient Follow-up  Jabier Harris 80 y.o. male 1940 [id number]    Date:  9/26/2023    Assessment and Plan:  77-year-old male presents for follow-up visit. He has history of anemia related to chronic disease, PMR, and low iron. He was last seen in our office in July 2022. He then had gross hematuria for which he saw urology. 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 Therefore a PET CT was completed. This has shown nonspecific metabolic activity normal size mediastinal and hilar nodes. Radiology stated chronic infection? Less likely neoplasm. There is diffuse activity throughout the colon at the level of the rectum. Patient had a previous colonoscopy. He has no new bowel complaints. He does not wish to proceed with another one at this time. Flow cytometry of blood was negative. Patient then had repeat cytoscopy with biopsies with urology on 9/11/23, negative report and findings. Continue to monitor CBC-D. Follow up with urology as scheduled. Follow up in 2 months with another CBC-D.     HPI:  Katia Sandoval is a 80 y. o. male presents for follow up for anemia.     Past medical history significant for hypertension, ischemic cardiomyopathy, paroxysmal SVT, BPH, hyperlipidemia, prediabetes.     Historically patient's hemoglobin is in the 12-13 g range with a normal MCV. Most recent CBC in Oct 2021 shows drop in hemoglobin to 10. 6. MCV remains normal. WBC and platelets are normal.  CMP significant for mild elevation of calcium, 10.2.  Patient has low albumin at 2.7.  Total protein 7.5.     He notes decrease in appetite over 2 years due to impaired taste and dysphagia.  He was seen by ENT and states they never figured anything out.     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      He was seen in follow up in Nov 2021. Reviewed that iron saturation and serum iron are decreased however TIBC and ferritin are okay. He could try to increase iron as he has been taking to any difficulty. Advised to at lunchtime and 1 in the evening.     Erythropoeitin was 17. 7.       TSH was very mildly elevated however T4 was normal.  However TSH historically in himself was always around 1 and now is 4. I sent to PCP for review.      1/13/21 he had EGD and colonoscopy. colonoscopy was negative for bleeding concern. EGD showed large hiatal hernia. Normal stomach and duodenum.     SPEP negative.     Patient saw rheum for hx of PMR. Labs showed elevated sed rate and CRP. He states he was placed on prednisone 10 mg PO daily.      Interval history:  Patient presented to the emergency department in December 2020 with gross hematuria and dysuria. Per urology note patient imaging showed distended bladder with question of mass versus nodule. He then underwent a cystoscopy and ureteroscopy with ureteral brushings on 5/17/2023.     Pathology of the mass and bladder was consistent of clonal B-cell proliferation suspicious for small B-cell lymphoma, no definitive. Pathology recommended doing a whole-body imaging to look for other areas for possible additional biopsy. Interval history:    ROS: Review of Systems   Constitutional: Positive for fatigue. HENT:        Continues to have decrease taste changes, chronic   Respiratory: Negative for cough and shortness of breath. Cardiovascular: Negative for leg swelling. Gastrointestinal: Negative for abdominal pain, constipation, diarrhea, nausea and vomiting. Genitourinary: Negative for difficulty urinating, dysuria and hematuria. Musculoskeletal: Negative. Skin: Negative. Neurological: Negative for dizziness, weakness, light-headedness and headaches. Hematological: Negative. Psychiatric/Behavioral: Negative.         Past Medical History:   Diagnosis Date   • BPH (benign prostatic hyperplasia)    • Chronic pain disorder     back   • Coronary artery disease    • Gallstones    • History of tracheostomy     per pt had it "after CABG surgery--was admitted for 53 days including Rehab"   • Hx of CABG     per pt 7-8 yrs ago   • Hypercholesterolemia    • Hypertension    • Moderate exercise     alot of walking-works FT and also walks dog 2 miles daily usually   • Myocardial infarction ([address])     per pt approx 7-8yrs ago --[facility] Dr Carri Billings sees yearly   • Prediabetes    • Shortness of breath     per pt"climbing steps or walking long distance"   • Wears glasses        Past Surgical History:   Procedure Laterality Date   • CATARACT EXTRACTION Bilateral    • COLONOSCOPY     • CORONARY ARTERY BYPASS GRAFT      x3 LIMA-LAD, SVG-OM, SVG-DIAG with mitral valve repair by nicole   • CYSTOSCOPY  12/13/2014    Diagnostic   • CYSTOSCOPY  03/24/2023    [facility]   • ESOPHAGOGASTRODUODENOSCOPY  04/07/2015    with biopsy. Dr Thierry Parker   • FL RETROGRADE PYELOGRAM  5/17/2023   • MITRAL VALVE REPAIR      26 mm fatmata hernandez physi annuloplasty ring   • ND CYSTO BLADDER W/URETERAL CATHETERIZATION Right 5/17/2023    Procedure: CYSTO, URETHERAL DILATION, RETROGRADE PYELOGRAM W/ INSERTION STENT URETERAL;  Surgeon: Juancarlos Evans MD;  Location: [facility];  Service: Urology   • ND CYSTO W/REMOVAL OF LESIONS SMALL N/A 5/17/2023    Procedure: (TURBT);   Surgeon: Juancarlos Evans MD;  Location[facility];  Service: Urology   • ND CYSTO/PYELOSCOPY BX&/FULGURATION PELIVC LESION Right 5/17/2023    Procedure: URETEROSCOPY,WITH URETERAL BRUSHING;  Surgeon: Juancarlos Evans MD;  Location: [facility];  Service: Urology   • TRACHEOSTOMY      during his heart surgery       Social History     Socioeconomic History   • Marital status:      Spouse name: Not on file   • Number of children: Not on file   • Years of education: Not on file   • Highest education level: Not on file   Occupational History   • Not on file   Tobacco Use   • Smoking status: Former     Packs/day: 0.50     Years: 16.00     Total pack years: 8.00     Types: Cigarettes     Start date: 46     Quit date: 1     Years since quittin.7   • Smokeless tobacco: Never   Vaping Use   • Vaping Use: Never used   Substance and Sexual Activity   • Alcohol use: Yes     Comment: per pt very rarely   • Drug use: No   • Sexual activity: Not on file     Comment: defer   Other Topics Concern   • Not on file   Social History Narrative    Consumes 2-3 ups of tea per week     Social Determinants of Health     Financial Resource Strain: Not on file   Food Insecurity: Not on file   Transportation Needs: Not on file   Physical Activity: Not on file   Stress: Not on file   Social Connections: Not on file   Intimate Partner Violence: Not on file   Housing Stability: Not on file       Family History   Problem Relation Age of Onset   • Colon cancer Father    • Kidney disease Father    • Diabetes Mother    • Heart disease Mother    • Hypertension Mother    • Cervical cancer Maternal Aunt    • Breast cancer Paternal Aunt    • Colon cancer Paternal Grandfather    • Heart disease Sister    • Heart disease Brother        No Known Allergies      Current Outpatient Medications:   •  aspirin 81 mg chewable tablet, Chew 81 mg daily, Disp: , Rfl:   •  atorvastatin (LIPITOR) 40 mg tablet, TAKE ONE TABLET BY MOUTH ONCE DAILY, Disp: 90 tablet, Rfl: 2  •  carvedilol (COREG) 6.25 mg tablet, TAKE 1 TABLET BY MOUTH TWICE A DAY WITH MEALS, Disp: 180 tablet, Rfl: 3  •  citalopram (CeleXA) 10 mg tablet, TAKE ONE TABLET BY MOUTH ONCE DAILY, Disp: 90 tablet, Rfl: 0  •  Cyanocobalamin ([employer] VITAMIN B-12 PO), Take by mouth, Disp: , Rfl:   •  Ferrous Sulfate (IRON) 325 (65 Fe) MG TABS, Take 1 tablet by mouth Twice daily, Disp: , Rfl:   •  finasteride (PROSCAR) 5 mg tablet, Take 1 tablet (5 mg total) by mouth daily, Disp: 90 tablet, Rfl: 3  •  lisinopril (ZESTRIL) 5 mg tablet, TAKE 1 TABLET BY MOUTH ONCE A DAY (Patient taking differently: every evening), Disp: 90 tablet, Rfl: 1  •  Multiple Vitamin Essential TABS, Take by mouth, Disp: , Rfl:   •  Omega 3 1200 MG CAPS, Take by mouth in the morning, Disp: , Rfl:   •  tamsulosin (FLOMAX) 0.4 mg, Take 2 capsules (0.8 mg total) by mouth daily at bedtime, Disp: 180 capsule, Rfl: 3  •  Cholecalciferol 1000 units tablet, Take 2,000 Units by mouth  (Patient not taking: Reported on 5/19/2023), Disp: , Rfl:   •  phenazopyridine (PYRIDIUM) 100 mg tablet, Take 1 tablet (100 mg total) by mouth 3 (three) times a day as needed for bladder spasms (Patient not taking: Reported on 6/2/2023), Disp: 20 tablet, Rfl: 0    Physical Exam:  /80 (BP Location: Left arm)   Pulse 78   Temp (!) 96.9 °F (36.1 °C) (Tympanic)   Resp 18   Ht 5' 7" (1.702 m)   Wt 54.4 kg (120 lb)   SpO2 96%   BMI 18.79 kg/m²     Physical Exam  Vitals reviewed. Constitutional:       General: He is not in acute distress. Appearance: He is well-developed. He is not ill-appearing. HENT:      Head: Normocephalic and atraumatic. Eyes:      General: No scleral icterus. Conjunctiva/sclera: Conjunctivae normal.   Cardiovascular:      Rate and Rhythm: Normal rate and regular rhythm. Heart sounds: Normal heart sounds. No murmur heard. Pulmonary:      Effort: Pulmonary effort is normal. No respiratory distress. Breath sounds: Normal breath sounds. Abdominal:      Palpations: Abdomen is soft. Tenderness: There is no abdominal tenderness. Musculoskeletal:         General: No tenderness. Normal range of motion. Cervical back: Normal range of motion and neck supple. Right lower leg: No edema. Left lower leg: No edema. Lymphadenopathy:      Cervical: No cervical adenopathy. Upper Body:      Right upper body: No supraclavicular or axillary adenopathy. Left upper body: No supraclavicular or axillary adenopathy. Skin:     General: Skin is warm and dry.    Neurological:      Mental Status: He is alert and oriented to person, place, and time. Cranial Nerves: No cranial nerve deficit. Psychiatric:         Mood and Affect: Mood normal.         Behavior: Behavior normal.       Labs:  Lab Results   Component Value Date    WBC 6.53 08/02/2023    HGB 11.6 (L) 08/02/2023    HCT 37.1 08/02/2023    MCV 95 08/02/2023     08/02/2023     I have spent 30 minutes with Patient  today in which greater than 50% of this time was spent in counseling/coordination of care regarding Diagnostic results, Risks and benefits of tx options, Instructions for management, Impressions, Counseling / Coordination of care, Documenting in the medical record, Reviewing / ordering tests, medicine, procedures   and Obtaining or reviewing history  . Patient voiced understanding and agreement in the above discussion. Aware to contact our office with questions/symptoms in the interim. This note has been generated by voice recognition software system. Therefore, there may be spelling, grammar, and or syntax errors. Please contact if questions arise.

## 2023-09-29 DIAGNOSIS — F41.9 ANXIETY: ICD-10-CM

## 2023-09-29 RX ORDER — CITALOPRAM HYDROBROMIDE 10 MG/1
TABLET ORAL
Qty: 90 TABLET | Refills: 0 | Status: SHIPPED | OUTPATIENT
Start: 2023-09-29

## 2023-10-20 DIAGNOSIS — F41.9 ANXIETY: ICD-10-CM

## 2023-10-23 RX ORDER — CITALOPRAM HYDROBROMIDE 10 MG/1
TABLET ORAL
Qty: 90 TABLET | Refills: 0 | Status: SHIPPED | OUTPATIENT
Start: 2023-10-23

## 2023-10-30 ENCOUNTER — HOSPITAL ENCOUNTER (OUTPATIENT)
Dept: ULTRASOUND IMAGING | Facility: HOSPITAL | Age: 83
Discharge: HOME | End: 2023-10-30
Attending: UROLOGY
Payer: COMMERCIAL

## 2023-10-30 DIAGNOSIS — N13.39 OTHER HYDRONEPHROSIS: ICD-10-CM

## 2023-10-30 DIAGNOSIS — R31.0 GROSS HEMATURIA: ICD-10-CM

## 2023-10-30 DIAGNOSIS — N40.1 BENIGN PROSTATIC HYPERPLASIA WITH LOWER URINARY TRACT SYMPTOMS, SYMPTOM DETAILS UNSPECIFIED: ICD-10-CM

## 2023-10-30 PROCEDURE — 76770 US EXAM ABDO BACK WALL COMP: CPT

## 2023-11-02 NOTE — PROGRESS NOTES
UROLOGY PROCEDURE NOTE     CHIEF COMPLAINT   Adan Zepeda is a 80 y.o. male with a complaint of   Chief Complaint   Patient presents with    Follow-up     Review US    Benign Prostatic Hypertrophy       History of Present Illness:   Adan Zepeda is a 80 y.o. male here for evaluation of of lower urinary tract symptoms on Flomax double dose and finasteride. Patient was seen in the emergency room in December with gross hematuria. Treated with antibiotics. Patient had imaging that demonstrates concern for a left-sided bladder abnormality and right-sided hydronephrosis without obstructing stone. Patient reports significant smoking history however he quit approximately 40 years ago. He does work for [employer] and is around multiple solvents. He still works 5 days a week. Patient was found to have an abnormal bladder lesion and underwent resection. This demonstrated lymphoma. Patient was referred back to hematology oncology and systemic work-up did not seem to indicate any additional sites of disease. In continued conversation with the oncology service, primarily Dr. Holger Alvarado, additional cystourethroscopy was recommended to ensure that there was not any additional recurrent or persistent disease. Cystoscopy performed in September demonstrated persistent healing. Cold cup biopsies were performed. Patient returns at interval to discuss recent ultrasound. No new urinary symptoms of complaint.     Past Medical History:     Past Medical History:   Diagnosis Date    BPH (benign prostatic hyperplasia)     Chronic pain disorder     back    Coronary artery disease     Gallstones     History of tracheostomy     per pt had it "after CABG surgery--was admitted for 53 days including Rehab"    Hx of CABG     per pt 7-8 yrs ago    Hypercholesterolemia     Hypertension     Moderate exercise     alot of walking-works FT and also walks dog 2 miles daily usually    Myocardial infarction ([address])     per pt approx 7-8yrs ago --[facility] cardio Dr Gordon Aden sees yearly    Prediabetes     Shortness of breath     per pt"climbing steps or walking long distance"    Wears glasses        PAST SURGICAL HISTORY:     Past Surgical History:   Procedure Laterality Date    CATARACT EXTRACTION Bilateral     COLONOSCOPY      CORONARY ARTERY BYPASS GRAFT      x3 LIMA-LAD, SVG-OM, SVG-DIAG with mitral valve repair by nicole    CYSTOSCOPY  12/13/2014    Diagnostic    CYSTOSCOPY  03/24/2023    [facility]    ESOPHAGOGASTRODUODENOSCOPY  04/07/2015    with biopsy. Dr Kassidy Mandujano  5/17/2023    MITRAL VALVE REPAIR      26 mm fatmata hernandez physi annuloplasty ring    NM CYSTO BLADDER W/URETERAL CATHETERIZATION Right 5/17/2023    Procedure: CYSTO, URETHERAL DILATION, RETROGRADE PYELOGRAM W/ INSERTION STENT URETERAL;  Surgeon: Lilli An MD;  [facility];  Service: Urology    NM CYSTO W/REMOVAL OF LESIONS SMALL N/A 5/17/2023    Procedure: (TURBT);   Surgeon: Lilli An MD;  [facility];  Service: Urology    NM CYSTO/PYELOSCOPY BX&/FULGURATION PELIVC LESION Right 5/17/2023    Procedure: URETEROSCOPY,WITH URETERAL BRUSHING;  Surgeon: Lilli An MD;  [facility];  Service: Urology    TRACHEOSTOMY      during his heart surgery       CURRENT MEDICATIONS:     Current Outpatient Medications   Medication Sig Dispense Refill    aspirin 81 mg chewable tablet Chew 81 mg daily      atorvastatin (LIPITOR) 40 mg tablet TAKE ONE TABLET BY MOUTH ONCE DAILY 90 tablet 2    carvedilol (COREG) 6.25 mg tablet TAKE 1 TABLET BY MOUTH TWICE A DAY WITH MEALS 180 tablet 3    citalopram (CeleXA) 10 mg tablet TAKE ONE TABLET BY MOUTH ONCE DAILY 90 tablet 0    Cyanocobalamin ([employer]S VITAMIN B-12 PO) Take by mouth      Ferrous Sulfate (IRON) 325 (65 Fe) MG TABS Take 1 tablet by mouth Twice daily      finasteride (PROSCAR) 5 mg tablet Take 1 tablet (5 mg total) by mouth daily 90 tablet 3 lisinopril (ZESTRIL) 5 mg tablet TAKE 1 TABLET BY MOUTH ONCE A DAY (Patient taking differently: every evening) 90 tablet 1    Multiple Vitamin Essential TABS Take by mouth      Omega 3 1200 MG CAPS Take by mouth in the morning      tamsulosin (FLOMAX) 0.4 mg Take 2 capsules (0.8 mg total) by mouth daily at bedtime 180 capsule 3    Cholecalciferol 1000 units tablet Take 2,000 Units by mouth  (Patient not taking: Reported on 2023)      phenazopyridine (PYRIDIUM) 100 mg tablet Take 1 tablet (100 mg total) by mouth 3 (three) times a day as needed for bladder spasms (Patient not taking: Reported on 2023) 20 tablet 0     No current facility-administered medications for this visit.        ALLERGIES:   No Known Allergies    SOCIAL HISTORY:     Social History     Socioeconomic History    Marital status:      Spouse name: Not on file    Number of children: Not on file    Years of education: Not on file    Highest education level: Not on file   Occupational History    Not on file   Tobacco Use    Smoking status: Former     Packs/day: 0.50     Years: 16.00     Total pack years: 8.00     Types: Cigarettes     Start date: 46     Quit date: 1     Years since quittin.8    Smokeless tobacco: Never   Vaping Use    Vaping Use: Never used   Substance and Sexual Activity    Alcohol use: Yes     Comment: per pt very rarely    Drug use: No    Sexual activity: Not on file     Comment: defer   Other Topics Concern    Not on file   Social History Narrative    Consumes 2-3 ups of tea per week     Social Determinants of Health     Financial Resource Strain: Not on file   Food Insecurity: Not on file   Transportation Needs: Not on file   Physical Activity: Not on file   Stress: Not on file   Social Connections: Not on file   Intimate Partner Violence: Not on file   Housing Stability: Not on file       SOCIAL HISTORY:     Family History   Problem Relation Age of Onset    Colon cancer Father     Kidney disease Father Diabetes Mother     Heart disease Mother     Hypertension Mother     Cervical cancer Maternal Aunt     Breast cancer Paternal Aunt     Colon cancer Paternal Grandfather     Heart disease Sister     Heart disease Brother        REVIEW OF SYSTEMS:     Review of Systems   Constitutional: Negative. Respiratory: Negative. Cardiovascular: Negative. Gastrointestinal: Negative. Genitourinary: Negative. Negative for hematuria. Musculoskeletal: Negative. Skin: Negative. Psychiatric/Behavioral: Negative. PHYSICAL EXAM:     /70 (BP Location: Left arm, Patient Position: Sitting, Cuff Size: Standard)   Pulse 67   Ht 5' 7" (1.702 m)   Wt 55.3 kg (122 lb)   SpO2 97%   BMI 19.11 kg/m²     Physical Exam  Vitals reviewed. HENT:      Head: Normocephalic. Nose: Nose normal.      Mouth/Throat:      Mouth: Mucous membranes are moist.   Eyes:      Pupils: Pupils are equal, round, and reactive to light. Cardiovascular:      Rate and Rhythm: Normal rate. Pulmonary:      Effort: Pulmonary effort is normal.   Abdominal:      General: Abdomen is flat. Comments: Large right-sided hernia, soft and reducible   Genitourinary:     Penis: Normal.       Testes: Normal.      Comments: Uncircumcised  Musculoskeletal:         General: Normal range of motion. Cervical back: Normal range of motion. Skin:     General: Skin is warm. Neurological:      General: No focal deficit present. Mental Status: He is alert.    Psychiatric:         Mood and Affect: Mood normal.         LABS:     CBC:   Lab Results   Component Value Date    WBC 6.53 08/02/2023    HGB 11.6 (L) 08/02/2023    HCT 37.1 08/02/2023    MCV 95 08/02/2023     08/02/2023       BMP:   Lab Results   Component Value Date    GLUCOSE 104 01/08/2016    CALCIUM 8.9 04/22/2023     01/08/2016    K 5.2 04/22/2023    CO2 29 04/22/2023     04/22/2023    BUN 23 04/22/2023    CREATININE 1.04 04/22/2023     IMAGING: 10/30/23  Narrative & Impression   RENAL ULTRASOUND WITH PVR     INDICATION:   N40.1: Benign prostatic hyperplasia with lower urinary tract symptoms  N13.39: Other hydronephrosis  R31.0: Gross hematuria. COMPARISON: 7/3/2023     TECHNIQUE:   Ultrasound of the retroperitoneum was performed with a curvilinear transducer utilizing volumetric sweeps and still imaging techniques. FINDINGS:     KIDNEYS:  Symmetric and normal size. Right kidney:  10.0 x 5.7 x 5.4 cm. Volume 161.2 mL  Left kidney:  9.5 x 5.3 x 4.0 cm. Volume 105.7 mL     Right kidney  Normal echogenicity and contour. No mass is identified. No hydronephrosis. No shadowing calculi. No perinephric fluid collections. Left kidney  Normal echogenicity and contour. No mass is identified. No hydronephrosis. No shadowing calculi. No perinephric fluid collections. URETERS:  Nonvisualized. BLADDER:  Normally distended. Trabeculated wall. Anterior right mural partially calcified mass, 1.5 x 0.6 x 0.6 cm. Bilateral ureteral jets detected. Prevoid: 341.6  No significant post void volume. Measured post void volume in mL: 71.1        IMPRESSION:  1.5 x 0.6 x 0.6 cm echogenic intraluminal bladder lesion, not previously visualized. Given similar finding in the left bladder on the prior exam, not evident today, prominent trabecula is considered. On the renal protocol CT of 3/7/2023, cystoscopy was   advised. Recommend correlation with results. No acute findings in the kidneys. PATHOLOGY:     9/11/23  Final Diagnosis   A. Urinary Bladder, Biopsy:  - Minute partially denuded urothelial mucosa with chronic inflammatory changes. 5/17/23  Final Diagnosis   A. Bladder, tumor, transurethral resection:  -Clonal B cell proliferation, suspicious for small B-cell lymphoma (see note). -Negative for features of urothelial dysplasia or carcinoma.      ASSESSMENT:     80 y.o. 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 Cystoscopies are complicated by the patient significantly trabeculated bladder with multiple cellules and diverticula however there did not appear to be any suspicious areas in the right bladder dome. Ultrasound may be complicated by the appearance of the patient's bladder. Patient is not inclined to multiple aggressive repeat cystoscopies and so we discussed the option of an MRI of his pelvis to further evaluate this area of abnormality. If the MRI is positive, I can discussed with the patient direct scheduling in the operating room for repeat resection, repeat office cystoscopy or observation. The patient is unsure that he wishes to undergo additional aggressive procedures and I think that is certainly understandable. Urine cytology will be sent today. I will contact the patient when I have results of his MRI.

## 2023-11-03 ENCOUNTER — OFFICE VISIT (OUTPATIENT)
Dept: UROLOGY | Facility: CLINIC | Age: 83
End: 2023-11-03
Payer: COMMERCIAL

## 2023-11-03 VITALS
DIASTOLIC BLOOD PRESSURE: 70 MMHG | WEIGHT: 122 LBS | HEART RATE: 67 BPM | SYSTOLIC BLOOD PRESSURE: 120 MMHG | OXYGEN SATURATION: 97 % | BODY MASS INDEX: 19.15 KG/M2 | HEIGHT: 67 IN

## 2023-11-03 DIAGNOSIS — D49.4 BLADDER TUMOR: Primary | ICD-10-CM

## 2023-11-03 DIAGNOSIS — N40.1 BPH WITH OBSTRUCTION/LOWER URINARY TRACT SYMPTOMS: ICD-10-CM

## 2023-11-03 DIAGNOSIS — N13.8 BPH WITH OBSTRUCTION/LOWER URINARY TRACT SYMPTOMS: ICD-10-CM

## 2023-11-03 LAB
SL AMB  POCT GLUCOSE, UA: ABNORMAL
SL AMB LEUKOCYTE ESTERASE,UA: ABNORMAL
SL AMB POCT BILIRUBIN,UA: ABNORMAL
SL AMB POCT BLOOD,UA: ABNORMAL
SL AMB POCT CLARITY,UA: CLEAR
SL AMB POCT COLOR,UA: ABNORMAL
SL AMB POCT KETONES,UA: ABNORMAL
SL AMB POCT NITRITE,UA: ABNORMAL
SL AMB POCT PH,UA: 6.5
SL AMB POCT SPECIFIC GRAVITY,UA: 1.01
SL AMB POCT URINE PROTEIN: ABNORMAL
SL AMB POCT UROBILINOGEN: 0.2

## 2023-11-03 PROCEDURE — 81002 URINALYSIS NONAUTO W/O SCOPE: CPT | Performed by: UROLOGY

## 2023-11-03 PROCEDURE — 88112 CYTOPATH CELL ENHANCE TECH: CPT | Performed by: PATHOLOGY

## 2023-11-03 PROCEDURE — 99214 OFFICE O/P EST MOD 30 MIN: CPT | Performed by: UROLOGY

## 2023-11-03 NOTE — LETTER
November 3, 2023     Veronica Walker, [address] [facility]. [address] [facility]    Patient: George Hodges   YOB: 1940   Date of Visit: 11/3/2023       Dear Dr. Bello Lav: Thank you for referring Marcela Curtis to me for evaluation. Below are my notes for this consultation. If you have questions, please do not hesitate to call me. I look forward to following your patient along with you. Sincerely,        Olga Masters MD        CC: MD Olga Tinajero MD  11/3/2023  8:05 AM  Sign when Signing Visit    UROLOGY PROCEDURE NOTE     CHIEF COMPLAINT   George Hodges is a 80 y.o. male with a complaint of   Chief Complaint   Patient presents with   • Follow-up     Review US   • Benign Prostatic Hypertrophy       History of Present Illness:   George Hodges is a 80 y.o. male here for evaluation of of lower urinary tract symptoms on Flomax double dose and finasteride. Patient was seen in the emergency room in December with gross hematuria. Treated with antibiotics. Patient had imaging that demonstrates concern for a left-sided bladder abnormality and right-sided hydronephrosis without obstructing stone. Patient reports significant smoking history however he quit approximately 40 years ago. He does work for "[employer]" and is around multiple solvents. He still works 5 days a week. Patient was found to have an abnormal bladder lesion and underwent resection. This demonstrated lymphoma. Patient was referred back to hematology oncology and systemic work-up did not seem to indicate any additional sites of disease. In continued conversation with the oncology service, primarily Dr. Anil Sargent, additional cystourethroscopy was recommended to ensure that there was not any additional recurrent or persistent disease. Cystoscopy performed in September demonstrated persistent healing. Cold cup biopsies were performed.   Patient returns at interval to discuss recent ultrasound. No new urinary symptoms of complaint. Past Medical History:     Past Medical History:   Diagnosis Date   • BPH (benign prostatic hyperplasia)    • Chronic pain disorder     back   • Coronary artery disease    • Gallstones    • History of tracheostomy     per pt had it "after CABG surgery--was admitted for 53 days including Rehab"   • Hx of CABG     per pt 7-8 yrs ago   • Hypercholesterolemia    • Hypertension    • Moderate exercise     alot of walking-works FT and also walks dog 2 miles daily usually   • Myocardial infarction ([address])     per pt approx 7-8yrs ago -- cardio Dr Erasmo Ty sees yearly   • Prediabetes    • Shortness of breath     per pt"climbing steps or walking long distance"   • Wears glasses        PAST SURGICAL HISTORY:     Past Surgical History:   Procedure Laterality Date   • CATARACT EXTRACTION Bilateral    • COLONOSCOPY     • CORONARY ARTERY BYPASS GRAFT      x3 LIMA-LAD, SVG-OM, SVG-DIAG with mitral valve repair by nicole   • CYSTOSCOPY  12/13/2014    Diagnostic   • CYSTOSCOPY  03/24/2023    Nita   • ESOPHAGOGASTRODUODENOSCOPY  04/07/2015    with biopsy. Dr Levi Post   • FL RETROGRADE PYELOGRAM  5/17/2023   • MITRAL VALVE REPAIR      26 mm fatmata hernandez physi annuloplasty ring   • FL CYSTO BLADDER W/URETERAL CATHETERIZATION Right 5/17/2023    Procedure: CYSTO, URETHERAL DILATION, RETROGRADE PYELOGRAM W/ INSERTION STENT URETERAL;  Surgeon: Jamari Alvarado MD;  Location: [facility];  Service: Urology   • FL CYSTO W/REMOVAL OF LESIONS SMALL N/A 5/17/2023    Procedure: (TURBT);   Surgeon: Jamari Alvarado MD;  Location[facility];  Service: Urology   • FL CYSTO/PYELOSCOPY BX&/FULGURATION PELIVC LESION Right 5/17/2023    Procedure: URETEROSCOPY,WITH URETERAL BRUSHING;  Surgeon: Jamari Alvarado MD;  Location: [facility];  Service: Urology   • TRACHEOSTOMY      during his heart surgery       CURRENT MEDICATIONS:     Current Outpatient Medications   Medication Sig Dispense Refill   • aspirin 81 mg chewable tablet Chew 81 mg daily     • atorvastatin (LIPITOR) 40 mg tablet TAKE ONE TABLET BY MOUTH ONCE DAILY 90 tablet 2   • carvedilol (COREG) 6.25 mg tablet TAKE 1 TABLET BY MOUTH TWICE A DAY WITH MEALS 180 tablet 3   • citalopram (CeleXA) 10 mg tablet TAKE ONE TABLET BY MOUTH ONCE DAILY 90 tablet 0   • Cyanocobalamin ([employer] VITAMIN B-12 PO) Take by mouth     • Ferrous Sulfate (IRON) 325 (65 Fe) MG TABS Take 1 tablet by mouth Twice daily     • finasteride (PROSCAR) 5 mg tablet Take 1 tablet (5 mg total) by mouth daily 90 tablet 3   • lisinopril (ZESTRIL) 5 mg tablet TAKE 1 TABLET BY MOUTH ONCE A DAY (Patient taking differently: every evening) 90 tablet 1   • Multiple Vitamin Essential TABS Take by mouth     • Omega 3 1200 MG CAPS Take by mouth in the morning     • tamsulosin (FLOMAX) 0.4 mg Take 2 capsules (0.8 mg total) by mouth daily at bedtime 180 capsule 3   • Cholecalciferol 1000 units tablet Take 2,000 Units by mouth  (Patient not taking: Reported on 2023)     • phenazopyridine (PYRIDIUM) 100 mg tablet Take 1 tablet (100 mg total) by mouth 3 (three) times a day as needed for bladder spasms (Patient not taking: Reported on 2023) 20 tablet 0     No current facility-administered medications for this visit. ALLERGIES:   No Known Allergies    SOCIAL HISTORY:     Social History     Socioeconomic History   • Marital status:      Spouse name: Not on file   • Number of children: Not on file   • Years of education: Not on file   • Highest education level: Not on file   Occupational History   • Not on file   Tobacco Use   • Smoking status: Former     Packs/day: 0.50     Years: 16.00     Total pack years: 8.00     Types: Cigarettes     Start date: 46     Quit date: 1     Years since quittin.8   • Smokeless tobacco: Never   Vaping Use   • Vaping Use: Never used   Substance and Sexual Activity   • Alcohol use:  Yes Comment: per pt very rarely   • Drug use: No   • Sexual activity: Not on file     Comment: defer   Other Topics Concern   • Not on file   Social History Narrative    Consumes 2-3 ups of tea per week     Social Determinants of Health     Financial Resource Strain: Not on file   Food Insecurity: Not on file   Transportation Needs: Not on file   Physical Activity: Not on file   Stress: Not on file   Social Connections: Not on file   Intimate Partner Violence: Not on file   Housing Stability: Not on file       SOCIAL HISTORY:     Family History   Problem Relation Age of Onset   • Colon cancer Father    • Kidney disease Father    • Diabetes Mother    • Heart disease Mother    • Hypertension Mother    • Cervical cancer Maternal Aunt    • Breast cancer Paternal Aunt    • Colon cancer Paternal Grandfather    • Heart disease Sister    • Heart disease Brother        REVIEW OF SYSTEMS:     Review of Systems   Constitutional: Negative. Respiratory: Negative. Cardiovascular: Negative. Gastrointestinal: Negative. Genitourinary: Negative. Negative for hematuria. Musculoskeletal: Negative. Skin: Negative. Psychiatric/Behavioral: Negative. PHYSICAL EXAM:     /70 (BP Location: Left arm, Patient Position: Sitting, Cuff Size: Standard)   Pulse 67   Ht 5' 7" (1.702 m)   Wt 55.3 kg (122 lb)   SpO2 97%   BMI 19.11 kg/m²     Physical Exam  Vitals reviewed. HENT:      Head: Normocephalic. Nose: Nose normal.      Mouth/Throat:      Mouth: Mucous membranes are moist.   Eyes:      Pupils: Pupils are equal, round, and reactive to light. Cardiovascular:      Rate and Rhythm: Normal rate. Pulmonary:      Effort: Pulmonary effort is normal.   Abdominal:      General: Abdomen is flat. Comments: Large right-sided hernia, soft and reducible   Genitourinary:     Penis: Normal.       Testes: Normal.      Comments: Uncircumcised  Musculoskeletal:         General: Normal range of motion. Cervical back: Normal range of motion. Skin:     General: Skin is warm. Neurological:      General: No focal deficit present. Mental Status: He is alert. Psychiatric:         Mood and Affect: Mood normal.         LABS:     CBC:   Lab Results   Component Value Date    WBC 6.53 08/02/2023    HGB 11.6 (L) 08/02/2023    HCT 37.1 08/02/2023    MCV 95 08/02/2023     08/02/2023       BMP:   Lab Results   Component Value Date    GLUCOSE 104 01/08/2016    CALCIUM 8.9 04/22/2023     01/08/2016    K 5.2 04/22/2023    CO2 29 04/22/2023     04/22/2023    BUN 23 04/22/2023    CREATININE 1.04 04/22/2023     IMAGING:     10/30/23  Narrative & Impression   RENAL ULTRASOUND WITH PVR     INDICATION:   N40.1: Benign prostatic hyperplasia with lower urinary tract symptoms  N13.39: Other hydronephrosis  R31.0: Gross hematuria. COMPARISON: 7/3/2023     TECHNIQUE:   Ultrasound of the retroperitoneum was performed with a curvilinear transducer utilizing volumetric sweeps and still imaging techniques. FINDINGS:     KIDNEYS:  Symmetric and normal size. Right kidney:  10.0 x 5.7 x 5.4 cm. Volume 161.2 mL  Left kidney:  9.5 x 5.3 x 4.0 cm. Volume 105.7 mL     Right kidney  Normal echogenicity and contour. No mass is identified. No hydronephrosis. No shadowing calculi. No perinephric fluid collections. Left kidney  Normal echogenicity and contour. No mass is identified. No hydronephrosis. No shadowing calculi. No perinephric fluid collections. URETERS:  Nonvisualized. BLADDER:  Normally distended. Trabeculated wall. Anterior right mural partially calcified mass, 1.5 x 0.6 x 0.6 cm. Bilateral ureteral jets detected. Prevoid: 341.6  No significant post void volume. Measured post void volume in mL: 71.1        IMPRESSION:  1.5 x 0.6 x 0.6 cm echogenic intraluminal bladder lesion, not previously visualized.  Given similar finding in the left bladder on the prior exam, not evident today, prominent trabecula is considered. On the renal protocol CT of 3/7/2023, cystoscopy was   advised. Recommend correlation with results. No acute findings in the kidneys. PATHOLOGY:     9/11/23  Final Diagnosis   A. Urinary Bladder, Biopsy:  - Minute partially denuded urothelial mucosa with chronic inflammatory changes. 5/17/23  Final Diagnosis   A. Bladder, tumor, transurethral resection:  -Clonal B cell proliferation, suspicious for small B-cell lymphoma (see note). -Negative for features of urothelial dysplasia or carcinoma. ASSESSMENT:     80 y.o. 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 Cystoscopies are complicated by the patient significantly trabeculated bladder with multiple cellules and diverticula however there did not appear to be any suspicious areas in the right bladder dome. Ultrasound may be complicated by the appearance of the patient's bladder. Patient is not inclined to multiple aggressive repeat cystoscopies and so we discussed the option of an MRI of his pelvis to further evaluate this area of abnormality. If the MRI is positive, I can discussed with the patient direct scheduling in the operating room for repeat resection, repeat office cystoscopy or observation. The patient is unsure that he wishes to undergo additional aggressive procedures and I think that is certainly understandable. Urine cytology will be sent today. I will contact the patient when I have results of his MRI.

## 2023-11-07 PROCEDURE — 88112 CYTOPATH CELL ENHANCE TECH: CPT | Performed by: PATHOLOGY

## 2023-11-16 ENCOUNTER — APPOINTMENT (OUTPATIENT)
Dept: LAB | Facility: HOSPITAL | Age: 83
End: 2023-11-16
Payer: COMMERCIAL

## 2023-11-16 DIAGNOSIS — D49.4 BLADDER TUMOR: ICD-10-CM

## 2023-11-16 LAB
ANION GAP SERPL CALCULATED.3IONS-SCNC: 3 MMOL/L
BUN SERPL-MCNC: 22 MG/DL (ref 5–25)
CALCIUM SERPL-MCNC: 9 MG/DL (ref 8.4–10.2)
CHLORIDE SERPL-SCNC: 103 MMOL/L (ref 96–108)
CO2 SERPL-SCNC: 32 MMOL/L (ref 21–32)
CREAT SERPL-MCNC: 0.96 MG/DL (ref 0.6–1.3)
GFR SERPL CREATININE-BSD FRML MDRD: 72 ML/MIN/1.73SQ M
GLUCOSE SERPL-MCNC: 83 MG/DL (ref 65–140)
POTASSIUM SERPL-SCNC: 4 MMOL/L (ref 3.5–5.3)
SODIUM SERPL-SCNC: 138 MMOL/L (ref 135–147)

## 2023-11-16 PROCEDURE — 80048 BASIC METABOLIC PNL TOTAL CA: CPT

## 2023-11-16 PROCEDURE — 36415 COLL VENOUS BLD VENIPUNCTURE: CPT

## 2023-11-24 ENCOUNTER — HOSPITAL ENCOUNTER (OUTPATIENT)
Facility: MEDICAL CENTER | Age: 83
Discharge: HOME | End: 2023-11-24
Payer: COMMERCIAL

## 2023-11-24 DIAGNOSIS — D49.4 BLADDER TUMOR: ICD-10-CM

## 2023-11-24 PROCEDURE — A9585 GADOBUTROL INJECTION: HCPCS | Performed by: UROLOGY

## 2023-11-24 PROCEDURE — 72197 MRI PELVIS W/O & W/DYE: CPT

## 2023-11-24 PROCEDURE — G1004 CDSM NDSC: HCPCS

## 2023-11-24 RX ORDER — GADOBUTROL 604.72 MG/ML
5 INJECTION INTRAVENOUS
Status: COMPLETED | OUTPATIENT
Start: 2023-11-24 | End: 2023-11-24

## 2023-11-24 RX ADMIN — GADOBUTROL 5 ML: 604.72 INJECTION INTRAVENOUS at 10:17

## 2023-11-28 ENCOUNTER — OFFICE VISIT (OUTPATIENT)
Dept: HEMATOLOGY ONCOLOGY | Facility: CLINIC | Age: 83
End: 2023-11-28
Payer: COMMERCIAL

## 2023-11-28 VITALS
RESPIRATION RATE: 16 BRPM | SYSTOLIC BLOOD PRESSURE: 128 MMHG | BODY MASS INDEX: 19.54 KG/M2 | WEIGHT: 124.5 LBS | HEART RATE: 78 BPM | HEIGHT: 67 IN | DIASTOLIC BLOOD PRESSURE: 80 MMHG | TEMPERATURE: 97.5 F | OXYGEN SATURATION: 98 %

## 2023-11-28 DIAGNOSIS — D64.9 ANEMIA, UNSPECIFIED TYPE: ICD-10-CM

## 2023-11-28 DIAGNOSIS — E61.1 IRON DEFICIENCY: ICD-10-CM

## 2023-11-28 DIAGNOSIS — M35.3 PMR (POLYMYALGIA RHEUMATICA) (HCC): ICD-10-CM

## 2023-11-28 DIAGNOSIS — Z95.1 HX OF CABG: ICD-10-CM

## 2023-11-28 DIAGNOSIS — I10 ESSENTIAL HYPERTENSION: ICD-10-CM

## 2023-11-28 DIAGNOSIS — E78.2 MIXED HYPERLIPIDEMIA: ICD-10-CM

## 2023-11-28 DIAGNOSIS — N32.9 LESION OF BLADDER: Primary | ICD-10-CM

## 2023-11-28 DIAGNOSIS — Z98.890 S/P MITRAL VALVE REPAIR: ICD-10-CM

## 2023-11-28 DIAGNOSIS — N40.1 BENIGN PROSTATIC HYPERPLASIA WITH LOWER URINARY TRACT SYMPTOMS, SYMPTOM DETAILS UNSPECIFIED: ICD-10-CM

## 2023-11-28 DIAGNOSIS — I50.22 CHRONIC SYSTOLIC CHF (CONGESTIVE HEART FAILURE), NYHA CLASS 2 (HCC): ICD-10-CM

## 2023-11-28 DIAGNOSIS — K44.9 HIATAL HERNIA: ICD-10-CM

## 2023-11-28 DIAGNOSIS — I25.5 ISCHEMIC CARDIOMYOPATHY: ICD-10-CM

## 2023-11-28 PROCEDURE — 99214 OFFICE O/P EST MOD 30 MIN: CPT | Performed by: INTERNAL MEDICINE

## 2023-11-28 NOTE — PROGRESS NOTES
Hematology/Oncology Outpatient Follow-up  Anjana Hand 80 y.o. male 1940          HPI:  Anjana Hand is a 80 y.o. male. He is here with his daughter. He has a lesion in his bladder and that was biopsied twice and 1 time there was question of maybe lymphoma but was not confirmed. Second time biopsy did not show malignancy. Recent urine cytology has come back negative for malignancy. On recent CT scan there is a lesion in the bladder. On 11/24/2023 patient had MRI scan of pelvis and that report is pending. Urologist is working on his bladder problem and if there is a lesion I think he would consider another cystoscopy and biopsy. Patient and family will keep me in the loop. I will also look out for the report of MRI scan. Patient states he had hematuria 1 year ago but not now. His anemia is improving. He has tiredness and arthritic symptoms. Occasionally dysphagia. He had EGD and colonoscopy in January 2022 and that showed a large hiatal hernia, diverticuli and internal hemorrhoids. Patient does not have bowel symptoms and no melena or blood per rectum. He is trying to maintain his weight. Past medical history significant for hypertension, ischemic cardiomyopathy, paroxysmal SVT, BPH, hyperlipidemia, prediabetes. Patient follows with his rheum for hx of PMR.             ROS: Reviewed 12 systems. Presently no other neurological, cardiac, pulmonary, GI and  symptoms other than listed in HPI. Other symptoms are in HPI. No other symptoms like fevers, chills, bleeding, bone pains, skin rash, weight loss, night sweats, swelling of the ankles and swollen glands. He is anxious.     Past Medical History:   Diagnosis Date    BPH (benign prostatic hyperplasia)     Chronic pain disorder     back    Coronary artery disease     Gallstones     History of tracheostomy     per pt had it "after CABG surgery--was admitted for 53 days including Rehab"    Hx of CABG     per pt 7-8 yrs ago Hypercholesterolemia     Hypertension     Moderate exercise     alot of walking-works FT and also walks dog 2 miles daily usually    Myocardial infarction [facility])     per pt approx 7-8yrs ago --[facility] cardio Dr Curt Garnett sees yearly    Prediabetes     Shortness of breath     per pt"climbing steps or walking long distance"    Wears glasses        Past Surgical History:   Procedure Laterality Date    CATARACT EXTRACTION Bilateral     COLONOSCOPY      CORONARY ARTERY BYPASS GRAFT      x3 LIMA-LAD, SVG-OM, SVG-DIAG with mitral valve repair by nicole    CYSTOSCOPY  2014    Diagnostic    CYSTOSCOPY  2023    [facility]    ESOPHAGOGASTRODUODENOSCOPY  2015    with biopsy. Dr Arlene Hughes  2023    MITRAL VALVE REPAIR      26 mm fatmata hernandez physi annuloplasty ring    KS CYSTO BLADDER W/URETERAL CATHETERIZATION Right 2023    Procedure: CYSTO, URETHERAL DILATION, RETROGRADE PYELOGRAM W/ INSERTION STENT URETERAL;  Surgeon: Constanza Lewis MD;  [facility];  Service: Urology    KS CYSTO W/REMOVAL OF LESIONS SMALL N/A 2023    Procedure: (TURBT);   Surgeon: Constanza Lewis MD;  [facility];  Service: Urology    KS CYSTO/PYELOSCOPY BX&/FULGURATION PELIVC LESION Right 2023    Procedure: URETEROSCOPY,WITH URETERAL BRUSHING;  Surgeon: Constanza Lewis MD;  [facility];  Service: Urology    TRACHEOSTOMY      during his heart surgery       Social History     Socioeconomic History    Marital status:      Spouse name: None    Number of children: None    Years of education: None    Highest education level: None   Occupational History    None   Tobacco Use    Smoking status: Former     Packs/day: 0.50     Years: 16.00     Total pack years: 8.00     Types: Cigarettes     Start date: 46     Quit date:      Years since quittin.9     Passive exposure: Past    Smokeless tobacco: Never   Vaping Use    Vaping Use: Never used Substance and Sexual Activity    Alcohol use: Yes     Comment: per pt very rarely    Drug use: No    Sexual activity: None     Comment: defer   Other Topics Concern    None   Social History Narrative    Consumes 2-3 ups of tea per week     Social Determinants of Health     Financial Resource Strain: Not on file   Food Insecurity: Not on file   Transportation Needs: Not on file   Physical Activity: Not on file   Stress: Not on file   Social Connections: Not on file   Intimate Partner Violence: Not on file   Housing Stability: Not on file       Family History   Problem Relation Age of Onset    Colon cancer Father     Kidney disease Father     Diabetes Mother     Heart disease Mother     Hypertension Mother     Cervical cancer Maternal Aunt     Breast cancer Paternal Aunt     Colon cancer Paternal Grandfather     Heart disease Sister     Heart disease Brother        No Known Allergies      Current Outpatient Medications:     aspirin 81 mg chewable tablet, Chew 81 mg daily, Disp: , Rfl:     atorvastatin (LIPITOR) 40 mg tablet, TAKE ONE TABLET BY MOUTH ONCE DAILY, Disp: 90 tablet, Rfl: 2    carvedilol (COREG) 6.25 mg tablet, TAKE 1 TABLET BY MOUTH TWICE A DAY WITH MEALS, Disp: 180 tablet, Rfl: 3    citalopram (CeleXA) 10 mg tablet, TAKE ONE TABLET BY MOUTH ONCE DAILY, Disp: 90 tablet, Rfl: 0    Cyanocobalamin ([employer] VITAMIN B-12 PO), Take by mouth, Disp: , Rfl:     Ferrous Sulfate (IRON) 325 (65 Fe) MG TABS, Take 1 tablet by mouth Twice daily, Disp: , Rfl:     finasteride (PROSCAR) 5 mg tablet, Take 1 tablet (5 mg total) by mouth daily, Disp: 90 tablet, Rfl: 3    lisinopril (ZESTRIL) 5 mg tablet, TAKE 1 TABLET BY MOUTH ONCE A DAY (Patient taking differently: every evening), Disp: 90 tablet, Rfl: 1    Multiple Vitamin Essential TABS, Take by mouth, Disp: , Rfl:     Omega 3 1200 MG CAPS, Take by mouth in the morning, Disp: , Rfl:     tamsulosin (FLOMAX) 0.4 mg, Take 2 capsules (0.8 mg total) by mouth daily at bedtime, Disp: 180 capsule, Rfl: 3    Cholecalciferol 1000 units tablet, Take 2,000 Units by mouth  (Patient not taking: Reported on 5/19/2023), Disp: , Rfl:     phenazopyridine (PYRIDIUM) 100 mg tablet, Take 1 tablet (100 mg total) by mouth 3 (three) times a day as needed for bladder spasms (Patient not taking: Reported on 6/2/2023), Disp: 20 tablet, Rfl: 0    Physical Exam:  /80 (BP Location: Left arm, Patient Position: Sitting, Cuff Size: Adult)   Pulse 78   Temp 97.5 °F (36.4 °C) (Temporal)   Resp 16   Ht 5' 7" (1.702 m)   Wt 56.5 kg (124 lb 8 oz)   SpO2 98%   BMI 19.50 kg/m²     Patient is alert and oriented. Patient is not in distress. Vital signs are above. There is no icterus. No oral thrush. No palpable neck mass. Clear lung fields. Regular heart rate. Systolic murmur. No palpable abdominal mass. No ascites. No edema of ankles. No calf tenderness. No focal neurological deficit. No skin rash. No palpable lymphadenopathy in the neck and axillary areas. No clubbing. Performance status 1 on ECOG scale. He is anxious. Imaging:    IMPRESSION:  1.5 x 0.6 x 0.6 cm echogenic intraluminal bladder lesion, not previously visualized. Given similar finding in the left bladder on the prior exam, not evident today, prominent trabecula is considered. On the renal protocol CT of 3/7/2023, cystoscopy was   advised. Recommend correlation with results. No acute findings in the kidneys. Workstation performed: [id number]        Imaging    US kidney and bladder with pvr (Order: [id number]) - 10/3    MRI pelvis: Report is pending. Exam ended on 11/24/2023.             FINDINGS:  The terminal ileum appeared normal.  Few small mild diverticula in the sigmoid colon  Internal hemorrhoids        EVENTS:      Procedure Events   Event Event Time   ENDO CECUM REACHED 1/13/2022 11:25 AM   ENDO SCOPE OUT TIME 1/13/2022 11:33 AM        FINDINGS:  Large hiatal hernia - GE junction 36 cm from the incisors, diaphragmatic impression 41 cm from the incisors  The stomach and duodenum appeared normal.  Performed biopsies to rule out celiac disease and H. pylori in the stomach and duodenum        SPECIMENS:  ID Type Source Tests Collected by Time Destination   1 : r/o celiac Tissue Duodenum TISSUE EXAM John Green MD 1/13/2022 11:11 AM     2 : r/o h.pylori Tissue Stomach TISSUE EXAM Sofy Yanez MD 1/13/2022 11:13 AM      Labs:  CBC and differential  Status: Final result      Contains abnormal data CBC and differential  Order: [id number]  Status: Final result       Visible to patient: No (inaccessible in [address] [place] [address])       Next appt: 03/27/2024 at 08:30 AM in [facility] Franky Patel DO)       Dx: Low serum iron;  Anemia, unspecified type    0 Result Notes            Component  Ref Range & Units 8/2/23  7:55 AM 4/22/23  7:52 AM 12/18/22  3:21 PM 12/10/22  7:44 AM 9/3/22  8:05 AM 7/13/22  7:23 AM 6/3/22  8:04 AM   WBC  4.31 - 10.16 Thousand/uL 6.53 5.64 5.77 6.74 8.72 7.43 7.44   RBC  3.88 - 5.62 Million/uL 3.90 3.68 Low  3.77 Low  4.03 3.65 Low  3.91 4.39   Hemoglobin  12.0 - 17.0 g/dL 11.6 Low  11.0 Low  11.4 Low  12.8 11.5 Low  11.5 Low  12.7   Hematocrit  36.5 - 49.3 % 37.1 35.3 Low  36.9 39.5 35.4 Low  36.7 40.4   MCV  82 - 98 fL 95 96 98 98 97 94 92   MCH  26.8 - 34.3 pg 29.7 29.9 30.2 31.8 31.5 29.4 28.9   MCHC  31.4 - 37.4 g/dL 31.3 Low  31.2 Low  30.9 Low  32.4 32.5 31.3 Low  31.4   RDW  11.6 - 15.1 % 15.5 High  15.9 High  14.1 14.1 15.6 High  16.2 High  18.0 High    MPV  8.9 - 12.7 fL 9.6 9.6 9.8 9.9 9.6 9.5 9.1   Platelets  264 - 532 Thousands/uL 237 208 224 218 273 272 257   nRBC  /100 WBCs 0 0 0 0 0 0 0   Neutrophils Relative  43 - 75 % 57 54 78 High  65 73 62 68   Immat GRANS %  0 - 2 % 0 0 0 0 1 1 0   Lymphocytes Relative  14 - 44 % 24 26 10 Low  20 16 21 19   Monocytes Relative  4 - 12 % 14 High  13 High  9 11 7 11 10   Eosinophils Relative  0 - 6 % 4 6 2 3 2 4 3   Basophils Relative  0 - 1 % 1 1 1 1 1 1 0   Neutrophils Absolute  1.85 - 7.62 Thousands/µL 3.70 2.99 4.51 4.33 6.46 4.63 5.04   Immature Grans Absolute  0.00 - 0.20 Thousand/uL 0.01 0.02 0.01 0.01 0.04 0.05 0.03   Lymphocytes Absolute  0.60 - 4.47 Thousands/µL 1.56 1.48 0.60 1.37 1.41 1.56 1.40   Monocytes Absolute  0.17 - 1.22 Thousand/µL 0.94 0.74 0.49 0.77 0.64 0.85 0.75   Eosinophils Absolute  0.00 - 0.61 Thousand/µL 0.26 0.36 0.12 0.20 0.13 0.27 0.19   Basophils Absolute  0.00 - 0.10 Thousands/µL 0.06 0.05 0.04 0.06 0.04 0.07 0.03      Basic metabolic panel  Status: Final result     Basic metabolic panel  Order: [id number]  Status: Final result       Visible to patient: No (inaccessible in [facility])       Next appt: 03/27/2024 at 08:30 AM in [facility] Lois Rivas DO)       Dx: Bladder tumor    0 Result Notes            Component  Ref Range & Units 11/16/23  8:04 AM 4/22/23  7:52 AM 12/18/22  3:21 PM 12/10/22  7:44 AM 9/3/22  8:05 AM 6/3/22  8:04 AM 10/16/21  7:41 AM   Sodium  135 - 147 mmol/L 138 139 141 142 142 138 R 138 R   Potassium  3.5 - 5.3 mmol/L 4.0 5.2 4.0 4.3 5.0 5.4 High  4.2   Chloride  96 - 108 mmol/L 103 107 104 109 High  104 104 R 104 R   CO2  21 - 32 mmol/L 32 29 31 30 32 31 30   ANION GAP  mmol/L 3 3 Low  R 6 R 3 Low  R 6 R 3 Low  R 4 R   BUN  5 - 25 mg/dL 22 23 17 22 19 23 18   Creatinine  0.60 - 1.30 mg/dL 0.96 1.04 CM 1.19 CM 1.09 CM 1.16 CM 1.09 CM 1.20 CM   Comment: Standardized to IDMS reference method   Glucose  65 - 140 mg/dL 83  106 CM  91 CM     Comment: If the patient is fasting, the ADA then defines impaired fasting glucose as > 100 mg/dL and diabetes as > or equal to 123 mg/dL.    Calcium  8.4 - 10.2 mg/dL 9.0 8.9 9.1 R 9.2 R 8.9 R 8.6 R 9.2 R   eGFR  ml/min/1.73sq m 72 66 56 62 58 63 56      Cytology, urine: [id number]  Order: [id number]  Collected 11/3/2023  7:56 AM       Status: Final result       Visible to patient: No (inaccessible in [address] [place] [address])       Dx: Bladder tumor; BPH with obstruction/l. ..    0 Result Notes      Component    Case Report   Non-gynecologic Cytology                          Case: [id number]                                   Authorizing Provider:  Juancarlos Evans MD   Collected:           11/03/2023 0756               Ordering Location:     [facility]        Received:            11/03/2023 [address] [place] [address]                                      [facility]                                                             Pathologist:           Lucita Walters DO                                                             Specimen:    Urine, Clean Catch                                                                        Final Diagnosis   A. Urine, Clean Catch:  Negative for high grade urothelial carcinoma ([address]) - see comment. Urothelial cells. Squamous cells. Numerous red blood cells. Satisfactory for evaluation. Comment:  The above diagnostic category is from the recently published book, The [address] [place] [address] [employer] for Reporting Urinary Cytology, and is in keeping with the ongoing effort for utilization of standardized diagnostic terminology in urine cytology. *     *The [address] [place] [address] for Reporting Urinary Cytology. Ana Betancourt; 2016.       Electronically signed by Lucita Walters DO on 11/7/2023 at  3:29 PM   Gross Description           Exam (Order [id number])  Result Information    Status Priority Source   Final result (9/18/2023  2:54 PM) Routine Urinary Bladder      Other Results from 9/11/2023     POCT urine dip Final result 9/11/2023     Tissue Exam: [id number]  Order: [id number]  Collected 9/11/2023  3:29 PM       Status: Final result       Visible to patient: No (inaccessible in [address] [place] [address])       Dx: Bladder tumor    0 Result Notes      Component    Case Report   Surgical Pathology Report                         Case: [id number]                                   Authorizing Provider:  Juancarlos Evans MD   Collected:           09/11/2023 1529               Ordering Location:     [address] [place] [address] Drive For        Received:            09/11/2023 [facility]                                      [facility] [place] [facility]                                                             Pathologist:           Sonia Balderas MD                                                                   Specimen:    Urinary Bladder                                                                            Final Diagnosis   A. Urinary Bladder, Biopsy:  - Minute partially denuded urothelial mucosa with chronic inflammatory changes. Electronically signed by Sonia Balderas MD on 9/18/2023 at  2:54 PM   Microscopic Description    Immunohistochemical stains performed with appropriate controls show the umbrella cells to be positive for CK20, urothelial basal cells to be positive for CD44 and normal wild-type expression of p53, scattered lymphoid aggregate with predominantly CD3 positive T lymphocytes, scant CD20 positive B lymphocytes, scattered  positive plasma cells. Kappa and Lambda by ALYSSA are noncontributory. CD30 shows scant positivity. Additional Information    All reported additional testing was performed with appropriately reactive controls. These tests were developed and their performance characteristics determined by [facility] or appropriate performing facility, though some tests may be performed on tissues which have not been validated for performance characteristics (such as staining performed on alcohol exposed cell blocks and decalcified tissues). Results should be interpreted with caution and in the context of the patients’ clinical condition. These tests may not be cleared or approved by the U.S. Food and Drug Administration, though the FDA has determined that such clearance or approval is not necessary. These tests are used for clinical purposes and they should not be regarded as investigational or for research.  This laboratory has been approved by [place] [address], designated as a high-complexity laboratory and is qualified to perform these tests. Interpretation performed at [facility], [address] [address]   Gross Description    A. The specimen is received in formalin, labeled with the patient's name and hospital number, and is designated "urinary bladder". The specimen consists of soft tissue fragments ranging from 0.2 to 0.3 cm in greatest dimension. Due to the size and consistency of the specimen, it may not survive histological processing. Entirely submitted. Screened cassette. Note: The estimated total formalin fixation time based upon information provided by the submitting clinician and the standard processing schedule is under 72 hours. SSarginson   Clinical Information    In the right lateral bladder surface the prior biopsy site had some dystrophic calcifications and healing but no signs of recurrent tumor or papillary disease. In the left posterior bladder with an area of trabeculation, there were small half centimeter areas of erythema. This did not appear velvety like carcinoma in situ nor was it raised or papillary. Resulting Agency [facility]      Assessment and plan: Moe Michael is a 80 y.o. male. He is here with his daughter. He has a lesion in his bladder and that was biopsied twice and 1 time there was question of maybe lymphoma but was not confirmed. Second time biopsy did not show malignancy. Recent urine cytology has come back negative for malignancy. On recent CT scan there is a lesion in the bladder. On 11/24/2023 patient had MRI scan of pelvis and that report is pending. Urologist is working on his bladder problem and if there is a lesion I think he would consider another cystoscopy and biopsy. Patient and family will keep me in the loop. I will also look out for the report of MRI scan. Patient states he had hematuria 1 year ago but not now. His anemia is improving.   He has tiredness and arthritic symptoms. Occasionally dysphagia. He had EGD and colonoscopy in January 2022 and that showed a large hiatal hernia, diverticuli and internal hemorrhoids. Patient does not have bowel symptoms and no melena or blood per rectum. He is trying to maintain his weight. Past medical history significant for hypertension, ischemic cardiomyopathy, paroxysmal SVT, BPH, hyperlipidemia, prediabetes. Patient follows with his rheum for hx of PMR. Physical examination and test results are as recorded and discussed in detail. Plan as mentioned above depending upon results of MRI scan of the pelvis. All discussed in detail. Questions answered. Patient will continue to follow with his primary physician and other consultants especially urologist.  Goal is to find out more about lesion in the bladder and to monitor and manage his anemia. Patient is capable of self-care. Diet and activities per patient's primary physician and other consultants. Patient to avoid falls and trauma. Discussed precautions against COVID and other infections. Patient appears to be capable of self-care  See diagnoses, orders and instructions:    1. Lesion of bladder    - CBC and differential; Future    2. Hiatal hernia      3. Anemia, unspecified type    - Iron Panel (Includes Ferritin, Iron Sat%, Iron, and TIBC); Future  - Reticulocytes; Future    4. Ischemic cardiomyopathy      5. Essential hypertension      6. Chronic systolic CHF (congestive heart failure), NYHA class 2 ([address])      7. Benign prostatic hyperplasia with lower urinary tract symptoms, symptom details unspecified      8. S/P mitral valve repair      9. PMR (polymyalgia rheumatica) (HCC)      10. Iron deficiency    - Iron Panel (Includes Ferritin, Iron Sat%, Iron, and TIBC); Future    11. Mixed hyperlipidemia      12. Hx of CABG    Blood work prior to next visit in 4 months. Patient OR family will let me know about MRI of the pelvis report. Patient voiced understanding and agreement in the above discussion. Aware to contact our office with questions/symptoms in the interim. This note has been generated by voice recognition software system. Therefore, there may be spelling, grammar, and other errors. Please contact if questions arise.

## 2023-11-28 NOTE — PATIENT INSTRUCTIONS
Blood work prior to next visit in 4 months. Patient OR family will let me know about MRI of the pelvis report.

## 2023-12-04 ENCOUNTER — TELEPHONE (OUTPATIENT)
Dept: UROLOGY | Facility: CLINIC | Age: 83
End: 2023-12-04

## 2023-12-04 NOTE — TELEPHONE ENCOUNTER
Called patient and left a detailed vm, per communication consent. Informed the pt that his urine cytology is negative for cancer cells and the MRI confirms that there is not a significant area of question as potentially described in the CT scan. Informed the pt that Dr. Donal Hitchcock would not recommend additional aggressive surgical intervention as he had no signs of mucosal abnormality on most recent cystoscopy. Left office number in case of any questions or concerns.

## 2023-12-04 NOTE — TELEPHONE ENCOUNTER
----- Message from Nevaeh Dietrich MD sent at 12/1/2023  7:58 AM EST -----  Please let the patient know that his urine cytology is negative for cancer cells in the MRI confirms that there is not a significant area of question as potentially described in the CT scan. At this point in time I would not recommend additional aggressive surgical intervention. He had no signs of mucosal abnormality on most recent cystoscopy.     Dr. Sanaz BRYANT, medical oncology is carbon copied on this notation

## 2024-02-21 PROBLEM — Z87.440 HISTORY OF UTI: Status: RESOLVED | Noted: 2020-01-08 | Resolved: 2024-02-21

## 2024-03-04 DIAGNOSIS — F41.9 ANXIETY: ICD-10-CM

## 2024-03-04 RX ORDER — CITALOPRAM HYDROBROMIDE 10 MG/1
TABLET ORAL
Qty: 90 TABLET | Refills: 0 | Status: SHIPPED | OUTPATIENT
Start: 2024-03-04

## 2024-03-28 ENCOUNTER — TELEPHONE (OUTPATIENT)
Dept: HEMATOLOGY ONCOLOGY | Facility: CLINIC | Age: 84
End: 2024-03-28

## 2024-04-01 ENCOUNTER — TELEPHONE (OUTPATIENT)
Dept: HEMATOLOGY ONCOLOGY | Facility: CLINIC | Age: 84
End: 2024-04-01

## 2024-04-01 NOTE — TELEPHONE ENCOUNTER
Appointment Change  Cancel, Reschedule, Change to Virtual      Who are you speaking with? Patient   If it is not the patient, is the caller listed on the communication consent form? N/A   Which provider is the appointment scheduled with? Dr. King   When was the original appointment scheduled?    Please list date and time 4/2/24 840   At which location is the appointment scheduled to take place? [place]   Was the appointment rescheduled?     Was the appointment changed from an in person visit to a virtual visit?    If so, please list the details of the change. N/a   What is the reason for the appointment change? Waiting for new insurance       Was [facility] transport scheduled? N/A   Does [facility] transport need to be scheduled for the new visit (if applicable) N/A   Does the patient need an infusion appointment rescheduled? N/A   Does the patient have an upcoming infusion appointment scheduled? If so, when? No   Is the patient undergoing chemotherapy? N/A   For appointments cancelled with less than 24 hours:  Was the no-show policy reviewed? Yes

## 2024-04-08 DIAGNOSIS — E78.5 HYPERLIPIDEMIA, UNSPECIFIED HYPERLIPIDEMIA TYPE: ICD-10-CM

## 2024-04-08 RX ORDER — ATORVASTATIN CALCIUM 40 MG/1
40 TABLET, FILM COATED ORAL DAILY
Qty: 90 TABLET | Refills: 1 | Status: SHIPPED | OUTPATIENT
Start: 2024-04-08

## 2024-05-02 ENCOUNTER — TELEPHONE (OUTPATIENT)
Dept: CARDIOLOGY CLINIC | Facility: CLINIC | Age: 84
End: 2024-05-02

## 2024-05-02 NOTE — TELEPHONE ENCOUNTER
----- Message from Maday Saldivar sent at 5/2/2024 11:24 AM EDT -----  Patient needs Atorvastatin 40mg   Hermes Castillo   [facility] [address] [place] [phone]

## 2024-05-02 NOTE — TELEPHONE ENCOUNTER
Left message for patient, advised atorvastatin was sent to [facility] 4/8/24 for 90 days supply with 1 refill.  I did call pharmacy to verify they have script.     Advised to call office with any questions.

## 2024-05-28 DIAGNOSIS — F41.9 ANXIETY: ICD-10-CM

## 2024-05-29 RX ORDER — CITALOPRAM HYDROBROMIDE 10 MG/1
10 TABLET ORAL DAILY
Qty: 90 TABLET | Refills: 0 | Status: SHIPPED | OUTPATIENT
Start: 2024-05-29

## 2024-06-07 ENCOUNTER — APPOINTMENT (OUTPATIENT)
Dept: LAB | Facility: HOSPITAL | Age: 84
End: 2024-06-07
Payer: COMMERCIAL

## 2024-06-07 ENCOUNTER — HOSPITAL ENCOUNTER (OUTPATIENT)
Dept: RADIOLOGY | Facility: HOSPITAL | Age: 84
Discharge: HOME | End: 2024-06-07
Payer: COMMERCIAL

## 2024-06-07 DIAGNOSIS — I10 ESSENTIAL HYPERTENSION: ICD-10-CM

## 2024-06-07 DIAGNOSIS — E78.2 MIXED HYPERLIPIDEMIA: ICD-10-CM

## 2024-06-07 DIAGNOSIS — F41.9 ANXIETY: ICD-10-CM

## 2024-06-07 DIAGNOSIS — M54.50 RIGHT LOW BACK PAIN, UNSPECIFIED CHRONICITY, UNSPECIFIED WHETHER SCIATICA PRESENT: ICD-10-CM

## 2024-06-07 DIAGNOSIS — I50.22 CHRONIC SYSTOLIC CHF (CONGESTIVE HEART FAILURE), NYHA CLASS 2 (HCC): ICD-10-CM

## 2024-06-07 LAB
ALBUMIN SERPL BCP-MCNC: 3.5 G/DL (ref 3.5–5)
ALP SERPL-CCNC: 60 U/L (ref 34–104)
ALT SERPL W P-5'-P-CCNC: 13 U/L (ref 7–52)
ANION GAP SERPL CALCULATED.3IONS-SCNC: 3 MMOL/L (ref 4–13)
AST SERPL W P-5'-P-CCNC: 18 U/L (ref 13–39)
BASOPHILS # BLD AUTO: 0.05 THOUSANDS/ÂΜL (ref 0–0.1)
BASOPHILS NFR BLD AUTO: 1 % (ref 0–1)
BILIRUB SERPL-MCNC: 0.81 MG/DL (ref 0.2–1)
BUN SERPL-MCNC: 20 MG/DL (ref 5–25)
CALCIUM SERPL-MCNC: 8.9 MG/DL (ref 8.4–10.2)
CHLORIDE SERPL-SCNC: 104 MMOL/L (ref 96–108)
CHOLEST SERPL-MCNC: 153 MG/DL
CO2 SERPL-SCNC: 31 MMOL/L (ref 21–32)
CREAT SERPL-MCNC: 1 MG/DL (ref 0.6–1.3)
EOSINOPHIL # BLD AUTO: 0.22 THOUSAND/ÂΜL (ref 0–0.61)
EOSINOPHIL NFR BLD AUTO: 5 % (ref 0–6)
ERYTHROCYTE [DISTWIDTH] IN BLOOD BY AUTOMATED COUNT: 14.5 % (ref 11.6–15.1)
GFR SERPL CREATININE-BSD FRML MDRD: 69 ML/MIN/1.73SQ M
GLUCOSE P FAST SERPL-MCNC: 85 MG/DL (ref 65–99)
HCT VFR BLD AUTO: 35.4 % (ref 36.5–49.3)
HDLC SERPL-MCNC: 67 MG/DL
HGB BLD-MCNC: 11.6 G/DL (ref 12–17)
IMM GRANULOCYTES # BLD AUTO: 0.02 THOUSAND/UL (ref 0–0.2)
IMM GRANULOCYTES NFR BLD AUTO: 1 % (ref 0–2)
LDLC SERPL CALC-MCNC: 76 MG/DL (ref 0–100)
LYMPHOCYTES # BLD AUTO: 0.86 THOUSANDS/ÂΜL (ref 0.6–4.47)
LYMPHOCYTES NFR BLD AUTO: 20 % (ref 14–44)
MCH RBC QN AUTO: 32 PG (ref 26.8–34.3)
MCHC RBC AUTO-ENTMCNC: 32.8 G/DL (ref 31.4–37.4)
MCV RBC AUTO: 98 FL (ref 82–98)
MONOCYTES # BLD AUTO: 0.58 THOUSAND/ÂΜL (ref 0.17–1.22)
MONOCYTES NFR BLD AUTO: 14 % (ref 4–12)
NEUTROPHILS # BLD AUTO: 2.53 THOUSANDS/ÂΜL (ref 1.85–7.62)
NEUTS SEG NFR BLD AUTO: 59 % (ref 43–75)
NRBC BLD AUTO-RTO: 0 /100 WBCS
PLATELET # BLD AUTO: 204 THOUSANDS/UL (ref 149–390)
PMV BLD AUTO: 9.7 FL (ref 8.9–12.7)
POTASSIUM SERPL-SCNC: 4.6 MMOL/L (ref 3.5–5.3)
PROT SERPL-MCNC: 6.6 G/DL (ref 6.4–8.4)
RBC # BLD AUTO: 3.62 MILLION/UL (ref 3.88–5.62)
SODIUM SERPL-SCNC: 138 MMOL/L (ref 135–147)
TRIGL SERPL-MCNC: 52 MG/DL
WBC # BLD AUTO: 4.26 THOUSAND/UL (ref 4.31–10.16)

## 2024-06-07 PROCEDURE — 85025 COMPLETE CBC W/AUTO DIFF WBC: CPT

## 2024-06-07 PROCEDURE — 36415 COLL VENOUS BLD VENIPUNCTURE: CPT

## 2024-06-07 PROCEDURE — 80053 COMPREHEN METABOLIC PANEL: CPT

## 2024-06-07 PROCEDURE — 72100 X-RAY EXAM L-S SPINE 2/3 VWS: CPT

## 2024-06-07 PROCEDURE — 80061 LIPID PANEL: CPT

## 2024-06-12 ENCOUNTER — OFFICE VISIT (OUTPATIENT)
Dept: FAMILY MEDICINE CLINIC | Facility: CLINIC | Age: 84
End: 2024-06-12
Payer: COMMERCIAL

## 2024-06-12 VITALS
HEIGHT: 67 IN | TEMPERATURE: 97.5 F | OXYGEN SATURATION: 98 % | SYSTOLIC BLOOD PRESSURE: 118 MMHG | DIASTOLIC BLOOD PRESSURE: 72 MMHG | HEART RATE: 72 BPM | WEIGHT: 128.2 LBS | BODY MASS INDEX: 20.12 KG/M2

## 2024-06-12 DIAGNOSIS — M47.816 OSTEOARTHRITIS OF LUMBAR SPINE, UNSPECIFIED SPINAL OSTEOARTHRITIS COMPLICATION STATUS: ICD-10-CM

## 2024-06-12 DIAGNOSIS — I10 ESSENTIAL HYPERTENSION: ICD-10-CM

## 2024-06-12 DIAGNOSIS — I25.5 ISCHEMIC CARDIOMYOPATHY: ICD-10-CM

## 2024-06-12 DIAGNOSIS — F41.9 ANXIETY: ICD-10-CM

## 2024-06-12 DIAGNOSIS — N40.1 BENIGN PROSTATIC HYPERPLASIA WITH LOWER URINARY TRACT SYMPTOMS, SYMPTOM DETAILS UNSPECIFIED: ICD-10-CM

## 2024-06-12 DIAGNOSIS — Z00.00 HEALTH CARE MAINTENANCE: Primary | ICD-10-CM

## 2024-06-12 DIAGNOSIS — Z95.1 HX OF CABG: ICD-10-CM

## 2024-06-12 DIAGNOSIS — E78.2 MIXED HYPERLIPIDEMIA: ICD-10-CM

## 2024-06-12 PROCEDURE — 99397 PER PM REEVAL EST PAT 65+ YR: CPT | Performed by: FAMILY MEDICINE

## 2024-06-12 NOTE — PATIENT INSTRUCTIONS
Here for General PE and labs stable. Xray shows lumbar degenerative changes, use tylenol prn pain. Use sunscreen and monitor for ticks. Take all meds as directed for HTN and high cholesterol and anxiety and see urology and cardiology as directed for BPH and heart issues hx of CABG. Recheck in 6 months.

## 2024-06-12 NOTE — PROGRESS NOTES
Ambulatory Visit  Name: Raleigh Sandoval      : 1940      MRN: [id number]  Encounter Provider: Gopi Victoria DO  Encounter Date: 2024   Encounter department: [facility]  Chief Complaint   Patient presents with    Annual Exam     Patient Instructions   Here for General PE and labs stable. Xray shows lumbar degenerative changes, use tylenol prn pain. Use sunscreen and monitor for ticks. Take all meds as directed for HTN and high cholesterol and anxiety and see urology and cardiology as directed for BPH and heart issues hx of CABG. Recheck in 6 months.   Assessment & Plan   1. Health care maintenance  2. Ischemic cardiomyopathy  Comments:  sees cardiology  3. Hx of CABG  Comments:  stable and sees Cardiology  4. Mixed hyperlipidemia  Comments:  labs stable  5. Essential hypertension  Comments:  stable  6. Anxiety  Comments:  stable  7. Osteoarthritis of lumbar spine, unspecified spinal osteoarthritis complication status  Comments:  tylenol prn pain, xrays reviewed  8. Benign prostatic hyperplasia with lower urinary tract symptoms, symptom details unspecified  Comments:  sees Urology and takes meds, stable    [unfilled]  History of Present Illness     Annual Exam, patient is a .         Review of Systems   Constitutional: Negative.    HENT: Negative.     Eyes: Negative.    Respiratory: Negative.     Cardiovascular: Negative.    Gastrointestinal: Negative.    Endocrine: Negative.    Genitourinary: Negative.    Musculoskeletal: Negative.    Skin: Negative.    Allergic/Immunologic: Negative.    Neurological: Negative.    Hematological: Negative.    Psychiatric/Behavioral: Negative.       Current Outpatient Medications on File Prior to Visit   Medication Sig Dispense Refill    aspirin 81 mg chewable tablet Chew 81 mg daily      atorvastatin (LIPITOR) 40 mg tablet TAKE ONE TABLET BY MOUTH ONCE DAILY 90 tablet 1    carvedilol (COREG) 6.25 mg tablet TAKE 1  "TABLET BY MOUTH TWICE A DAY WITH MEALS 180 tablet 3    citalopram (CeleXA) 10 mg tablet Take 1 tablet (10 mg total) by mouth daily 90 tablet 0    Cyanocobalamin ([employer] VITAMIN B-12 PO) Take by mouth      Ferrous Sulfate (IRON) 325 (65 Fe) MG TABS Take 1 tablet by mouth Twice daily      finasteride (PROSCAR) 5 mg tablet Take 1 tablet (5 mg total) by mouth daily 90 tablet 3    lisinopril (ZESTRIL) 5 mg tablet TAKE 1 TABLET BY MOUTH ONCE A DAY (Patient taking differently: every evening) 90 tablet 1    Multiple Vitamin Essential TABS Take by mouth      Omega 3 1200 MG CAPS Take by mouth in the morning      tamsulosin (FLOMAX) 0.4 mg Take 2 capsules (0.8 mg total) by mouth daily at bedtime 180 capsule 3    [DISCONTINUED] Cholecalciferol 1000 units tablet Take 2,000 Units by mouth  (Patient not taking: Reported on 5/19/2023)      [DISCONTINUED] phenazopyridine (PYRIDIUM) 100 mg tablet Take 1 tablet (100 mg total) by mouth 3 (three) times a day as needed for bladder spasms (Patient not taking: Reported on 6/2/2023) 20 tablet 0     No current facility-administered medications on file prior to visit.      Objective     /72   Pulse 72   Temp 97.5 °F (36.4 °C)   Ht 5' 7\" (1.702 m)   Wt 58.2 kg (128 lb 3.2 oz)   SpO2 98%   BMI 20.08 kg/m²     Physical Exam  Constitutional:       Appearance: Normal appearance. He is well-developed.   HENT:      Head: Normocephalic and atraumatic.      Right Ear: External ear normal.      Left Ear: External ear normal.      Nose: Nose normal.      Mouth/Throat:      Mouth: Mucous membranes are moist.   Eyes:      Conjunctiva/sclera: Conjunctivae normal.      Pupils: Pupils are equal, round, and reactive to light.   Cardiovascular:      Rate and Rhythm: Normal rate and regular rhythm.      Pulses: Normal pulses.      Heart sounds: Normal heart sounds.   Pulmonary:      Effort: Pulmonary effort is normal.      Breath sounds: Normal breath sounds.   Abdominal:      General: Abdomen is " flat. Bowel sounds are normal.      Palpations: Abdomen is soft.   Musculoskeletal:         General: Normal range of motion.      Cervical back: Normal range of motion and neck supple.   Skin:     General: Skin is warm and dry.      Capillary Refill: Capillary refill takes less than 2 seconds.   Neurological:      General: No focal deficit present.      Mental Status: He is alert and oriented to person, place, and time. Mental status is at baseline.      Deep Tendon Reflexes: Reflexes are normal and symmetric.   Psychiatric:         Behavior: Behavior normal.       Administrative Statements   I have spent a total time of 30 minutes on 06/12/24 In caring for this patient including Diagnostic results, Prognosis, Risks and benefits of tx options, Instructions for management, Patient and family education, Importance of tx compliance, Risk factor reductions, Impressions, Counseling / Coordination of care, Documenting in the medical record, Reviewing / ordering tests, medicine, procedures  , and Obtaining or reviewing history  .

## 2024-06-17 DIAGNOSIS — I25.5: ICD-10-CM

## 2024-06-17 DIAGNOSIS — I50.9: ICD-10-CM

## 2024-06-17 RX ORDER — LISINOPRIL 5 MG/1
5 TABLET ORAL DAILY
Qty: 90 TABLET | Refills: 1 | Status: SHIPPED | OUTPATIENT
Start: 2024-06-17

## 2024-06-26 NOTE — PROGRESS NOTES
Heart Failure Outpatient Progress Note - Raleigh Sandoval 83 y.o. male MRN: [id number]    @ Encounter: [id number]      Assessment/Plan:    Patient Active Problem List    Diagnosis Date Noted    Chronic systolic CHF (congestive heart failure), NYHA class 2 (Formerly [facility]) 09/16/2022    Ischemic cardiomyopathy 03/12/2018    Lesion of bladder 11/28/2023    Hiatal hernia 11/28/2023    Anemia, unspecified 11/28/2023    PMR (polymyalgia rheumatica) (Former[facility]) 04/26/2023    Chronic rhinitis 03/20/2023    Iron deficiency 06/10/2022    Anxiety 06/10/2022    Bilateral inguinal hernia without obstruction or gangrene 05/09/2022    Protein-calorie malnutrition, unspecified severity ([facility]) 11/12/2021    Pleural effusion on left 03/02/2020    Post-nasal drip 03/02/2020    Benign prostatic hyperplasia 11/07/2018    Hyperlipidemia 11/07/2018    Prediabetes 11/07/2018    Essential hypertension 11/07/2018    Hx of CABG 03/12/2018    S/P mitral valve repair 03/12/2018    Paroxysmal supraventricular tachycardia 09/02/2021     # Chronic HFmEF, Stage C, NYHA 2  Etiology: iCM    Weight: 127 lbs  NT proBNP:     Studies- personally reviewed by me  Echo 6/11/21:  LVEF: 45%, hypo of inferior wall  LVEDd:  RV: mildly dilated, mildly reduced  MV annuloplasty, moderate MR- anteriorly directed  Mild MS, mean gradient of 5.7 mmHg  Mild to moderate TR  PASP: 45     Echo 3/27/18:   LVEF: 45%  LVEDd 6.1 cm  Moderate to severe MR, eccentric  PASP 30 mmHg    TTE 7/28/16: LVEF: 40%, LVEDd 5.9cm, grade 2 DD  Moderate MR    Neurohormonal Blockade:  --Beta-Blocker: coreg 6.25 mg BID  --ACEi, ARB or ARNi: lisinopril 5 mg   --Aldosterone Receptor Blocker:  --Diuretic: none     Sudden Cardiac Death Risk Reduction:  --ICD: none, EF > 35%    Electrical Resynchronization:  --Candidacy for BiV device: narrow QRS    # CAD with CABG x 3 2012  TWI in 2,3 - no ischemic eval since CABG- EKG today- same abnormality but unchanged- pt declining stress test again- no cp or SOB.:no  need for nitro tabs  Meds: asa, atorvastatin 40 mg daily, coreg 6.25 mg BID  Angina: none    # hyperlipidemia: Atorvastatin 40 mg daily  6/7/24: LDL 76, HDL 67  10/16/21: LDL 74, HDL 53  5/4/19- HDL 59, LDL 65    # Mitral valve repair with 26 mm CE annuloplasty ring   Has moderate to severe MR on 3/27/18 echo - eccentric    # Post op Trach and peg at that time  # polymyalgia rheumatica  # anemia of chronic disease, iron def- resolved  HgB stable at 11.6  # pre diabetes  # left pleural effusion- likely loculated- has seen Pulm    TODAY'S PLAN:  Continue asa, atorvastatin and coreg for CAD  continue atorvastatin for previous lipid check at goal  Continue coreg and lisinopril for his HFmEF   Echo follow up on mitral valve  --2g sodium diet  - Daily weights    HPI:   84 yo male with history of CAD with CABG x 3 in 2012 with mitral valve repair with ring, polymalgia rheumatica, BPH, ICM with last EF: 40% on June 2016 echo and moderate MR on the echo. He reportedly had VDRF after CABG with trach and PEG. Has not been admitted with heart failure  Last eval, pt declined stress test. On follow up 8/29 he denies SOB or CP. EKG today is abnormal but unchanged from Feb EKG.      Pleural effusion felt to be loculated    Interval History:  Walks the dog everyday, doing well  Still working at [employer] as a  3-11 pm    Review of Systems   Constitutional:  Positive for fatigue. Negative for activity change, appetite change and unexpected weight change.   HENT:  Negative for congestion and nosebleeds.    Eyes: Negative.    Respiratory:  Negative for cough, chest tightness and shortness of breath.    Cardiovascular:  Negative for chest pain, palpitations and leg swelling.   Gastrointestinal:  Negative for abdominal distention.   Endocrine: Negative.    Genitourinary: Negative.    Musculoskeletal: Negative.    Skin: Negative.    Neurological:  Negative for dizziness, syncope and weakness.   Hematological: Negative.   "  Psychiatric/Behavioral: Negative.           Past Medical History:   Diagnosis Date    BPH (benign prostatic hyperplasia)     Chronic pain disorder     back    Coronary artery disease     Gallstones     History of tracheostomy     per pt had it \"after CABG surgery--was admitted for 53 days including Rehab\"    Hx of CABG     per pt 7-8 yrs ago    Hypercholesterolemia     Hypertension     Moderate exercise     alot of walking-works FT and also walks dog 2 miles daily usually    Myocardial infarction (HCC)     per pt approx 7-8yrs ago --[facility] cardio Dr ASHWIN Castillo sees yearly    Prediabetes     Shortness of breath     per pt\"climbing steps or walking long distance\"    Wears glasses          No Known Allergies  .    Current Outpatient Medications:     aspirin 81 mg chewable tablet, Chew 81 mg daily, Disp: , Rfl:     atorvastatin (LIPITOR) 40 mg tablet, TAKE ONE TABLET BY MOUTH ONCE DAILY, Disp: 90 tablet, Rfl: 1    carvedilol (COREG) 6.25 mg tablet, TAKE 1 TABLET BY MOUTH TWICE A DAY WITH MEALS, Disp: 180 tablet, Rfl: 3    citalopram (CeleXA) 10 mg tablet, Take 1 tablet (10 mg total) by mouth daily, Disp: 90 tablet, Rfl: 0    Cyanocobalamin ([employer]S VITAMIN B-12 PO), Take by mouth, Disp: , Rfl:     Ferrous Sulfate (IRON) 325 (65 Fe) MG TABS, Take 1 tablet by mouth Twice daily, Disp: , Rfl:     finasteride (PROSCAR) 5 mg tablet, Take 1 tablet (5 mg total) by mouth daily, Disp: 90 tablet, Rfl: 3    lisinopril (ZESTRIL) 5 mg tablet, Take 1 tablet (5 mg total) by mouth daily, Disp: 90 tablet, Rfl: 1    Multiple Vitamin Essential TABS, Take by mouth, Disp: , Rfl:     Omega 3 1200 MG CAPS, Take by mouth in the morning, Disp: , Rfl:     tamsulosin (FLOMAX) 0.4 mg, Take 2 capsules (0.8 mg total) by mouth daily at bedtime, Disp: 180 capsule, Rfl: 3    Social History     Socioeconomic History    Marital status:      Spouse name: Not on file    Number of children: Not on file    Years of education: Not on file    Highest education " level: Not on file   Occupational History    Not on file   Tobacco Use    Smoking status: Former     Current packs/day: 0.00     Average packs/day: 0.5 packs/day for 16.0 years (8.0 ttl pk-yrs)     Types: Cigarettes     Start date:      Quit date:      Years since quittin.5     Passive exposure: Past    Smokeless tobacco: Never   Vaping Use    Vaping status: Never Used   Substance and Sexual Activity    Alcohol use: Yes     Comment: per pt very rarely    Drug use: No    Sexual activity: Not on file     Comment: defer   Other Topics Concern    Not on file   Social History Narrative    Consumes 2-3 ups of tea per week     Social Determinants of Health     Financial Resource Strain: Not on file   Food Insecurity: Not on file   Transportation Needs: Not on file   Physical Activity: Not on file   Stress: Not on file (2021)   Social Connections: Not on file   Intimate Partner Violence: Low Risk  (2021)    Received from [employer]    Intimate Partner Violence     Insults You: Not on file     Threatens You: Not on file     Screams at You: Not on file     Physically Hurt: Not on file     Intimate Partner Violence Score: Not on file   Housing Stability: Not on file       Family History   Problem Relation Age of Onset    Colon cancer Father     Kidney disease Father     Diabetes Mother     Heart disease Mother     Hypertension Mother     Cervical cancer Maternal Aunt     Breast cancer Paternal Aunt     Colon cancer Paternal Grandfather     Heart disease Sister     Heart disease Brother        Physical Exam:    Vitals:   Vitals:    24 0800   BP: 144/70   Pulse: 70       Wt Readings from Last 3 Encounters:   24 57.6 kg (127 lb)   24 58.2 kg (128 lb 3.2 oz)   23 56.5 kg (124 lb 8 oz)       Physical Exam  Constitutional:       Appearance: He is well-developed. He is not diaphoretic.   HENT:      Head: Normocephalic and atraumatic.   Eyes:      Pupils: Pupils are  "equal, round, and reactive to light.   Neck:      Vascular: No JVD.   Cardiovascular:      Rate and Rhythm: Normal rate and regular rhythm.      Heart sounds: Murmur (apical) heard.   Pulmonary:      Effort: Pulmonary effort is normal.      Breath sounds: Normal breath sounds. No rales.   Abdominal:      General: Bowel sounds are normal. There is no distension.      Palpations: Abdomen is soft.   Musculoskeletal:         General: Normal range of motion.      Cervical back: Normal range of motion.   Skin:     General: Skin is warm and dry.   Neurological:      Mental Status: He is alert and oriented to person, place, and time.       Labs & Results:    Lab Results   Component Value Date    GLUCOSE 104 01/08/2016    CALCIUM 8.9 06/07/2024     01/08/2016    K 4.6 06/07/2024    CO2 31 06/07/2024     06/07/2024    BUN 20 06/07/2024    CREATININE 1.00 06/07/2024     Lab Results   Component Value Date    WBC 4.26 (L) 06/07/2024    HGB 11.6 (L) 06/07/2024    HCT 35.4 (L) 06/07/2024    MCV 98 06/07/2024     06/07/2024     No results found for: \"NTBNP\"   Lab Results   Component Value Date    CHOL 127 11/28/2015    CHOL 105 10/11/2014    CHOL 132 07/14/2014     Lab Results   Component Value Date    HDL 67 06/07/2024    HDL 53 10/16/2021    HDL 65 01/09/2021     Lab Results   Component Value Date    LDLCALC 76 06/07/2024    LDLCALC 74 10/16/2021    LDLCALC 84 01/09/2021     Lab Results   Component Value Date    TRIG 52 06/07/2024    TRIG 40 10/16/2021    TRIG 47 01/09/2021     No results found for: \"CHOLHDL\"      EKG personally reviewed by Hermes Amezquita.     Counseling / Coordination of Care  Time spent today 25 minutes.  Greater than 50% of total time was spent with the patient and / or family counseling and / or coordination of care.  We discussed diagnoses, most recent studies, tests and any changes in treatment plan  Thank you for the opportunity to participate in the care of this patient.    HERMES AMEZQUITA " CORY.  DIRECTOR OF HEART FAILURE/ PULMONARY HYPERTENSION  MEDICAL DIRECTOR OF LVAD PROGRAM  [facility]

## 2024-07-01 ENCOUNTER — OFFICE VISIT (OUTPATIENT)
Dept: CARDIOLOGY CLINIC | Facility: CLINIC | Age: 84
End: 2024-07-01
Payer: COMMERCIAL

## 2024-07-01 VITALS
HEART RATE: 70 BPM | BODY MASS INDEX: 19.93 KG/M2 | HEIGHT: 67 IN | DIASTOLIC BLOOD PRESSURE: 70 MMHG | WEIGHT: 127 LBS | SYSTOLIC BLOOD PRESSURE: 144 MMHG

## 2024-07-01 DIAGNOSIS — I25.5 ISCHEMIC CARDIOMYOPATHY: Primary | ICD-10-CM

## 2024-07-01 DIAGNOSIS — I50.22 CHRONIC SYSTOLIC CHF (CONGESTIVE HEART FAILURE), NYHA CLASS 2 (HCC): ICD-10-CM

## 2024-07-01 DIAGNOSIS — I10 ESSENTIAL HYPERTENSION: ICD-10-CM

## 2024-07-01 DIAGNOSIS — Z98.890 H/O MITRAL VALVE REPAIR: ICD-10-CM

## 2024-07-01 DIAGNOSIS — I47.10 PAROXYSMAL SUPRAVENTRICULAR TACHYCARDIA: ICD-10-CM

## 2024-07-01 PROCEDURE — 99214 OFFICE O/P EST MOD 30 MIN: CPT | Performed by: INTERNAL MEDICINE

## 2024-07-09 ENCOUNTER — APPOINTMENT (EMERGENCY)
Dept: RADIOLOGY | Facility: HOSPITAL | Age: 84
DRG: 291 | End: 2024-07-09
Payer: COMMERCIAL

## 2024-07-09 ENCOUNTER — HOSPITAL ENCOUNTER (INPATIENT)
Facility: HOSPITAL | Age: 84
LOS: 3 days | Discharge: HOME | DRG: 291 | End: 2024-07-12
Attending: EMERGENCY MEDICINE | Admitting: HOSPITALIST
Payer: COMMERCIAL

## 2024-07-09 DIAGNOSIS — J96.91 HYPOXIC RESPIRATORY FAILURE (HCC): ICD-10-CM

## 2024-07-09 DIAGNOSIS — I50.9 CHF (CONGESTIVE HEART FAILURE) (HCC): Primary | ICD-10-CM

## 2024-07-09 DIAGNOSIS — R93.89 ABNORMAL CT OF THE CHEST: ICD-10-CM

## 2024-07-09 DIAGNOSIS — I50.21 ACUTE SYSTOLIC CONGESTIVE HEART FAILURE (HCC): ICD-10-CM

## 2024-07-09 LAB
2HR DELTA HS TROPONIN: 25 NG/L
4HR DELTA HS TROPONIN: 56 NG/L
ALBUMIN SERPL BCG-MCNC: 3.4 G/DL (ref 3.5–5)
ALP SERPL-CCNC: 69 U/L (ref 34–104)
ALT SERPL W P-5'-P-CCNC: 14 U/L (ref 7–52)
ANION GAP SERPL CALCULATED.3IONS-SCNC: 6 MMOL/L (ref 4–13)
APTT PPP: 33 SECONDS (ref 23–37)
AST SERPL W P-5'-P-CCNC: 19 U/L (ref 13–39)
ATRIAL RATE: 71 BPM
ATRIAL RATE: 95 BPM
BACTERIA UR QL AUTO: ABNORMAL /HPF
BASOPHILS # BLD AUTO: 0.05 THOUSANDS/ÂΜL (ref 0–0.1)
BASOPHILS NFR BLD AUTO: 1 % (ref 0–1)
BILIRUB SERPL-MCNC: 0.56 MG/DL (ref 0.2–1)
BILIRUB UR QL STRIP: NEGATIVE
BUN SERPL-MCNC: 28 MG/DL (ref 5–25)
CALCIUM ALBUM COR SERPL-MCNC: 9 MG/DL (ref 8.3–10.1)
CALCIUM SERPL-MCNC: 8.5 MG/DL (ref 8.4–10.2)
CARDIAC TROPONIN I PNL SERPL HS: 19 NG/L
CARDIAC TROPONIN I PNL SERPL HS: 44 NG/L
CARDIAC TROPONIN I PNL SERPL HS: 75 NG/L
CHLORIDE SERPL-SCNC: 106 MMOL/L (ref 96–108)
CLARITY UR: CLEAR
CO2 SERPL-SCNC: 28 MMOL/L (ref 21–32)
COLOR UR: YELLOW
CREAT SERPL-MCNC: 0.98 MG/DL (ref 0.6–1.3)
EOSINOPHIL # BLD AUTO: 0.39 THOUSAND/ÂΜL (ref 0–0.61)
EOSINOPHIL NFR BLD AUTO: 6 % (ref 0–6)
ERYTHROCYTE [DISTWIDTH] IN BLOOD BY AUTOMATED COUNT: 14 % (ref 11.6–15.1)
FLUAV RNA RESP QL NAA+PROBE: NEGATIVE
FLUBV RNA RESP QL NAA+PROBE: NEGATIVE
GFR SERPL CREATININE-BSD FRML MDRD: 71 ML/MIN/1.73SQ M
GLUCOSE SERPL-MCNC: 204 MG/DL (ref 65–140)
GLUCOSE UR STRIP-MCNC: ABNORMAL MG/DL
HCT VFR BLD AUTO: 30.7 % (ref 36.5–49.3)
HGB BLD-MCNC: 9.8 G/DL (ref 12–17)
HGB UR QL STRIP.AUTO: NEGATIVE
HYALINE CASTS #/AREA URNS LPF: ABNORMAL /LPF
IMM GRANULOCYTES # BLD AUTO: 0.02 THOUSAND/UL (ref 0–0.2)
IMM GRANULOCYTES NFR BLD AUTO: 0 % (ref 0–2)
INR PPP: 1.1 (ref 0.84–1.19)
KETONES UR STRIP-MCNC: NEGATIVE MG/DL
LACTATE SERPL-SCNC: 1.1 MMOL/L (ref 0.5–2)
LEUKOCYTE ESTERASE UR QL STRIP: NEGATIVE
LYMPHOCYTES # BLD AUTO: 0.51 THOUSANDS/ÂΜL (ref 0.6–4.47)
LYMPHOCYTES NFR BLD AUTO: 8 % (ref 14–44)
MCH RBC QN AUTO: 30.6 PG (ref 26.8–34.3)
MCHC RBC AUTO-ENTMCNC: 31.9 G/DL (ref 31.4–37.4)
MCV RBC AUTO: 96 FL (ref 82–98)
MONOCYTES # BLD AUTO: 0.72 THOUSAND/ÂΜL (ref 0.17–1.22)
MONOCYTES NFR BLD AUTO: 11 % (ref 4–12)
MUCOUS THREADS UR QL AUTO: ABNORMAL
NEUTROPHILS # BLD AUTO: 5.15 THOUSANDS/ÂΜL (ref 1.85–7.62)
NEUTS SEG NFR BLD AUTO: 74 % (ref 43–75)
NITRITE UR QL STRIP: NEGATIVE
NON-SQ EPI CELLS URNS QL MICRO: ABNORMAL /HPF
NRBC BLD AUTO-RTO: 0 /100 WBCS
P AXIS: 71 DEGREES
PH UR STRIP.AUTO: 6 [PH]
PLATELET # BLD AUTO: 256 THOUSANDS/UL (ref 149–390)
PLATELET # BLD AUTO: 272 THOUSANDS/UL (ref 149–390)
PMV BLD AUTO: 9.3 FL (ref 8.9–12.7)
PMV BLD AUTO: 9.5 FL (ref 8.9–12.7)
POTASSIUM SERPL-SCNC: 4 MMOL/L (ref 3.5–5.3)
PR INTERVAL: 170 MS
PROCALCITONIN SERPL-MCNC: <0.05 NG/ML
PROT SERPL-MCNC: 6.5 G/DL (ref 6.4–8.4)
PROT UR STRIP-MCNC: ABNORMAL MG/DL
PROTHROMBIN TIME: 14.4 SECONDS (ref 11.6–14.5)
QRS AXIS: 38 DEGREES
QRS AXIS: 39 DEGREES
QRSD INTERVAL: 108 MS
QRSD INTERVAL: 110 MS
QT INTERVAL: 364 MS
QT INTERVAL: 408 MS
QTC INTERVAL: 437 MS
QTC INTERVAL: 457 MS
RBC # BLD AUTO: 3.2 MILLION/UL (ref 3.88–5.62)
RBC #/AREA URNS AUTO: ABNORMAL /HPF
RSV RNA RESP QL NAA+PROBE: NEGATIVE
SARS-COV-2 RNA RESP QL NAA+PROBE: NEGATIVE
SODIUM SERPL-SCNC: 140 MMOL/L (ref 135–147)
SP GR UR STRIP.AUTO: 1.02 (ref 1–1.03)
T WAVE AXIS: 21 DEGREES
T WAVE AXIS: 56 DEGREES
UROBILINOGEN UR STRIP-ACNC: <2 MG/DL
VENTRICULAR RATE: 69 BPM
VENTRICULAR RATE: 95 BPM
WBC # BLD AUTO: 6.84 THOUSAND/UL (ref 4.31–10.16)
WBC #/AREA URNS AUTO: ABNORMAL /HPF

## 2024-07-09 PROCEDURE — 71045 X-RAY EXAM CHEST 1 VIEW: CPT

## 2024-07-09 PROCEDURE — 85730 THROMBOPLASTIN TIME PARTIAL: CPT

## 2024-07-09 PROCEDURE — 99223 1ST HOSP IP/OBS HIGH 75: CPT | Performed by: HOSPITALIST

## 2024-07-09 PROCEDURE — 93010 ELECTROCARDIOGRAM REPORT: CPT | Performed by: INTERNAL MEDICINE

## 2024-07-09 PROCEDURE — 87040 BLOOD CULTURE FOR BACTERIA: CPT

## 2024-07-09 PROCEDURE — 80053 COMPREHEN METABOLIC PANEL: CPT

## 2024-07-09 PROCEDURE — 85025 COMPLETE CBC W/AUTO DIFF WBC: CPT

## 2024-07-09 PROCEDURE — 36415 COLL VENOUS BLD VENIPUNCTURE: CPT

## 2024-07-09 PROCEDURE — 99285 EMERGENCY DEPT VISIT HI MDM: CPT

## 2024-07-09 PROCEDURE — 85049 AUTOMATED PLATELET COUNT: CPT | Performed by: HOSPITALIST

## 2024-07-09 PROCEDURE — 93005 ELECTROCARDIOGRAM TRACING: CPT

## 2024-07-09 PROCEDURE — 84145 PROCALCITONIN (PCT): CPT

## 2024-07-09 PROCEDURE — 84484 ASSAY OF TROPONIN QUANT: CPT

## 2024-07-09 PROCEDURE — 81001 URINALYSIS AUTO W/SCOPE: CPT

## 2024-07-09 PROCEDURE — 84484 ASSAY OF TROPONIN QUANT: CPT | Performed by: HOSPITALIST

## 2024-07-09 PROCEDURE — 85610 PROTHROMBIN TIME: CPT

## 2024-07-09 PROCEDURE — 96374 THER/PROPH/DIAG INJ IV PUSH: CPT

## 2024-07-09 PROCEDURE — 83605 ASSAY OF LACTIC ACID: CPT

## 2024-07-09 PROCEDURE — 0241U HB NFCT DS VIR RESP RNA 4 TRGT: CPT

## 2024-07-09 RX ORDER — TAMSULOSIN HYDROCHLORIDE 0.4 MG/1
0.8 CAPSULE ORAL
Status: DISCONTINUED | OUTPATIENT
Start: 2024-07-09 | End: 2024-07-12 | Stop reason: HOSPADM

## 2024-07-09 RX ORDER — ASPIRIN 81 MG/1
81 TABLET, CHEWABLE ORAL DAILY
Status: DISCONTINUED | OUTPATIENT
Start: 2024-07-09 | End: 2024-07-12 | Stop reason: HOSPADM

## 2024-07-09 RX ORDER — NITROGLYCERIN 0.4 MG/1
0.4 TABLET SUBLINGUAL ONCE
Status: COMPLETED | OUTPATIENT
Start: 2024-07-09 | End: 2024-07-09

## 2024-07-09 RX ORDER — CITALOPRAM 20 MG/1
10 TABLET ORAL DAILY
Status: DISCONTINUED | OUTPATIENT
Start: 2024-07-09 | End: 2024-07-12 | Stop reason: HOSPADM

## 2024-07-09 RX ORDER — NITROGLYCERIN 20 MG/100ML
5-200 INJECTION INTRAVENOUS
Status: DISCONTINUED | OUTPATIENT
Start: 2024-07-09 | End: 2024-07-09

## 2024-07-09 RX ORDER — ENOXAPARIN SODIUM 100 MG/ML
40 INJECTION SUBCUTANEOUS DAILY
Status: DISCONTINUED | OUTPATIENT
Start: 2024-07-10 | End: 2024-07-12 | Stop reason: HOSPADM

## 2024-07-09 RX ORDER — FUROSEMIDE 10 MG/ML
20 INJECTION INTRAMUSCULAR; INTRAVENOUS
Status: COMPLETED | OUTPATIENT
Start: 2024-07-10 | End: 2024-07-11

## 2024-07-09 RX ORDER — ATORVASTATIN CALCIUM 40 MG/1
40 TABLET, FILM COATED ORAL
Status: DISCONTINUED | OUTPATIENT
Start: 2024-07-09 | End: 2024-07-12 | Stop reason: HOSPADM

## 2024-07-09 RX ORDER — FUROSEMIDE 10 MG/ML
20 INJECTION INTRAMUSCULAR; INTRAVENOUS ONCE
Status: COMPLETED | OUTPATIENT
Start: 2024-07-09 | End: 2024-07-09

## 2024-07-09 RX ORDER — LISINOPRIL 5 MG/1
5 TABLET ORAL DAILY
Status: DISCONTINUED | OUTPATIENT
Start: 2024-07-09 | End: 2024-07-12 | Stop reason: HOSPADM

## 2024-07-09 RX ORDER — ACETAMINOPHEN 325 MG/1
650 TABLET ORAL EVERY 4 HOURS PRN
Status: DISCONTINUED | OUTPATIENT
Start: 2024-07-09 | End: 2024-07-12 | Stop reason: HOSPADM

## 2024-07-09 RX ORDER — CARVEDILOL 6.25 MG/1
6.25 TABLET ORAL 2 TIMES DAILY WITH MEALS
Status: DISCONTINUED | OUTPATIENT
Start: 2024-07-09 | End: 2024-07-12 | Stop reason: HOSPADM

## 2024-07-09 RX ADMIN — FUROSEMIDE 20 MG: 10 INJECTION, SOLUTION INTRAMUSCULAR; INTRAVENOUS at 11:49

## 2024-07-09 RX ADMIN — CARVEDILOL 6.25 MG: 6.25 TABLET, FILM COATED ORAL at 16:16

## 2024-07-09 RX ADMIN — NITROGLYCERIN 0.4 MG: 0.4 TABLET SUBLINGUAL at 12:44

## 2024-07-09 RX ADMIN — FUROSEMIDE 20 MG: 10 INJECTION, SOLUTION INTRAMUSCULAR; INTRAVENOUS at 12:43

## 2024-07-09 RX ADMIN — ATORVASTATIN CALCIUM 40 MG: 40 TABLET, FILM COATED ORAL at 16:16

## 2024-07-09 RX ADMIN — TAMSULOSIN HYDROCHLORIDE 0.8 MG: 0.4 CAPSULE ORAL at 21:23

## 2024-07-09 NOTE — ED PROVIDER NOTES
"History  Chief Complaint   Patient presents with    Shortness of Breath     Pt reports increased SOB over the last few days.      HPI    Prior to Admission Medications   Prescriptions Last Dose Informant Patient Reported? Taking?   Cyanocobalamin ([employer] VITAMIN B-12 PO)  Self Yes No   Sig: Take by mouth   Ferrous Sulfate (IRON) 325 (65 Fe) MG TABS  Self Yes No   Sig: Take 1 tablet by mouth Twice daily   Multiple Vitamin Essential TABS  Self Yes No   Sig: Take by mouth   Omega 3 1200 MG CAPS  Self Yes No   Sig: Take by mouth in the morning   aspirin 81 mg chewable tablet  Self Yes No   Sig: Chew 81 mg daily   atorvastatin (LIPITOR) 40 mg tablet   No No   Sig: TAKE ONE TABLET BY MOUTH ONCE DAILY   carvedilol (COREG) 6.25 mg tablet  Self No No   Sig: TAKE 1 TABLET BY MOUTH TWICE A DAY WITH MEALS   citalopram (CeleXA) 10 mg tablet   No No   Sig: Take 1 tablet (10 mg total) by mouth daily   finasteride (PROSCAR) 5 mg tablet  Self No No   Sig: Take 1 tablet (5 mg total) by mouth daily   lisinopril (ZESTRIL) 5 mg tablet   No No   Sig: Take 1 tablet (5 mg total) by mouth daily   tamsulosin (FLOMAX) 0.4 mg  Self No No   Sig: Take 2 capsules (0.8 mg total) by mouth daily at bedtime      Facility-Administered Medications: None       Past Medical History:   Diagnosis Date    BPH (benign prostatic hyperplasia)     Chronic pain disorder     back    Coronary artery disease     Gallstones     History of tracheostomy     per pt had it \"after CABG surgery--was admitted for 53 days including Rehab\"    Hx of CABG     per pt 7-8 yrs ago    Hypercholesterolemia     Hypertension     Moderate exercise     alot of walking-works FT and also walks dog 2 miles daily usually    Myocardial infarction (HCC)     per pt approx 7-8yrs ago --[facility] cardio Dr ASHWIN Castillo sees yearly    Prediabetes     Shortness of breath     per pt\"climbing steps or walking long distance\"    Wears glasses        Past Surgical History:   Procedure Laterality Date    CATARACT " EXTRACTION Bilateral     COLONOSCOPY      CORONARY ARTERY BYPASS GRAFT      x3 LIMA-LAD, SVG-OM, SVG-DIAG with mitral valve repair by nicole    CYSTOSCOPY  2014    Diagnostic    CYSTOSCOPY  2023    [facility]    ESOPHAGOGASTRODUODENOSCOPY  2015    with biopsy. Dr Mazariegos    FL RETROGRADE PYELOGRAM  2023    MITRAL VALVE REPAIR      26 mm fatmata hernandez physi annuloplasty ring    MT CYSTO BLADDER W/URETERAL CATHETERIZATION Right 2023    Procedure: CYSTO, URETHERAL DILATION, RETROGRADE PYELOGRAM W/ INSERTION STENT URETERAL;  Surgeon: Kee Moya MD;  Location: [facility];  Service: Urology    MT CYSTO W/REMOVAL OF LESIONS SMALL N/A 2023    Procedure: (TURBT);  Surgeon: Kee Moya MD;  [facility];  Service: Urology    MT CYSTO/PYELOSCOPY BX&/FULGURATION PELIVC LESION Right 2023    Procedure: URETEROSCOPY,WITH URETERAL BRUSHING;  Surgeon: Kee Moya MD;  Location[facility];  Service: Urology    TRACHEOSTOMY      during his heart surgery       Family History   Problem Relation Age of Onset    Colon cancer Father     Kidney disease Father     Diabetes Mother     Heart disease Mother     Hypertension Mother     Cervical cancer Maternal Aunt     Breast cancer Paternal Aunt     Colon cancer Paternal Grandfather     Heart disease Sister     Heart disease Brother      I have reviewed and agree with the history as documented.    E-Cigarette/Vaping    E-Cigarette Use Never User      E-Cigarette/Vaping Substances     Social History     Tobacco Use    Smoking status: Former     Current packs/day: 0.00     Average packs/day: 0.5 packs/day for 16.0 years (8.0 ttl pk-yrs)     Types: Cigarettes     Start date:      Quit date:      Years since quittin.5     Passive exposure: Past    Smokeless tobacco: Never   Vaping Use    Vaping status: Never Used   Substance Use Topics    Alcohol use: Yes     Comment: per pt very rarely    Drug use:  No        Review of Systems    Physical Exam  ED Triage Vitals [07/09/24 1119]   Temperature Pulse Respirations Blood Pressure SpO2   98.3 °F (36.8 °C) 105 (!) 24 (!) 198/86 (!) 83 %      Temp Source Heart Rate Source Patient Position - Orthostatic VS BP Location FiO2 (%)   Oral Monitor Sitting Right arm --      Pain Score       --             Orthostatic Vital Signs  Vitals:    07/09/24 1119   BP: (!) 198/86   Pulse: 105   Patient Position - Orthostatic VS: Sitting       Physical Exam    ED Medications  Medications - No data to display    Diagnostic Studies  Results Reviewed       None                   No orders to display         Procedures  Procedures      ED Course  ED Course as of 07/10/24 0921 Tue Jul 09, 2024   1434 XR chest portable   1445 83-year-old male, admitted to [facility] acute hypoxic respiratory failure secondary to CHF, received Lasix in the ED, and is currently stable on 4 L nasal cannula, also received 1 dose of sublingual nitro                                       Medical Decision Making  Amount and/or Complexity of Data Reviewed  Labs: ordered.  Radiology:  Decision-making details documented in ED Course.    Risk  Prescription drug management.  Decision regarding hospitalization.          Disposition  Final diagnoses:   None     ED Disposition       None          Follow-up Information    None         Patient's Medications   Discharge Prescriptions    No medications on file     No discharge procedures on file.    PDMP Review       None             ED Provider  Attending physically available and evaluated Raleigh Sandoval. I managed the patient along with the ED Attending.    Electronically Signed by         arm       Pain Score       07/09/24 1200       No Pain             Orthostatic Vital Signs  Vitals:    07/11/24 1453 07/11/24 2333 07/12/24 0727 07/12/24 1640   BP: 140/69 99/56 116/64 139/71   Pulse: 72 62 75 72   Patient Position - Orthostatic VS: Lying Lying Lying Lying       Physical Exam    ED Medications  Medications   furosemide (LASIX) injection 20 mg (20 mg Intravenous Given 7/9/24 1149)   nitroglycerin (NITROSTAT) SL tablet 0.4 mg (0.4 mg Sublingual Given 7/9/24 1244)   furosemide (LASIX) injection 20 mg (20 mg Intravenous Given 7/9/24 1243)   furosemide (LASIX) injection 20 mg (20 mg Intravenous Given 7/11/24 1615)   potassium chloride (Klor-Con M20) CR tablet 40 mEq (40 mEq Oral Given 7/11/24 0826)   pneumococcal 20-melvi conj vacc (PREVNAR 20) IM Injection 0.5 mL (0.5 mL Intramuscular Given 7/10/24 1440)       Diagnostic Studies  Results Reviewed       Procedure Component Value Units Date/Time    Blood culture #1 [[id number]] Collected: 07/09/24 1145    Lab Status: Final result Specimen: Blood from Arm, Right Updated: 07/14/24 1501     Blood Culture No Growth After 5 Days.    Blood culture #2 [[id number]] Collected: 07/09/24 1134    Lab Status: Final result Specimen: Blood from Arm, Left Updated: 07/14/24 1501     Blood Culture No Growth After 5 Days.    Basic metabolic panel [[id number]] Collected: 07/10/24 0610    Lab Status: Final result Specimen: Blood from Arm, Right Updated: 07/10/24 0703     Sodium 140 mmol/L      Potassium 3.6 mmol/L      Chloride 101 mmol/L      CO2 32 mmol/L      ANION GAP 7 mmol/L      BUN 24 mg/dL      Creatinine 1.05 mg/dL      Glucose 76 mg/dL      Calcium 8.6 mg/dL      eGFR 65 ml/min/1.73sq m     Narrative:      National Kidney Disease Foundation guidelines for Chronic Kidney Disease (CKD):     Stage 1 with normal or high GFR (GFR > 90 mL/min/1.73 square meters)    Stage 2 Mild CKD (GFR = 60-89 mL/min/1.73 square meters)    Stage 3A Moderate CKD (GFR = 45-59 mL/min/1.73  square meters)    Stage 3B Moderate CKD (GFR = 30-44 mL/min/1.73 square meters)    Stage 4 Severe CKD (GFR = 15-29 mL/min/1.73 square meters)    Stage 5 End Stage CKD (GFR <15 mL/min/1.73 square meters)  Note: GFR calculation is accurate only with a steady state creatinine    Magnesium [[id number]]  (Abnormal) Collected: 07/10/24 0610    Lab Status: Final result Specimen: Blood from Arm, Right Updated: 07/10/24 0703     Magnesium 1.8 mg/dL     CBC and differential [[id number]]  (Abnormal) Collected: 07/10/24 0610    Lab Status: Final result Specimen: Blood from Arm, Right Updated: 07/10/24 0628     WBC 6.15 Thousand/uL      RBC 3.17 Million/uL      Hemoglobin 9.9 g/dL      Hematocrit 30.8 %      MCV 97 fL      MCH 31.2 pg      MCHC 32.1 g/dL      RDW 13.8 %      MPV 9.4 fL      Platelets 258 Thousands/uL      nRBC 0 /100 WBCs      Segmented % 72 %      Immature Grans % 0 %      Lymphocytes % 10 %      Monocytes % 12 %      Eosinophils Relative 5 %      Basophils Relative 1 %      Absolute Neutrophils 4.41 Thousands/µL      Absolute Immature Grans 0.02 Thousand/uL      Absolute Lymphocytes 0.61 Thousands/µL      Absolute Monocytes 0.74 Thousand/µL      Eosinophils Absolute 0.33 Thousand/µL      Basophils Absolute 0.04 Thousands/µL     COVID/FLU/RSV [[id number]]  (Normal) Collected: 07/09/24 1221    Lab Status: Final result Specimen: Nares from Nose Updated: 07/09/24 1712     SARS-CoV-2 Negative     INFLUENZA A PCR Negative     INFLUENZA B PCR Negative     RSV PCR Negative    Narrative:      FOR PEDIATRIC PATIENTS - copy/paste COVID Guidelines URL to browser: [email or web address]    SARS-CoV-2 assay is a Nucleic Acid Amplification assay intended for the  qualitative detection of nucleic acid from SARS-CoV-2 in nasopharyngeal  swabs. Results are for the presumptive identification of SARS-CoV-2 RNA.    Positive results are indicative of infection with SARS-CoV-2, the  virus  causing COVID-19, but do not rule out bacterial infection or co-infection  with other viruses. Laboratories within the [place] and its  territories are required to report all positive results to the appropriate  public health authorities. Negative results do not preclude SARS-CoV-2  infection and should not be used as the sole basis for treatment or other  patient management decisions. Negative results must be combined with  clinical observations, patient history, and epidemiological information.  This test has not been FDA cleared or approved.    This test has been authorized by FDA under an Emergency Use Authorization  (EUA). This test is only authorized for the duration of time the  declaration that circumstances exist justifying the authorization of the  emergency use of an in vitro diagnostic tests for detection of SARS-CoV-2  virus and/or diagnosis of COVID-19 infection under section 564(b)(1) of  the Act, 21 U.S.C. 360bbb-3(b)(1), unless the authorization is terminated  or revoked sooner. The test has been validated but independent review by FDA  and CLIA is pending.    Test performed using [employer] GeneXpert: This RT-PCR assay targets N2,  a region unique to SARS-CoV-2. A conserved region in the E-gene was chosen  for pan-Sarbecovirus detection which includes SARS-CoV-2.    According to CMS-2020-01-R, this platform meets the definition of high-throughput technology.    HS Troponin I 4hr [[id number]]  (Abnormal) Collected: 07/09/24 1635    Lab Status: Final result Specimen: Blood from Arm, Left Updated: 07/09/24 1704     hs TnI 4hr 75 ng/L      Delta 4hr hsTnI 56 ng/L     Urine Microscopic [[id number]]  (Abnormal) Collected: 07/09/24 1246    Lab Status: Final result Specimen: Urine, Clean Catch Updated: 07/09/24 1445     RBC, UA 1-2 /hpf      WBC, UA 2-4 /hpf      Epithelial Cells None Seen /hpf      Bacteria, UA Occasional /hpf      MUCUS THREADS Moderate     Hyaline Casts, UA 0-3 /lpf     UA w  Reflex to Microscopic w Reflex to Culture [[id number]]  (Abnormal) Collected: 07/09/24 1246    Lab Status: Final result Specimen: Urine, Clean Catch Updated: 07/09/24 1429     Color, UA Yellow     Clarity, UA Clear     Specific Gravity, UA 1.023     pH, UA 6.0     Leukocytes, UA Negative     Nitrite, UA Negative     Protein, UA 30 (1+) mg/dl      Glucose, UA Trace mg/dl      Ketones, UA Negative mg/dl      Urobilinogen, UA <2.0 mg/dl      Bilirubin, UA Negative     Occult Blood, UA Negative    HS Troponin I 2hr [[id number]]  (Abnormal) Collected: 07/09/24 1334    Lab Status: Final result Specimen: Blood from Line Updated: 07/09/24 1404     hs TnI 2hr 44 ng/L      Delta 2hr hsTnI 25 ng/L     Protime-INR [[id number]]  (Normal) Collected: 07/09/24 1134    Lab Status: Final result Specimen: Blood from Arm, Left Updated: 07/09/24 1212     Protime 14.4 seconds      INR 1.10    APTT [[id number]]  (Normal) Collected: 07/09/24 1134    Lab Status: Final result Specimen: Blood from Arm, Left Updated: 07/09/24 1212     PTT 33 seconds     Procalcitonin [[id number]]  (Normal) Collected: 07/09/24 1134    Lab Status: Final result Specimen: Blood from Arm, Left Updated: 07/09/24 1211     Procalcitonin <0.05 ng/ml     HS Troponin 0hr (reflex protocol) [[id number]]  (Normal) Collected: 07/09/24 1134    Lab Status: Final result Specimen: Blood from Arm, Left Updated: 07/09/24 1206     hs TnI 0hr 19 ng/L     Comprehensive metabolic panel [[id number]]  (Abnormal) Collected: 07/09/24 1134    Lab Status: Final result Specimen: Blood from Central Venous Line Updated: 07/09/24 1204     Sodium 140 mmol/L      Potassium 4.0 mmol/L      Chloride 106 mmol/L      CO2 28 mmol/L      ANION GAP 6 mmol/L      BUN 28 mg/dL      Creatinine 0.98 mg/dL      Glucose 204 mg/dL      Calcium 8.5 mg/dL      Corrected Calcium 9.0 mg/dL      AST 19 U/L      ALT 14 U/L      Alkaline Phosphatase 69 U/L      Total Protein 6.5 g/dL      Albumin 3.4 g/dL      Total  Bilirubin 0.56 mg/dL      eGFR 71 ml/min/1.73sq m     Narrative:      National Kidney Disease Foundation guidelines for Chronic Kidney Disease (CKD):     Stage 1 with normal or high GFR (GFR > 90 mL/min/1.73 square meters)    Stage 2 Mild CKD (GFR = 60-89 mL/min/1.73 square meters)    Stage 3A Moderate CKD (GFR = 45-59 mL/min/1.73 square meters)    Stage 3B Moderate CKD (GFR = 30-44 mL/min/1.73 square meters)    Stage 4 Severe CKD (GFR = 15-29 mL/min/1.73 square meters)    Stage 5 End Stage CKD (GFR <15 mL/min/1.73 square meters)  Note: GFR calculation is accurate only with a steady state creatinine    Lactic acid [[id number]]  (Normal) Collected: 07/09/24 1134    Lab Status: Final result Specimen: Blood from Arm, Left Updated: 07/09/24 1203     LACTIC ACID 1.1 mmol/L     Narrative:      Result may be elevated if tourniquet was used during collection.    CBC and differential [[id number]]  (Abnormal) Collected: 07/09/24 1134    Lab Status: Final result Specimen: Blood from Arm, Left Updated: 07/09/24 1145     WBC 6.84 Thousand/uL      RBC 3.20 Million/uL      Hemoglobin 9.8 g/dL      Hematocrit 30.7 %      MCV 96 fL      MCH 30.6 pg      MCHC 31.9 g/dL      RDW 14.0 %      MPV 9.5 fL      Platelets 256 Thousands/uL      nRBC 0 /100 WBCs      Segmented % 74 %      Immature Grans % 0 %      Lymphocytes % 8 %      Monocytes % 11 %      Eosinophils Relative 6 %      Basophils Relative 1 %      Absolute Neutrophils 5.15 Thousands/µL      Absolute Immature Grans 0.02 Thousand/uL      Absolute Lymphocytes 0.51 Thousands/µL      Absolute Monocytes 0.72 Thousand/µL      Eosinophils Absolute 0.39 Thousand/µL      Basophils Absolute 0.05 Thousands/µL                    CT chest wo contrast   Final Result by Prabhu Orozco MD (07/12 1150)   1.New small to moderate right pleural effusion.      2.Loculated fluid in the major fissure correlates with opacity seen in the left upper lung on chest x-ray.      3.Chronic loculated  fluid pocket in the left lung base with adjacent rounded atelectasis.      4.Moderate to large hiatal hernia.         Workstation performed: [id number]         XR chest portable   Final Result by Kaitlyn Gandhi MD (07/09 1406)   Ovoid opacity in the left apex, and adjacent groundglass nodular densities. Small left pleural effusion. Diffuse interstitial opacities. Recommend further evaluation with CT chest with contrast.         Workstation performed: [id number]               Procedures  Procedures      ED Course  ED Course as of 07/19/24 0622   Tue Jul 09, 2024   1434 XR chest portable   1445 83-year-old male, admitted to [facility] acute hypoxic respiratory failure secondary to CHF, received Lasix in the ED, and is currently stable on 4 L nasal cannula, also received 1 dose of sublingual nitro                             SBIRT 22yo+      Flowsheet Row Most Recent Value   Initial Alcohol Screen: US AUDIT-C     1. How often do you have a drink containing alcohol? 0 Filed at: 07/09/2024 1150   2. How many drinks containing alcohol do you have on a typical day you are drinking?  0 Filed at: 07/09/2024 1150   3a. Male UNDER 65: How often do you have five or more drinks on one occasion? 0 Filed at: 07/09/2024 1150   3b. FEMALE Any Age, or MALE 65+: How often do you have 4 or more drinks on one occassion? 0 Filed at: 07/09/2024 1150   Audit-C Score 0 Filed at: 07/09/2024 1150   CONNIE: How many times in the past year have you...    Used an illegal drug or used a prescription medication for non-medical reasons? Never Filed at: 07/09/2024 1150                  Medical Decision Making  Amount and/or Complexity of Data Reviewed  Labs: ordered.  Radiology:  Decision-making details documented in ED Course.    Risk  Prescription drug management.  Decision regarding hospitalization.          Disposition  Final diagnoses:   CHF (congestive heart failure) (HCC)   Hypoxic respiratory failure (HCC)     Time reflects when diagnosis was  documented in both MDM as applicable and the Disposition within this note       Time User Action Codes Description Comment    7/9/2024 12:39 PM Aelm Chapa Add [I50.9] CHF (congestive heart failure) (HCC)     7/9/2024 12:39 PM Alem Chapa Add [J96.91] Hypoxic respiratory failure (HCC)     7/12/2024 12:08 PM Horacio Carl Add [R93.89] Abnormal CT of the chest     7/12/2024  4:35 PM Horacio Carl Add [I50.21] Acute systolic congestive heart failure, HFmrEF, 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  [address]  [place] [address]  [phone]              Discharge Medication List as of 7/12/2024  4:36 PM        START taking these medications    Details   furosemide (LASIX) 20 mg tablet Take 1 tablet (20 mg total) by mouth daily, Starting Sat 7/13/2024, Normal           CONTINUE these medications which have NOT CHANGED    Details   aspirin 81 mg chewable tablet Chew 81 mg daily, Historical Med      atorvastatin (LIPITOR) 40 mg tablet TAKE ONE TABLET BY MOUTH ONCE DAILY, Starting Mon 4/8/2024, Normal      carvedilol (COREG) 6.25 mg tablet TAKE 1 TABLET BY MOUTH TWICE A DAY WITH MEALS, Normal      citalopram (CeleXA) 10 mg tablet Take 1 tablet (10 mg total) by mouth daily, Starting Wed 5/29/2024, Normal      Cyanocobalamin ([employer] VITAMIN B-12 PO) Take by mouth, Historical Med      Ferrous Sulfate (IRON) 325 (65 Fe) MG TABS Take 1 tablet by mouth Twice daily, Starting Thu 3/26/2015, Historical Med      lisinopril (ZESTRIL) 5 mg tablet Take 1 tablet (5 mg total) by mouth daily, Starting Mon 6/17/2024, Normal      Multiple Vitamin Essential TABS Take by mouth, Historical Med      Omega 3 1200 MG CAPS Take by  mouth in the morning, Historical Med      tamsulosin (FLOMAX) 0.4 mg Take 2 capsules (0.8 mg total) by mouth daily at bedtime, Starting Mon 6/5/2023, Normal      finasteride (PROSCAR) 5 mg tablet Take 1 tablet (5 mg total) by mouth daily, Starting Mon 6/5/2023, Normal           Outpatient Discharge Orders   Ambulatory referral to Cardiology   Standing Status: Future Standing Exp. Date: 07/12/25      Ambulatory referral to Pulmonology   Standing Status: Future Standing Exp. Date: 07/12/25      Discharge Diet     Activity as tolerated       PDMP Review       None             ED Provider  Attending physically available and evaluated Raleigh Sandoval. I managed the patient along with the ED Attending.    Electronically Signed by           Alem Chapa DO  07/19/24 0663

## 2024-07-09 NOTE — H&P
[facility]  H&P  Name: Raleigh Sandoval 83 y.o. male I MRN: [id number]  Unit/Bed#: ED-09 I Date of Admission: 7/9/2024   Date of Service: 7/9/2024 I Hospital Day: 0      Assessment & Plan   * Acute CHF (congestive heart failure) (HCC)  Assessment & Plan  Wt Readings from Last 3 Encounters:   07/09/24 60.2 kg (132 lb 11.5 oz)   07/01/24 57.6 kg (127 lb)   06/12/24 58.2 kg (128 lb 3.2 oz)       Patient presents with several days of worsening leg swelling and dyspnea on exertion    Last echocardiogram that I can find was in 2021 with LVEF of 45%    He is not on diuretics at home chronically    Will start him on Lasix 20 mg IV every 12 hours, Coreg, aspirin, statin, lisinopril    Check an Echo to see how his heart is functioning now.    Consult cardiology        S/P mitral valve repair  Assessment & Plan  Check echo;    Hx of CABG  Assessment & Plan  Rule out for MI         Chief Complaint:   Shortness of breath and leg swelling      History of Present Illness:    Raleigh Sandoval is a 83 y.o. male who presents with shortness of breath and leg swelling.  Patient states over the last 5 to 7 days he has noticed increased leg swelling and dyspnea on exertion.  He is not chronically on Lasix but does have some mild chronic congestive heart failure.  He has not run out of any of his other medications.  No chest pain with this.  No fever or chills.  No cough.  No recent changes in medications..      Review of Systems:    Review of Systems   Constitutional:  Negative for chills and fever.   HENT:  Negative for ear pain and sore throat.    Eyes:  Negative for pain and visual disturbance.   Respiratory:  Positive for shortness of breath. Negative for cough.    Cardiovascular:  Positive for leg swelling. Negative for chest pain and palpitations.   Gastrointestinal:  Negative for abdominal pain and vomiting.   Genitourinary:  Negative for dysuria and hematuria.   Musculoskeletal:  Negative for  "arthralgias and back pain.   Skin:  Negative for color change and rash.   Neurological:  Negative for seizures and syncope.   All other systems reviewed and are negative.        Past Medical and Surgical History:     Past Medical History:   Diagnosis Date    BPH (benign prostatic hyperplasia)     Chronic pain disorder     back    Coronary artery disease     Gallstones     History of tracheostomy     per pt had it \"after CABG surgery--was admitted for 53 days including Rehab\"    Hx of CABG     per pt 7-8 yrs ago    Hypercholesterolemia     Hypertension     Moderate exercise     alot of walking-works FT and also walks dog 2 miles daily usually    Myocardial infarction (HCC)     per pt approx 7-8yrs ago --[facility] cardio Dr ASHWIN Castillo sees yearly    Prediabetes     Shortness of breath     per pt\"climbing steps or walking long distance\"    Wears glasses        Past Surgical History:   Procedure Laterality Date    CATARACT EXTRACTION Bilateral     COLONOSCOPY      CORONARY ARTERY BYPASS GRAFT      x3 LIMA-LAD, SVG-OM, SVG-DIAG with mitral valve repair by nicole    CYSTOSCOPY  12/13/2014    Diagnostic    CYSTOSCOPY  03/24/2023    [facility]    ESOPHAGOGASTRODUODENOSCOPY  04/07/2015    with biopsy. Dr Mazariegos    FL RETROGRADE PYELOGRAM  5/17/2023    MITRAL VALVE REPAIR      26 mm fatmata hernandez physi annuloplasty ring    MI CYSTO BLADDER W/URETERAL CATHETERIZATION Right 5/17/2023    Procedure: CYSTO, URETHERAL DILATION, RETROGRADE PYELOGRAM W/ INSERTION STENT URETERAL;  Surgeon: Kee Moya MD;  Location: [facility];  Service: Urology    MI CYSTO W/REMOVAL OF LESIONS SMALL N/A 5/17/2023    Procedure: (TURBT);  Surgeon: Kee Moya MD;  Location: [facility];  Service: Urology    MI CYSTO/PYELOSCOPY BX&/FULGURATION PELIVC LESION Right 5/17/2023    Procedure: URETEROSCOPY,WITH URETERAL BRUSHING;  Surgeon: Kee Moya MD;  Location: [facility];  Service: Urology    TRACHEOSTOMY      during his " heart surgery         Home Medications:    Prior to Admission medications    Medication Sig Start Date End Date Taking? Authorizing Provider   aspirin 81 mg chewable tablet Chew 81 mg daily   Yes Historical Provider, MD   atorvastatin (LIPITOR) 40 mg tablet TAKE ONE TABLET BY MOUTH ONCE DAILY 24  Yes Hermes Castillo, DO   carvedilol (COREG) 6.25 mg tablet TAKE 1 TABLET BY MOUTH TWICE A DAY WITH MEALS 23  Yes Hermes Castillo DO   citalopram (CeleXA) 10 mg tablet Take 1 tablet (10 mg total) by mouth daily 24  Yes Gopi Victoria, DO   Cyanocobalamin ([employer] VITAMIN B-12 PO) Take by mouth   Yes Historical Provider, MD   Ferrous Sulfate (IRON) 325 (65 Fe) MG TABS Take 1 tablet by mouth Twice daily 3/26/15  Yes Historical Provider, MD   lisinopril (ZESTRIL) 5 mg tablet Take 1 tablet (5 mg total) by mouth daily 24  Yes Gopi Victoria,    Multiple Vitamin Essential TABS Take by mouth   Yes Historical Provider, MD   Omega 3 1200 MG CAPS Take by mouth in the morning   Yes Historical Provider, MD   tamsulosin (FLOMAX) 0.4 mg Take 2 capsules (0.8 mg total) by mouth daily at bedtime 23  Yes JARRED Orozco   finasteride (PROSCAR) 5 mg tablet Take 1 tablet (5 mg total) by mouth daily  Patient not taking: Reported on 2024   JARRED Orozco     I have reviewed home medications with patient personally.      Allergies: No Known Allergies      Social History:    Substance Use History:   Social History     Substance and Sexual Activity   Alcohol Use Yes    Comment: per pt very rarely     Social History     Tobacco Use   Smoking Status Former    Current packs/day: 0.00    Average packs/day: 0.5 packs/day for 16.0 years (8.0 ttl pk-yrs)    Types: Cigarettes    Start date:     Quit date:     Years since quittin.5    Passive exposure: Past   Smokeless Tobacco Never     Social History     Substance and Sexual Activity   Drug Use No         Family History:    non-contributory      Physical  "Exam:     Vitals:   Blood Pressure: 165/79 (07/09/24 1330)  Pulse: 65 (07/09/24 1330)  Temperature: 98.3 °F (36.8 °C) (07/09/24 1119)  Temp Source: Oral (07/09/24 1119)  Respirations: 14 (07/09/24 1330)  Height: 5' 7\" (170.2 cm) (07/09/24 1330)  Weight - Scale: 60.2 kg (132 lb 11.5 oz) (07/09/24 1119)  SpO2: 99 % (07/09/24 1330)    Physical Exam  Vitals and nursing note reviewed.   HENT:      Head: Normocephalic and atraumatic.   Eyes:      Pupils: Pupils are equal, round, and reactive to light.   Neck:      Comments: HJR 1/2 way up.  Cardiovascular:      Rate and Rhythm: Normal rate and regular rhythm.      Heart sounds: No murmur heard.     No friction rub. No gallop.   Pulmonary:      Effort: Pulmonary effort is normal.      Breath sounds: Normal breath sounds. No wheezing or rales.   Abdominal:      General: Bowel sounds are normal.      Palpations: Abdomen is soft.      Tenderness: There is no abdominal tenderness.   Musculoskeletal:      Right lower leg: Edema (1+) present.      Left lower leg: Edema (1+) present.         .    Additional Data:     Lab Results: I have personally reviewed pertinent reports.   and I have personally reviewed pertinent films in PACS    Results from last 7 days   Lab Units 07/09/24  1134   WBC Thousand/uL 6.84   HEMOGLOBIN g/dL 9.8*   HEMATOCRIT % 30.7*   PLATELETS Thousands/uL 256   SEGS PCT % 74   LYMPHO PCT % 8*   MONO PCT % 11   EOS PCT % 6     Results from last 7 days   Lab Units 07/09/24  1134   POTASSIUM mmol/L 4.0   CHLORIDE mmol/L 106   CO2 mmol/L 28   BUN mg/dL 28*   CREATININE mg/dL 0.98   CALCIUM mg/dL 8.5   ALK PHOS U/L 69   ALT U/L 14   AST U/L 19     Results from last 7 days   Lab Units 07/09/24  1134   INR  1.10                 Imaging: I have personally reviewed pertinent reports.      XR chest portable    (Results Pending)         EKG, Pathology, and Other Studies Reviewed on Admission:   EKG: NSR   no ST changes      VTE Prophylaxis: Enoxaparin (Lovenox)  "       Anticipated Length of Stay:  Patient will be admitted on an Inpatient basis with an anticipated length of stay of  greater than 2 midnights.   Justification for Hospital Stay: Patient has acute CHF.  He needs IV Lasix diuresis.  Length of stay began 2 midnights.          ** Please Note: This note has been constructed using a voice recognition system. **

## 2024-07-09 NOTE — ASSESSMENT & PLAN NOTE
Wt Readings from Last 3 Encounters:   07/09/24 60.2 kg (132 lb 11.5 oz)   07/01/24 57.6 kg (127 lb)   06/12/24 58.2 kg (128 lb 3.2 oz)       Patient presents with several days of worsening leg swelling and dyspnea on exertion    Last echocardiogram that I can find was in 2021 with LVEF of 45%    He is not on diuretics at home chronically    Will start him on Lasix 20 mg IV every 12 hours, Coreg, aspirin, statin, lisinopril    Check an Echo to see how his heart is functioning now.    Consult cardiology

## 2024-07-09 NOTE — PLAN OF CARE
Problem: Potential for Falls  Goal: Patient will remain free of falls  Description: INTERVENTIONS:  - Educate patient/family on patient safety including physical limitations  - Instruct patient to call for assistance with activity   - Consult OT/PT to assist with strengthening/mobility   - Keep Call bell within reach  - Keep bed low and locked with side rails adjusted as appropriate  - Keep care items and personal belongings within reach  - Initiate and maintain comfort rounds  - Make Fall Risk Sign visible to staff  - Offer Toileting every 2 Hours, in advance of need  - Initiate/Maintain  bed alarm  - Obtain necessary fall risk management equipment: alarms   - Apply yellow socks and bracelet for high fall risk patients  - Consider moving patient to room near nurses station  7/9/2024 1848 by Carolina Vidal  Outcome: Progressing  7/9/2024 1847 by Carolina Vidal  Outcome: Progressing     Problem: SAFETY ADULT  Goal: Patient will remain free of falls  Description: INTERVENTIONS:  - Educate patient/family on patient safety including physical limitations  - Instruct patient to call for assistance with activity   - Consult OT/PT to assist with strengthening/mobility   - Keep Call bell within reach  - Keep bed low and locked with side rails adjusted as appropriate  - Keep care items and personal belongings within reach  - Initiate and maintain comfort rounds  - Make Fall Risk Sign visible to staff  - Offer Toileting every 2  Hours, in advance of need  - Initiate/Maintain bed alarm  - Obtain necessary fall risk management equipment: alarms  - Apply yellow socks and bracelet for high fall risk patients  - Consider moving patient to room near nurses station  7/9/2024 1848 by Carolina Vidal  Outcome: Progressing  7/9/2024 1847 by Carolina Vidal  Outcome: Progressing  Goal: Maintain or return to baseline ADL function  Description: INTERVENTIONS:  -  Assess patient's ability to carry out ADLs; assess patient's baseline for ADL function  and identify physical deficits which impact ability to perform ADLs (bathing, care of mouth/teeth, toileting, grooming, dressing, etc.)  - Assess/evaluate cause of self-care deficits   - Assess range of motion  - Assess patient's mobility; develop plan if impaired  - Assess patient's need for assistive devices and provide as appropriate  - Encourage maximum independence but intervene and supervise when necessary  - Involve family in performance of ADLs  - Assess for home care needs following discharge   - Consider OT consult to assist with ADL evaluation and planning for discharge  - Provide patient education as appropriate  7/9/2024 1848 by Carolina Vidal  Outcome: Progressing  7/9/2024 1847 by Caorlina Vidal  Outcome: Progressing  Goal: Maintains/Returns to pre admission functional level  Description: INTERVENTIONS:  - Perform AM-PAC 6 Click Basic Mobility/ Daily Activity assessment daily.  - Set and communicate daily mobility goal to care team and patient/family/caregiver.   - Collaborate with rehabilitation services on mobility goals if consulted  - Perform Range of Motion 3 times a day.  - Reposition patient every 2 hours.  - Dangle patient 3 times a day  - Stand patient 3 times a day  - Ambulate patient 3 times a day  - Out of bed to chair 3 times a day   - Out of bed for meals 3 times a day  - Out of bed for toileting  - Record patient progress and toleration of activity level   7/9/2024 1848 by Carolina Vidal  Outcome: Progressing  7/9/2024 1847 by Carolina Vidal  Outcome: Progressing

## 2024-07-09 NOTE — ED ATTENDING ATTESTATION
7/9/2024  I, Agatha Montoya MD, saw and evaluated the patient. I have discussed the patient with the resident/non-physician practitioner and agree with the resident's/non-physician practitioner's findings, Plan of Care, and MDM as documented in the resident's/non-physician practitioner's note, except where noted. All available labs and Radiology studies were reviewed.  I was present for key portions of any procedure(s) performed by the resident/non-physician practitioner and I was immediately available to provide assistance.       At this point I agree with the current assessment done in the Emergency Department.  I have conducted an independent evaluation of this patient a history and physical is as follows:    ED Course         Critical Care Time  Procedures    82 y/o male with dyspnea for a few days, worse this am.  Sat'ing 83% on RA, better on O2.  Pt. Has baseline mild LE edema.  No cough, no fevers.  H/o ischemic cardiomyopathy and CHF, not on diuretics. No cp    Tachycardic, increased work of breathing, diminished breath sounds b/l, abd. Nontender, ext. 1+ edema b/l    Will admit for CHF, treated with lasix and O2.

## 2024-07-09 NOTE — LETTER
[place] [id number]  [address] [facility] [address]  Dept: [phone]    July 12, 2024     Patient: Raleigh Sandoval   YOB: 1940   Date of Visit: 7/9/2024       To Whom it May Concern:    Raleigh Sandoval is under my professional care. He was seen in the hospital from 7/9/2024 to 07/12/24. He may return to work on 7/22/24 without limitations.    If you have any questions or concerns, please don't hesitate to call.         Sincerely,          Horacio Carl MD

## 2024-07-10 ENCOUNTER — APPOINTMENT (INPATIENT)
Dept: NON INVASIVE DIAGNOSTICS | Facility: HOSPITAL | Age: 84
DRG: 291 | End: 2024-07-10
Payer: COMMERCIAL

## 2024-07-10 PROBLEM — D50.0 IRON DEFICIENCY ANEMIA DUE TO CHRONIC BLOOD LOSS: Status: ACTIVE | Noted: 2023-11-28

## 2024-07-10 LAB
ANION GAP SERPL CALCULATED.3IONS-SCNC: 7 MMOL/L (ref 4–13)
AORTIC ROOT: 3.6 CM
AORTIC VALVE MEAN VELOCITY: 7.7 M/S
APICAL FOUR CHAMBER EJECTION FRACTION: 39 %
ASCENDING AORTA: 3.4 CM
AV AREA BY CONTINUOUS VTI: 2.2 CM2
AV AREA PEAK VELOCITY: 2.2 CM2
AV LVOT MEAN GRADIENT: 2 MMHG
AV LVOT PEAK GRADIENT: 3 MMHG
AV MEAN GRADIENT: 3 MMHG
AV PEAK GRADIENT: 5 MMHG
AV VALVE AREA: 2.17 CM2
AV VELOCITY RATIO: 0.78
BASOPHILS # BLD AUTO: 0.04 THOUSANDS/ÂΜL (ref 0–0.1)
BASOPHILS NFR BLD AUTO: 1 % (ref 0–1)
BNP SERPL-MCNC: 485 PG/ML (ref 0–100)
BSA FOR ECHO PROCEDURE: 1.69 M2
BUN SERPL-MCNC: 24 MG/DL (ref 5–25)
CALCIUM SERPL-MCNC: 8.6 MG/DL (ref 8.4–10.2)
CARDIAC TROPONIN I PNL SERPL HS: 38 NG/L (ref 8–18)
CHLORIDE SERPL-SCNC: 101 MMOL/L (ref 96–108)
CO2 SERPL-SCNC: 32 MMOL/L (ref 21–32)
CREAT SERPL-MCNC: 1.05 MG/DL (ref 0.6–1.3)
DOP CALC AO PEAK VEL: 1.15 M/S
DOP CALC AO VTI: 26.92 CM
DOP CALC LVOT AREA: 2.83 CM2
DOP CALC LVOT CARDIAC INDEX: 2.31 L/MIN/M2
DOP CALC LVOT CARDIAC OUTPUT: 3.93 L/MIN
DOP CALC LVOT DIAMETER: 1.9 CM
DOP CALC LVOT PEAK VEL VTI: 20.61 CM
DOP CALC LVOT PEAK VEL: 0.9 M/S
DOP CALC LVOT STROKE INDEX: 34.1 ML/M2
DOP CALC LVOT STROKE VOLUME: 58
DOP CALC MV VTI: 58.48 CM
E WAVE DECELERATION TIME: 261 MS
E/A RATIO: 1.56
EOSINOPHIL # BLD AUTO: 0.33 THOUSAND/ÂΜL (ref 0–0.61)
EOSINOPHIL NFR BLD AUTO: 5 % (ref 0–6)
ERYTHROCYTE [DISTWIDTH] IN BLOOD BY AUTOMATED COUNT: 13.8 % (ref 11.6–15.1)
FRACTIONAL SHORTENING: 15 (ref 28–44)
GFR SERPL CREATININE-BSD FRML MDRD: 65 ML/MIN/1.73SQ M
GLUCOSE SERPL-MCNC: 76 MG/DL (ref 65–140)
HCT VFR BLD AUTO: 30.8 % (ref 36.5–49.3)
HGB BLD-MCNC: 9.9 G/DL (ref 12–17)
IMM GRANULOCYTES # BLD AUTO: 0.02 THOUSAND/UL (ref 0–0.2)
IMM GRANULOCYTES NFR BLD AUTO: 0 % (ref 0–2)
INTERVENTRICULAR SEPTUM IN DIASTOLE (PARASTERNAL SHORT AXIS VIEW): 1 CM
INTERVENTRICULAR SEPTUM: 1 CM (ref 0.6–1.1)
LAAS-AP2: 24.3 CM2
LAAS-AP4: 32.1 CM2
LEFT ATRIUM SIZE: 5.4 CM
LEFT ATRIUM VOLUME (MOD BIPLANE): 101 ML
LEFT ATRIUM VOLUME INDEX (MOD BIPLANE): 59.4 ML/M2
LEFT INTERNAL DIMENSION IN SYSTOLE: 4.6 CM (ref 2.1–4)
LEFT VENTRICULAR INTERNAL DIMENSION IN DIASTOLE: 5.4 CM (ref 3.5–6)
LEFT VENTRICULAR POSTERIOR WALL IN END DIASTOLE: 0.8 CM
LEFT VENTRICULAR STROKE VOLUME: 41 ML
LVSV (TEICH): 41 ML
LYMPHOCYTES # BLD AUTO: 0.61 THOUSANDS/ÂΜL (ref 0.6–4.47)
LYMPHOCYTES NFR BLD AUTO: 10 % (ref 14–44)
MAGNESIUM SERPL-MCNC: 1.8 MG/DL (ref 1.9–2.7)
MCH RBC QN AUTO: 31.2 PG (ref 26.8–34.3)
MCHC RBC AUTO-ENTMCNC: 32.1 G/DL (ref 31.4–37.4)
MCV RBC AUTO: 97 FL (ref 82–98)
MONOCYTES # BLD AUTO: 0.74 THOUSAND/ÂΜL (ref 0.17–1.22)
MONOCYTES NFR BLD AUTO: 12 % (ref 4–12)
MV E'TISSUE VEL-SEP: 4 CM/S
MV MEAN GRADIENT: 5 MMHG
MV PEAK A VEL: 1.06 M/S
MV PEAK E VEL: 165 CM/S
MV PEAK GRADIENT: 15 MMHG
MV STENOSIS PRESSURE HALF TIME: 76 MS
MV VALVE AREA BY CONTINUITY EQUATION: 1 CM2
MV VALVE AREA P 1/2 METHOD: 2.9
NEUTROPHILS # BLD AUTO: 4.41 THOUSANDS/ÂΜL (ref 1.85–7.62)
NEUTS SEG NFR BLD AUTO: 72 % (ref 43–75)
NRBC BLD AUTO-RTO: 0 /100 WBCS
PLATELET # BLD AUTO: 258 THOUSANDS/UL (ref 149–390)
PMV BLD AUTO: 9.4 FL (ref 8.9–12.7)
POTASSIUM SERPL-SCNC: 3.6 MMOL/L (ref 3.5–5.3)
RBC # BLD AUTO: 3.17 MILLION/UL (ref 3.88–5.62)
RIGHT ATRIAL 2D VOLUME: 41 ML
RIGHT ATRIUM AREA SYSTOLE A4C: 15.3 CM2
RIGHT VENTRICLE ID DIMENSION: 4.2 CM
SL CV LEFT ATRIUM LENGTH A2C: 6.2 CM
SL CV LV EF: 43
SL CV PED ECHO LEFT VENTRICLE DIASTOLIC VOLUME (MOD BIPLANE) 2D: 141 ML
SL CV PED ECHO LEFT VENTRICLE SYSTOLIC VOLUME (MOD BIPLANE) 2D: 100 ML
SODIUM SERPL-SCNC: 140 MMOL/L (ref 135–147)
TR MAX PG: 42 MMHG
TR PEAK VELOCITY: 3.3 M/S
TRICUSPID ANNULAR PLANE SYSTOLIC EXCURSION: 1.6 CM
TRICUSPID VALVE PEAK REGURGITATION VELOCITY: 3.26 M/S
WBC # BLD AUTO: 6.15 THOUSAND/UL (ref 4.31–10.16)

## 2024-07-10 PROCEDURE — 84484 ASSAY OF TROPONIN QUANT: CPT | Performed by: PHYSICIAN ASSISTANT

## 2024-07-10 PROCEDURE — 90677 PCV20 VACCINE IM: CPT | Performed by: INTERNAL MEDICINE

## 2024-07-10 PROCEDURE — 83735 ASSAY OF MAGNESIUM: CPT | Performed by: HOSPITALIST

## 2024-07-10 PROCEDURE — 93306 TTE W/DOPPLER COMPLETE: CPT | Performed by: INTERNAL MEDICINE

## 2024-07-10 PROCEDURE — 93306 TTE W/DOPPLER COMPLETE: CPT

## 2024-07-10 PROCEDURE — 99232 SBSQ HOSP IP/OBS MODERATE 35: CPT | Performed by: INTERNAL MEDICINE

## 2024-07-10 PROCEDURE — 85025 COMPLETE CBC W/AUTO DIFF WBC: CPT | Performed by: HOSPITALIST

## 2024-07-10 PROCEDURE — 83880 ASSAY OF NATRIURETIC PEPTIDE: CPT | Performed by: STUDENT IN AN ORGANIZED HEALTH CARE EDUCATION/TRAINING PROGRAM

## 2024-07-10 PROCEDURE — 99223 1ST HOSP IP/OBS HIGH 75: CPT | Performed by: STUDENT IN AN ORGANIZED HEALTH CARE EDUCATION/TRAINING PROGRAM

## 2024-07-10 PROCEDURE — 83550 IRON BINDING TEST: CPT | Performed by: INTERNAL MEDICINE

## 2024-07-10 PROCEDURE — 80048 BASIC METABOLIC PNL TOTAL CA: CPT | Performed by: HOSPITALIST

## 2024-07-10 PROCEDURE — 83540 ASSAY OF IRON: CPT | Performed by: INTERNAL MEDICINE

## 2024-07-10 PROCEDURE — 90471 IMMUNIZATION ADMIN: CPT | Performed by: INTERNAL MEDICINE

## 2024-07-10 RX ORDER — POTASSIUM CHLORIDE 20 MEQ/1
40 TABLET, EXTENDED RELEASE ORAL DAILY
Status: COMPLETED | OUTPATIENT
Start: 2024-07-10 | End: 2024-07-11

## 2024-07-10 RX ADMIN — LISINOPRIL 5 MG: 5 TABLET ORAL at 09:03

## 2024-07-10 RX ADMIN — FUROSEMIDE 20 MG: 10 INJECTION, SOLUTION INTRAMUSCULAR; INTRAVENOUS at 16:42

## 2024-07-10 RX ADMIN — ENOXAPARIN SODIUM 40 MG: 40 INJECTION SUBCUTANEOUS at 09:03

## 2024-07-10 RX ADMIN — CITALOPRAM HYDROBROMIDE 10 MG: 20 TABLET ORAL at 09:03

## 2024-07-10 RX ADMIN — CARVEDILOL 6.25 MG: 6.25 TABLET, FILM COATED ORAL at 16:41

## 2024-07-10 RX ADMIN — FUROSEMIDE 20 MG: 10 INJECTION, SOLUTION INTRAMUSCULAR; INTRAVENOUS at 09:03

## 2024-07-10 RX ADMIN — PNEUMOCOCCAL 20-VALENT CONJUGATE VACCINE 0.5 ML
2.2; 2.2; 2.2; 2.2; 2.2; 2.2; 2.2; 2.2; 2.2; 2.2; 2.2; 2.2; 2.2; 2.2; 2.2; 2.2; 4.4; 2.2; 2.2; 2.2 INJECTION, SUSPENSION INTRAMUSCULAR at 14:40

## 2024-07-10 RX ADMIN — ASPIRIN 81 MG CHEWABLE TABLET 81 MG: 81 TABLET CHEWABLE at 09:03

## 2024-07-10 RX ADMIN — CARVEDILOL 6.25 MG: 6.25 TABLET, FILM COATED ORAL at 09:03

## 2024-07-10 RX ADMIN — POTASSIUM CHLORIDE 40 MEQ: 1500 TABLET, EXTENDED RELEASE ORAL at 11:17

## 2024-07-10 RX ADMIN — TAMSULOSIN HYDROCHLORIDE 0.8 MG: 0.4 CAPSULE ORAL at 21:02

## 2024-07-10 RX ADMIN — ATORVASTATIN CALCIUM 40 MG: 40 TABLET, FILM COATED ORAL at 16:41

## 2024-07-10 RX ADMIN — IRON SUCROSE 200 MG: 20 INJECTION, SOLUTION INTRAVENOUS at 14:34

## 2024-07-10 NOTE — PROGRESS NOTES
[facility]  Progress Note  Name: Raleigh Sandoval I  MRN: [id number]  Unit/Bed#: E4 -01 I Date of Admission: 7/9/2024   Date of Service: 7/10/2024 I Hospital Day: 1    Assessment & Plan   Iron deficiency anemia due to chronic blood loss  Assessment & Plan  The patient has documented iron deficiency as lab work.  Iron studies will be repeated.  Patient will be started on Venofer infusion.    Essential hypertension  Assessment & Plan  Continue current therapy.    Hyperlipidemia  Assessment & Plan  Atorvastatin.    S/P mitral valve repair  Assessment & Plan  Check echo;    Hx of CABG  Assessment & Plan  Rule out for MI    * Acute CHF (congestive heart failure) (HCC)  Assessment & Plan  Wt Readings from Last 3 Encounters:   07/10/24 59.9 kg (132 lb)   07/01/24 57.6 kg (127 lb)   06/12/24 58.2 kg (128 lb 3.2 oz)       Patient presents with several days of worsening leg swelling and dyspnea on exertion    Last echocardiogram that I can find was in 2021 with LVEF of 45%    He is not on diuretics at home chronically    Will start him on Lasix 20 mg IV every 12 hours, Coreg, aspirin, statin, lisinopril    Echo is pending.    Cardiology consult is greatly appreciated.           The patient has not had pneumococcal vaccination.  After a detailed discussion of the pros and cons, the patient acquiesced to receive 1.    Subjective:  The patient is feeling much better.  He says that his breathing has improved markedly.  He has no chest pain.  He has no abdominal pain, nausea, or vomiting.    Physical Exam:   Temp:  [98.1 °F (36.7 °C)-98.9 °F (37.2 °C)] 98.9 °F (37.2 °C)  HR:  [61-77] 66  Resp:  [14-23] 18  BP: (137-172)/(65-88) 137/65    Gen: Well-developed, well-nourished, in no distress.  Neck: Supple.  No lymphadenopathy, goiter, or bruit  Heart: Regular rhythm.  I heard no murmur, gallop, or rub.  Lungs: Clear to auscultation percussion.  No wheezing, rales, or rhonchi.  Abd: Soft with  active bowel sounds.  No mass, tenderness, organomegaly.  Extremities: No clubbing, cyanosis, or edema.  No calf tenderness.  Neuro: Alert and oriented.  No focal sign.  Skin: Warm and dry.      LABS:   CBC:   Lab Results   Component Value Date    WBC 6.15 07/10/2024    HGB 9.9 (L) 07/10/2024    HCT 30.8 (L) 07/10/2024    MCV 97 07/10/2024     07/10/2024    RBC 3.17 (L) 07/10/2024    MCH 31.2 07/10/2024    MCHC 32.1 07/10/2024    RDW 13.8 07/10/2024    MPV 9.4 07/10/2024    NRBC 0 07/10/2024   , CMP:   Lab Results   Component Value Date    SODIUM 140 07/10/2024    K 3.6 07/10/2024     07/10/2024    CO2 32 07/10/2024    BUN 24 07/10/2024    CREATININE 1.05 07/10/2024    CALCIUM 8.6 07/10/2024    EGFR 65 07/10/2024         VTE Pharmacologic Prophylaxis: Enoxaparin (Lovenox)  VTE Mechanical Prophylaxis: sequential compression device

## 2024-07-10 NOTE — CONSULTS
Consult - Cardiology   Raleigh Sandoval 83 y.o. male MRN: [id number]  Unit/Bed#: E4 -01 Encounter: [id number]        Reason For Consult:  heart failure                 Assessment:  Acute hypoxic respiratory failure: D/T problem #2   -83% room air saturation present on arrival  HFmEF: Signs of acute decompensation POA   -Ischemic cardiomyopathy      * Echo 7/2016: LVEF 40%      * Echo 3/2018: LVEF 45% mod-severe MR      * Echo 6/2021: LVEF 45% moderate MR   -Prehospital diuretic: None   -Self-reported estimated dry weight 125-127 pounds   -Neurohormonal blockade: Lisinopril 5 mg daily, carvedilol 6.25 mg twice daily  Abnormal troponin - probable nonischemic myocardial injury   HS Troponin 19, 44, 75 without angina or ischemic ECG change  Valvular heart disease, s/p mitral valve repair (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   -Moderate MR per last echo -2021 (repeat pending)  CAD, Hx CABG x3, 2012   -O/p Rx: aspirin, Lipitor 40 mg, beta-blocker-carvedilol 6.25 mg twice daily  Hyperlipidemia   -Atorvastatin 40 mg    -Lipid 6/7/2024: Cholesterol 153, triglycerides 52, HDL 67, calculated LDL 76  Other    * Hx prior tracheostomy and PEG tube in the aftermath of cardiac surgery with prolonged hospitalization    * Polymyalgia rheumatica    * Prediabetes    Discussion / Plan:  # Patient with known history of ischemic cardiomyopathy with midrange ejection fraction and moderate mitral regurgitation typically with preserved effort tolerance without much in the way of heart failure now presents with acute CHF with uncertain precipitant but noting no signs or symptoms symptoms to suggest A-fib or other dysrhythmia, or angina.           Oxygenation, symptoms, exam improving with IV diuretic - though still with signs of volume overload ~~> continue IV diuretic today.  Guided by clinical course in the next 24-48 hours can address assuming a routine daily diuretic versus as needed use  Check echocardiogram for reassessment of structural  heart disease  Repeat troponin to establish trend/peak  Guided by results of echocardiogram we can address need/type/timing of any ischemic testing without imminent plan at this point  Replete potassium with  goals of 4.0  ~~> begin 40 mEq daily now with continued surveillance of electrolytes  For the patient's cardiomyopathy and CAD he is taking aspirin, statin, beta-blocker, and ACE inhibitor prior to admission recently favorable lipid levels and good BP control ~~> continue these as taken prior to admission      History Of Present Illness:  Raleigh Sandoval is a an 83-year-old with outpatient care by [facility] cardiologist Dr. Hermes Castillo.  His history is summarized above notably including CAD with prior CABG as well as mitral valve repair with subsequent regurgitation that is at least moderate, ischemic cardiomyopathy with LVEF historically 40-45% ,as well as hypertension and dyslipidemia.    Last week this gentleman was to our office and seen by Dr. Castillo on 7/1.  At his visit he reported feeling generally well noting he was taking daily walks with his dog and still working as a  on night shift at [employer].  /70, heart rate 70, weight 127 pounds / 57.6 kg  He reportedly had a euvolemic exam  An echocardiogram was ordered and no changes were made to his medications.    During the last 1-2 weeks when walking his dog the patient had some intermittent feelings of effort dyspnea which she blamed on the humidity noting he was comfortable indoors at rest and with activity.  Though it is not weigh himself daily he did note some waxing and waning of his weight during the last 2 weeks as much is 5-7 pounds above his baseline. Yesterday morning just after waking the patient experienced some dyspnea at rest.  As this was new for him he came to the emergency department to be evaluated with signs of lower extremity edema increased work of breathing and hypoxia present on arrival.  He denies  "any recent symptoms of angina or other chest discomfort, tachycardia or palpitation, sodium indiscretion or medication noncompliance.        Past Medical History:        Past Medical History:   Diagnosis Date    BPH (benign prostatic hyperplasia)     Chronic pain disorder     back    Coronary artery disease     Gallstones     History of tracheostomy     per pt had it \"after CABG surgery--was admitted for 53 days including Rehab\"    Hx of CABG     per pt 7-8 yrs ago    Hypercholesterolemia     Hypertension     Moderate exercise     alot of walking-works FT and also walks dog 2 miles daily usually    Myocardial infarction (HCC)     per pt approx 7-8yrs ago --[facility] cardio Dr ASHWIN Castillo sees yearly    Prediabetes     Shortness of breath     per pt\"climbing steps or walking long distance\"    Wears glasses       Past Surgical History:   Procedure Laterality Date    CATARACT EXTRACTION Bilateral     COLONOSCOPY      CORONARY ARTERY BYPASS GRAFT      x3 LIMA-LAD, SVG-OM, SVG-DIAG with mitral valve repair by nicole    CYSTOSCOPY  12/13/2014    Diagnostic    CYSTOSCOPY  03/24/2023    Nita    ESOPHAGOGASTRODUODENOSCOPY  04/07/2015    with biopsy. Dr Mazariegos    FL RETROGRADE PYELOGRAM  5/17/2023    MITRAL VALVE REPAIR      26 mm fatmata hernandez physi annuloplasty ring    MD CYSTO BLADDER W/URETERAL CATHETERIZATION Right 5/17/2023    Procedure: CYSTO, URETHERAL DILATION, RETROGRADE PYELOGRAM W/ INSERTION STENT URETERAL;  Surgeon: Kee Moya MD;  Location: [facility];  Service: Urology    MD CYSTO W/REMOVAL OF LESIONS SMALL N/A 5/17/2023    Procedure: (TURBT);  Surgeon: Kee Moya MD;  [facility];  Service: Urology    MD CYSTO/PYELOSCOPY BX&/FULGURATION PELIVC LESION Right 5/17/2023    Procedure: URETEROSCOPY,WITH URETERAL BRUSHING;  Surgeon: Kee Moya MD;  Location: [facility];  Service: Urology    TRACHEOSTOMY      during his heart surgery        Allergy:        No Known " Allergies    Medications:       Prior to Admission medications    Medication Sig Start Date End Date Taking? Authorizing Provider   aspirin 81 mg chewable tablet Chew 81 mg daily   Yes Historical Provider, MD   atorvastatin (LIPITOR) 40 mg tablet TAKE ONE TABLET BY MOUTH ONCE DAILY 4/8/24  Yes Hermes Castillo DO   carvedilol (COREG) 6.25 mg tablet TAKE 1 TABLET BY MOUTH TWICE A DAY WITH MEALS 8/8/23  Yes Hermes Castillo DO   citalopram (CeleXA) 10 mg tablet Take 1 tablet (10 mg total) by mouth daily 5/29/24  Yes Gopi Victoria,    Cyanocobalamin ([employer] VITAMIN B-12 PO) Take by mouth   Yes Historical Provider, MD   Ferrous Sulfate (IRON) 325 (65 Fe) MG TABS Take 1 tablet by mouth Twice daily 3/26/15  Yes Historical Provider, MD   lisinopril (ZESTRIL) 5 mg tablet Take 1 tablet (5 mg total) by mouth daily 6/17/24  Yes Gopi Victoria DO   Multiple Vitamin Essential TABS Take by mouth   Yes Historical Provider, MD   Omega 3 1200 MG CAPS Take by mouth in the morning   Yes Historical Provider, MD   tamsulosin (FLOMAX) 0.4 mg Take 2 capsules (0.8 mg total) by mouth daily at bedtime 6/5/23  Yes JARRED Orozco   finasteride (PROSCAR) 5 mg tablet Take 1 tablet (5 mg total) by mouth daily  Patient not taking: Reported on 7/9/2024 6/5/23   JARRED Orozco       Family History:     Family History   Problem Relation Age of Onset    Colon cancer Father     Kidney disease Father     Diabetes Mother     Heart disease Mother     Hypertension Mother     Cervical cancer Maternal Aunt     Breast cancer Paternal Aunt     Colon cancer Paternal Grandfather     Heart disease Sister     Heart disease Brother         Social History:       Social History     Socioeconomic History    Marital status:      Spouse name: None    Number of children: None    Years of education: None    Highest education level: None   Occupational History    None   Tobacco Use    Smoking status: Former     Current packs/day: 0.00     Average packs/day:  0.5 packs/day for 16.0 years (8.0 ttl pk-yrs)     Types: Cigarettes     Start date:      Quit date:      Years since quittin.5     Passive exposure: Past    Smokeless tobacco: Never   Vaping Use    Vaping status: Never Used   Substance and Sexual Activity    Alcohol use: Yes     Comment: per pt very rarely    Drug use: No    Sexual activity: None     Comment: defer   Other Topics Concern    None   Social History Narrative    Consumes 2-3 ups of tea per week     Social Determinants of Health     Financial Resource Strain: Not on file   Food Insecurity: Not on file   Transportation Needs: Not on file   Physical Activity: Not on file   Stress: Not on file (2021)   Social Connections: Not on file   Intimate Partner Violence: Low Risk  (2021)    Received from [place]    Intimate Partner Violence     Insults You: Not on file     Threatens You: Not on file     Screams at You: Not on file     Physically Hurt: Not on file     Intimate Partner Violence Score: Not on file   Housing Stability: Not on file       ROS:  Symptoms per HPI  The remainder of the review of systems is negative    Exam:  General:  Alert, normally conversant, comfortable appearing  Head: Normocephalic, atraumatic.  Eyes:  EOMI. Pupils - equal, round, reactive to accomodation.  No icterus.  Normal Conjunctiva.   Oropharynx: Moist without lesion  Neck:  No gross bruit, JVD, thyromegaly, or lymphadenopathy  Heart:  Regular with controlled rate.  Prominent apical systolic ejection murmur   lungs: Moderate excursion air exchange with trace basilar rales   abdomen:  Soft and nontender with normal bowel sounds. No organomegaly or mass  Lower Limbs: 1+ edema localized to the distal pretibial and malleolar areas  Pulses:  RLE - DP:  1-2+                LLE - DP: 1 -2+  Musculoskeletal: Independent movement of limbs observed, Formal ROM and strength eval not performed  Neurologic:    Oriented to: person, place,  situation.     Cranial Nerves: grossly intact - vision, smell, taste, and hearing were not tested.     Motor function: grossly normal, symmetric   Sensation: Was not tested      Vitals:    07/09/24 2238 07/10/24 0300 07/10/24 0540 07/10/24 0738   BP: (!) 172/74 144/70  137/65   BP Location: Right arm Right arm  Right arm   Pulse: 69 61  66   Resp: 18 18  18   Temp: 98.4 °F (36.9 °C) 98.1 °F (36.7 °C)  98.9 °F (37.2 °C)   TempSrc: Temporal Temporal  Temporal   SpO2: 99% 99% 95% 95%   Weight:       Height:               DATA:      Telemetry:   Sinus rhythm with controlled ventricular rate trend without other dysrhythmia         Weights:    Wt Readings from Last 20 Encounters:   07/09/24 60.2 kg (132 lb 11.5 oz)   07/01/24 57.6 kg (127 lb)   06/12/24 58.2 kg (128 lb 3.2 oz)   11/28/23 56.5 kg (124 lb 8 oz)   11/03/23 55.3 kg (122 lb)   09/26/23 54.4 kg (120 lb)   09/20/23 54.6 kg (120 lb 6.4 oz)   09/11/23 54 kg (119 lb)   07/25/23 54.2 kg (119 lb 8 oz)   06/02/23 56.2 kg (124 lb)   05/19/23 56.2 kg (124 lb)   05/17/23 54.8 kg (120 lb 13 oz)   04/26/23 54.1 kg (119 lb 3.2 oz)   03/24/23 53.5 kg (118 lb)   03/20/23 53.4 kg (117 lb 12.8 oz)   02/28/23 54 kg (119 lb)   12/18/22 54.2 kg (119 lb 7.8 oz)   09/29/22 53.5 kg (118 lb)   09/16/22 55.7 kg (122 lb 12.8 oz)   07/19/22 55.3 kg (122 lb)   , Body mass index is 20.79 kg/m².         Lab Studies:               Results from last 7 days   Lab Units 07/10/24  0610 07/09/24  1635 07/09/24  1134   WBC Thousand/uL 6.15  --  6.84   HEMOGLOBIN g/dL 9.9*  --  9.8*   HEMATOCRIT % 30.8*  --  30.7*   PLATELETS Thousands/uL 258 272 256   ,   Results from last 7 days   Lab Units 07/10/24  0610 07/09/24  1134   POTASSIUM mmol/L 3.6 4.0   CHLORIDE mmol/L 101 106   CO2 mmol/L 32 28   BUN mg/dL 24 28*   CREATININE mg/dL 1.05 0.98   CALCIUM mg/dL 8.6 8.5   ALK PHOS U/L  --  69   ALT U/L  --  14   AST U/L  --  19

## 2024-07-10 NOTE — ASSESSMENT & PLAN NOTE
The patient has documented iron deficiency as lab work.  Iron studies will be repeated.  Patient will be started on Venofer infusion.

## 2024-07-10 NOTE — PLAN OF CARE
Problem: Potential for Falls  Goal: Patient will remain free of falls  Description: INTERVENTIONS:  - Educate patient/family on patient safety including physical limitations  - Instruct patient to call for assistance with activity   - Consult OT/PT to assist with strengthening/mobility   - Keep Call bell within reach  - Keep bed low and locked with side rails adjusted as appropriate  - Keep care items and personal belongings within reach  - Initiate and maintain comfort rounds  - Make Fall Risk Sign visible to staff  - Offer Toileting every 3 Hours, in advance of need  - Initiate/Maintain bed alarm  - Obtain necessary fall risk management equipment:   - Apply yellow socks and bracelet for high fall risk patients  - Consider moving patient to room near nurses station  Outcome: Progressing     Problem: SAFETY ADULT  Goal: Patient will remain free of falls  Description: INTERVENTIONS:  - Educate patient/family on patient safety including physical limitations  - Instruct patient to call for assistance with activity   - Consult OT/PT to assist with strengthening/mobility   - Keep Call bell within reach  - Keep bed low and locked with side rails adjusted as appropriate  - Keep care items and personal belongings within reach  - Initiate and maintain comfort rounds  - Make Fall Risk Sign visible to staff  - Offer Toileting every 3 Hours, in advance of need  - Initiate/Maintain bed alarm  - Obtain necessary fall risk management equipment:   - Apply yellow socks and bracelet for high fall risk patients  - Consider moving patient to room near nurses station  Outcome: Progressing  Goal: Maintain or return to baseline ADL function  Description: INTERVENTIONS:  -  Assess patient's ability to carry out ADLs; assess patient's baseline for ADL function and identify physical deficits which impact ability to perform ADLs (bathing, care of mouth/teeth, toileting, grooming, dressing, etc.)  - Assess/evaluate cause of self-care  deficits   - Assess range of motion  - Assess patient's mobility; develop plan if impaired  - Assess patient's need for assistive devices and provide as appropriate  - Encourage maximum independence but intervene and supervise when necessary  - Involve family in performance of ADLs  - Assess for home care needs following discharge   - Consider OT consult to assist with ADL evaluation and planning for discharge  - Provide patient education as appropriate  Outcome: Progressing  Goal: Maintains/Returns to pre admission functional level  Description: INTERVENTIONS:  - Perform AM-PAC 6 Click Basic Mobility/ Daily Activity assessment daily.  - Set and communicate daily mobility goal to care team and patient/family/caregiver.   - Collaborate with rehabilitation services on mobility goals if consulted  - Perform Range of Motion 3 times a day.  - Reposition patient every 2 hours.  - Dangle patient 3 times a day  - Stand patient 3 times a day  - Ambulate patient 3 times a day  - Out of bed to chair 3 times a day   - Out of bed for meals 3 times a day  - Out of bed for toileting  - Record patient progress and toleration of activity level   Outcome: Progressing     Problem: CARDIOVASCULAR - ADULT  Goal: Maintains optimal cardiac output and hemodynamic stability  Description: INTERVENTIONS:  - Monitor I/O, vital signs and rhythm  - Monitor for S/S and trends of decreased cardiac output  - Administer and titrate ordered vasoactive medications to optimize hemodynamic stability  - Assess quality of pulses, skin color and temperature  - Assess for signs of decreased coronary artery perfusion  - Instruct patient to report change in severity of symptoms  Outcome: Progressing     Problem: RESPIRATORY - ADULT  Goal: Achieves optimal ventilation and oxygenation  Description: INTERVENTIONS:  - Assess for changes in respiratory status  - Assess for changes in mentation and behavior  - Position to facilitate oxygenation and minimize  respiratory effort  - Oxygen administered by appropriate delivery if ordered  - Initiate smoking cessation education as indicated  - Encourage broncho-pulmonary hygiene including cough, deep breathe, Incentive Spirometry  - Assess the need for suctioning and aspirate as needed  - Assess and instruct to report SOB or any respiratory difficulty  - Respiratory Therapy support as indicated  Outcome: Progressing     Problem: DISCHARGE PLANNING  Goal: Discharge to home or other facility with appropriate resources  Description: INTERVENTIONS:  - Identify barriers to discharge w/patient and caregiver  - Arrange for needed discharge resources and transportation as appropriate  - Identify discharge learning needs (meds, wound care, etc.)  - Arrange for interpretive services to assist at discharge as needed  - Refer to Case Management Department for coordinating discharge planning if the patient needs post-hospital services based on physician/advanced practitioner order or complex needs related to functional status, cognitive ability, or social support system  Outcome: Progressing     Problem: Knowledge Deficit  Goal: Patient/family/caregiver demonstrates understanding of disease process, treatment plan, medications, and discharge instructions  Description: Complete learning assessment and assess knowledge base.  Interventions:  - Provide teaching at level of understanding  - Provide teaching via preferred learning methods  Outcome: Progressing

## 2024-07-10 NOTE — PLAN OF CARE
Problem: DISCHARGE PLANNING  Goal: Discharge to home or other facility with appropriate resources  Description: INTERVENTIONS:  - Identify barriers to discharge w/patient and caregiver  - Arrange for needed discharge resources and transportation as appropriate  - Identify discharge learning needs (meds, wound care, etc.)  - Arrange for interpretive services to assist at discharge as needed  - Refer to Case Management Department for coordinating discharge planning if the patient needs post-hospital services based on physician/advanced practitioner order or complex needs related to functional status, cognitive ability, or social support system  Outcome: Progressing     Problem: Knowledge Deficit  Goal: Patient/family/caregiver demonstrates understanding of disease process, treatment plan, medications, and discharge instructions  Description: Complete learning assessment and assess knowledge base.  Interventions:  - Provide teaching at level of understanding  - Provide teaching via preferred learning methods  Outcome: Progressing     Problem: CARDIOVASCULAR - ADULT  Goal: Maintains optimal cardiac output and hemodynamic stability  Description: INTERVENTIONS:  - Monitor I/O, vital signs and rhythm  - Monitor for S/S and trends of decreased cardiac output  - Administer and titrate ordered vasoactive medications to optimize hemodynamic stability  - Assess quality of pulses, skin color and temperature  - Assess for signs of decreased coronary artery perfusion  - Instruct patient to report change in severity of symptoms  Outcome: Progressing     Problem: RESPIRATORY - ADULT  Goal: Achieves optimal ventilation and oxygenation  Description: INTERVENTIONS:  - Assess for changes in respiratory status  - Assess for changes in mentation and behavior  - Position to facilitate oxygenation and minimize respiratory effort  - Oxygen administered by appropriate delivery if ordered  - Initiate smoking cessation education as  indicated  - Encourage broncho-pulmonary hygiene including cough, deep breathe, Incentive Spirometry  - Assess the need for suctioning and aspirate as needed  - Assess and instruct to report SOB or any respiratory difficulty  - Respiratory Therapy support as indicated  Outcome: Progressing

## 2024-07-10 NOTE — UTILIZATION REVIEW
Initial Clinical Review    Admission: Date/Time/Statement:   Admission Orders (From admission, onward)       Ordered        07/09/24 1240  INPATIENT ADMISSION  Once                          Orders Placed This Encounter   Procedures    INPATIENT ADMISSION     Standing Status:   Standing     Number of Occurrences:   1     Order Specific Question:   Level of Care     Answer:   Med Surg [16]     Order Specific Question:   Estimated length of stay     Answer:   More than 2 Midnights     Order Specific Question:   Certification     Answer:   I certify that inpatient services are medically necessary for this patient for a duration of greater than two midnights. See H&P and MD Progress Notes for additional information about the patient's course of treatment.     ED Arrival Information       Expected   -    Arrival   7/9/2024 11:15    Acuity   Emergent              Means of arrival   Walk-In    Escorted by   Family Member    Service   Hospitalist    Admission type   Emergency              Arrival complaint   sob             Chief Complaint   Patient presents with    Shortness of Breath     Pt reports increased SOB over the last few days.        Initial Presentation: 83 y.o. male presents to the ED from home with c/o SOB, OSHEA, BLE swelling x 5-7 days, not on Lasix.  PMH: mild chronic CHF, h/o CABG, s/p MVR.  In the ED he is HTN, tachypneic, hypoxic and placed on oxygen 4L NC oxygen increased to 6L. Labs - mild troponin elevation, elevated glucose 204, BUN.  Imaging - opacity L apex,  GG nodular densities, small L pleural effusion, diffuse interstitial opacities, recommend CT chest w/ contrast.  Treated with IV Lasix x 2, NTG x 1.  On exam HJR 1/2 up. BLE swelling 1+.  Admitted to INPATIENT status with Acute CHF  - PLAN: IV Lasix 20 mg q 12 hrs, home Coreg, ASA, statin, Lisinopril, Echo, cardio consult .     Anticipated Length of Stay/Certification Statement: Patient will be admitted on an Inpatient basis with an anticipated  length of stay of  greater than 2 midnights.   Justification for Hospital Stay: Patient has acute CHF.  He needs IV Lasix diuresis.  Length of stay began 2 midnights.     Date: 7/10   Day 2:   CHF, COURTNEY chronic blood loss, s/o MVR - remains on oxygen overnight, no hypoxic episodes. Today pt is on room air.  Will start IV Venofer, get Echo, starting IV Lasix 20 mg IV q 12 hr, continue Coreg, ASA, statin, Lisinopril.  On exam feels improvement in breathing, no CP.     7/10 Cardio Consult - Acute on chronic heart failure with midrange EF 45%, Ischemic CM - Echo pending,  IV Lasix 20 mg BID, continue Lisionpril, Coreg, ASA, statin, daily wt. On exam is volume up, get BNP, trend BUN/Cr and lytes, echo.          ED Triage Vitals   Temperature Pulse Respirations Blood Pressure SpO2 Pain Score   07/09/24 1119 07/09/24 1119 07/09/24 1119 07/09/24 1119 07/09/24 1119 07/09/24 1200   98.3 °F (36.8 °C) 105 (!) 24 (!) 198/86 (!) 83 % No Pain     Weight (last 2 days)       Date/Time Weight    07/10/24 1132 59.9 (132)    07/09/24 1119 60.2 (132.72)            Vital Signs (last 3 days)       Date/Time Temp Pulse Resp BP MAP (mmHg) SpO2 Calculated FIO2 (%) - Nasal Cannula Nasal Cannula O2 Flow Rate (L/min) O2 Device Patient Position - Orthostatic VS [place] Coma Scale Score Pain    07/10/24 1132 -- 66 -- 137/65 -- -- -- -- -- -- -- --    07/10/24 0850 -- -- -- -- -- -- -- -- -- -- 15 No Pain    07/10/24 0738 98.9 °F (37.2 °C) 66 18 137/65 -- 95 % -- -- None (Room air) Lying -- --    07/10/24 0540 -- -- -- -- -- 95 % 20 0 L/min None (Room air) -- -- --    07/10/24 0300 98.1 °F (36.7 °C) 61 18 144/70 100 99 % 32 3 L/min Nasal cannula Lying -- --    07/09/24 2238 98.4 °F (36.9 °C) 69 18 172/74 107 99 % 36 4 L/min Nasal cannula Lying -- --    07/09/24 2019 98.5 °F (36.9 °C) 69 18 159/74 106 99 % -- -- -- Lying -- --    07/09/24 1957 -- -- -- -- -- -- 40 5 L/min Nasal cannula -- 15 No Pain    07/09/24 1600 98.3 °F (36.8 °C) 77 18 167/88 --  98 % 40 5 L/min Nasal cannula Lying 15 No Pain    07/09/24 1549 -- -- -- -- -- -- -- -- -- -- -- No Pain    07/09/24 1443 -- -- -- -- -- -- -- -- -- -- 15 --    07/09/24 1430 -- 64 16 152/78 109 98 % 36 4 L/min Nasal cannula Lying -- --    07/09/24 1330 -- 65 14 165/79 111 99 % -- -- -- -- -- No Pain    07/09/24 1245 -- 76 23 159/85 115 99 % 36 4 L/min Nasal cannula Lying -- --    07/09/24 1224 -- 75 -- 164/89 -- 99 % 44 6 L/min Nasal cannula Lying -- --    07/09/24 1200 -- 80 24 153/75 108 99 % 44 6 L/min Nasal cannula Sitting -- No Pain    07/09/24 1150 -- 77 24 169/78 -- 99 % 44 6 L/min Nasal cannula Sitting -- --    07/09/24 1137 -- 93 -- -- -- 98 % 36 4 L/min Nasal cannula -- -- --    07/09/24 1119 98.3 °F (36.8 °C) 105 24 198/86 -- 83 % -- -- None (Room air) Sitting -- --              Pertinent Labs/Diagnostic Test Results:   Radiology:  XR chest portable   Final Interpretation by Kaitlyn Gandhi MD (07/09 1406)   Ovoid opacity in the left apex, and adjacent groundglass nodular densities. Small left pleural effusion. Diffuse interstitial opacities. Recommend further evaluation with CT chest with contrast.         Workstation performed: [id number]           Cardiology:  ECG 12 lead   Final Result by Antwan Martinez MD (07/09 1804)   Normal sinus rhythm with sincgle PVC   Cannot rule out Inferior infarct , age undetermined   Cannot rule out Anterior infarct (cited on or before 14-MAY-2012)   Abnormal ECG   When compared with ECG of 09-JUL-2024 11:33, (unconfirmed)   PVC is new.   Confirmed by Antwan Martinez ([id number]) on 7/9/2024 6:04:19 PM      ECG 12 lead   Final Result by Antwan Martinez MD (07/09 1805)   Sinus rhythm with Premature atrial complexes with Aberrant conduction   Anterior infarct , age undetermined   Abnormal ECG   When compared with ECG of 14-MAY-2012 07:03,   Significant changes have occurred   Confirmed by Antwan Martinez ([id number]) on 7/9/2024 6:05:23 PM        GI:  No orders to display       Results  from last 7 days   Lab Units 07/09/24  1221   SARS-COV-2  Negative     Results from last 7 days   Lab Units 07/10/24  0610 07/09/24  1635 07/09/24  1134   WBC Thousand/uL 6.15  --  6.84   HEMOGLOBIN g/dL 9.9*  --  9.8*   HEMATOCRIT % 30.8*  --  30.7*   PLATELETS Thousands/uL 258 272 256   TOTAL NEUT ABS Thousands/µL 4.41  --  5.15         Results from last 7 days   Lab Units 07/10/24  0610 07/09/24  1134   SODIUM mmol/L 140 140   POTASSIUM mmol/L 3.6 4.0   CHLORIDE mmol/L 101 106   CO2 mmol/L 32 28   ANION GAP mmol/L 7 6   BUN mg/dL 24 28*   CREATININE mg/dL 1.05 0.98   EGFR ml/min/1.73sq m 65 71   CALCIUM mg/dL 8.6 8.5   MAGNESIUM mg/dL 1.8*  --      Results from last 7 days   Lab Units 07/09/24  1134   AST U/L 19   ALT U/L 14   ALK PHOS U/L 69   TOTAL PROTEIN g/dL 6.5   ALBUMIN g/dL 3.4*   TOTAL BILIRUBIN mg/dL 0.56         Results from last 7 days   Lab Units 07/10/24  0610 07/09/24  1134   GLUCOSE RANDOM mg/dL 76 204*           Results from last 7 days   Lab Units 07/09/24  1635 07/09/24  1334 07/09/24  1134   HS TNI 0HR ng/L  --   --  19   HS TNI 2HR ng/L  --  44  --    HSTNI D2 ng/L  --  25*  --    HS TNI 4HR ng/L 75*  --   --    HSTNI D4 ng/L 56*  --   --          Results from last 7 days   Lab Units 07/09/24  1134   PROTIME seconds 14.4   INR  1.10   PTT seconds 33         Results from last 7 days   Lab Units 07/09/24  1134   PROCALCITONIN ng/ml <0.05     Results from last 7 days   Lab Units 07/09/24  1134   LACTIC ACID mmol/L 1.1     Results from last 7 days   Lab Units 07/09/24  1246   CLARITY UA  Clear   COLOR UA  Yellow   SPEC GRAV UA  1.023   PH UA  6.0   GLUCOSE UA mg/dl Trace*   KETONES UA mg/dl Negative   BLOOD UA  Negative   PROTEIN UA mg/dl 30 (1+)*   NITRITE UA  Negative   BILIRUBIN UA  Negative   UROBILINOGEN UA (BE) mg/dl <2.0   LEUKOCYTES UA  Negative   WBC UA /hpf 2-4*   RBC UA /hpf 1-2   BACTERIA UA /hpf Occasional   EPITHELIAL CELLS WET PREP /hpf None Seen   MUCUS THREADS  Moderate*  "    Results from last 7 days   Lab Units 07/09/24  1221   INFLUENZA A PCR  Negative   INFLUENZA B PCR  Negative   RSV PCR  Negative           Results from last 7 days   Lab Units 07/09/24  1145 07/09/24  1134   BLOOD CULTURE  Received in Microbiology Lab. Culture in Progress. Received in Microbiology Lab. Culture in Progress.         ED Treatment-Medication Administration from 07/09/2024 1115 to 07/09/2024 1543         Date/Time Order Dose Route Action     07/09/2024 1149 furosemide (LASIX) injection 20 mg 20 mg Intravenous Given     07/09/2024 1244 nitroglycerin (NITROSTAT) SL tablet 0.4 mg 0.4 mg Sublingual Given     07/09/2024 1243 furosemide (LASIX) injection 20 mg 20 mg Intravenous Given            Past Medical History:   Diagnosis Date    BPH (benign prostatic hyperplasia)     Chronic pain disorder     back    Coronary artery disease     Gallstones     History of tracheostomy     per pt had it \"after CABG surgery--was admitted for 53 days including Rehab\"    Hx of CABG     per pt 7-8 yrs ago    Hypercholesterolemia     Hypertension     Moderate exercise     alot of walking-works FT and also walks dog 2 miles daily usually    Myocardial infarction (HCC)     per pt approx 7-8yrs ago --[facility] cardio Dr ASHWIN Castillo sees yearly    Prediabetes     Shortness of breath     per pt\"climbing steps or walking long distance\"    Wears glasses      Admitting Diagnosis: CHF (congestive heart failure) (HCC) [I50.9]  SOB (shortness of breath) [R06.02]  Hypoxic respiratory failure (HCC) [J96.91]  Age/Sex: 83 y.o. male  Admission Orders:  Scheduled Medications:  aspirin, 81 mg, Oral, Daily  atorvastatin, 40 mg, Oral, Daily With Dinner  carvedilol, 6.25 mg, Oral, BID With Meals  citalopram, 10 mg, Oral, Daily  enoxaparin, 40 mg, Subcutaneous, Daily  furosemide, 20 mg, Intravenous, BID (diuretic)  iron sucrose, 200 mg, Intravenous, Daily  lisinopril, 5 mg, Oral, Daily  pneumococcal 20-melvi conj vacc, 0.5 mL, Intramuscular, Once  potassium " chloride, 40 mEq, Oral, Daily  tamsulosin, 0.8 mg, Oral, HS      Continuous IV Infusions:     PRN Meds:  acetaminophen, 650 mg, Oral, Q4H PRN    Tele   IV Lasix   Fluid restriction  IP CONSULT TO NUTRITION SERVICES  IP CONSULT TO CARDIOLOGY    Network Utilization Review Department  ATTENTION: Please call with any questions or concerns to [phone] and carefully listen to the prompts so that you are directed to the right person. All voicemails are confidential.   For Discharge needs, contact Care Management DC Support Team at [phone] opt. [phone]  Send all requests for admission clinical reviews, approved or denied determinations and any other requests to dedicated fax number below belonging to the campus where the patient is receiving treatment. List of dedicated fax numbers for the Facilities:  FACILITY NAME UR FAX NUMBER   ADMISSION DENIALS (Administrative/Medical Necessity) [phone]   DISCHARGE SUPPORT TEAM (NETWORK) [phone]   PARENT CHILD HEALTH (Maternity/NICU/Pediatrics) [phone]   [facility] [phone]   [facility] [phone]   [facility] [phone]   [facility] [phone]   [facility] [phone]   [facility] [phone]   [facility] [phone]   [facility] [phone]   [facility] [phone]   [facility] [phone]   [facility] [phone]   [facility] [phone]

## 2024-07-10 NOTE — ASSESSMENT & PLAN NOTE
Wt Readings from Last 3 Encounters:   07/10/24 59.9 kg (132 lb)   07/01/24 57.6 kg (127 lb)   06/12/24 58.2 kg (128 lb 3.2 oz)       Patient presents with several days of worsening leg swelling and dyspnea on exertion    Last echocardiogram that I can find was in 2021 with LVEF of 45%    He is not on diuretics at home chronically    Will start him on Lasix 20 mg IV every 12 hours, Coreg, aspirin, statin, lisinopril    Echo is pending.    Cardiology consult is greatly appreciated.

## 2024-07-10 NOTE — CASE MANAGEMENT
Case Management Assessment & Discharge Planning Note    Patient name Raleigh Sandoval  Location [facility] /E4 -* MRN [id number]  : 1940 Date 7/10/2024       Current Admission Date: 2024  Current Admission Diagnosis:Acute CHF (congestive heart failure) ([facility])   Patient Active Problem List    Diagnosis Date Noted Date Diagnosed    Acute CHF (congestive heart failure) ([facility]) 2024     Lesion of bladder 2023     Hiatal hernia 2023     Anemia, unspecified 2023     PMR (polymyalgia rheumatica) ([facility]) 2023     Chronic rhinitis 2023     Chronic systolic CHF (congestive heart failure), NYHA class 2 ([facility]) 2022     Iron deficiency 06/10/2022     Anxiety 06/10/2022     Bilateral inguinal hernia without obstruction or gangrene 2022     Protein-calorie malnutrition, unspecified severity ([facility]) 2021     Paroxysmal supraventricular tachycardia 2021     Pleural effusion on left 2020     Post-nasal drip 2020     Benign prostatic hyperplasia 2018     Hyperlipidemia 2018     Prediabetes 2018     Essential hypertension 2018     Ischemic cardiomyopathy 2018     Hx of CABG 2018     S/P mitral valve repair 2018       LOS (days): 1  Geometric Mean LOS (GMLOS) (days):   Days to GMLOS:     OBJECTIVE:    Risk of Unplanned Readmission Score: 10.95         Current admission status: Inpatient       Preferred Pharmacy:   [facility] - [place] - [address] [place] [address]  [address]  [address]  [place] [address]  Phone: [phone] Fax: [phone]    Primary Care Provider: Gopi Victoria DO    Primary Insurance: AETNA  Secondary Insurance:     ASSESSMENT:  Active Health Care Proxies       Lori Jazdanika [place] [facility] Health Care Representative - Daughter   Primary Phone: [phone] (Home)                 Advance Directives  Does patient have a Health Care POA?: Yes  Does patient have  Advance Directives?: Yes  Advance Directives: Power of  for health care, Living will  Primary Contact: Jaz Sandoval (Daughter)  [phone]         Readmission Root Cause  30 Day Readmission: No    Patient Information  Admitted from:: Home  Mental Status: Alert  During Assessment patient was accompanied by: Daughter  Assessment information provided by:: Patient  Primary Caregiver: Self  Support Systems: Self, Son, Daughter  County of Residence: [place]  What city do you live in?: [place]  Home entry access options. Select all that apply.: Stairs  Number of steps to enter home.: 3  Do the steps have railings?: Yes  Type of Current Residence: 3 story home  Upon entering residence, is there a bedroom on the main floor (no further steps)?: No  A bedroom is located on the following floor levels of residence (select all that apply):: 2nd Floor  Upon entering residence, is there a bathroom on the main floor (no further steps)?: No  Indicate which floors of current residence have a bathroom (select all the apply):: 2nd Floor  Number of steps to 2nd floor from main floor: One Flight  Living Arrangements: Lives w/ Daughter  Is patient a ?: No    Activities of Daily Living Prior to Admission  Functional Status: Independent  Completes ADLs independently?: Yes  Ambulates independently?: Yes  Does patient use assisted devices?: No  Does patient currently own DME?: No  Does patient have a history of Outpatient Therapy (PT/OT)?: Yes  Does the patient have a history of Short-Term Rehab?: Yes ([facility])  Does patient have a history of HHC?: Yes (Cannot remember name of past agency)  Does patient currently have HHC?: No         Patient Information Continued  Income Source: Employed  Does patient have prescription coverage?: Yes  Does patient receive dialysis treatments?: No  Does patient have a history of substance abuse?: No  Does patient have a history of Mental Health Diagnosis?: Yes (Anxiety)  Is patient  receiving treatment for mental health?: No. Patient declined treatment information.  Has patient received inpatient treatment related to mental health in the last 2 years?: No         Means of Transportation  Means of Transport to Appts:: Drives Self      Social Determinants of Health (SDOH)      Flowsheet Row Most Recent Value   Housing Stability    In the last 12 months, was there a time when you were not able to pay the mortgage or rent on time? N   In the past 12 months, how many times have you moved where you were living? 0   At any time in the past 12 months, were you homeless or living in a shelter (including now)? N   Transportation Needs    In the past 12 months, has lack of transportation kept you from medical appointments or from getting medications? no   In the past 12 months, has lack of transportation kept you from meetings, work, or from getting things needed for daily living? No   Food Insecurity    Within the past 12 months, you worried that your food would run out before you got the money to buy more. Never true   Within the past 12 months, the food you bought just didn't last and you didn't have money to get more. Never true   Utilities    In the past 12 months has the electric, gas, oil, or water company threatened to shut off services in your home? No            DISCHARGE DETAILS:    Discharge planning discussed with:: Patient and daughter  Freedom of Choice: Yes     CM contacted family/caregiver?: Yes  Were Treatment Team discharge recommendations reviewed with patient/caregiver?: Yes  Did patient/caregiver verbalize understanding of patient care needs?: Yes  Were patient/caregiver advised of the risks associated with not following Treatment Team discharge recommendations?: Yes    Contacts  Patient Contacts: Jaz Sandoval (Daughter) [phone]  Relationship to Patient:: Family  Contact Method: In Person  Reason/Outcome: Continuity of Care, Emergency Contact, Discharge Planning    Requested  Home Health Care         Is the patient interested in HHC at discharge?: No    DME Referral Provided  Referral made for DME?: No         Would you like to participate in our [facility] service program?  : No - Declined          Transport at Discharge : Family           CM met with patient and patient's daughter at bedside to introduce self and role with DC planning.  Patient resides in a 3 story home with his daughter and son in law.  Patient's bedroom and bathroom are on the 2nd level of the home.  Patient was independent PTA, he drives and is employed FT.  Patient does not utilize or own any DME at home.  Patient reports family will transport him home at time of DC.  Patient does not identify any CM needs at this time, CM department remains available.

## 2024-07-11 PROBLEM — I50.21 ACUTE SYSTOLIC CONGESTIVE HEART FAILURE (HCC): Status: ACTIVE | Noted: 2024-07-09

## 2024-07-11 PROBLEM — E44.0 MODERATE PROTEIN-CALORIE MALNUTRITION (HCC): Status: ACTIVE | Noted: 2024-07-11

## 2024-07-11 LAB
ANION GAP SERPL CALCULATED.3IONS-SCNC: 6 MMOL/L (ref 4–13)
BUN SERPL-MCNC: 23 MG/DL (ref 5–25)
CALCIUM SERPL-MCNC: 8.6 MG/DL (ref 8.4–10.2)
CHLORIDE SERPL-SCNC: 96 MMOL/L (ref 96–108)
CO2 SERPL-SCNC: 33 MMOL/L (ref 21–32)
CREAT SERPL-MCNC: 1.12 MG/DL (ref 0.6–1.3)
GFR SERPL CREATININE-BSD FRML MDRD: 60 ML/MIN/1.73SQ M
GLUCOSE SERPL-MCNC: 187 MG/DL (ref 65–140)
IRON SATN MFR SERPL: 13 % (ref 15–50)
IRON SERPL-MCNC: 30 UG/DL (ref 50–212)
MAGNESIUM SERPL-MCNC: 1.8 MG/DL (ref 1.9–2.7)
POTASSIUM SERPL-SCNC: 3.8 MMOL/L (ref 3.5–5.3)
SODIUM SERPL-SCNC: 135 MMOL/L (ref 135–147)
TIBC SERPL-MCNC: 233 UG/DL (ref 250–450)
UIBC SERPL-MCNC: 203 UG/DL (ref 155–355)

## 2024-07-11 PROCEDURE — 83735 ASSAY OF MAGNESIUM: CPT | Performed by: STUDENT IN AN ORGANIZED HEALTH CARE EDUCATION/TRAINING PROGRAM

## 2024-07-11 PROCEDURE — 99232 SBSQ HOSP IP/OBS MODERATE 35: CPT | Performed by: STUDENT IN AN ORGANIZED HEALTH CARE EDUCATION/TRAINING PROGRAM

## 2024-07-11 PROCEDURE — 80048 BASIC METABOLIC PNL TOTAL CA: CPT | Performed by: STUDENT IN AN ORGANIZED HEALTH CARE EDUCATION/TRAINING PROGRAM

## 2024-07-11 PROCEDURE — 99232 SBSQ HOSP IP/OBS MODERATE 35: CPT | Performed by: INTERNAL MEDICINE

## 2024-07-11 RX ORDER — FUROSEMIDE 20 MG/1
20 TABLET ORAL DAILY
Status: DISCONTINUED | OUTPATIENT
Start: 2024-07-12 | End: 2024-07-12 | Stop reason: HOSPADM

## 2024-07-11 RX ADMIN — IRON SUCROSE 200 MG: 20 INJECTION, SOLUTION INTRAVENOUS at 08:31

## 2024-07-11 RX ADMIN — TAMSULOSIN HYDROCHLORIDE 0.8 MG: 0.4 CAPSULE ORAL at 21:18

## 2024-07-11 RX ADMIN — CITALOPRAM HYDROBROMIDE 10 MG: 20 TABLET ORAL at 08:26

## 2024-07-11 RX ADMIN — POTASSIUM CHLORIDE 40 MEQ: 1500 TABLET, EXTENDED RELEASE ORAL at 08:26

## 2024-07-11 RX ADMIN — FUROSEMIDE 20 MG: 10 INJECTION, SOLUTION INTRAMUSCULAR; INTRAVENOUS at 08:28

## 2024-07-11 RX ADMIN — CARVEDILOL 6.25 MG: 6.25 TABLET, FILM COATED ORAL at 16:14

## 2024-07-11 RX ADMIN — ASPIRIN 81 MG CHEWABLE TABLET 81 MG: 81 TABLET CHEWABLE at 08:26

## 2024-07-11 RX ADMIN — ATORVASTATIN CALCIUM 40 MG: 40 TABLET, FILM COATED ORAL at 16:14

## 2024-07-11 RX ADMIN — ENOXAPARIN SODIUM 40 MG: 40 INJECTION SUBCUTANEOUS at 08:29

## 2024-07-11 RX ADMIN — FUROSEMIDE 20 MG: 10 INJECTION, SOLUTION INTRAMUSCULAR; INTRAVENOUS at 16:15

## 2024-07-11 RX ADMIN — CARVEDILOL 6.25 MG: 6.25 TABLET, FILM COATED ORAL at 08:26

## 2024-07-11 RX ADMIN — LISINOPRIL 5 MG: 5 TABLET ORAL at 08:26

## 2024-07-11 NOTE — PLAN OF CARE
Problem: Potential for Falls  Goal: Patient will remain free of falls  Description: INTERVENTIONS:  - Educate patient/family on patient safety including physical limitations  - Instruct patient to call for assistance with activity   - Consult OT/PT to assist with strengthening/mobility   - Keep Call bell within reach  - Keep bed low and locked with side rails adjusted as appropriate  - Keep care items and personal belongings within reach  - Initiate and maintain comfort rounds  - Make Fall Risk Sign visible to staff  - Offer Toileting every 3 Hours, in advance of need  - Initiate/Maintain bed alarm  - Obtain necessary fall risk management equipment:   - Apply yellow socks and bracelet for high fall risk patients  - Consider moving patient to room near nurses station  Outcome: Progressing     Problem: SAFETY ADULT  Goal: Patient will remain free of falls  Description: INTERVENTIONS:  - Educate patient/family on patient safety including physical limitations  - Instruct patient to call for assistance with activity   - Consult OT/PT to assist with strengthening/mobility   - Keep Call bell within reach  - Keep bed low and locked with side rails adjusted as appropriate  - Keep care items and personal belongings within reach  - Initiate and maintain comfort rounds  - Make Fall Risk Sign visible to staff  - Offer Toileting every 3 Hours, in advance of need  - Initiate/Maintain bed alarm  - Obtain necessary fall risk management equipment:   - Apply yellow socks and bracelet for high fall risk patients  - Consider moving patient to room near nurses station  Outcome: Progressing  Goal: Maintain or return to baseline ADL function  Description: INTERVENTIONS:  -  Assess patient's ability to carry out ADLs; assess patient's baseline for ADL function and identify physical deficits which impact ability to perform ADLs (bathing, care of mouth/teeth, toileting, grooming, dressing, etc.)  - Assess/evaluate cause of self-care  deficits   - Assess range of motion  - Assess patient's mobility; develop plan if impaired  - Assess patient's need for assistive devices and provide as appropriate  - Encourage maximum independence but intervene and supervise when necessary  - Involve family in performance of ADLs  - Assess for home care needs following discharge   - Consider OT consult to assist with ADL evaluation and planning for discharge  - Provide patient education as appropriate  Outcome: Progressing  Goal: Maintains/Returns to pre admission functional level  Description: INTERVENTIONS:  - Perform AM-PAC 6 Click Basic Mobility/ Daily Activity assessment daily.  - Set and communicate daily mobility goal to care team and patient/family/caregiver.   - Collaborate with rehabilitation services on mobility goals if consulted  - Perform Range of Motion 3 times a day.  - Reposition patient every 2 hours.  - Dangle patient 3 times a day  - Stand patient 3 times a day  - Ambulate patient 3 times a day  - Out of bed to chair 3 times a day   - Out of bed for meals 3 times a day  - Out of bed for toileting  - Record patient progress and toleration of activity level   Outcome: Progressing     Problem: CARDIOVASCULAR - ADULT  Goal: Maintains optimal cardiac output and hemodynamic stability  Description: INTERVENTIONS:  - Monitor I/O, vital signs and rhythm  - Monitor for S/S and trends of decreased cardiac output  - Administer and titrate ordered vasoactive medications to optimize hemodynamic stability  - Assess quality of pulses, skin color and temperature  - Assess for signs of decreased coronary artery perfusion  - Instruct patient to report change in severity of symptoms  Outcome: Progressing     Problem: RESPIRATORY - ADULT  Goal: Achieves optimal ventilation and oxygenation  Description: INTERVENTIONS:  - Assess for changes in respiratory status  - Assess for changes in mentation and behavior  - Position to facilitate oxygenation and minimize  respiratory effort  - Oxygen administered by appropriate delivery if ordered  - Initiate smoking cessation education as indicated  - Encourage broncho-pulmonary hygiene including cough, deep breathe, Incentive Spirometry  - Assess the need for suctioning and aspirate as needed  - Assess and instruct to report SOB or any respiratory difficulty  - Respiratory Therapy support as indicated  Outcome: Progressing     Problem: DISCHARGE PLANNING  Goal: Discharge to home or other facility with appropriate resources  Description: INTERVENTIONS:  - Identify barriers to discharge w/patient and caregiver  - Arrange for needed discharge resources and transportation as appropriate  - Identify discharge learning needs (meds, wound care, etc.)  - Arrange for interpretive services to assist at discharge as needed  - Refer to Case Management Department for coordinating discharge planning if the patient needs post-hospital services based on physician/advanced practitioner order or complex needs related to functional status, cognitive ability, or social support system  Outcome: Progressing     Problem: Knowledge Deficit  Goal: Patient/family/caregiver demonstrates understanding of disease process, treatment plan, medications, and discharge instructions  Description: Complete learning assessment and assess knowledge base.  Interventions:  - Provide teaching at level of understanding  - Provide teaching via preferred learning methods  Outcome: Progressing     Problem: METABOLIC, FLUID AND ELECTROLYTES - ADULT  Goal: Fluid balance maintained  Description: INTERVENTIONS:  - Monitor labs   - Monitor I/O and WT  - Instruct patient on fluid and nutrition as appropriate  - Assess for signs & symptoms of volume excess or deficit  Outcome: Progressing

## 2024-07-11 NOTE — ASSESSMENT & PLAN NOTE
Malnutrition Findings:      Adult Degree of Malnutrition: Malnutrition of moderate degree                          BMI Findings:           Body mass index is 18.78 kg/m².    Continue oral nutritional support.

## 2024-07-11 NOTE — ASSESSMENT & PLAN NOTE
Neurohormonal Blockade:  --Beta Blocker: Coreg 6.25 mg BID  --ARNi / ACEi / ARB: Lisinopril 5 mg QD  --Aldosterone Antagonist: None  --SGLT2 Inhibitor: None  --Home Diuretic: None  --Inpatient Diuretic: PO Lasix 20 mg QD  Euvolemic at 119# today. Continue daily standing weights.

## 2024-07-11 NOTE — PLAN OF CARE
Problem: METABOLIC, FLUID AND ELECTROLYTES - ADULT  Goal: Fluid balance maintained  Description: INTERVENTIONS:  - Monitor labs   - Monitor I/O and WT  - Instruct patient on fluid and nutrition as appropriate  - Assess for signs & symptoms of volume excess or deficit  Outcome: Progressing     Problem: RESPIRATORY - ADULT  Goal: Achieves optimal ventilation and oxygenation  Description: INTERVENTIONS:  - Assess for changes in respiratory status  - Assess for changes in mentation and behavior  - Position to facilitate oxygenation and minimize respiratory effort  - Oxygen administered by appropriate delivery if ordered  - Initiate smoking cessation education as indicated  - Encourage broncho-pulmonary hygiene including cough, deep breathe, Incentive Spirometry  - Assess the need for suctioning and aspirate as needed  - Assess and instruct to report SOB or any respiratory difficulty  - Respiratory Therapy support as indicated  Outcome: Progressing     Problem: Knowledge Deficit  Goal: Patient/family/caregiver demonstrates understanding of disease process, treatment plan, medications, and discharge instructions  Description: Complete learning assessment and assess knowledge base.  Interventions:  - Provide teaching at level of understanding  - Provide teaching via preferred learning methods  Outcome: Progressing     Problem: DISCHARGE PLANNING  Goal: Discharge to home or other facility with appropriate resources  Description: INTERVENTIONS:  - Identify barriers to discharge w/patient and caregiver  - Arrange for needed discharge resources and transportation as appropriate  - Identify discharge learning needs (meds, wound care, etc.)  - Arrange for interpretive services to assist at discharge as needed  - Refer to Case Management Department for coordinating discharge planning if the patient needs post-hospital services based on physician/advanced practitioner order or complex needs related to functional status,  cognitive ability, or social support system  Outcome: Progressing

## 2024-07-11 NOTE — PROGRESS NOTES
[facility]  Progress Note  Name: Raleigh Sandoval I  MRN: [id number]  Unit/Bed#: E4 MS [id number] I Date of Admission: 7/9/2024   Date of Service: 7/11/2024 I Hospital Day: 2    Assessment & Plan   Hx of CABG  Assessment & Plan  No current evidence of myocardial ischemia.    Iron deficiency anemia due to chronic blood loss  Assessment & Plan  The patient has documented iron deficiency as lab work.  Iron studies will be repeated.  Patient will be started on Venofer infusion.    Moderate protein-calorie malnutrition (HCC)  Assessment & Plan  Malnutrition Findings:      Adult Degree of Malnutrition: Malnutrition of moderate degree                          BMI Findings:           Body mass index is 18.78 kg/m².    Continue oral nutritional support.      Essential hypertension  Assessment & Plan  Continue current therapy.    Hyperlipidemia  Assessment & Plan  Atorvastatin.               Subjective:  The patient is feeling better.  His breathing has improved.  He has no chest pain.  He slept pretty well.  He is eating well.  He denies abdominal pain, nausea, or vomiting.    Physical Exam:   Temp:  [98 °F (36.7 °C)-99.8 °F (37.7 °C)] 98 °F (36.7 °C)  HR:  [62-72] 72  Resp:  [14-18] 14  BP: (130-136)/(65-91) 136/91    Gen: Well-developed, thin, in no distress  Neck: Supple.  No lymphadenopathy, goiter, or bruit.  Heart: Regular rhythm.  No murmur or gallop.  Lungs: Clear to auscultation and percussion.  No wheezing, rales, or rhonchi.  Abd: Soft with active bowel sounds.  No mass, tenderness, organomegaly.  Extremities: No clubbing, cyanosis, or edema.  No calf tenderness.  Neuro: Alert and oriented.  No focal sign.  Skin: Warm and dry.      LABS:   CMP:   Lab Results   Component Value Date    SODIUM 135 07/11/2024    K 3.8 07/11/2024    CL 96 07/11/2024    CO2 33 (H) 07/11/2024    BUN 23 07/11/2024    CREATININE 1.12 07/11/2024    CALCIUM 8.6 07/11/2024    EGFR 60 07/11/2024         VTE  Pharmacologic Prophylaxis: Enoxaparin (Lovenox)  VTE Mechanical Prophylaxis: reason for no mechanical VTE prophylaxis CHF

## 2024-07-11 NOTE — PROGRESS NOTES
Cardiology Progress Note - Raleigh Sandoval 83 y.o. male MRN: [id number]    Unit/Bed#: E4 -01 Encounter: [id number]      Assessment & Plan:    Acute on chronic heart failure with midrange ejection fraction  - Ischemic cardiomyopathy  -TTE 6/11/2021 showed EF 45%, probable diastolic dysfunction, hypokinesis of the inferior wall, mildly dilated RV with mildly reduced function, moderate LA, mitral valve repair with moderate eccentric MR, probable mild MS mean gradient 5.7 mmHg, mild to moderate TR with estimated PA pressure 45 mmHg  -TTE 7/10/2024 showed EF 43%, mildly dilated RV with mildly reduced function, severe LAE, moderate RA, mitral valve repair with mild to moderate MR, mild to moderate TR, estimated PA pressure 45 mmHg  - on admission  - Chest x-ray on admission showed small left pleural effusion, diffuse interstitial opacities, ovoid opacity in the left lung apex with adjacent groundglass nodular densities  - Not on diuretics prior to admission  - Current diuretic Rx furosemide 20 mg IV twice daily    Essential hypertension  - Continue with lisinopril 5 mg daily and Coreg 6.25 mg twice daily    Coronary artery disease  - Status post CABG x 3 in 2012  - Continue with aspirin 81 mg daily    S/P mitral valve repair   - repaired with a 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    Hyperlipidemia  - Continue with atorvastatin 40 mg daily    Iron deficiency anemia due to chronic blood loss     Summary:  -Patient feeling better today and appears more euvolemic  - Will give final dose of furosemide 20 mg IV this evening and change to oral furosemide 20 mg daily tomorrow  - No significant change on repeat TTE  - Check daily standing weights  - Monitor creatinine and electrolytes closely  - Should be stable for discharge from a cardiac standpoint in the next 24 to 48 hours      Subjective:   No significant events overnight.  He reports feeling better today.  Reports significant improvement in his breathing and edema.   "Denies chest pain, orthopnea, abdominal pain, nausea, vomiting, fever, chills, headache, dizziness or palpitations.    Objective:     Vitals: Blood pressure 136/91, pulse 72, temperature 98 °F (36.7 °C), temperature source Temporal, resp. rate 14, height 5' 7\" (1.702 m), weight 54.4 kg (119 lb 14.9 oz), SpO2 94%., Body mass index is 18.78 kg/m².,   Orthostatic Blood Pressures      Flowsheet Row Most Recent Value   Blood Pressure 136/91 filed at 07/11/2024 0732   Patient Position - Orthostatic VS Lying filed at 07/11/2024 0732              Intake/Output Summary (Last 24 hours) at 7/11/2024 1034  Last data filed at 7/11/2024 0906  Gross per 24 hour   Intake 1020 ml   Output 2425 ml   Net -1405 ml           Physical Exam:    GEN: Raleigh Sandoval appears well, alert and oriented x 3, pleasant and cooperative   HEENT: Mucous membranes moist, no scleral icterus, no conjunctival pallor  NECK: No elevated JVD  HEART: Regular rate and rhythm, normal S1 and S2, 2/6 systolic murmur  LUNGS: Decreased breath sounds at right lower lung base, otherwise clear to auscultation  ABDOMEN: normal bowel sounds, soft, no tenderness, no distention  EXTREMITIES: peripheral pulses normal; no lower extremity edema   NEURO: no focal findings   SKIN: No lesions or rashes on exposed skin        Current Facility-Administered Medications:     acetaminophen (TYLENOL) tablet 650 mg, 650 mg, Oral, Q4H PRN, Elroy S Prechtel, DO    aspirin chewable tablet 81 mg, 81 mg, Oral, Daily, Elroy S Prechtel, DO, 81 mg at 07/11/24 0826    atorvastatin (LIPITOR) tablet 40 mg, 40 mg, Oral, Daily With Dinner, Elroy S Prechtel, DO, 40 mg at 07/10/24 1641    carvedilol (COREG) tablet 6.25 mg, 6.25 mg, Oral, BID With Meals, Elroy S Prechtel, DO, 6.25 mg at 07/11/24 0826    citalopram (CeleXA) tablet 10 mg, 10 mg, Oral, Daily, Elroy S Prechtel, DO, 10 mg at 07/11/24 0826    enoxaparin (LOVENOX) subcutaneous injection 40 mg, 40 mg, Subcutaneous, Daily, " Elroy S Prechtel, DO, 40 mg at 07/11/24 0829    furosemide (LASIX) injection 20 mg, 20 mg, Intravenous, BID (diuretic), Elroy S Prechtel, DO, 20 mg at 07/11/24 0828    iron sucrose (VENOFER) 200 mg in sodium chloride 0.9 % 100 mL IVPB, 200 mg, Intravenous, Daily, Des Altamirano MD, Last Rate: 100 mL/hr at 07/11/24 0831, 200 mg at 07/11/24 0831    lisinopril (ZESTRIL) tablet 5 mg, 5 mg, Oral, Daily, Elroy S Prechtel, DO, 5 mg at 07/11/24 0826    tamsulosin (FLOMAX) capsule 0.8 mg, 0.8 mg, Oral, HS, Elroy S Prechtel, DO, 0.8 mg at 07/10/24 2102    Labs & Results:    Lab Results   Component Value Date    CKTOTAL 63 03/12/2022    CKTOTAL 82 04/09/2016    CKTOTAL 75 02/13/2016    TROPONINI 0.04 03/17/2015    TROPONINI 0.05 (H) 03/16/2015       Lab Results   Component Value Date    GLUCOSE 104 01/08/2016    CALCIUM 8.6 07/11/2024     01/08/2016    K 3.8 07/11/2024    CO2 33 (H) 07/11/2024    CL 96 07/11/2024    BUN 23 07/11/2024    CREATININE 1.12 07/11/2024       Lab Results   Component Value Date    WBC 6.15 07/10/2024    HGB 9.9 (L) 07/10/2024    HCT 30.8 (L) 07/10/2024    MCV 97 07/10/2024     07/10/2024     Results from last 7 days   Lab Units 07/09/24  1134   INR  1.10       Lab Results   Component Value Date    CHOL 127 11/28/2015    CHOL 105 10/11/2014     Lab Results   Component Value Date    HDL 67 06/07/2024    HDL 53 10/16/2021     Lab Results   Component Value Date    LDLCALC 76 06/07/2024    LDLCALC 74 10/16/2021     Lab Results   Component Value Date    TRIG 52 06/07/2024    TRIG 40 10/16/2021       Lab Results   Component Value Date    ALT 14 07/09/2024    AST 19 07/09/2024    ALKPHOS 69 07/09/2024         EKG personally reviewed by )Lino Matthews MD. No acute changes

## 2024-07-12 ENCOUNTER — APPOINTMENT (INPATIENT)
Dept: CT IMAGING | Facility: HOSPITAL | Age: 84
DRG: 291 | End: 2024-07-12
Payer: COMMERCIAL

## 2024-07-12 VITALS
HEART RATE: 72 BPM | RESPIRATION RATE: 16 BRPM | OXYGEN SATURATION: 92 % | SYSTOLIC BLOOD PRESSURE: 139 MMHG | DIASTOLIC BLOOD PRESSURE: 71 MMHG | WEIGHT: 117.5 LBS | HEIGHT: 67 IN | BODY MASS INDEX: 18.44 KG/M2 | TEMPERATURE: 99.1 F

## 2024-07-12 DIAGNOSIS — R93.89 ABNORMAL CT OF THE CHEST: Primary | ICD-10-CM

## 2024-07-12 PROCEDURE — 99239 HOSP IP/OBS DSCHRG MGMT >30: CPT | Performed by: INTERNAL MEDICINE

## 2024-07-12 PROCEDURE — 71250 CT THORAX DX C-: CPT

## 2024-07-12 PROCEDURE — 99232 SBSQ HOSP IP/OBS MODERATE 35: CPT | Performed by: STUDENT IN AN ORGANIZED HEALTH CARE EDUCATION/TRAINING PROGRAM

## 2024-07-12 PROCEDURE — 99255 IP/OBS CONSLTJ NEW/EST HI 80: CPT | Performed by: INTERNAL MEDICINE

## 2024-07-12 RX ORDER — FUROSEMIDE 20 MG/1
20 TABLET ORAL DAILY
Qty: 30 TABLET | Refills: 0 | Status: SHIPPED | OUTPATIENT
Start: 2024-07-13 | End: 2024-07-17 | Stop reason: SDUPTHER

## 2024-07-12 RX ADMIN — CITALOPRAM HYDROBROMIDE 10 MG: 20 TABLET ORAL at 09:06

## 2024-07-12 RX ADMIN — IRON SUCROSE 200 MG: 20 INJECTION, SOLUTION INTRAVENOUS at 09:11

## 2024-07-12 RX ADMIN — ATORVASTATIN CALCIUM 40 MG: 40 TABLET, FILM COATED ORAL at 16:45

## 2024-07-12 RX ADMIN — LISINOPRIL 5 MG: 5 TABLET ORAL at 09:07

## 2024-07-12 RX ADMIN — CARVEDILOL 6.25 MG: 6.25 TABLET, FILM COATED ORAL at 16:44

## 2024-07-12 RX ADMIN — FUROSEMIDE 20 MG: 20 TABLET ORAL at 09:07

## 2024-07-12 RX ADMIN — CARVEDILOL 6.25 MG: 6.25 TABLET, FILM COATED ORAL at 09:07

## 2024-07-12 RX ADMIN — ASPIRIN 81 MG CHEWABLE TABLET 81 MG: 81 TABLET CHEWABLE at 09:06

## 2024-07-12 RX ADMIN — ENOXAPARIN SODIUM 40 MG: 40 INJECTION SUBCUTANEOUS at 09:07

## 2024-07-12 NOTE — CONSULTS
Consultation - Pulmonary Medicine   Raleigh Sandoval 83 y.o. male MRN: [id number]  Unit/Bed#: E4 -01 Encounter: [id number]      Assessment/Plan:    Abnormal CT of chest  -CT chest 07/12/2024 shows new small to moderate right pleural effusion.  Loculated fluid in the major fissure correlates with opacity seen in left upper lobe on chest x-ray.  Chronic loculated fluid pocket in left lung base with adjacent rounded atelectasis  -Follow-up outpatient and repeat chest x-ray in 2 weeks    Chronic loculated left-sided lower lobe effusion with new loculated effusion on upper lobe fissure  -CT chest 07/12/2024 shows chronic loculated fluid pocket in left base with adjacent rounded atelectasis seen on previous imaging and new loculated fluid and major fissure and left upper lobe  -Seen in office with Dr. Zhang in 2020  -WBC 6.84-6.15  -Procalcitonin negative  -Afebrile for the last 48 hours  -No indication for Tx at this time  -Recommend follow-up chest x-ray in 2 weeks    Right-sided pleural effusion  -CT chest shows new small to moderate right-sided pleural effusion  -Patient treated for CHF exacerbation this admission  - on admission  -S/p mitral valve repair with mild to moderate MR  -Being discharged on Lasix 20 mg daily  -Follows with cardiology  -No indication for thoracentesis    Acute systolic CHF w/ PHT  -presented with shortness of breath and lower extremity edema on admission with   -TTE 7/10/2024 shows EF 43% mildly dilated RV with mildly reduced function, severe L AE, moderate RA, mitral valve repair with mild to moderate MR, mild to moderate TR estimated PA pressure 45 mmHg  -Appears euvolemic on exam  -Being discharged on Lasix 20 mg daily  -Follows with cardiology      History of Present Illness   Physician Requesting Consult: Horacio Agosto Pe*  Reason for Consult / Principal Problem: Abnormal CT  Hx and PE limited by: Nothing  Chief Complaint: No new complaints  HPI: Raleigh GONZALEZ  Lori is a 83 y.o. male with PMH of HTN, HDL, CHF, CABG x 3 in 2012, s/p mitral valve repair 5/2023 who presented to [facility] with complaints of worsening shortness of breath with lower extremity edema found to be in acute CHF.  Treated with IV Lasix.  Echo completed showed no significant change.  Euvolemic on exam today and was cleared by cardiology for discharge.  Follow-up CT of chest showed new small to moderate right effusion, loculated fluid within the fissure and chronic loculated fluid in the left lung base.    Pulmonary was consulted for abnormal CT of the chest, with new right-sided pleural effusion after treatment for CHF and loculated effusion in the major fissure and chronic loculated fluid in the left lung base.  Patient seen and examined at bedside.  Found resting comfortably in bed.  Does not appear in any respiratory distress.  He is currently on room air saturation 98%.  Denies any fever chills night sweats chest pain or hemoptysis.  No cough or sputum production present.    From a pulmonary standpoint patient was seen outpatient by Dr. Zhang in 2020 for loculated pleural effusion on left side.  At that time no further treatment was indicated.  Patient was asymptomatic.  Was given nasal spray for chronic postnasal drip.  He has smoking history that is significant for 6-pack-year history of smoking but quit over 50 years ago.  He denies any recent travel or sick exposure.  He denies any occupational or environmental hazards.  He denies any mold dust or silica exposure    Inpatient consult to Pulmonology  Consult performed by: JARRED Momin  Consult ordered by: Horacio Carl MD        Review of Systems   Respiratory:  Negative for cough, chest tightness, shortness of breath and wheezing.    Cardiovascular:  Negative for chest pain.   Gastrointestinal:  Negative for diarrhea, nausea and vomiting.       Historical Information   Past Medical History:  "  Diagnosis Date    BPH (benign prostatic hyperplasia)     Chronic pain disorder     back    Coronary artery disease     Gallstones     History of tracheostomy     per pt had it \"after CABG surgery--was admitted for 53 days including Rehab\"    Hx of CABG     per pt 7-8 yrs ago    Hypercholesterolemia     Hypertension     Moderate exercise     alot of walking-works FT and also walks dog 2 miles daily usually    Myocardial infarction (HCC)     per pt approx 7-8yrs ago --[facility] cardio Dr ASHWIN Castillo sees yearly    Prediabetes     Shortness of breath     per pt\"climbing steps or walking long distance\"    Wears glasses      Past Surgical History:   Procedure Laterality Date    CATARACT EXTRACTION Bilateral     COLONOSCOPY      CORONARY ARTERY BYPASS GRAFT      x3 LIMA-LAD, SVG-OM, SVG-DIAG with mitral valve repair by nicole    CYSTOSCOPY  12/13/2014    Diagnostic    CYSTOSCOPY  03/24/2023    [facility]    ESOPHAGOGASTRODUODENOSCOPY  04/07/2015    with biopsy. Dr Mazariegos    FL RETROGRADE PYELOGRAM  5/17/2023    MITRAL VALVE REPAIR      26 mm fatmata hernandez physi annuloplasty ring    WA CYSTO BLADDER W/URETERAL CATHETERIZATION Right 5/17/2023    Procedure: CYSTO, URETHERAL DILATION, RETROGRADE PYELOGRAM W/ INSERTION STENT URETERAL;  Surgeon: Kee Moya MD;  Location: [facility];  Service: Urology    WA CYSTO W/REMOVAL OF LESIONS SMALL N/A 5/17/2023    Procedure: (TURBT);  Surgeon: Kee Moya MD;  Location[facility];  Service: Urology    WA CYSTO/PYELOSCOPY BX&/FULGURATION PELIVC LESION Right 5/17/2023    Procedure: URETEROSCOPY,WITH URETERAL BRUSHING;  Surgeon: Kee Moya MD;  Location[facility];  Service: Urology    TRACHEOSTOMY      during his heart surgery     Social History   Social History     Substance and Sexual Activity   Alcohol Use Yes    Comment: per pt very rarely     Social History     Substance and Sexual Activity   Drug Use No     Social History     Tobacco Use   Smoking " "Status Former    Current packs/day: 0.00    Average packs/day: 0.5 packs/day for 16.0 years (8.0 ttl pk-yrs)    Types: Cigarettes    Start date:     Quit date:     Years since quittin.5    Passive exposure: Past   Smokeless Tobacco Never     E-Cigarette/Vaping    E-Cigarette Use Never User      E-Cigarette/Vaping Substances     Occupational History: Worked at [employer]    Family History:   Family History   Problem Relation Age of Onset    Colon cancer Father     Kidney disease Father     Diabetes Mother     Heart disease Mother     Hypertension Mother     Cervical cancer Maternal Aunt     Breast cancer Paternal Aunt     Colon cancer Paternal Grandfather     Heart disease Sister     Heart disease Brother        Meds/Allergies   all current active meds have been reviewed    No Known Allergies    Objective   Vitals: Blood pressure 116/64, pulse 75, temperature 98.7 °F (37.1 °C), temperature source Temporal, resp. rate 16, height 5' 7\" (1.702 m), weight 53.3 kg (117 lb 8.1 oz), SpO2 93%.RA,Body mass index is 18.4 kg/m².    Intake/Output Summary (Last 24 hours) at 2024 1252  Last data filed at 2024 0853  Gross per 24 hour   Intake 720 ml   Output 750 ml   Net -30 ml     Invasive Devices       Peripheral Intravenous Line  Duration             Peripheral IV 24 Left Wrist 3 days              Drain  Duration             Ureteral Internal Stent Right ureter 422 days                    Physical Exam  Vitals reviewed.   Constitutional:       General: He is not in acute distress.     Appearance: He is not ill-appearing.   HENT:      Head: Normocephalic.      Right Ear: External ear normal.      Left Ear: External ear normal.      Nose: Nose normal.      Mouth/Throat:      Mouth: Mucous membranes are moist.      Pharynx: Oropharynx is clear.   Eyes:      Extraocular Movements: Extraocular movements intact.      Pupils: Pupils are equal, round, and reactive to light.   Cardiovascular:      Rate and " "Rhythm: Normal rate and regular rhythm.      Pulses: Normal pulses.      Heart sounds: Normal heart sounds.   Pulmonary:      Effort: Pulmonary effort is normal.      Breath sounds: No wheezing, rhonchi or rales.      Comments: Diminished breath sounds in bilateral bases  Abdominal:      General: Bowel sounds are normal.      Tenderness: There is no abdominal tenderness.   Musculoskeletal:         General: Normal range of motion.      Right lower leg: No edema.      Left lower leg: No edema.   Skin:     General: Skin is warm and dry.   Neurological:      Mental Status: He is alert and oriented to person, place, and time.   Psychiatric:         Mood and Affect: Mood normal.         Behavior: Behavior normal.         Thought Content: Thought content normal.         Judgment: Judgment normal.         Lab Results: I have personally reviewed pertinent lab results., ABG: No results found for: \"PHART\", \"KMW2XFH\", \"PO2ART\", \"ALH9CIS\", \"U4PKLRPJ\", \"BEART\", \"SOURCE\", BNP: No results found for: \"BNP\", CBC: No results found for: \"WBC\", \"HGB\", \"HCT\", \"MCV\", \"PLT\", \"ADJUSTEDWBC\", \"RBC\", \"MCH\", \"MCHC\", \"RDW\", \"MPV\", \"NRBC\", CMP: No results found for: \"SODIUM\", \"K\", \"CL\", \"CO2\", \"ANIONGAP\", \"BUN\", \"CREATININE\", \"GLUCOSE\", \"CALCIUM\", \"AST\", \"ALT\", \"ALKPHOS\", \"PROT\", \"BILITOT\", \"EGFR\"      Flu/COVID/RSV PCR: All negative    BNP: 485    Imaging Studies: I have personally reviewed pertinent reports.     CT chest 07/12/2024 shows new small to moderate right pleural effusion.  Loculated fluid in the major fissure correlates with opacity seen in left upper lung on chest x-ray.  Chronic loculated fluid pocket in left lung base with adjacent rounded atelectasis.  Moderate to large hiatal hernia    EKG, Pathology, and Other Studies: I have personally reviewed pertinent reports.    -TTE 7/10/2024 shows EF 43% mildly dilated RV with mildly reduced function, severe L AE, moderate RA, mitral valve repair with mild to moderate MR, mild to " "moderate TR estimated PA pressure 45 mmHg     Pulmonary Results (PFTs, PSG): I have personally reviewed pertinent reports.     No formal PFTs    Code Status: Level 1 - Full Code    Portions of the record may have been created with voice recognition software.  Occasional wrong word or \"sound a like\" substitutions may have occurred due to the inherent limitations of voice recognition software.  Read the chart carefully and recognize, using context, where substitutions have occurred.  "

## 2024-07-12 NOTE — ASSESSMENT & PLAN NOTE
CT of the chest was ordered to follow-up on his initial x-ray 2 days ago which showed an ovoid opacity in the left apex  CT of the chest today showed new small to moderate right effusion, loculated fluid within the fissure, and chronic loculated fluid in the left lung base.  Findings discussed with patient and daughter at bedside.  Will consult pulmonary regarding persistent fluid despite CHF treatment

## 2024-07-12 NOTE — PROGRESS NOTES
Cardiology Progress Note - Raleigh Sandoval 83 y.o. male MRN: [id number]    Unit/Bed#: E4 -01 Encounter: [id number]      Assessment & Plan:    Acute on chronic heart failure with midrange ejection fraction  - Ischemic cardiomyopathy  -TTE 6/11/2021 showed EF 45%, probable diastolic dysfunction, hypokinesis of the inferior wall, mildly dilated RV with mildly reduced function, moderate LA, mitral valve repair with moderate eccentric MR, probable mild MS mean gradient 5.7 mmHg, mild to moderate TR with estimated PA pressure 45 mmHg  -TTE 7/10/2024 showed EF 43%, mildly dilated RV with mildly reduced function, severe LAE, moderate RA, mitral valve repair with mild to moderate MR, mild to moderate TR, estimated PA pressure 45 mmHg  - on admission  - Chest x-ray on admission showed small left pleural effusion, diffuse interstitial opacities, ovoid opacity in the left lung apex with adjacent groundglass nodular densities  - Not on diuretics prior to admission  - Current diuretic Rx furosemide 20 mg p.o. daily    Essential hypertension  - Continue with lisinopril 5 mg daily and Coreg 6.25 mg twice daily    Coronary artery disease  - Status post CABG x 3 in 2012  - Continue with aspirin 81 mg daily    S/P mitral valve repair   - repaired with a 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    Hyperlipidemia  - Continue with atorvastatin 40 mg daily    Iron deficiency anemia due to chronic blood loss     Summary:  -Weights remain stable today and he appears euvolemic on exam  - Continue with furosemide 20 mg p.o. daily  -Stable from a cardiac standpoint for discharge home today  - Will message office to arrange an outpatient follow-up appointment  - Consider adding Jardiance in the office      Subjective:   No significant events overnight.  He reports feeling well today and has no complaints.  Discussed heart failure diagnosis and ways to prevent recurrent heart failure exacerbations.  Denies chest pain, orthopnea, abdominal pain,  "nausea, vomiting, fever, chills, headache, dizziness or palpitations.    Objective:     Vitals: Blood pressure 116/64, pulse 75, temperature 98.7 °F (37.1 °C), temperature source Temporal, resp. rate 16, height 5' 7\" (1.702 m), weight 53.3 kg (117 lb 8.1 oz), SpO2 93%., Body mass index is 18.4 kg/m².,   Orthostatic Blood Pressures      Flowsheet Row Most Recent Value   Blood Pressure 116/64 filed at 07/12/2024 0702   Patient Position - Orthostatic VS Lying filed at 07/12/2024 0762              Intake/Output Summary (Last 24 hours) at 7/12/2024 1033  Last data filed at 7/12/2024 0853  Gross per 24 hour   Intake 720 ml   Output 1200 ml   Net -480 ml           Physical Exam:    GEN: Raleigh Sandoval appears well, alert and oriented x 3, pleasant and cooperative   HEENT: Mucous membranes moist, no scleral icterus, no conjunctival pallor  NECK: No elevated JVD  HEART: Regular rate and rhythm, normal S1 and S2, 2/6 systolic murmur  LUNGS: Clear to auscultation bilaterally  ABDOMEN: normal bowel sounds, soft, no tenderness, no distention  EXTREMITIES: peripheral pulses normal; no lower extremity edema   NEURO: no focal findings   SKIN: No lesions or rashes on exposed skin        Current Facility-Administered Medications:     acetaminophen (TYLENOL) tablet 650 mg, 650 mg, Oral, Q4H PRN, Elroy S Prechtel, DO    aspirin chewable tablet 81 mg, 81 mg, Oral, Daily, Elroy S Prechtel, DO, 81 mg at 07/12/24 0906    atorvastatin (LIPITOR) tablet 40 mg, 40 mg, Oral, Daily With Dinner, Elroy S Prechtel, DO, 40 mg at 07/11/24 1614    carvedilol (COREG) tablet 6.25 mg, 6.25 mg, Oral, BID With Meals, Elroy S Prechtel, DO, 6.25 mg at 07/12/24 0907    citalopram (CeleXA) tablet 10 mg, 10 mg, Oral, Daily, Elroy S Prechtel, DO, 10 mg at 07/12/24 0906    enoxaparin (LOVENOX) subcutaneous injection 40 mg, 40 mg, Subcutaneous, Daily, Elroy Diaz DO, 40 mg at 07/12/24 0907    furosemide (LASIX) tablet 20 mg, 20 mg, Oral, " Daily, Lino Matthews MD, 20 mg at 07/12/24 0907    iron sucrose (VENOFER) 200 mg in sodium chloride 0.9 % 100 mL IVPB, 200 mg, Intravenous, Daily, Des Altamirano MD, Last Rate: 100 mL/hr at 07/12/24 0911, 200 mg at 07/12/24 0911    lisinopril (ZESTRIL) tablet 5 mg, 5 mg, Oral, Daily, Elroy S Prechtel, DO, 5 mg at 07/12/24 0907    tamsulosin (FLOMAX) capsule 0.8 mg, 0.8 mg, Oral, HS, Elroy S Prechtel, DO, 0.8 mg at 07/11/24 2118    Labs & Results:    Lab Results   Component Value Date    CKTOTAL 63 03/12/2022    CKTOTAL 82 04/09/2016    CKTOTAL 75 02/13/2016    TROPONINI 0.04 03/17/2015    TROPONINI 0.05 (H) 03/16/2015       Lab Results   Component Value Date    GLUCOSE 104 01/08/2016    CALCIUM 8.6 07/11/2024     01/08/2016    K 3.8 07/11/2024    CO2 33 (H) 07/11/2024    CL 96 07/11/2024    BUN 23 07/11/2024    CREATININE 1.12 07/11/2024       Lab Results   Component Value Date    WBC 6.15 07/10/2024    HGB 9.9 (L) 07/10/2024    HCT 30.8 (L) 07/10/2024    MCV 97 07/10/2024     07/10/2024     Results from last 7 days   Lab Units 07/09/24  1134   INR  1.10       Lab Results   Component Value Date    CHOL 127 11/28/2015    CHOL 105 10/11/2014     Lab Results   Component Value Date    HDL 67 06/07/2024    HDL 53 10/16/2021     Lab Results   Component Value Date    LDLCALC 76 06/07/2024    LDLCALC 74 10/16/2021     Lab Results   Component Value Date    TRIG 52 06/07/2024    TRIG 40 10/16/2021       Lab Results   Component Value Date    ALT 14 07/09/2024    AST 19 07/09/2024    ALKPHOS 69 07/09/2024         EKG personally reviewed by )Lino Matthews MD. No acute changes

## 2024-07-12 NOTE — PROGRESS NOTES
[facility]  Progress Note  Name: Raleigh Sandoval I  MRN: [id number]  Unit/Bed#: E4 -01 I Date of Admission: 7/9/2024   Date of Service: 7/12/2024 I Hospital Day: 3    Assessment & Plan   * Acute systolic congestive heart failure, HFmrEF, LVEF 45%, LVIDd 5.4 cm, etiology is ICM  Assessment & Plan  Acute on chronic systolic congestive heart failure, known ischemic cardiomyopathy  Clinically improved and stable for discharge on: Lasix 20 mg daily, lisinopril 5 mg daily, Coreg 6.25 mg twice daily  Outpatient follow-up with cardiology regarding CHF and possible addition of Jardiance    Abnormal CT of the chest  Assessment & Plan  CT of the chest was ordered to follow-up on his initial x-ray 2 days ago which showed an ovoid opacity in the left apex  CT of the chest today showed new small to moderate right effusion, loculated fluid within the fissure, and chronic loculated fluid in the left lung base.  Findings discussed with patient and daughter at bedside.  Will consult pulmonary regarding persistent fluid despite CHF treatment    Hx of CABG x 3, 2012  Assessment & Plan  Stable without acute coronary syndrome  Continue baby aspirin, Lipitor 40 mg daily and Coreg 6.25 mg twice daily  Routine outpatient cardiology follow-up    S/P mitral valve repair, 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, May 2023  Assessment & Plan  Mitral valve disease status post repair    Hyperlipidemia  Assessment & Plan  Continue Lipitor 40 mg daily    Essential hypertension  Assessment & Plan  Stable on Coreg 6.25 mg twice daily and lisinopril 5 mg daily    Iron deficiency anemia due to chronic blood loss  Assessment & Plan  Treated with IV Venofer.  Outpatient follow-up with Dr. Victoria           VTE Pharmacologic Prophylaxis: VTE Score: 4 Moderate Risk (Score 3-4) - Pharmacological DVT Prophylaxis Ordered: enoxaparin (Lovenox).    Patient Centered Rounds: Discussed with his nurse  Discussions with Specialists or Other Care  Team Provider: Discussed with Dr. Matthews and CM    Education and Discussions with Family / Patient: Updated  (sister) at bedside.    Current Length of Stay: 3 day(s)  Current Patient Status: Inpatient   Certification Statement: The patient will continue to require additional inpatient hospital stay due to abnormal chest ct  Discharge Plan: tbd    Code Status: Level 1 - Full Code    Subjective:   He feels better.  He had no shortness of breath or swelling  He plans to go back to work on Monday  We discussed his abnormal chest x-ray and need for follow-up CT which we did.  I showed him his x-ray images and CT images which were still abnormal.  We discussed pulmonary consultation for further evaluation and treatment    Objective:     Vitals:   Temp (24hrs), Av.4 °F (36.9 °C), Min:98.1 °F (36.7 °C), Max:98.7 °F (37.1 °C)    Temp:  [98.1 °F (36.7 °C)-98.7 °F (37.1 °C)] 98.7 °F (37.1 °C)  HR:  [62-75] 75  Resp:  [16] 16  BP: ()/(56-69) 116/64  SpO2:  [93 %-98 %] 93 %  Body mass index is 18.4 kg/m².     Input and Output Summary (last 24 hours):     Intake/Output Summary (Last 24 hours) at 2024 1220  Last data filed at 2024 0853  Gross per 24 hour   Intake 720 ml   Output 750 ml   Net -30 ml       Physical Exam:   Physical Exam  Vitals reviewed.   Constitutional:       Appearance: He is not ill-appearing.   HENT:      Head: Normocephalic and atraumatic.      Nose: No congestion or rhinorrhea.   Eyes:      General: No scleral icterus.  Cardiovascular:      Rate and Rhythm: Normal rate and regular rhythm.   Pulmonary:      Breath sounds: No stridor. No wheezing or rhonchi.   Abdominal:      Palpations: Abdomen is soft.      Tenderness: There is no abdominal tenderness. There is no guarding.   Musculoskeletal:      Right lower leg: No edema.      Left lower leg: No edema.   Skin:     General: Skin is warm.   Neurological:      Mental Status: He is oriented to person, place, and time.    Psychiatric:         Mood and Affect: Mood normal.         Behavior: Behavior normal.     Additional Data:     Labs:  Results from last 7 days   Lab Units 07/10/24  0610   WBC Thousand/uL 6.15   HEMOGLOBIN g/dL 9.9*   HEMATOCRIT % 30.8*   PLATELETS Thousands/uL 258   SEGS PCT % 72   LYMPHO PCT % 10*   MONO PCT % 12   EOS PCT % 5     Results from last 7 days   Lab Units 07/11/24  0906 07/10/24  0610 07/09/24  1134   SODIUM mmol/L 135   < > 140   POTASSIUM mmol/L 3.8   < > 4.0   CHLORIDE mmol/L 96   < > 106   CO2 mmol/L 33*   < > 28   BUN mg/dL 23   < > 28*   CREATININE mg/dL 1.12   < > 0.98   ANION GAP mmol/L 6   < > 6   CALCIUM mg/dL 8.6   < > 8.5   ALBUMIN g/dL  --   --  3.4*   TOTAL BILIRUBIN mg/dL  --   --  0.56   ALK PHOS U/L  --   --  69   ALT U/L  --   --  14   AST U/L  --   --  19   GLUCOSE RANDOM mg/dL 187*   < > 204*    < > = values in this interval not displayed.     Results from last 7 days   Lab Units 07/09/24  1134   INR  1.10             Results from last 7 days   Lab Units 07/09/24  1134   LACTIC ACID mmol/L 1.1   PROCALCITONIN ng/ml <0.05       Lines/Drains:  Invasive Devices       Peripheral Intravenous Line  Duration             Peripheral IV 07/09/24 Left Wrist 3 days              Drain  Duration             Ureteral Internal Stent Right ureter 421 days                          Imaging: Reviewed radiology reports from this admission including: chest xray and chest CT scan and Personally reviewed the following imaging: chest xray and chest CT scan    Recent Cultures (last 7 days):   Results from last 7 days   Lab Units 07/09/24  1145 07/09/24  1134   BLOOD CULTURE  No Growth at 48 hrs. No Growth at 48 hrs.       Last 24 Hours Medication List:   Current Facility-Administered Medications   Medication Dose Route Frequency Provider Last Rate    acetaminophen  650 mg Oral Q4H PRN Elroy S Prechtel, DO      aspirin  81 mg Oral Daily Elroy S Prechtel, DO      atorvastatin  40 mg Oral Daily With  Dinner Elroy S Prechtel, DO      carvedilol  6.25 mg Oral BID With Meals Elroy S Prechtel, DO      citalopram  10 mg Oral Daily Elroy S Prechtel, DO      enoxaparin  40 mg Subcutaneous Daily Elroy S Prechtel, DO      furosemide  20 mg Oral Daily Lino Matthews MD      iron sucrose  200 mg Intravenous Daily Des Altamirano  mg (07/12/24 0911)    lisinopril  5 mg Oral Daily Elroy S Prechtel, DO      tamsulosin  0.8 mg Oral HS Elroy S Prechtel, DO          Today, Patient Was Seen By: Horacio Carl MD    **Please Note: This note may have been constructed using a voice recognition system.**

## 2024-07-12 NOTE — PLAN OF CARE
Problem: Potential for Falls  Goal: Patient will remain free of falls  Description: INTERVENTIONS:  - Educate patient/family on patient safety including physical limitations  - Instruct patient to call for assistance with activity   - Consult OT/PT to assist with strengthening/mobility   - Keep Call bell within reach  - Keep bed low and locked with side rails adjusted as appropriate  - Keep care items and personal belongings within reach  - Initiate and maintain comfort rounds  - Make Fall Risk Sign visible to staff  - Apply yellow socks and bracelet for high fall risk patients  - Consider moving patient to room near nurses station  Outcome: Progressing     Problem: SAFETY ADULT  Goal: Patient will remain free of falls  Description: INTERVENTIONS:  - Educate patient/family on patient safety including physical limitations  - Instruct patient to call for assistance with activity   - Consult OT/PT to assist with strengthening/mobility   - Keep Call bell within reach  - Keep bed low and locked with side rails adjusted as appropriate  - Keep care items and personal belongings within reach  - Initiate and maintain comfort rounds  - Make Fall Risk Sign visible to staff  - Apply yellow socks and bracelet for high fall risk patients  - Consider moving patient to room near nurses station  Outcome: Progressing     Problem: DISCHARGE PLANNING  Goal: Discharge to home or other facility with appropriate resources  Description: INTERVENTIONS:  - Identify barriers to discharge w/patient and caregiver  - Arrange for needed discharge resources and transportation as appropriate  - Identify discharge learning needs (meds, wound care, etc.)  - Arrange for interpretive services to assist at discharge as needed  - Refer to Case Management Department for coordinating discharge planning if the patient needs post-hospital services based on physician/advanced practitioner order or complex needs related to functional status, cognitive  ability, or social support system  Outcome: Progressing     Problem: Knowledge Deficit  Goal: Patient/family/caregiver demonstrates understanding of disease process, treatment plan, medications, and discharge instructions  Description: Complete learning assessment and assess knowledge base.  Interventions:  - Provide teaching at level of understanding  - Provide teaching via preferred learning methods  Outcome: Progressing     Problem: CARDIOVASCULAR - ADULT  Goal: Maintains optimal cardiac output and hemodynamic stability  Description: INTERVENTIONS:  - Monitor I/O, vital signs and rhythm  - Monitor for S/S and trends of decreased cardiac output  - Administer and titrate ordered vasoactive medications to optimize hemodynamic stability  - Assess quality of pulses, skin color and temperature  - Assess for signs of decreased coronary artery perfusion  - Instruct patient to report change in severity of symptoms  Outcome: Progressing     Problem: RESPIRATORY - ADULT  Goal: Achieves optimal ventilation and oxygenation  Description: INTERVENTIONS:  - Assess for changes in respiratory status  - Assess for changes in mentation and behavior  - Position to facilitate oxygenation and minimize respiratory effort  - Oxygen administered by appropriate delivery if ordered  - Initiate smoking cessation education as indicated  - Encourage broncho-pulmonary hygiene including cough, deep breathe, Incentive Spirometry  - Assess the need for suctioning and aspirate as needed  - Assess and instruct to report SOB or any respiratory difficulty  - Respiratory Therapy support as indicated  Outcome: Progressing     Problem: METABOLIC, FLUID AND ELECTROLYTES - ADULT  Goal: Fluid balance maintained  Description: INTERVENTIONS:  - Monitor labs   - Monitor I/O and WT  - Instruct patient on fluid and nutrition as appropriate  - Assess for signs & symptoms of volume excess or deficit  Outcome: Progressing

## 2024-07-12 NOTE — PLAN OF CARE
Problem: Potential for Falls  Goal: Patient will remain free of falls  Description: INTERVENTIONS:  - Educate patient/family on patient safety including physical limitations  - Instruct patient to call for assistance with activity   - Consult OT/PT to assist with strengthening/mobility   - Keep Call bell within reach  - Keep bed low and locked with side rails adjusted as appropriate  - Keep care items and personal belongings within reach  - Initiate and maintain comfort rounds  - Make Fall Risk Sign visible to staff  - Offer Toileting every 2 Hours, in advance of need  - Initiate/Maintain bed alarm  - Obtain necessary fall risk management equipment: alarm  - Apply yellow socks and bracelet for high fall risk patients  - Consider moving patient to room near nurses station  Outcome: Adequate for Discharge     Problem: SAFETY ADULT  Goal: Patient will remain free of falls  Description: INTERVENTIONS:  - Educate patient/family on patient safety including physical limitations  - Instruct patient to call for assistance with activity   - Consult OT/PT to assist with strengthening/mobility   - Keep Call bell within reach  - Keep bed low and locked with side rails adjusted as appropriate  - Keep care items and personal belongings within reach  - Initiate and maintain comfort rounds  - Make Fall Risk Sign visible to staff  - Offer Toileting every 2 Hours, in advance of need  - Initiate/Maintain bed  alarm  - Obtain necessary fall risk management equipment: alarm  - Apply yellow socks and bracelet for high fall risk patients  - Consider moving patient to room near nurses station  Outcome: Adequate for Discharge  Goal: Maintain or return to baseline ADL function  Description: INTERVENTIONS:  -  Assess patient's ability to carry out ADLs; assess patient's baseline for ADL function and identify physical deficits which impact ability to perform ADLs (bathing, care of mouth/teeth, toileting, grooming, dressing, etc.)  -  Assess/evaluate cause of self-care deficits   - Assess range of motion  - Assess patient's mobility; develop plan if impaired  - Assess patient's need for assistive devices and provide as appropriate  - Encourage maximum independence but intervene and supervise when necessary  - Involve family in performance of ADLs  - Assess for home care needs following discharge   - Consider OT consult to assist with ADL evaluation and planning for discharge  - Provide patient education as appropriate  Outcome: Adequate for Discharge  Goal: Maintains/Returns to pre admission functional level  Description: INTERVENTIONS:  - Perform AM-PAC 6 Click Basic Mobility/ Daily Activity assessment daily.  - Set and communicate daily mobility goal to care team and patient/family/caregiver.   - Collaborate with rehabilitation services on mobility goals if consulted  - Perform Range of Motion 2 times a day.  - Reposition patient every 2 hours.  - Record patient progress and toleration of activity level   Outcome: Adequate for Discharge     Problem: CARDIOVASCULAR - ADULT  Goal: Maintains optimal cardiac output and hemodynamic stability  Description: INTERVENTIONS:  - Monitor I/O, vital signs and rhythm  - Monitor for S/S and trends of decreased cardiac output  - Administer and titrate ordered vasoactive medications to optimize hemodynamic stability  - Assess quality of pulses, skin color and temperature  - Assess for signs of decreased coronary artery perfusion  - Instruct patient to report change in severity of symptoms  Outcome: Adequate for Discharge     Problem: RESPIRATORY - ADULT  Goal: Achieves optimal ventilation and oxygenation  Description: INTERVENTIONS:  - Assess for changes in respiratory status  - Assess for changes in mentation and behavior  - Position to facilitate oxygenation and minimize respiratory effort  - Oxygen administered by appropriate delivery if ordered  - Initiate smoking cessation education as indicated  -  Encourage broncho-pulmonary hygiene including cough, deep breathe, Incentive Spirometry  - Assess the need for suctioning and aspirate as needed  - Assess and instruct to report SOB or any respiratory difficulty  - Respiratory Therapy support as indicated  Outcome: Adequate for Discharge     Problem: DISCHARGE PLANNING  Goal: Discharge to home or other facility with appropriate resources  Description: INTERVENTIONS:  - Identify barriers to discharge w/patient and caregiver  - Arrange for needed discharge resources and transportation as appropriate  - Identify discharge learning needs (meds, wound care, etc.)  - Arrange for interpretive services to assist at discharge as needed  - Refer to Case Management Department for coordinating discharge planning if the patient needs post-hospital services based on physician/advanced practitioner order or complex needs related to functional status, cognitive ability, or social support system  Outcome: Adequate for Discharge     Problem: Knowledge Deficit  Goal: Patient/family/caregiver demonstrates understanding of disease process, treatment plan, medications, and discharge instructions  Description: Complete learning assessment and assess knowledge base.  Interventions:  - Provide teaching at level of understanding  - Provide teaching via preferred learning methods  Outcome: Adequate for Discharge     Problem: METABOLIC, FLUID AND ELECTROLYTES - ADULT  Goal: Fluid balance maintained  Description: INTERVENTIONS:  - Monitor labs   - Monitor I/O and WT  - Instruct patient on fluid and nutrition as appropriate  - Assess for signs & symptoms of volume excess or deficit  Outcome: Adequate for Discharge

## 2024-07-12 NOTE — ASSESSMENT & PLAN NOTE
Acute on chronic systolic congestive heart failure, known ischemic cardiomyopathy  Clinically improved and stable for discharge on: Lasix 20 mg daily, lisinopril 5 mg daily, Coreg 6.25 mg twice daily  Outpatient follow-up with cardiology regarding CHF and possible addition of Jardiance

## 2024-07-12 NOTE — PLAN OF CARE
Problem: DISCHARGE PLANNING  Goal: Discharge to home or other facility with appropriate resources  Description: INTERVENTIONS:  - Identify barriers to discharge w/patient and caregiver  - Arrange for needed discharge resources and transportation as appropriate  - Identify discharge learning needs (meds, wound care, etc.)  - Arrange for interpretive services to assist at discharge as needed  - Refer to Case Management Department for coordinating discharge planning if the patient needs post-hospital services based on physician/advanced practitioner order or complex needs related to functional status, cognitive ability, or social support system  7/12/2024 1616 by Carmen Brown, RN  Outcome: Adequate for Discharge  7/12/2024 1054 by Carmen Brown, RN  Outcome: Adequate for Discharge

## 2024-07-12 NOTE — ASSESSMENT & PLAN NOTE
Stable without acute coronary syndrome  Continue baby aspirin, Lipitor 40 mg daily and Coreg 6.25 mg twice daily  Routine outpatient cardiology follow-up

## 2024-07-12 NOTE — ASSESSMENT & PLAN NOTE
CT of the chest was ordered to follow-up on his initial x-ray 2 days ago which showed an ovoid opacity in the left apex  CT of the chest today showed new small to moderate right effusion, loculated fluid within the fissure, and chronic loculated fluid in the left lung base.  Pulmonary was consulted and currently there is no need for intervention/treatment.  He can be discharged home with outpatient chest x-ray in 2 weeks and follow-up with pulmonology..

## 2024-07-12 NOTE — DISCHARGE SUMMARY
[facility]  Discharge- Raleigh Sandoval 1940, 83 y.o. male MRN: [id number]  Unit/Bed#: E4 -01 Encounter: [id number]  Primary Care Provider: Gopi Victoria DO   Date and time admitted to hospital: 7/9/2024 11:21 AM    * Acute systolic congestive heart failure, HFmrEF, LVEF 45%, LVIDd 5.4 cm, etiology is ICM  Assessment & Plan  Acute on chronic systolic congestive heart failure, known ischemic cardiomyopathy  Clinically improved and stable for discharge on: Lasix 20 mg daily, lisinopril 5 mg daily, Coreg 6.25 mg twice daily  Outpatient follow-up with cardiology regarding CHF and possible addition of Jardiance    Abnormal CT of the chest  Assessment & Plan  CT of the chest was ordered to follow-up on his initial x-ray 2 days ago which showed an ovoid opacity in the left apex  CT of the chest today showed new small to moderate right effusion, loculated fluid within the fissure, and chronic loculated fluid in the left lung base.  Pulmonary was consulted and currently there is no need for intervention/treatment.  He can be discharged home with outpatient chest x-ray in 2 weeks and follow-up with pulmonology..    Hx of CABG x 3, 2012  Assessment & Plan  Stable without acute coronary syndrome  Continue baby aspirin, Lipitor 40 mg daily and Coreg 6.25 mg twice daily  Routine outpatient cardiology follow-up    S/P mitral valve repair, 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, May 2023  Assessment & Plan  Mitral valve disease status post repair    Hyperlipidemia  Assessment & Plan  Continue Lipitor 40 mg daily    Essential hypertension  Assessment & Plan  Stable on Coreg 6.25 mg twice daily and lisinopril 5 mg daily      Medical Problems       Resolved Problems  Date Reviewed: 7/12/2024   None       Discharging Physician / Practitioner: Horacio Carl MD  PCP: Gopi Victoria DO  Admission Date:   Admission Orders (From admission, onward)       Ordered        07/09/24 1240  INPATIENT  ADMISSION  Once                          Discharge Date: 07/12/24    Consultations During Hospital Stay:  Cardiology  Pulmonology    Procedures Performed:   none    Significant Findings / Test Results:   CT chest wo contrast  Result Date: 7/12/2024  Impression: 1.New small to moderate right pleural effusion. 2.Loculated fluid in the major fissure correlates with opacity seen in the left upper lung on chest x-ray. 3.Chronic loculated fluid pocket in the left lung base with adjacent rounded atelectasis. 4.Moderate to large hiatal hernia.     Echo complete w/ contrast if indicated  Result Date: 7/10/2024  Narrative:   Left Ventricle: Left ventricular cavity size is normal. Wall thickness is normal. The left ventricular ejection fraction is 43% by visual estimation. Systolic function is mildly reduced. There is basal inferior wall hypokinesis with paradoxical so wall motion consistent with postoperative state. There is probable diastolic dysfunction.  Diastolic staging precluded by (of mitral valve repair.  There are echocardiographic indications of elevated left atrial pressures.   Right Ventricle: Right ventricular cavity size is mildly dilated. Systolic function is mildly reduced.   Left Atrium: The atrium is severely dilated.   Right Atrium: The atrium is moderately dilated.   Aortic Valve: There is trace regurgitation. There is aortic valve sclerosis.   Mitral Valve: The valve has been repaired with an annular ring. There is mild to moderate regurgitation with an eccentrically directed jet. The gradient recorded across the prosthetic mitral valve is above the expected range with MVA 1.02 cm2 with mean PG 4.5 mmHg at 65 bpm.   Tricuspid Valve: There is mild to moderate regurgitation. Pulmonary artery systolic pressures are estimated at 45 mmHg.   Aorta: The aortic root is ectatic 3.6 cm. The ascending aorta is normal in size.   Compared to report from June 11, 2021, there are no significant changes to degree of  mitral valve annuloplasty ring stenosis.     XR chest portable  Result Date: 7/9/2024  Impression: Ovoid opacity in the left apex, and adjacent groundglass nodular densities. Small left pleural effusion. Diffuse interstitial opacities. Recommend further evaluation with CT chest with contrast.     Incidental Findings:   See above CXR and CT   He will have a repeat cxr in 2 weeks and follow up with pulmonology    Test Results Pending at Discharge (will require follow up):   none     Outpatient Tests Requested:  none    Complications:  none    Reason for Admission: Shortness of breath and leg swelling    Hospital Course:   Raleigh Sandoval is a 83 y.o. male patient who originally presented to the hospital on 7/9/2024 due to shortness of breath and leg swelling due to congestive heart failure.  He was diuresed successfully and was followed closely by cardiology.  His initial chest x-ray showed diffuse interstitial opacities, small left pleural effusion and there was an ovoid opacity in the left apex.  Follow-up CT was ordered today for these findings and it showed right pleural effusion, loculated fluid in the apex and chronic loculated fluid in the left base.  Pulmonary was consulted for these findings.  There was no evidence for pneumonia and there was no indication for treatment at this time.  They will order repeat chest x-ray in 2 weeks and will follow-up in the office.  He is stable for discharge home on Lasix 20 mg daily with cardiology follow-up.    Ambulatory referral was placed to both cardiology and pulmonology for close follow-up    Please see above list of diagnoses and related plan for additional information.     Condition at Discharge: good    Discharge Day Visit / Exam:   * Please refer to separate progress note for these details *    Discussion with Family: Updated  (daughter) at bedside.    Discharge instructions/Information to patient and family:   See after visit summary for  information provided to patient and family.      Provisions for Follow-Up Care:  See after visit summary for information related to follow-up care and any pertinent home health orders.        Disposition:   Home    Planned Readmission: no     Discharge Statement:  I spent >30 minutes discharging the patient. This time was spent on the day of discharge. I had direct contact with the patient on the day of discharge. Greater than 50% of the total time was spent examining patient, answering all patient questions, arranging and discussing plan of care with patient as well as directly providing post-discharge instructions.  Additional time then spent on discharge activities.    Discharge Medications:  See after visit summary for reconciled discharge medications provided to patient and/or family.      **Please Note: This note may have been constructed using a voice recognition system**

## 2024-07-12 NOTE — NURSING NOTE
Patient has been discharged, instructions reviewed and expressed understanding. Daughter notified work note is here for .

## 2024-07-14 LAB
BACTERIA BLD CULT: NORMAL
BACTERIA BLD CULT: NORMAL

## 2024-07-14 NOTE — PROGRESS NOTES
Heart Failure Outpatient Progress Note - Raleigh Sandoval 83 y.o. male MRN: [id number]    @ Encounter: [id number]      Assessment/Plan:    Patient Active Problem List    Diagnosis Date Noted    Abnormal CT of the chest 07/12/2024    Moderate protein-calorie malnutrition (HCC) 07/11/2024    Acute systolic congestive heart failure, HFmrEF, LVEF 45%, LVIDd 5.4 cm, etiology is ICM 07/09/2024    Lesion of bladder 11/28/2023    Hiatal hernia 11/28/2023    Iron deficiency anemia due to chronic blood loss 11/28/2023    PMR (polymyalgia rheumatica) ([facility]) 04/26/2023    Chronic rhinitis 03/20/2023    Chronic systolic CHF (congestive heart failure), NYHA class 2 ([facility]) 09/16/2022    Iron deficiency 06/10/2022    Anxiety 06/10/2022    Bilateral inguinal hernia without obstruction or gangrene 05/09/2022    Protein-calorie malnutrition, unspecified severity ([facility]) 11/12/2021    Paroxysmal supraventricular tachycardia 09/02/2021    Pleural effusion on left 03/02/2020    Post-nasal drip 03/02/2020    Benign prostatic hyperplasia 11/07/2018    Hyperlipidemia 11/07/2018    Prediabetes 11/07/2018    Essential hypertension 11/07/2018    Ischemic cardiomyopathy 03/12/2018    Hx of CABG x 3, 2012 03/12/2018    S/P mitral valve repair, 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, May 2023 03/12/2018     Chronic HFmrEF, Stage C, NYHA 2  -Etiology: iCM  -warm, euvolemic on lasix 20 mg. Will continue  -no other med changes today       Weight: 127 lbs, 117 on discharge, today, 119      -TTE 7/10/2024 showed EF 43%, mildly dilated RV with mildly reduced function, severe LAE, moderate RA, mitral valve repair with mild to moderate MR, mild to moderate TR, estimated PA pressure 45 mmHg     Echo 6/11/21:  LVEF: 45%, hypo of inferior wall  LVEDd:  RV: mildly dilated, mildly reduced  MV annuloplasty, moderate MR- anteriorly directed  Mild MS, mean gradient of 5.7 mmHg  Mild to moderate TR  PASP: 45      Echo 3/27/18:   LVEF: 45%  LVEDd 6.1 cm  Moderate to severe MR,  eccentric  PASP 30 mmHg     TTE 7/28/16: LVEF: 40%, LVEDd 5.9cm, grade 2 DD  Moderate MR     Neurohormonal Blockade:  --Beta-Blocker: coreg 6.25 mg BID  --ACEi, ARB or ARNi: lisinopril 5 mg   --Aldosterone Receptor Blocker:  --Diuretic: Lasix 20 mg daily     Sudden Cardiac Death Risk Reduction:  --ICD: none, EF > 35%     Electrical Resynchronization:  --Candidacy for BiV device: narrow QRS     CAD with CABG x 3 2012  -TWI in 2,3 - no ischemic eval since CABG-  -EKG  same abnormality but unchanged- pt declining stress test again  - no cp or SOB.:no need for nitro tabs  -Rx: asa, atorvastatin 40 mg daily, coreg 6.25 mg BID  -Angina: none     Hyperlipidemia: Atorvastatin 40 mg daily  6/7/24: LDL 76, HDL 67  10/16/21: LDL 74, HDL 53  5/4/19- HDL 59, LDL 65     Mitral valve repair with 26 mm CE annuloplasty ring   Has moderate to severe MR on 3/27/18 echo - eccentric  Post op Trach and peg at that time    Polymyalgia rheumatica  Anemia of chronic disease, iron def- resolved  -HgB stable at 11.6  Pre diabetes  Left pleural effusion  - likely loculated- has seen Pulm  -small left effusion on 7/2024 CXR     HPI:   84 yo male with history of CAD with CABG x 3 in 2012 with mitral valve repair with ring, polymalgia rheumatica, BPH, ICM with last EF: 40% on June 2016 echo and moderate MR on the echo. He reportedly had VDRF after CABG with trach and PEG. Has not been admitted with heart failure  Last eval, pt declined stress test. On follow up 8/29 he denies SOB or CP. EKG today is abnormal but unchanged from Feb EKG.      Pleural effusion felt to be loculated     Interval History:  Walks the dog everyday, doing well  Still working at [employer] as a  3-11 pm    7/17/24 Presents for hospital follow up. Recently admitted at the [facility] with heart failure exacerbation. Was diuresed and placed on once daily Lasix 20 mg. Suggestion for addition of SGLT2i as outpatient. Seen by pulm for his pleural effusion. No  "further workup or intervention recommended. Feels at his baseline today. Has his usual fatigue but no worse than normal. Still works second shift at [employer]. Wants to get back to walking one mile per day.   Past Medical History:   Diagnosis Date    BPH (benign prostatic hyperplasia)     Chronic pain disorder     back    Coronary artery disease     Gallstones     History of tracheostomy     per pt had it \"after CABG surgery--was admitted for 53 days including Rehab\"    Hx of CABG     per pt 7-8 yrs ago    Hypercholesterolemia     Hypertension     Moderate exercise     alot of walking-works FT and also walks dog 2 miles daily usually    Myocardial infarction (HCC)     per pt approx 7-8yrs ago --[facility] cardio Dr ASHWIN Castillo sees yearly    Prediabetes     Shortness of breath     per pt\"climbing steps or walking long distance\"    Wears glasses        12 point ROS negative other than that stated in HPI    No Known Allergies  .    Current Outpatient Medications:     aspirin 81 mg chewable tablet, Chew 81 mg daily, Disp: , Rfl:     atorvastatin (LIPITOR) 40 mg tablet, TAKE ONE TABLET BY MOUTH ONCE DAILY, Disp: 90 tablet, Rfl: 1    carvedilol (COREG) 6.25 mg tablet, TAKE 1 TABLET BY MOUTH TWICE A DAY WITH MEALS, Disp: 180 tablet, Rfl: 3    citalopram (CeleXA) 10 mg tablet, Take 1 tablet (10 mg total) by mouth daily, Disp: 90 tablet, Rfl: 0    Cyanocobalamin ([employer] VITAMIN B-12 PO), Take by mouth, Disp: , Rfl:     Ferrous Sulfate (IRON) 325 (65 Fe) MG TABS, Take 1 tablet by mouth Twice daily, Disp: , Rfl:     finasteride (PROSCAR) 5 mg tablet, Take 1 tablet (5 mg total) by mouth daily (Patient not taking: Reported on 7/9/2024), Disp: 90 tablet, Rfl: 3    furosemide (LASIX) 20 mg tablet, Take 1 tablet (20 mg total) by mouth daily, Disp: 30 tablet, Rfl: 0    lisinopril (ZESTRIL) 5 mg tablet, Take 1 tablet (5 mg total) by mouth daily, Disp: 90 tablet, Rfl: 1    Multiple Vitamin Essential TABS, Take by mouth, Disp: , Rfl:     Omega 3 1200 " MG CAPS, Take by mouth in the morning, Disp: , Rfl:     tamsulosin (FLOMAX) 0.4 mg, Take 2 capsules (0.8 mg total) by mouth daily at bedtime, Disp: 180 capsule, Rfl: 3    Social History     Socioeconomic History    Marital status:      Spouse name: Not on file    Number of children: Not on file    Years of education: Not on file    Highest education level: Not on file   Occupational History    Not on file   Tobacco Use    Smoking status: Former     Current packs/day: 0.00     Average packs/day: 0.5 packs/day for 16.0 years (8.0 ttl pk-yrs)     Types: Cigarettes     Start date:      Quit date:      Years since quittin.5     Passive exposure: Past    Smokeless tobacco: Never   Vaping Use    Vaping status: Never Used   Substance and Sexual Activity    Alcohol use: Yes     Comment: per pt very rarely    Drug use: No    Sexual activity: Not on file     Comment: defer   Other Topics Concern    Not on file   Social History Narrative    Consumes 2-3 ups of tea per week     Social Determinants of Health     Financial Resource Strain: Not on file   Food Insecurity: No Food Insecurity (7/10/2024)    Hunger Vital Sign     Worried About Running Out of Food in the Last Year: Never true     Ran Out of Food in the Last Year: Never true   Transportation Needs: No Transportation Needs (7/10/2024)    PRAPARE - Transportation     Lack of Transportation (Medical): No     Lack of Transportation (Non-Medical): No   Physical Activity: Not on file   Stress: Not on file (2021)   Social Connections: Not on file   Intimate Partner Violence: Low Risk  (2021)    [facility]    Intimate Partner Violence     Insults You: Not on file     Threatens You: Not on file     Screams at You: Not on file     Physically Hurt: Not on file     Intimate Partner Violence Score: Not on file   Housing Stability: Low Risk  (7/10/2024)    Housing Stability Vital Sign     Unable to Pay for Housing in the  "Last Year: No     Number of Times Moved in the Last Year: 0     Homeless in the Last Year: No       Family History   Problem Relation Age of Onset    Colon cancer Father     Kidney disease Father     Diabetes Mother     Heart disease Mother     Hypertension Mother     Cervical cancer Maternal Aunt     Breast cancer Paternal Aunt     Colon cancer Paternal Grandfather     Heart disease Sister     Heart disease Brother        Physical Exam:    Vitals: /60   Pulse 70   Ht 5' 7\" (1.702 m)   Wt 54.2 kg (119 lb 6.4 oz)   SpO2 97%   BMI 18.70 kg/m²     Wt Readings from Last 3 Encounters:   07/12/24 53.3 kg (117 lb 8.1 oz)   07/01/24 57.6 kg (127 lb)   06/12/24 58.2 kg (128 lb 3.2 oz)       GEN: Raleigh Sandoval appears well, alert and oriented x 3, pleasant and cooperative   HEENT: pupils equal, round, and reactive to light; extraocular muscles intact  NECK: supple, no carotid bruits   HEART: regular rhythm, normal S1 and S2, no murmurs, clicks, gallops or rubs, JVP is flat   LUNGS: clear to auscultation bilaterally; no wheezes, rales, or rhonchi   ABDOMEN: normal bowel sounds, soft, no tenderness, no distention  EXTREMITIES: peripheral pulses normal; no clubbing, cyanosis, or edema  NEURO: no focal findings   SKIN: normal without suspicious lesions on exposed skin    Labs & Results:  Lab Results   Component Value Date     01/08/2016    SODIUM 135 07/11/2024    K 3.8 07/11/2024    CL 96 07/11/2024    CO2 33 (H) 07/11/2024    ANIONGAP 8 01/08/2016    AGAP 6 07/11/2024    BUN 23 07/11/2024    CREATININE 1.12 07/11/2024    GLUC 187 (H) 07/11/2024    GLUF 85 06/07/2024    CALCIUM 8.6 07/11/2024    AST 19 07/09/2024    ALT 14 07/09/2024    ALKPHOS 69 07/09/2024    PROT 7.5 01/08/2016    TP 6.5 07/09/2024    BILITOT 0.77 01/08/2016    TBILI 0.56 07/09/2024    EGFR 60 07/11/2024     Lab Results   Component Value Date     (H) 07/10/2024      Lab Results   Component Value Date    WBC 6.15 07/10/2024    HGB " 9.9 (L) 07/10/2024    HCT 30.8 (L) 07/10/2024    MCV 97 07/10/2024     07/10/2024     Lab Results   Component Value Date    LDLCALC 76 06/07/2024     Lab Results   Component Value Date    WYQ0CGILUQQV 4.029 (H) 11/13/2021     Lab Results   Component Value Date    HGBA1C 5.6 01/09/2021         EKG personally reviewed by JARRED Doherty.   No results found for this visit on 07/17/24.     Counseling / Coordination of Care  Total face to face time spent with patient 15 minutes.  An additional 10 minutes was spent for chart/data review and visit preparation.       Thank you for the opportunity to participate in the care of this patient.    JARRED Doherty

## 2024-07-15 ENCOUNTER — PATIENT OUTREACH (OUTPATIENT)
Dept: FAMILY MEDICINE CLINIC | Facility: CLINIC | Age: 84
End: 2024-07-15

## 2024-07-15 ENCOUNTER — TRANSITIONAL CARE MANAGEMENT (OUTPATIENT)
Dept: FAMILY MEDICINE CLINIC | Facility: CLINIC | Age: 84
End: 2024-07-15

## 2024-07-15 DIAGNOSIS — Z78.9 NEED FOR COMMUNITY RESOURCE: ICD-10-CM

## 2024-07-15 DIAGNOSIS — Z71.89 COORDINATION OF COMPLEX CARE: Primary | ICD-10-CM

## 2024-07-15 DIAGNOSIS — I50.22 CHRONIC SYSTOLIC CHF (CONGESTIVE HEART FAILURE), NYHA CLASS 2 (HCC): ICD-10-CM

## 2024-07-15 NOTE — PROGRESS NOTES
Pt admitted 7/9-7/12 at [facility] for acute systolic heart failure. Pt d/c'd to home without VNA.  Following low sodium diet: yes, daughter cooks meals and has been looking for low sodium ingredients    Following fluid restriction: Yes pt aware of 64 oz. restriction   Hospital discharge weight:   117.51 lbs  Weighing daily:  yes and recording            Weight log:yes  1st home weight: 119.2 lbs       Weight today:119.4 lbs  Monitoring symptoms: yes  Any current symptoms: no  When to call provider: aware of weight gain rule  Medications reviewed:  yes  Knows name of diuretic:  yes  Escalation plan: reviewed  HF education reviewed/reinforced including low sodium diet, fluid restriction, activity, symptoms of decompensation and when and who to call.   Cardiology follow up appointment: 7/17  PCP follow up appointment:Pt has tried calling and is going to stop at PCP in person to schedule. Does not like to wait on hold and has not gotten through after a few tries today.  Transportation:Pt drives  Home health care agency:n/a   Start of Care Date:n/a    Pt agreeable to outreach for HF . He declines in home CMOC visit. He states that he still works after 3 PM and to call him in the earlier day hours.

## 2024-07-16 NOTE — UTILIZATION REVIEW
NOTIFICATION OF ADMISSION DISCHARGE   This is a Notification of Discharge from [facility]. Please be advised that this patient has been discharge from our facility. Below you will find the admission and discharge date and time including the patient’s disposition.   UTILIZATION REVIEW CONTACT:  Carmen Thomson  Utilization   Network Utilization Review Department  Phone: [phone] x carefully listen to the prompts. All voicemails are confidential.  Email: [email or web address]     ADMISSION INFORMATION  PRESENTATION DATE: 7/9/2024 11:21 AM  OBERVATION ADMISSION DATE: N/A  INPATIENT ADMISSION DATE: 7/9/24 12:40 PM   DISCHARGE DATE: 7/12/2024  5:27 PM   DISPOSITION:Home/Self Care    Network Utilization Review Department  ATTENTION: Please call with any questions or concerns to [phone] and carefully listen to the prompts so that you are directed to the right person. All voicemails are confidential.   For Discharge needs, contact Care Management DC Support Team at [phone] opt. 2  Send all requests for admission clinical reviews, approved or denied determinations and any other requests to dedicated fax number below belonging to the campus where the patient is receiving treatment. List of dedicated fax numbers for the Facilities:  FACILITY NAME UR FAX NUMBER   ADMISSION DENIALS (Administrative/Medical Necessity) [phone]   DISCHARGE SUPPORT TEAM ([facility]) [phone]   PARENT CHILD HEALTH (Maternity/NICU/Pediatrics) [phone]   [facility] [phone]   [facility] [phone]   [facility] [phone]   [facility] [phone]   [facility] [phone]   [facility] [phone]   [facility] [phone]   [facility] [phone]    [facility] [phone]   Critical access hospital [phone]   [facility] [phone]   [facility] [phone]

## 2024-07-17 ENCOUNTER — OFFICE VISIT (OUTPATIENT)
Dept: CARDIOLOGY CLINIC | Facility: CLINIC | Age: 84
End: 2024-07-17
Payer: COMMERCIAL

## 2024-07-17 VITALS
WEIGHT: 119.4 LBS | BODY MASS INDEX: 18.74 KG/M2 | HEIGHT: 67 IN | HEART RATE: 70 BPM | DIASTOLIC BLOOD PRESSURE: 60 MMHG | OXYGEN SATURATION: 97 % | SYSTOLIC BLOOD PRESSURE: 102 MMHG

## 2024-07-17 DIAGNOSIS — I48.0 PAROXYSMAL ATRIAL FIBRILLATION (HCC): Primary | ICD-10-CM

## 2024-07-17 DIAGNOSIS — I50.21 ACUTE SYSTOLIC CONGESTIVE HEART FAILURE (HCC): ICD-10-CM

## 2024-07-17 PROCEDURE — 99214 OFFICE O/P EST MOD 30 MIN: CPT | Performed by: NURSE PRACTITIONER

## 2024-07-17 RX ORDER — FUROSEMIDE 20 MG/1
20 TABLET ORAL DAILY
Qty: 90 TABLET | Refills: 1 | Status: SHIPPED | OUTPATIENT
Start: 2024-07-17

## 2024-07-17 NOTE — PATIENT INSTRUCTIONS
Weigh yourself daily  If you gain 3 lbs in one day or 5 lbs in one week, please call the office at [phone] and ask for a nurse or the heart failure nurse  Keep your sodium intake to <2 grams, (2000 mg) per day, and fluids <2 Liters (2000 ml) per day. This is around 6-7, 8 oz glasses of fluid per day    Continue medications as you are

## 2024-07-19 ENCOUNTER — TELEPHONE (OUTPATIENT)
Dept: CARDIOLOGY CLINIC | Facility: CLINIC | Age: 84
End: 2024-07-19

## 2024-07-19 NOTE — TELEPHONE ENCOUNTER
Attempted to contact patient, spoke with his son, patient not home, phone # provided for call back

## 2024-07-22 ENCOUNTER — PATIENT OUTREACH (OUTPATIENT)
Dept: FAMILY MEDICINE CLINIC | Facility: CLINIC | Age: 84
End: 2024-07-22

## 2024-07-22 NOTE — PROGRESS NOTES
Pt returned call and left message that he was leaving for work and this CM should try again to contact him in the morning.

## 2024-07-22 NOTE — PROGRESS NOTES
Sean message on HF  pt for weekly call. Upon review, pt has been to cardiology on 7/17 for follow up.Pt's weight listed as 119.4 lbs at visit.   Call placed to pt and message left requesting return call back.Pt in HF  until 8/11

## 2024-07-23 ENCOUNTER — PATIENT OUTREACH (OUTPATIENT)
Dept: CASE MANAGEMENT | Facility: OTHER | Age: 84
End: 2024-07-23

## 2024-07-23 NOTE — PROGRESS NOTES
Called and spoke with HF pt for weekly update. Pt reports that he is doing well except for some shortness of breath upon exertion.His weight today was 118.7 lbs. He has been watching sodium intake, taking medications as ordered. Since last week's call, pt had seen cardiology for f/u visit on 7/17.  Pt states that he works as an  and he does do some walking, approximately 2-3 blocks per shift. He has been doing fine at tolerating that,he states.   Will follow up next week.

## 2024-07-23 NOTE — PROGRESS NOTES
Inbasket message reminder to follow up with pt with another attempt. Message left requesting return call back.

## 2024-07-26 ENCOUNTER — TELEPHONE (OUTPATIENT)
Dept: CARDIOLOGY CLINIC | Facility: CLINIC | Age: 84
End: 2024-07-26

## 2024-07-26 NOTE — TELEPHONE ENCOUNTER
Left message for patient to call office for hospital HF follow up.   Phone # provided.  Reminded patient of follow up 8/1/24 in [place] with Day.

## 2024-07-27 ENCOUNTER — HOSPITAL ENCOUNTER (OUTPATIENT)
Dept: RADIOLOGY | Facility: HOSPITAL | Age: 84
Discharge: HOME | End: 2024-07-27
Payer: COMMERCIAL

## 2024-07-27 ENCOUNTER — APPOINTMENT (OUTPATIENT)
Dept: LAB | Facility: HOSPITAL | Age: 84
End: 2024-07-27
Payer: COMMERCIAL

## 2024-07-27 DIAGNOSIS — N32.9 LESION OF BLADDER: ICD-10-CM

## 2024-07-27 DIAGNOSIS — R93.89 ABNORMAL CT OF THE CHEST: ICD-10-CM

## 2024-07-27 DIAGNOSIS — E61.1 IRON DEFICIENCY: ICD-10-CM

## 2024-07-27 DIAGNOSIS — D64.9 ANEMIA, UNSPECIFIED TYPE: ICD-10-CM

## 2024-07-27 LAB
BASOPHILS # BLD AUTO: 0.04 THOUSANDS/ÂΜL (ref 0–0.1)
BASOPHILS NFR BLD AUTO: 1 % (ref 0–1)
EOSINOPHIL # BLD AUTO: 0.49 THOUSAND/ÂΜL (ref 0–0.61)
EOSINOPHIL NFR BLD AUTO: 10 % (ref 0–6)
ERYTHROCYTE [DISTWIDTH] IN BLOOD BY AUTOMATED COUNT: 14.2 % (ref 11.6–15.1)
FERRITIN SERPL-MCNC: 276 NG/ML (ref 24–336)
HCT VFR BLD AUTO: 31.7 % (ref 36.5–49.3)
HGB BLD-MCNC: 10.2 G/DL (ref 12–17)
IMM GRANULOCYTES # BLD AUTO: 0.01 THOUSAND/UL (ref 0–0.2)
IMM GRANULOCYTES NFR BLD AUTO: 0 % (ref 0–2)
IRON SATN MFR SERPL: 25 % (ref 15–50)
IRON SERPL-MCNC: 66 UG/DL (ref 50–212)
LYMPHOCYTES # BLD AUTO: 0.96 THOUSANDS/ÂΜL (ref 0.6–4.47)
LYMPHOCYTES NFR BLD AUTO: 20 % (ref 14–44)
MCH RBC QN AUTO: 31.2 PG (ref 26.8–34.3)
MCHC RBC AUTO-ENTMCNC: 32.2 G/DL (ref 31.4–37.4)
MCV RBC AUTO: 97 FL (ref 82–98)
MONOCYTES # BLD AUTO: 0.64 THOUSAND/ÂΜL (ref 0.17–1.22)
MONOCYTES NFR BLD AUTO: 13 % (ref 4–12)
NEUTROPHILS # BLD AUTO: 2.7 THOUSANDS/ÂΜL (ref 1.85–7.62)
NEUTS SEG NFR BLD AUTO: 56 % (ref 43–75)
NRBC BLD AUTO-RTO: 0 /100 WBCS
PLATELET # BLD AUTO: 195 THOUSANDS/UL (ref 149–390)
PMV BLD AUTO: 9.9 FL (ref 8.9–12.7)
RBC # BLD AUTO: 3.27 MILLION/UL (ref 3.88–5.62)
RETICS # AUTO: NORMAL 10*3/UL (ref 14356–105094)
RETICS # CALC: 0.81 % (ref 0.37–1.87)
TIBC SERPL-MCNC: 269 UG/DL (ref 250–450)
UIBC SERPL-MCNC: 203 UG/DL (ref 155–355)
WBC # BLD AUTO: 4.84 THOUSAND/UL (ref 4.31–10.16)

## 2024-07-27 PROCEDURE — 83540 ASSAY OF IRON: CPT

## 2024-07-27 PROCEDURE — 82728 ASSAY OF FERRITIN: CPT

## 2024-07-27 PROCEDURE — 85025 COMPLETE CBC W/AUTO DIFF WBC: CPT

## 2024-07-27 PROCEDURE — 83550 IRON BINDING TEST: CPT

## 2024-07-27 PROCEDURE — 71046 X-RAY EXAM CHEST 2 VIEWS: CPT

## 2024-07-27 PROCEDURE — 85045 AUTOMATED RETICULOCYTE COUNT: CPT

## 2024-07-27 PROCEDURE — 36415 COLL VENOUS BLD VENIPUNCTURE: CPT

## 2024-07-29 ENCOUNTER — PATIENT OUTREACH (OUTPATIENT)
Dept: CASE MANAGEMENT | Facility: OTHER | Age: 84
End: 2024-07-29

## 2024-07-29 NOTE — PROGRESS NOTES
Received inbasket reminder for weekly f/u call to HF pt.Called and left message requesting return call back. Pt had Chest Xray and labs  completed on 7/27 since our last call.Pt has TCM appt in AM with PCP.

## 2024-07-30 ENCOUNTER — OFFICE VISIT (OUTPATIENT)
Dept: FAMILY MEDICINE CLINIC | Facility: CLINIC | Age: 84
End: 2024-07-30
Payer: COMMERCIAL

## 2024-07-30 ENCOUNTER — PATIENT OUTREACH (OUTPATIENT)
Dept: CASE MANAGEMENT | Facility: OTHER | Age: 84
End: 2024-07-30

## 2024-07-30 VITALS
SYSTOLIC BLOOD PRESSURE: 118 MMHG | BODY MASS INDEX: 19.15 KG/M2 | OXYGEN SATURATION: 97 % | TEMPERATURE: 98 F | HEIGHT: 67 IN | WEIGHT: 122 LBS | DIASTOLIC BLOOD PRESSURE: 68 MMHG | HEART RATE: 74 BPM

## 2024-07-30 DIAGNOSIS — E78.2 MIXED HYPERLIPIDEMIA: ICD-10-CM

## 2024-07-30 DIAGNOSIS — Z95.1 HX OF CABG: ICD-10-CM

## 2024-07-30 DIAGNOSIS — I50.21 ACUTE SYSTOLIC CONGESTIVE HEART FAILURE (HCC): ICD-10-CM

## 2024-07-30 DIAGNOSIS — I10 ESSENTIAL HYPERTENSION: ICD-10-CM

## 2024-07-30 DIAGNOSIS — Z98.890 S/P MITRAL VALVE REPAIR: ICD-10-CM

## 2024-07-30 DIAGNOSIS — Z76.89 ENCOUNTER FOR SUPPORT AND COORDINATION OF TRANSITION OF CARE: Primary | ICD-10-CM

## 2024-07-30 DIAGNOSIS — R93.89 ABNORMAL CT OF THE CHEST: ICD-10-CM

## 2024-07-30 PROCEDURE — 99496 TRANSJ CARE MGMT HIGH F2F 7D: CPT | Performed by: FAMILY MEDICINE

## 2024-07-30 PROCEDURE — 1111F DSCHRG MED/CURRENT MED MERGE: CPT | Performed by: FAMILY MEDICINE

## 2024-07-30 NOTE — PROGRESS NOTES
HF  pt returned call and stated that he had PCP follow up this morning and there weren't any medication changes. Pt has appt on Thursday with Cardiology and will discuss refill of furosemide. Also todays weight reportedly was 122 lbs. Pt denies any adverse s/s other than some different breathing effort on days when it is humid or he is outside.He prefers to stay in air conditioning. Pt getting ready for work and leaving soon for his shift.Will follow up next week.

## 2024-07-30 NOTE — PATIENT INSTRUCTIONS
Here for TCM and feeling well and does see cardiology again this Thursday.    TCM discharge summary reviewed:    * Acute systolic congestive heart failure, HFmrEF, LVEF 45%, LVIDd 5.4 cm, etiology is ICM  Assessment & Plan  Acute on chronic systolic congestive heart failure, known ischemic cardiomyopathy  Clinically improved and stable for discharge on: Lasix 20 mg daily, lisinopril 5 mg daily, Coreg 6.25 mg twice daily  Outpatient follow-up with cardiology regarding CHF and possible addition of Jardiance     Abnormal CT of the chest  Assessment & Plan  CT of the chest was ordered to follow-up on his initial x-ray 2 days ago which showed an ovoid opacity in the left apex  CT of the chest today showed new small to moderate right effusion, loculated fluid within the fissure, and chronic loculated fluid in the left lung base.  Pulmonary was consulted and currently there is no need for intervention/treatment.  He can be discharged home with outpatient chest x-ray in 2 weeks and follow-up with pulmonology..     Hx of CABG x 3, 2012  Assessment & Plan  Stable without acute coronary syndrome  Continue baby aspirin, Lipitor 40 mg daily and Coreg 6.25 mg twice daily  Routine outpatient cardiology follow-up     S/P mitral valve repair, 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, May 2023  Assessment & Plan  Mitral valve disease status post repair     Hyperlipidemia  Assessment & Plan  Continue Lipitor 40 mg daily     Essential hypertension  Assessment & Plan  Stable on Coreg 6.25 mg twice daily and lisinopril 5 mg daily      Patient is overall stable today but will need to see Cardiology this Thursday and rec follow up with pulmonary as directed. CXRAY pending. Take all meds as directed.

## 2024-07-30 NOTE — PROGRESS NOTES
Ambulatory Visit  Name: Raleigh Sandoval      : 1940      MRN: [id number]  Encounter Provider: Gopi Victoria DO  Encounter Date: 2024   Encounter department: [facility] PRIMARY CARE  Chief Complaint   Patient presents with    Follow-up     Follow up hospital      Patient Instructions   Here for TCM and feeling well and does see cardiology again this Thursday.    TCM discharge summary reviewed:    * Acute systolic congestive heart failure, HFmrEF, LVEF 45%, LVIDd 5.4 cm, etiology is ICM  Assessment & Plan  Acute on chronic systolic congestive heart failure, known ischemic cardiomyopathy  Clinically improved and stable for discharge on: Lasix 20 mg daily, lisinopril 5 mg daily, Coreg 6.25 mg twice daily  Outpatient follow-up with cardiology regarding CHF and possible addition of Jardiance     Abnormal CT of the chest  Assessment & Plan  CT of the chest was ordered to follow-up on his initial x-ray 2 days ago which showed an ovoid opacity in the left apex  CT of the chest today showed new small to moderate right effusion, loculated fluid within the fissure, and chronic loculated fluid in the left lung base.  Pulmonary was consulted and currently there is no need for intervention/treatment.  He can be discharged home with outpatient chest x-ray in 2 weeks and follow-up with pulmonology..     Hx of CABG x 3, 2012  Assessment & Plan  Stable without acute coronary syndrome  Continue baby aspirin, Lipitor 40 mg daily and Coreg 6.25 mg twice daily  Routine outpatient cardiology follow-up     S/P mitral valve repair, 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, May 2023  Assessment & Plan  Mitral valve disease status post repair     Hyperlipidemia  Assessment & Plan  Continue Lipitor 40 mg daily     Essential hypertension  Assessment & Plan  Stable on Coreg 6.25 mg twice daily and lisinopril 5 mg daily      Patient is overall stable today but will need to see Cardiology this Thursday and rec follow up with  pulmonary as directed. CXRAY pending. Take all meds as directed.     Assessment & Plan   1. Encounter for support and coordination of transition of care  2. Acute systolic congestive heart failure, HFmrEF, LVEF 45%, LVIDd 5.4 cm, etiology is ICM  Comments:  sees cardiology  3. Abnormal CT of the chest  Comments:  sees pulmonary  4. Hx of CABG x 3, 2012  Comments:  sees cardiology, stable  5. S/P mitral valve repair, 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, May 2023  Comments:  stable  6. Mixed hyperlipidemia  Comments:  takes med  7. Essential hypertension  Comments:  stable    [unfilled]  History of Present Illness     Follow-up (Follow up hospital ), no leg edema and no SOB. Had cxray done last Saturday but has yet to be read and resulted and needs to still see pulmonary.       Discharge summary reviewed:    Hospital Course:   Raleigh Sandoval is a 83 y.o. male patient who originally presented to the hospital on 7/9/2024 due to shortness of breath and leg swelling due to congestive heart failure.  He was diuresed successfully and was followed closely by cardiology.  His initial chest x-ray showed diffuse interstitial opacities, small left pleural effusion and there was an ovoid opacity in the left apex.  Follow-up CT was ordered today for these findings and it showed right pleural effusion, loculated fluid in the apex and chronic loculated fluid in the left base.  Pulmonary was consulted for these findings.  There was no evidence for pneumonia and there was no indication for treatment at this time.  They will order repeat chest x-ray in 2 weeks and will follow-up in the office.  He is stable for discharge home on Lasix 20 mg daily with cardiology follow-up.     Ambulatory referral was placed to both cardiology and pulmonology for close follow-up     Please see above list of diagnoses and related plan for additional information.           Review of Systems   Constitutional: Negative.    HENT: Negative.     Eyes: Negative.   "  Respiratory: Negative.  Negative for cough and shortness of breath.    Cardiovascular: Negative.    Gastrointestinal: Negative.    Endocrine: Negative.    Genitourinary: Negative.    Musculoskeletal: Negative.         No edema in legs   Skin: Negative.    Allergic/Immunologic: Negative.    Neurological: Negative.    Hematological: Negative.    Psychiatric/Behavioral: Negative.       Current Outpatient Medications on File Prior to Visit   Medication Sig Dispense Refill    aspirin 81 mg chewable tablet Chew 81 mg daily      atorvastatin (LIPITOR) 40 mg tablet TAKE ONE TABLET BY MOUTH ONCE DAILY 90 tablet 1    carvedilol (COREG) 6.25 mg tablet TAKE 1 TABLET BY MOUTH TWICE A DAY WITH MEALS 180 tablet 3    citalopram (CeleXA) 10 mg tablet Take 1 tablet (10 mg total) by mouth daily 90 tablet 0    Cyanocobalamin ([employer] VITAMIN B-12 PO) Take by mouth      Ferrous Sulfate (IRON) 325 (65 Fe) MG TABS Take 1 tablet by mouth Twice daily      finasteride (PROSCAR) 5 mg tablet Take 1 tablet (5 mg total) by mouth daily 90 tablet 3    furosemide (LASIX) 20 mg tablet Take 1 tablet (20 mg total) by mouth daily 90 tablet 1    lisinopril (ZESTRIL) 5 mg tablet Take 1 tablet (5 mg total) by mouth daily 90 tablet 1    Multiple Vitamin Essential TABS Take by mouth      Omega 3 1200 MG CAPS Take by mouth in the morning      tamsulosin (FLOMAX) 0.4 mg Take 2 capsules (0.8 mg total) by mouth daily at bedtime 180 capsule 3     No current facility-administered medications on file prior to visit.      Objective     /68   Pulse 74   Temp 98 °F (36.7 °C)   Ht 5' 7\" (1.702 m)   Wt 55.3 kg (122 lb)   SpO2 97%   BMI 19.11 kg/m²     Physical Exam  Constitutional:       Appearance: Normal appearance. He is well-developed.   HENT:      Head: Normocephalic and atraumatic.      Right Ear: External ear normal.      Left Ear: External ear normal.      Nose: Nose normal.      Mouth/Throat:      Mouth: Mucous membranes are moist.   Eyes:      " Conjunctiva/sclera: Conjunctivae normal.      Pupils: Pupils are equal, round, and reactive to light.   Cardiovascular:      Rate and Rhythm: Normal rate and regular rhythm.      Pulses: Normal pulses.      Heart sounds: Normal heart sounds.   Pulmonary:      Effort: Pulmonary effort is normal.      Breath sounds: Normal breath sounds.   Musculoskeletal:         General: Normal range of motion.      Cervical back: Normal range of motion and neck supple.   Skin:     General: Skin is warm and dry.      Capillary Refill: Capillary refill takes less than 2 seconds.   Neurological:      General: No focal deficit present.      Mental Status: He is alert and oriented to person, place, and time. Mental status is at baseline.      Deep Tendon Reflexes: Reflexes are normal and symmetric.   Psychiatric:         Mood and Affect: Mood normal.         Behavior: Behavior normal.         Thought Content: Thought content normal.         Judgment: Judgment normal.       Administrative Statements   I have spent a total time of 30 minutes in caring for this patient on the day of the visit/encounter including Diagnostic results, Prognosis, Risks and benefits of tx options, Instructions for management, Patient and family education, Importance of tx compliance, Risk factor reductions, Impressions, Counseling / Coordination of care, Documenting in the medical record, Reviewing / ordering tests, medicine, procedures  , and Obtaining or reviewing history  .

## 2024-07-31 PROBLEM — I10 ESSENTIAL HYPERTENSION: Chronic | Status: ACTIVE | Noted: 2018-11-07

## 2024-07-31 PROBLEM — E78.5 HYPERLIPIDEMIA: Chronic | Status: ACTIVE | Noted: 2018-11-07

## 2024-07-31 PROBLEM — J90 PLEURAL EFFUSION ON LEFT: Chronic | Status: ACTIVE | Noted: 2020-03-02

## 2024-07-31 PROBLEM — I50.22 HEART FAILURE WITH MID-RANGE EJECTION FRACTION (HFMEF) (HCC): Chronic | Status: ACTIVE | Noted: 2022-09-16

## 2024-07-31 PROBLEM — R09.82 POST-NASAL DRIP: Status: RESOLVED | Noted: 2020-03-02 | Resolved: 2024-07-31

## 2024-07-31 PROBLEM — I25.5 ISCHEMIC CARDIOMYOPATHY: Chronic | Status: ACTIVE | Noted: 2018-03-12

## 2024-07-31 PROBLEM — J90 PLEURAL EFFUSION ON LEFT: Status: RESOLVED | Noted: 2020-03-02 | Resolved: 2024-07-31

## 2024-08-01 ENCOUNTER — OFFICE VISIT (OUTPATIENT)
Dept: CARDIOLOGY CLINIC | Facility: CLINIC | Age: 84
End: 2024-08-01
Payer: COMMERCIAL

## 2024-08-01 VITALS
OXYGEN SATURATION: 95 % | SYSTOLIC BLOOD PRESSURE: 144 MMHG | DIASTOLIC BLOOD PRESSURE: 70 MMHG | WEIGHT: 122 LBS | HEART RATE: 67 BPM | BODY MASS INDEX: 19.11 KG/M2

## 2024-08-01 DIAGNOSIS — N40.1 BENIGN PROSTATIC HYPERPLASIA WITH LOWER URINARY TRACT SYMPTOMS, SYMPTOM DETAILS UNSPECIFIED: ICD-10-CM

## 2024-08-01 DIAGNOSIS — I10 ESSENTIAL HYPERTENSION: Chronic | ICD-10-CM

## 2024-08-01 DIAGNOSIS — I50.22 HEART FAILURE WITH MID-RANGE EJECTION FRACTION (HFMEF) (HCC): Primary | Chronic | ICD-10-CM

## 2024-08-01 DIAGNOSIS — I25.5 ISCHEMIC CARDIOMYOPATHY: Chronic | ICD-10-CM

## 2024-08-01 DIAGNOSIS — I25.10 CORONARY ARTERY DISEASE INVOLVING NATIVE CORONARY ARTERY OF NATIVE HEART WITHOUT ANGINA PECTORIS: ICD-10-CM

## 2024-08-01 PROCEDURE — 1160F RVW MEDS BY RX/DR IN RCRD: CPT | Performed by: PHYSICIAN ASSISTANT

## 2024-08-01 PROCEDURE — 3078F DIAST BP <80 MM HG: CPT | Performed by: PHYSICIAN ASSISTANT

## 2024-08-01 PROCEDURE — 99214 OFFICE O/P EST MOD 30 MIN: CPT | Performed by: PHYSICIAN ASSISTANT

## 2024-08-01 PROCEDURE — 1111F DSCHRG MED/CURRENT MED MERGE: CPT | Performed by: PHYSICIAN ASSISTANT

## 2024-08-01 PROCEDURE — 3077F SYST BP >= 140 MM HG: CPT | Performed by: PHYSICIAN ASSISTANT

## 2024-08-01 PROCEDURE — 1159F MED LIST DOCD IN RCRD: CPT | Performed by: PHYSICIAN ASSISTANT

## 2024-08-01 RX ORDER — TAMSULOSIN HYDROCHLORIDE 0.4 MG/1
0.8 CAPSULE ORAL
Qty: 200 CAPSULE | Refills: 1 | Status: SHIPPED | OUTPATIENT
Start: 2024-08-01

## 2024-08-01 NOTE — PROGRESS NOTES
"Advanced Heart Failure / Pulmonary Hypertension Outpatient Visit    Raleigh Sandoval 83 y.o. male   MRN: [id number]  Encounter: [id number]    Assessment:  Patient Active Problem List    Diagnosis Date Noted    Paroxysmal atrial fibrillation (HCC) 07/17/2024    Abnormal CT of the chest 07/12/2024    Moderate protein-calorie malnutrition (HCC) 07/11/2024    Lesion of bladder 11/28/2023    Hiatal hernia 11/28/2023    Iron deficiency anemia due to chronic blood loss 11/28/2023    PMR (polymyalgia rheumatica) ([facility]) 04/26/2023    Chronic rhinitis 03/20/2023    Heart failure with mid-range ejection fraction (HFmEF) ([facility]) 09/16/2022    Iron deficiency 06/10/2022    Anxiety 06/10/2022    Bilateral inguinal hernia without obstruction or gangrene 05/09/2022    Protein-calorie malnutrition, unspecified severity ([facility]) 11/12/2021    Paroxysmal supraventricular tachycardia 09/02/2021    Pleural effusion on left 03/02/2020    Benign prostatic hyperplasia 11/07/2018    Hyperlipidemia 11/07/2018    Prediabetes 11/07/2018    Essential hypertension 11/07/2018    Ischemic cardiomyopathy 03/12/2018    Hx of CABG x 3, 2012 03/12/2018    S/P mitral valve repair, 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, May 2023 03/12/2018       Today's Plan:  Continue current medications  Will price check Jardiance.  Advised caution with NSAID use and to consider referral to pain specialist if OTC Tylenol is not effective in managing pain.    Plan:  Chronic HFmEF; LVEF 40-45%   Etiology: ischemic.   TTE 07/10/2024: \"EF 43%, mildly dilated RV with mildly reduced function, severe LAE, moderate RA, mitral valve repair with mild to moderate MR, mild to moderate TR, estimated PA pressure 45 mmHg.\"   Weight of 119 lbs on 07/17. Today, weighs 122 lbs.    Most recent BMP from 07/11/2024: sodium 135; potassium 3.8; BUN 23; creatinine 1.12; eGFR 60.     Pharmacotherapies / Neurohormonal Blockade:  --Beta Blocker: carvedilol 6.25 mg q12 hours.   --ARNi / ACEi / ARB: lisinopril 5 " "mg daily.   --Aldosterone Antagonist: No.   --SGLT2 Inhibitor: No.   --Diuretic: Lasix 20 mg daily.     Sudden Cardiac Death Risk Reduction:  --LVEF >35%.    Coronary artery disease   Without active chest pain.   S/p CABG x3 (LIMA-LAD, SVG-OM, SVG-diag) in 2012.   Continue on aspirin, statin, and BB as above.     Hypertension   BP of 144/70 mmHg in office today.   Continue on medications as above.     Hyperlipidemia   S/p mitral valve repair  Chronic left pleural effusion  Polymyalgia rheumatica  History of tobacco abuse    HPI:   Raleigh Sandoval is an 83-year-old man with a PMH as above who presents to the office for follow-up. Follows with Dr. Castillo.     07/17/2024 with TH: \"Presents for hospital follow up. Recently admitted at the [facility] with heart failure exacerbation. Was diuresed and placed on once daily Lasix 20 mg. Suggestion for addition of SGLT2i as outpatient. Seen by pulm for his pleural effusion. No further workup or intervention recommended. Feels at his baseline today. Has his usual fatigue but no worse than normal. Still works second shift at [employer]. Wants to get back to walking one mile per day.\"    08/01/2024: Presents with his daughter for follow-up. Feeling well today with no current cardiac complaints. Continues with chronic back pain; advised caution with NSAID use.  Denies lightheadedness, chest pain, SOB, OSHEA, LE swelling, PND, and orthopnea.  Continues with decreased appetite and often supplements meals with Ensure.    Past Medical History:   Diagnosis Date    BPH (benign prostatic hyperplasia)     Chronic pain disorder     back    Coronary artery disease     Gallstones     History of tracheostomy     per pt had it \"after CABG surgery--was admitted for 53 days including Rehab\"    Hx of CABG     per pt 7-8 yrs ago    Hypercholesterolemia     Hypertension     Moderate exercise     alot of walking-works FT and also walks dog 2 miles daily usually    Myocardial infarction (HCC)  " "   per pt approx 7-8yrs ago -- cardio Dr ASHWIN Castillo sees yearly    Pleural effusion on left 03/02/2020    Prediabetes     Shortness of breath     per pt\"climbing steps or walking long distance\"    Wears glasses        Review of Systems   Constitutional:  Positive for appetite change. Negative for activity change, fatigue and unexpected weight change.   Respiratory:  Negative for cough, chest tightness and shortness of breath.    Cardiovascular:  Negative for chest pain, palpitations and leg swelling.   Gastrointestinal:  Negative for abdominal distention and abdominal pain.   Genitourinary:  Negative for decreased urine volume, dysuria and urgency.   Musculoskeletal: Negative.    Skin: Negative.    Neurological:  Negative for dizziness, syncope, weakness and light-headedness.   Psychiatric/Behavioral:  Negative for confusion and sleep disturbance. The patient is not nervous/anxious.        No Known Allergies      Current Outpatient Medications:     aspirin 81 mg chewable tablet, Chew 81 mg daily, Disp: , Rfl:     atorvastatin (LIPITOR) 40 mg tablet, TAKE ONE TABLET BY MOUTH ONCE DAILY, Disp: 90 tablet, Rfl: 1    carvedilol (COREG) 6.25 mg tablet, TAKE 1 TABLET BY MOUTH TWICE A DAY WITH MEALS, Disp: 180 tablet, Rfl: 3    citalopram (CeleXA) 10 mg tablet, Take 1 tablet (10 mg total) by mouth daily, Disp: 90 tablet, Rfl: 0    Cyanocobalamin ([employer]S VITAMIN B-12 PO), Take by mouth, Disp: , Rfl:     Ferrous Sulfate (IRON) 325 (65 Fe) MG TABS, Take 1 tablet by mouth Twice daily, Disp: , Rfl:     finasteride (PROSCAR) 5 mg tablet, Take 1 tablet (5 mg total) by mouth daily, Disp: 90 tablet, Rfl: 3    furosemide (LASIX) 20 mg tablet, Take 1 tablet (20 mg total) by mouth daily, Disp: 90 tablet, Rfl: 1    lisinopril (ZESTRIL) 5 mg tablet, Take 1 tablet (5 mg total) by mouth daily, Disp: 90 tablet, Rfl: 1    Multiple Vitamin Essential TABS, Take by mouth, Disp: , Rfl:     Omega 3 1200 MG CAPS, Take by mouth in the morning, Disp: , " Rfl:     tamsulosin (FLOMAX) 0.4 mg, Take 2 capsules (0.8 mg total) by mouth daily at bedtime, Disp: 180 capsule, Rfl: 3    Social History     Socioeconomic History    Marital status:      Spouse name: Not on file    Number of children: Not on file    Years of education: Not on file    Highest education level: Not on file   Occupational History    Not on file   Tobacco Use    Smoking status: Former     Current packs/day: 0.00     Average packs/day: 0.5 packs/day for 16.0 years (8.0 ttl pk-yrs)     Types: Cigarettes     Start date:      Quit date:      Years since quittin.6     Passive exposure: Past    Smokeless tobacco: Never   Vaping Use    Vaping status: Never Used   Substance and Sexual Activity    Alcohol use: Yes     Comment: per pt very rarely    Drug use: No    Sexual activity: Not on file     Comment: defer   Other Topics Concern    Not on file   Social History Narrative    Consumes 2-3 ups of tea per week     Social Determinants of Health     Financial Resource Strain: Not on file   Food Insecurity: No Food Insecurity (7/10/2024)    Hunger Vital Sign     Worried About Running Out of Food in the Last Year: Never true     Ran Out of Food in the Last Year: Never true   Transportation Needs: No Transportation Needs (7/10/2024)    PRAPARE - Transportation     Lack of Transportation (Medical): No     Lack of Transportation (Non-Medical): No   Physical Activity: Not on file   Stress: Not on file (2021)   Social Connections: Not on file   Intimate Partner Violence: Low Risk  (2021)    [facility]    Intimate Partner Violence     Insults You: Not on file     Threatens You: Not on file     Screams at You: Not on file     Physically Hurt: Not on file     Intimate Partner Violence Score: Not on file   Housing Stability: Low Risk  (7/10/2024)    Housing Stability Vital Sign     Unable to Pay for Housing in the Last Year: No     Number of Times Moved in the  Last Year: 0     Homeless in the Last Year: No     Family History   Problem Relation Age of Onset    Colon cancer Father     Kidney disease Father     Diabetes Mother     Heart disease Mother     Hypertension Mother     Cervical cancer Maternal Aunt     Breast cancer Paternal Aunt     Colon cancer Paternal Grandfather     Heart disease Sister     Heart disease Brother        Vitals:   Blood pressure 144/70, pulse 67, weight 55.3 kg (122 lb), SpO2 95%.    Wt Readings from Last 10 Encounters:   08/01/24 55.3 kg (122 lb)   07/30/24 55.3 kg (122 lb)   07/17/24 54.2 kg (119 lb 6.4 oz)   07/12/24 53.3 kg (117 lb 8.1 oz)   07/01/24 57.6 kg (127 lb)   06/12/24 58.2 kg (128 lb 3.2 oz)   11/28/23 56.5 kg (124 lb 8 oz)   11/03/23 55.3 kg (122 lb)   09/26/23 54.4 kg (120 lb)   09/20/23 54.6 kg (120 lb 6.4 oz)     Vitals:    08/01/24 1137   BP: 144/70   BP Location: Right arm   Patient Position: Sitting   Cuff Size: Standard   Pulse: 67   SpO2: 95%   Weight: 55.3 kg (122 lb)       Physical Exam  Vitals reviewed.   Constitutional:       General: He is awake. He is not in acute distress.     Appearance: Normal appearance. He is well-developed. He is not toxic-appearing or diaphoretic.   HENT:      Head: Normocephalic.      Nose: Nose normal.      Mouth/Throat:      Mouth: Mucous membranes are moist.   Eyes:      General: No scleral icterus.     Conjunctiva/sclera: Conjunctivae normal.   Neck:      Vascular: No JVD.   Cardiovascular:      Rate and Rhythm: Normal rate. Occasional Extrasystoles are present.     Heart sounds: Murmur heard.   Pulmonary:      Effort: Pulmonary effort is normal. No tachypnea, bradypnea or respiratory distress.      Breath sounds: Normal air entry. No decreased air movement. Examination of the left-lower field reveals decreased breath sounds. Decreased breath sounds present. No rhonchi.   Abdominal:      General: Bowel sounds are normal. There is no distension.      Palpations: Abdomen is soft.       Tenderness: There is no abdominal tenderness.   Musculoskeletal:      Cervical back: Neck supple.      Right lower leg: No edema.      Left lower leg: No edema.   Skin:     General: Skin is warm and dry.      Coloration: Skin is pale. Skin is not jaundiced.   Neurological:      General: No focal deficit present.      Mental Status: He is alert and oriented to person, place, and time.   Psychiatric:         Attention and Perception: Attention normal.         Mood and Affect: Mood and affect normal.         Speech: Speech normal.         Behavior: Behavior normal. Behavior is cooperative.         Thought Content: Thought content normal.       Labs & Results:  Lab Results   Component Value Date    WBC 4.84 07/27/2024    HGB 10.2 (L) 07/27/2024    HCT 31.7 (L) 07/27/2024    MCV 97 07/27/2024     07/27/2024     Lab Results   Component Value Date    SODIUM 135 07/11/2024    K 3.8 07/11/2024    CL 96 07/11/2024    CO2 33 (H) 07/11/2024    BUN 23 07/11/2024    CREATININE 1.12 07/11/2024    GLUC 187 (H) 07/11/2024    CALCIUM 8.6 07/11/2024     Lab Results   Component Value Date    INR 1.10 07/09/2024    INR 1.02 12/18/2022    INR 0.95 03/16/2015    PROTIME 14.4 07/09/2024    PROTIME 13.4 12/18/2022    PROTIME 12.9 03/16/2015     Lab Results   Component Value Date     (H) 07/10/2024      Day Lombardi PA-C

## 2024-08-01 NOTE — PATIENT INSTRUCTIONS
Will cancel echocardiogram scheduled for September as you had test done at hospital .    Will price check Jardiance.     Please weigh yourself every day (after emptying your bladder) and keep a detailed log of weights.   Contact the Heart Failure program at [phone] if you gain 3+ lbs overnight or 5+ lbs in 5-7 days.  Limit daily sodium/salt intake to 2000 mg daily to prevent fluid retention.  Avoid canned foods, fast food/Chinese food, and processed meats (hot dogs, lunch meat, and sausage etc.). Caution with condiments.  Limit fluid intake to 2000 mL or 2 liters (about 60-65 ounces) daily.  Avoid electrolyte replacement drinks (such as Gatorade, Pedialyte, Propel, Liquid IV, etc.).  Bring complete list of medications and log of daily weights to your follow-up appointment.

## 2024-08-01 NOTE — TELEPHONE ENCOUNTER
Patient was last seen by Dr. COELHO in Nov 2023.    He has been scheduled for a yearly follow up in Nov 2024    Needs a refill in the meantime. Only had enough refills to last a year from June 2023    Out of medication

## 2024-08-05 ENCOUNTER — TELEPHONE (OUTPATIENT)
Dept: PULMONOLOGY | Facility: MEDICAL CENTER | Age: 84
End: 2024-08-05

## 2024-08-05 DIAGNOSIS — F41.9 ANXIETY: ICD-10-CM

## 2024-08-05 RX ORDER — CITALOPRAM HYDROBROMIDE 10 MG/1
10 TABLET ORAL DAILY
Qty: 90 TABLET | Refills: 0 | Status: SHIPPED | OUTPATIENT
Start: 2024-08-05

## 2024-08-05 NOTE — TELEPHONE ENCOUNTER
Called patient left message.  Chest Xray shows sided pleural effusion has resolved and left-sided pleural effusion is decreased in size.  Patient can call if they have any further questions or concerns

## 2024-08-06 ENCOUNTER — PATIENT OUTREACH (OUTPATIENT)
Dept: CASE MANAGEMENT | Facility: OTHER | Age: 84
End: 2024-08-06

## 2024-08-06 DIAGNOSIS — E78.5 HYPERLIPIDEMIA, UNSPECIFIED HYPERLIPIDEMIA TYPE: ICD-10-CM

## 2024-08-06 NOTE — PROGRESS NOTES
Called HF pt to discuss update. Pt reports that he is still doing well and todays weight was 119 lbs. He, in fact, has started to drink protein supplement shakes. Pt states that he doesn't have taste for anything in the past 4 or 5 years. Pt not sure what this is related to. He still has feeling of being tired and is hoping this improves. He is working.   Since last discussion, pt had cardiology f/u appt.Theat office will be price checking on jardiance for pt.No medication change.   Pt aware of weight gain rule, s/s and when to call. Will follow up again next week and HF  ends 8/11.

## 2024-08-06 NOTE — TELEPHONE ENCOUNTER
Reason for call: NOT A DUPLICATE Patient stated there are no more refill at the pharmacy    [x] Refill   [] Prior Auth  [] Other:     Office:   [] PCP/Provider -   [x] Specialty/Provider - Cardio/Hermes Castillo,      Medication: atorvastatin (LIPITOR) 40 mg tablet     Dose/Frequency: TAKE ONE TABLET BY MOUTH ONCE DAILY     Quantity: 90    Pharmacy: [facility] - [facility] - [address] [place] [address]      Does the patient have enough for 3 days?   [x] Yes   [] No - Send as HP to POD

## 2024-08-07 RX ORDER — ATORVASTATIN CALCIUM 40 MG/1
40 TABLET, FILM COATED ORAL DAILY
Qty: 100 TABLET | Refills: 1 | Status: SHIPPED | OUTPATIENT
Start: 2024-08-07

## 2024-08-08 DIAGNOSIS — I50.22 CHRONIC SYSTOLIC CHF (CONGESTIVE HEART FAILURE), NYHA CLASS 2 (HCC): ICD-10-CM

## 2024-08-08 RX ORDER — CARVEDILOL 6.25 MG/1
6.25 TABLET ORAL 2 TIMES DAILY WITH MEALS
Qty: 180 TABLET | Refills: 1 | Status: SHIPPED | OUTPATIENT
Start: 2024-08-08

## 2024-08-12 ENCOUNTER — PATIENT OUTREACH (OUTPATIENT)
Dept: CASE MANAGEMENT | Facility: OTHER | Age: 84
End: 2024-08-12

## 2024-08-12 NOTE — PROGRESS NOTES
Inkathrynsket reminder to call HF  pt whose episode ended yesterday. Call placed and message left requesting return call back.

## 2024-08-13 ENCOUNTER — PATIENT OUTREACH (OUTPATIENT)
Dept: CASE MANAGEMENT | Facility: OTHER | Age: 84
End: 2024-08-13

## 2024-08-13 NOTE — PROGRESS NOTES
Called HF pt who reports that he is doing well. Weight today remains at 119 lbs. Pt denies any s/s HF exacerbation. Low humidity with weather has allowed his breathing to be better. He is aware of weight gain rule and is appreciative of calls for HF . Reviewed upcoming appts. Removing self from care team at this time.

## 2024-09-03 DIAGNOSIS — I50.9: ICD-10-CM

## 2024-09-03 DIAGNOSIS — I25.5: ICD-10-CM

## 2024-09-03 DIAGNOSIS — F41.9 ANXIETY: ICD-10-CM

## 2024-09-04 RX ORDER — LISINOPRIL 5 MG/1
5 TABLET ORAL DAILY
Qty: 90 TABLET | Refills: 1 | Status: SHIPPED | OUTPATIENT
Start: 2024-09-04

## 2024-09-04 RX ORDER — CITALOPRAM HYDROBROMIDE 10 MG/1
10 TABLET ORAL DAILY
Qty: 90 TABLET | Refills: 1 | Status: SHIPPED | OUTPATIENT
Start: 2024-09-04

## 2024-09-11 ENCOUNTER — OFFICE VISIT (OUTPATIENT)
Dept: PULMONOLOGY | Facility: CLINIC | Age: 84
End: 2024-09-11
Payer: COMMERCIAL

## 2024-09-11 VITALS
BODY MASS INDEX: 19.3 KG/M2 | WEIGHT: 123 LBS | HEIGHT: 67 IN | HEART RATE: 67 BPM | DIASTOLIC BLOOD PRESSURE: 68 MMHG | SYSTOLIC BLOOD PRESSURE: 128 MMHG | TEMPERATURE: 97.3 F | OXYGEN SATURATION: 97 %

## 2024-09-11 DIAGNOSIS — R93.89 ABNORMAL CT OF THE CHEST: ICD-10-CM

## 2024-09-11 DIAGNOSIS — J90 PLEURAL EFFUSION ON LEFT: Primary | Chronic | ICD-10-CM

## 2024-09-11 DIAGNOSIS — I50.22 HEART FAILURE WITH MID-RANGE EJECTION FRACTION (HFMEF) (HCC): Chronic | ICD-10-CM

## 2024-09-11 DIAGNOSIS — K44.9 HIATAL HERNIA: ICD-10-CM

## 2024-09-11 PROCEDURE — 99213 OFFICE O/P EST LOW 20 MIN: CPT | Performed by: NURSE PRACTITIONER

## 2024-09-11 NOTE — PROGRESS NOTES
Pulmonary Follow-Up Note   Raleigh Sandoval 84 y.o. male MRN: [id number]  9/11/2024      Assessment/Plan:    Problem List Items Addressed This Visit       Pleural effusion on left - Primary (Chronic)     Opacity as seen on recent CXR on the left correllates with historic loculated fluid of the major fissure. Patient has been evaluated for this in the past and it was felt to be subsequent to history of CABG in 2012. It has been stable and does not cause active symptoms.    Right pleural effusion during recent hospitalization is now resolved on CXR.         Heart failure with mid-range ejection fraction (HFmEF) (HCC) (Chronic)     Wt Readings from Last 3 Encounters:   09/11/24 55.8 kg (123 lb)   08/01/24 55.3 kg (122 lb)   07/30/24 55.3 kg (122 lb)   Euvolemic on exam today; continue furosemide, lisinopril, carvedilol as directed           Hiatal hernia     Moderate to large hiatal hernia on recent CT chest and I did review imaging with patient today. Some facet of his shortness of breath may be due to presence of stomach inside the chest. He is aware of this finding.           Abnormal CT of the chest     Vaccines: patient declines influenza; pneumonia up to date    Return if symptoms worsen or fail to improve.    All of Raleigh's questions were answered prior to leaving the office today.  He is aware to call our office with any further questions or concerns.    History of Present Illness   Reason for Visit: HFU  Chief Complaint: none  HPI: Raleigh Sandoval is a 84 y.o. male with PMHx HTN, HDL, CHF, CABG x 3 in 2012, s/p mitral valve repair 5/2023 who presents to the office today following recent hospitalization 7/9/24-7/12/24; initially presented with shortness of breath and lower extremity edema that was felt to be due to CHF exacerbation. Symptoms were accompanied by findings of pleural effusion on CT imaging however he remained on room air during hospital stay. Pleural effusions were felt to be too small  "for thoracentesis. He was diuresed and discharged to home.    Today he feels no shortness of breath, cough, wheeze or tight chest. He has very intermittent episodes of shortness of breath when he \"runs up the stairs\" but if he takes his time does fine. No symptoms when walking on a flat surface or performing ADL's. Feeling much better since he left the hospital. He is aware of the hiatal hernia the past several years and has not noted any change in how he feels - denies reflux, heartburn, upset stomach or changes in digestion of any kind. Does not want intervention.    Review of Systems  Please note that a 14-point review of systems was performed to include Constitutional, HEENT, Respiratory, CVS, GI, , Musculoskeletal, Integumentary, Neurologic, Rheumatologic, Endocrinologic, Psychiatric, Lymphatic, and Hematologic/Oncologic systems were reviewed and are negative unless otherwise stated in HPI. Positive and negative findings pertinent to this evaluation are incorporated into the history of present illness.       Historical Information   Past Medical History:   Diagnosis Date    BPH (benign prostatic hyperplasia)     Chronic pain disorder     back    Coronary artery disease     Gallstones     History of tracheostomy     per pt had it \"after CABG surgery--was admitted for 53 days including Rehab\"    Hx of CABG     per pt 7-8 yrs ago    Hypercholesterolemia     Hypertension     Moderate exercise     alot of walking-works FT and also walks dog 2 miles daily usually    Myocardial infarction (HCC)     per pt approx 7-8yrs ago -- cardio Dr ASHWIN Castillo sees yearly    Pleural effusion on left 03/02/2020    Prediabetes     Shortness of breath     per pt\"climbing steps or walking long distance\"    Wears glasses      Past Surgical History:   Procedure Laterality Date    CATARACT EXTRACTION Bilateral     COLONOSCOPY      CORONARY ARTERY BYPASS GRAFT      x3 LIMA-LAD, SVG-OM, SVG-DIAG with mitral valve repair by nicole    " CYSTOSCOPY  2014    Diagnostic    CYSTOSCOPY  2023    [facility]    ESOPHAGOGASTRODUODENOSCOPY  2015    with biopsy. Dr Mazariegos    FL RETROGRADE PYELOGRAM  2023    MITRAL VALVE REPAIR      26 mm fatmata hernandez physi annuloplasty ring    IA CYSTO BLADDER W/URETERAL CATHETERIZATION Right 2023    Procedure: CYSTO, URETHERAL DILATION, RETROGRADE PYELOGRAM W/ INSERTION STENT URETERAL;  Surgeon: Kee Moya MD;  Location: [facility];  Service: Urology    IA CYSTO W/REMOVAL OF LESIONS SMALL N/A 2023    Procedure: (TURBT);  Surgeon: Kee Moya MD;  [facility];  Service: Urology    IA CYSTO/PYELOSCOPY BX&/FULGURATION PELIVC LESION Right 2023    Procedure: URETEROSCOPY,WITH URETERAL BRUSHING;  Surgeon: Kee Moya MD;  Location: [facility];  Service: Urology    TRACHEOSTOMY      during his heart surgery     Family History   Problem Relation Age of Onset    Colon cancer Father     Kidney disease Father     Diabetes Mother     Heart disease Mother     Hypertension Mother     Cervical cancer Maternal Aunt     Breast cancer Paternal Aunt     Colon cancer Paternal Grandfather     Heart disease Sister     Heart disease Brother      Social History   Social History     Substance and Sexual Activity   Alcohol Use Yes    Comment: per pt very rarely     Social History     Substance and Sexual Activity   Drug Use No     Social History     Tobacco Use   Smoking Status Former    Current packs/day: 0.00    Average packs/day: 0.5 packs/day for 16.0 years (8.0 ttl pk-yrs)    Types: Cigarettes    Start date:     Quit date:     Years since quittin.7    Passive exposure: Past   Smokeless Tobacco Never     E-Cigarette/Vaping    E-Cigarette Use Never User      E-Cigarette/Vaping Substances       Meds/Allergies     Current Outpatient Medications:     aspirin 81 mg chewable tablet, Chew 81 mg daily, Disp: , Rfl:     atorvastatin (LIPITOR) 40 mg tablet,  "Take 1 tablet (40 mg total) by mouth daily, Disp: 100 tablet, Rfl: 1    carvedilol (COREG) 6.25 mg tablet, TAKE 1 TABLET BY MOUTH TWICE A DAY WITH FOOD, Disp: 180 tablet, Rfl: 1    citalopram (CeleXA) 10 mg tablet, Take 1 tablet (10 mg total) by mouth daily, Disp: 90 tablet, Rfl: 1    Cyanocobalamin ([employer] VITAMIN B-12 PO), Take by mouth, Disp: , Rfl:     Ferrous Sulfate (IRON) 325 (65 Fe) MG TABS, Take 1 tablet by mouth Twice daily, Disp: , Rfl:     finasteride (PROSCAR) 5 mg tablet, Take 1 tablet (5 mg total) by mouth daily, Disp: 90 tablet, Rfl: 3    furosemide (LASIX) 20 mg tablet, Take 1 tablet (20 mg total) by mouth daily, Disp: 90 tablet, Rfl: 1    lisinopril (ZESTRIL) 5 mg tablet, Take 1 tablet (5 mg total) by mouth daily, Disp: 90 tablet, Rfl: 1    Multiple Vitamin Essential TABS, Take by mouth, Disp: , Rfl:     Omega 3 1200 MG CAPS, Take by mouth in the morning, Disp: , Rfl:     tamsulosin (FLOMAX) 0.4 mg, Take 2 capsules (0.8 mg total) by mouth daily at bedtime, Disp: 200 capsule, Rfl: 1  No Known Allergies    Vitals: Blood pressure 128/68, pulse 67, temperature (!) 97.3 °F (36.3 °C), temperature source Tympanic, height 5' 7\" (1.702 m), weight 55.8 kg (123 lb), SpO2 97%. Body mass index is 19.26 kg/m². Oxygen Therapy  SpO2: 97 %  Oxygen Therapy: None (Room air)      Physical Exam  Vitals reviewed.   Constitutional:       Appearance: Normal appearance.   HENT:      Head: Normocephalic.      Nose: Nose normal.      Mouth/Throat:      Mouth: Mucous membranes are moist.      Pharynx: Oropharynx is clear.   Cardiovascular:      Rate and Rhythm: Normal rate and regular rhythm.      Pulses: Normal pulses.      Heart sounds: Normal heart sounds.   Pulmonary:      Effort: Pulmonary effort is normal.      Breath sounds: Normal breath sounds.   Musculoskeletal:         General: Normal range of motion.   Skin:     General: Skin is warm and dry.      Capillary Refill: Capillary refill takes less than 2 seconds. " "  Neurological:      General: No focal deficit present.      Mental Status: He is alert and oriented to person, place, and time.   Psychiatric:         Mood and Affect: Mood normal.         Behavior: Behavior normal.         Labs:   Lab Results   Component Value Date    WBC 4.84 07/27/2024    HGB 10.2 (L) 07/27/2024    HCT 31.7 (L) 07/27/2024    MCV 97 07/27/2024     07/27/2024    EOSPCT 10 (H) 07/27/2024    EOSABS 0.49 07/27/2024    MONOPCT 13 (H) 07/27/2024     Lab Results   Component Value Date    GLUCOSE 104 01/08/2016    CALCIUM 8.6 07/11/2024     01/08/2016    K 3.8 07/11/2024    CO2 33 (H) 07/11/2024    CL 96 07/11/2024    BUN 23 07/11/2024    CREATININE 1.12 07/11/2024     No results found for: \"IGE\", \"RAST\"  Lab Results   Component Value Date    ALT 14 07/09/2024    AST 19 07/09/2024    ALKPHOS 69 07/09/2024    BILITOT 0.77 01/08/2016         Imaging and other studies: Reviewed radiology reports from this admission including: chest xray and CT chest.  CXR 7/27/24  Since July 9, 2024:  1.  Decreased size of the chronic small left pleural effusion.  2.  Resolved right pleural effusion.     CT Chest 7/12/24  1.New small to moderate right pleural effusion.     2.Loculated fluid in the major fissure correlates with opacity seen in the left upper lung on chest x-ray.     3.Chronic loculated fluid pocket in the left lung base with adjacent rounded atelectasis.     4.Moderate to large hiatal hernia.    ECHO 7/10/24    Left Ventricle: Left ventricular cavity size is normal. Wall thickness is normal. The left ventricular ejection fraction is 43% by visual estimation. Systolic function is mildly reduced. There is basal inferior wall hypokinesis with paradoxical so wall motion consistent with postoperative state. There is probable diastolic dysfunction.  Diastolic staging precluded by (of mitral valve repair.  There are echocardiographic indications of elevated left atrial pressures.    Right Ventricle: " "Right ventricular cavity size is mildly dilated. Systolic function is mildly reduced.    Left Atrium: The atrium is severely dilated.    Right Atrium: The atrium is moderately dilated.    Aortic Valve: There is trace regurgitation. There is aortic valve sclerosis.    Mitral Valve: The valve has been repaired with an annular ring. There is mild to moderate regurgitation with an eccentrically directed jet. The gradient recorded across the prosthetic mitral valve is above the expected range with MVA 1.02 cm2 with mean PG 4.5 mmHg at 65 bpm.    Tricuspid Valve: There is mild to moderate regurgitation. Pulmonary artery systolic pressures are estimated at 45 mmHg.    Aorta: The aortic root is ectatic 3.6 cm. The ascending aorta is normal in size.    Compared to report from June 11, 2021, there are no significant changes to degree of mitral valve annuloplasty ring stenosis.      JARRED Maddox  [facility]        Portions of the record may have been created with voice recognition software.  Occasional wrong word or \"sound a like\" substitutions may have occurred due to the inherent limitations of voice recognition software.  Read the chart carefully and recognize, using context, where substitutions have occurred or contact the dictating provider.  "

## 2024-09-11 NOTE — ASSESSMENT & PLAN NOTE
Opacity as seen on recent CXR on the left correllates with historic loculated fluid of the major fissure. Patient has been evaluated for this in the past and it was felt to be subsequent to history of CABG in 2012. It has been stable and does not cause active symptoms.    Right pleural effusion during recent hospitalization is now resolved on CXR.

## 2024-09-11 NOTE — ASSESSMENT & PLAN NOTE
Wt Readings from Last 3 Encounters:   09/11/24 55.8 kg (123 lb)   08/01/24 55.3 kg (122 lb)   07/30/24 55.3 kg (122 lb)   Euvolemic on exam today; continue furosemide, lisinopril, carvedilol as directed

## 2024-09-11 NOTE — ASSESSMENT & PLAN NOTE
Moderate to large hiatal hernia on recent CT chest and I did review imaging with patient today. Some facet of his shortness of breath may be due to presence of stomach inside the chest. He is aware of this finding.

## 2024-09-13 DIAGNOSIS — N13.8 BPH WITH OBSTRUCTION/LOWER URINARY TRACT SYMPTOMS: ICD-10-CM

## 2024-09-13 DIAGNOSIS — N40.1 BPH WITH OBSTRUCTION/LOWER URINARY TRACT SYMPTOMS: ICD-10-CM

## 2024-09-13 RX ORDER — FINASTERIDE 5 MG/1
5 TABLET, FILM COATED ORAL DAILY
Qty: 90 TABLET | Refills: 0 | Status: SHIPPED | OUTPATIENT
Start: 2024-09-13

## 2024-09-30 DIAGNOSIS — N40.1 BPH WITH OBSTRUCTION/LOWER URINARY TRACT SYMPTOMS: ICD-10-CM

## 2024-09-30 DIAGNOSIS — I50.21 ACUTE SYSTOLIC CONGESTIVE HEART FAILURE (HCC): ICD-10-CM

## 2024-09-30 DIAGNOSIS — F41.9 ANXIETY: ICD-10-CM

## 2024-09-30 DIAGNOSIS — N13.8 BPH WITH OBSTRUCTION/LOWER URINARY TRACT SYMPTOMS: ICD-10-CM

## 2024-09-30 RX ORDER — FUROSEMIDE 20 MG
20 TABLET ORAL DAILY
Qty: 90 TABLET | Refills: 1 | Status: SHIPPED | OUTPATIENT
Start: 2024-09-30

## 2024-09-30 RX ORDER — CITALOPRAM HYDROBROMIDE 10 MG/1
10 TABLET ORAL DAILY
Qty: 90 TABLET | Refills: 1 | Status: SHIPPED | OUTPATIENT
Start: 2024-09-30

## 2024-09-30 RX ORDER — FINASTERIDE 5 MG/1
5 TABLET, FILM COATED ORAL DAILY
Qty: 90 TABLET | Refills: 0 | Status: SHIPPED | OUTPATIENT
Start: 2024-09-30

## 2024-10-14 ENCOUNTER — TELEPHONE (OUTPATIENT)
Dept: FAMILY MEDICINE CLINIC | Facility: CLINIC | Age: 84
End: 2024-10-14

## 2024-10-14 NOTE — TELEPHONE ENCOUNTER
MELIZA 10-14-24 to inform patient of the cancellation of his appointment 11-5-24 with Chantel the date and time is 12-3-24 at 8:20 am ask patient to call the office at [phone]

## 2024-11-11 DIAGNOSIS — I25.5: ICD-10-CM

## 2024-11-11 DIAGNOSIS — F41.9 ANXIETY: ICD-10-CM

## 2024-11-11 DIAGNOSIS — I50.9: ICD-10-CM

## 2024-11-12 RX ORDER — CITALOPRAM HYDROBROMIDE 10 MG/1
10 TABLET ORAL DAILY
Qty: 90 TABLET | Refills: 1 | Status: SHIPPED | OUTPATIENT
Start: 2024-11-12

## 2024-11-12 RX ORDER — LISINOPRIL 5 MG/1
5 TABLET ORAL DAILY
Qty: 90 TABLET | Refills: 1 | Status: SHIPPED | OUTPATIENT
Start: 2024-11-12

## 2024-11-29 NOTE — PROGRESS NOTES
12/3/2024      Chief Complaint   Patient presents with    Follow-up    Benign Prostatic Hypertrophy     Assessment and Plan    84 y.o. male managed previously Dr. Moya     1. Benign prostatic hyperplasia with lower urinary tract symptoms, symptom details unspecified  Assessment & Plan:  BPH  Continues on Flomax and finasteride  Patient reports being content with urination at this time  He feels that he adequately empties his bladder  He denies signs of urinary tract infection  No gross hematuria  UA-negative for blood and infection  Refill sent  Plan follow-up in 1 year  Orders:  -     POCT urine dip  -     tamsulosin (FLOMAX) 0.4 mg; Take 2 capsules (0.8 mg total) by mouth daily at bedtime  -     finasteride (PROSCAR) 5 mg tablet; Take 1 tablet (5 mg total) by mouth daily  2. Bladder tumor  Assessment & Plan:    Bladder lesion underwent resection 5/17/2023- resection less than 2 cm   Pathology-Negative for high grade urothelial carcinoma ([facility]) - see comment.Acellular specimen. Clonal B cell proliferation, suspicious for small B-cell lymphoma   Repeat biopsy 9/11/2023-Minute partially denuded urothelial mucosa with chronic inflammatory changes. No malignancy   Cystoscopy noted to be complicated due to significant trabeculated bladder with multiple cellules and diverticula  It was felt that the ultrasound would also be complicated by the appearance of the patient's bladder  Patient not inclined to multiple aggressive repeat cystoscopies  MRI of the pelvis to further evaluate the area of abnormality  MRI was negative for concern, cytology also negative   Patient was following with hematology oncology for suspicious small B-cell lymphoma-   No longer concern with lymphoma- not following   Discussed bladder surveillance with either cystoscopy or MRI   Given the tumor was benign recurrence risk is lower than for malignant lesions but not negligible.  Gven patient's risk factors of significant smoking history B  Leal exposure to multiple solvents I recommend cystoscopy at this time for surveillance.  Patient deferred both MRI and cystoscopy   UA today- negative for blood and infection   Denies gross hematuria or inability to empty bladder   Plan to follow up in 1 year with UA    Orders:  -     POCT urine dip      History of Present Illness  Raleigh Sandoval is a 84 y.o. male here for evaluation of BPH.  Patient currently on Flomax and finasteride.  He reports being on these for many years now.  He denies any issues with urination.  He feels that he empties his bladder adequately.  He denies gross hematuria as he does have a history of a bladder tumor and gross hematuria that occurred in December 2023.  He denies any new or current issues with urination.  He reports being content on current medication.    Patient was seen in the emergency room in December of 2023 with gross hematuria.  Treated with antibiotics.  Patient had imaging that demonstrates concern for a left-sided bladder abnormality and right-sided hydronephrosis without obstructing stone.       Patient reports significant smoking history however he quit approximately 40 years ago.  He does work for [employer] and is around multiple solvents.  He still works 5 days a week.     Patient was found to have an abnormal bladder lesion and underwent resection.  This demonstrated lymphoma.  Patient was referred back to hematology oncology and systemic work-up did not seem to indicate any additional sites of disease. Cystoscopy performed in September demonstrated persistent healing.  Cold cup biopsies were performed.  No new urinary symptoms of complaint.    Review of Systems   Constitutional:  Negative for chills and fever.   HENT:  Negative for ear pain and sore throat.    Eyes:  Negative for pain and visual disturbance.   Respiratory:  Negative for cough and shortness of breath.    Cardiovascular:  Negative for chest pain and palpitations.   Gastrointestinal:   "Negative for abdominal pain and vomiting.   Genitourinary:  Negative for decreased urine volume, difficulty urinating, dysuria, flank pain, frequency, hematuria and urgency.   Musculoskeletal:  Negative for arthralgias and back pain.   Skin:  Negative for color change and rash.   Neurological:  Negative for seizures and syncope.   All other systems reviewed and are negative.      Vitals  Vitals:    12/03/24 0821   BP: 110/60   BP Location: Left arm   Patient Position: Sitting   Cuff Size: Adult   Pulse: 64   SpO2: 98%   Weight: 57.6 kg (127 lb)   Height: 5' 7\" (1.702 m)       Physical Exam  Vitals reviewed.   Constitutional:       Appearance: Normal appearance.   HENT:      Head: Normocephalic and atraumatic.   Eyes:      Conjunctiva/sclera: Conjunctivae normal.   Pulmonary:      Effort: Pulmonary effort is normal.   Abdominal:      General: Abdomen is flat. There is no distension.      Palpations: Abdomen is soft.      Tenderness: There is no abdominal tenderness.   Genitourinary:     Penis: Normal.       Testes: Normal.   Musculoskeletal:         General: Normal range of motion.      Cervical back: Normal range of motion.   Skin:     General: Skin is warm and dry.   Neurological:      General: No focal deficit present.      Mental Status: He is alert and oriented to person, place, and time.   Psychiatric:         Mood and Affect: Mood normal.         Behavior: Behavior normal.         Thought Content: Thought content normal.         Judgment: Judgment normal.       Past History  Past Medical History:   Diagnosis Date    BPH (benign prostatic hyperplasia)     Chronic pain disorder     back    Coronary artery disease     Gallstones     History of tracheostomy     per pt had it \"after CABG surgery--was admitted for 53 days including Rehab\"    Hx of CABG     per pt 7-8 yrs ago    Hypercholesterolemia     Hypertension     Moderate exercise     alot of walking-works FT and also walks dog 2 miles daily usually    " "Myocardial infarction (HCC)     per pt approx 7-8yrs ago -- cardio Dr ASHWIN Castillo sees yearly    Pleural effusion on left 2020    Prediabetes     Shortness of breath     per pt\"climbing steps or walking long distance\"    Wears glasses      Social History     Socioeconomic History    Marital status:      Spouse name: None    Number of children: None    Years of education: None    Highest education level: None   Occupational History    None   Tobacco Use    Smoking status: Former     Current packs/day: 0.00     Average packs/day: 0.5 packs/day for 16.0 years (8.0 ttl pk-yrs)     Types: Cigarettes     Start date:      Quit date:      Years since quittin.9     Passive exposure: Past    Smokeless tobacco: Never   Vaping Use    Vaping status: Never Used   Substance and Sexual Activity    Alcohol use: Yes     Comment: per pt very rarely    Drug use: No    Sexual activity: None     Comment: defer   Other Topics Concern    None   Social History Narrative    Consumes 2-3 ups of tea per week     Social Drivers of Health     Financial Resource Strain: Not on file   Food Insecurity: No Food Insecurity (7/10/2024)    Nursing - Inadequate Food Risk Classification     Worried About Running Out of Food in the Last Year: Never true     Ran Out of Food in the Last Year: Never true     Ran Out of Food in the Last Year: Not on file   Transportation Needs: No Transportation Needs (7/10/2024)    PRAPARE - Transportation     Lack of Transportation (Medical): No     Lack of Transportation (Non-Medical): No   Physical Activity: Not on file   Stress: Not on file (2024)   Social Connections: Not on file   Intimate Partner Violence: Low Risk  (2021)    Received from [facility]    Intimate Partner Violence     Insults You: Not on file     Threatens You: Not on file     Screams at You: Not on file     Physically Hurt: Not on file     Intimate Partner Violence Score: Not on file   Housing " Stability: Low Risk  (7/10/2024)    Housing Stability Vital Sign     Unable to Pay for Housing in the Last Year: No     Number of Times Moved in the Last Year: 0     Homeless in the Last Year: No     Social History     Tobacco Use   Smoking Status Former    Current packs/day: 0.00    Average packs/day: 0.5 packs/day for 16.0 years (8.0 ttl pk-yrs)    Types: Cigarettes    Start date:     Quit date:     Years since quittin.9    Passive exposure: Past   Smokeless Tobacco Never     Family History   Problem Relation Age of Onset    Colon cancer Father     Kidney disease Father     Diabetes Mother     Heart disease Mother     Hypertension Mother     Cervical cancer Maternal Aunt     Breast cancer Paternal Aunt     Colon cancer Paternal Grandfather     Heart disease Sister     Heart disease Brother        The following portions of the patient's history were reviewed and updated as appropriate: allergies, current medications, past medical history, past social history, past surgical history and problem list.    Results  No results found for this or any previous visit (from the past hour).  ]  Lab Results   Component Value Date    PSA 0.4 2015    PSA 1.7 10/11/2014     Lab Results   Component Value Date    GLUCOSE 104 2016    CALCIUM 8.6 2024     2016    K 3.8 2024    CO2 33 (H) 2024    CL 96 2024    BUN 23 2024    CREATININE 1.12 2024     Lab Results   Component Value Date    WBC 4.84 2024    HGB 10.2 (L) 2024    HCT 31.7 (L) 2024    MCV 97 2024     2024

## 2024-12-03 ENCOUNTER — OFFICE VISIT (OUTPATIENT)
Dept: UROLOGY | Facility: CLINIC | Age: 84
End: 2024-12-03
Payer: COMMERCIAL

## 2024-12-03 VITALS
HEIGHT: 67 IN | HEART RATE: 64 BPM | DIASTOLIC BLOOD PRESSURE: 60 MMHG | WEIGHT: 127 LBS | SYSTOLIC BLOOD PRESSURE: 110 MMHG | BODY MASS INDEX: 19.93 KG/M2 | OXYGEN SATURATION: 98 %

## 2024-12-03 DIAGNOSIS — N40.1 BENIGN PROSTATIC HYPERPLASIA WITH LOWER URINARY TRACT SYMPTOMS, SYMPTOM DETAILS UNSPECIFIED: Primary | ICD-10-CM

## 2024-12-03 DIAGNOSIS — D49.4 BLADDER TUMOR: ICD-10-CM

## 2024-12-03 LAB
SL AMB  POCT GLUCOSE, UA: NORMAL
SL AMB LEUKOCYTE ESTERASE,UA: NORMAL
SL AMB POCT BILIRUBIN,UA: NORMAL
SL AMB POCT BLOOD,UA: NORMAL
SL AMB POCT CLARITY,UA: CLEAR
SL AMB POCT COLOR,UA: YELLOW
SL AMB POCT KETONES,UA: NORMAL
SL AMB POCT NITRITE,UA: NORMAL
SL AMB POCT PH,UA: 6.5
SL AMB POCT SPECIFIC GRAVITY,UA: 1.01
SL AMB POCT URINE PROTEIN: NORMAL
SL AMB POCT UROBILINOGEN: 0.2

## 2024-12-03 PROCEDURE — 81002 URINALYSIS NONAUTO W/O SCOPE: CPT

## 2024-12-03 PROCEDURE — 99213 OFFICE O/P EST LOW 20 MIN: CPT

## 2024-12-03 RX ORDER — TAMSULOSIN HYDROCHLORIDE 0.4 MG/1
0.8 CAPSULE ORAL
Qty: 90 CAPSULE | Refills: 3 | Status: SHIPPED | OUTPATIENT
Start: 2024-12-03

## 2024-12-03 RX ORDER — FINASTERIDE 5 MG/1
5 TABLET, FILM COATED ORAL DAILY
Qty: 90 TABLET | Refills: 3 | Status: SHIPPED | OUTPATIENT
Start: 2024-12-03

## 2024-12-03 NOTE — ASSESSMENT & PLAN NOTE
Bladder lesion underwent resection 5/17/2023- resection less than 2 cm   Pathology-Negative for high grade urothelial carcinoma ([facility]) - see comment.Acellular specimen. Clonal B cell proliferation, suspicious for small B-cell lymphoma   Repeat biopsy 9/11/2023-Minute partially denuded urothelial mucosa with chronic inflammatory changes. No malignancy   Cystoscopy noted to be complicated due to significant trabeculated bladder with multiple cellules and diverticula  It was felt that the ultrasound would also be complicated by the appearance of the patient's bladder  Patient not inclined to multiple aggressive repeat cystoscopies  MRI of the pelvis to further evaluate the area of abnormality  MRI was negative for concern, cytology also negative   Patient was following with hematology oncology for suspicious small B-cell lymphoma-   No longer concern with lymphoma- not following   Discussed bladder surveillance with either cystoscopy or MRI   Given the tumor was benign recurrence risk is lower than for malignant lesions but not negligible.  Gven patient's risk factors of significant smoking history B Leal exposure to multiple solvents I recommend cystoscopy at this time for surveillance.  Patient deferred both MRI and cystoscopy   UA today- negative for blood and infection   Denies gross hematuria or inability to empty bladder   Plan to follow up in 1 year with UA

## 2024-12-03 NOTE — ASSESSMENT & PLAN NOTE
BPH  Continues on Flomax and finasteride  Patient reports being content with urination at this time  He feels that he adequately empties his bladder  He denies signs of urinary tract infection  No gross hematuria  UA-negative for blood and infection  Refill sent  Plan follow-up in 1 year

## 2024-12-12 ENCOUNTER — OFFICE VISIT (OUTPATIENT)
Dept: FAMILY MEDICINE CLINIC | Facility: CLINIC | Age: 84
End: 2024-12-12
Payer: COMMERCIAL

## 2024-12-12 VITALS
BODY MASS INDEX: 19.84 KG/M2 | HEART RATE: 63 BPM | DIASTOLIC BLOOD PRESSURE: 70 MMHG | WEIGHT: 126.4 LBS | SYSTOLIC BLOOD PRESSURE: 124 MMHG | HEIGHT: 67 IN | OXYGEN SATURATION: 94 % | TEMPERATURE: 97.6 F | RESPIRATION RATE: 16 BRPM

## 2024-12-12 DIAGNOSIS — N40.1 BENIGN PROSTATIC HYPERPLASIA WITH LOWER URINARY TRACT SYMPTOMS, SYMPTOM DETAILS UNSPECIFIED: ICD-10-CM

## 2024-12-12 DIAGNOSIS — M54.50 RIGHT-SIDED LOW BACK PAIN WITHOUT SCIATICA, UNSPECIFIED CHRONICITY: ICD-10-CM

## 2024-12-12 DIAGNOSIS — R73.03 PREDIABETES: ICD-10-CM

## 2024-12-12 DIAGNOSIS — I10 ESSENTIAL HYPERTENSION: Primary | Chronic | ICD-10-CM

## 2024-12-12 DIAGNOSIS — E78.2 MIXED HYPERLIPIDEMIA: Chronic | ICD-10-CM

## 2024-12-12 DIAGNOSIS — F41.9 ANXIETY: ICD-10-CM

## 2024-12-12 DIAGNOSIS — Z95.1 HX OF CABG: ICD-10-CM

## 2024-12-12 PROBLEM — C83.09: Status: ACTIVE | Noted: 2024-12-12

## 2024-12-12 PROBLEM — J43.9 PULMONARY EMPHYSEMA, UNSPECIFIED EMPHYSEMA TYPE (HCC): Status: ACTIVE | Noted: 2024-12-12

## 2024-12-12 PROCEDURE — 99214 OFFICE O/P EST MOD 30 MIN: CPT | Performed by: FAMILY MEDICINE

## 2024-12-12 NOTE — PATIENT INSTRUCTIONS
Here for BP check and is stable and will continue same bp med and take cholesterol med and rec seeing cardiology for hx of CABG and will rec xrays for right low back and right posterior hip pain. Tylenol prn and rec consult with orthopedics. Defers flu shot today.

## 2024-12-12 NOTE — PROGRESS NOTES
Name: Raleigh Sandoval      : 1940      MRN: [id number]  Encounter Provider: Gopi Victoria DO  Encounter Date: 2024   Encounter department: [facility] PRIMARY CARE  :  Chief Complaint   Patient presents with    Follow-up     6 month follow up      Patient Instructions   Here for BP check and is stable and will continue same bp med and take cholesterol med and rec seeing cardiology for hx of CABG and will rec xrays for right low back and right posterior hip pain. Tylenol prn and rec consult with orthopedics. Defers flu shot today.     Assessment & Plan  Essential hypertension    Orders:    Comprehensive metabolic panel; Future    Mixed hyperlipidemia    Orders:    Comprehensive metabolic panel; Future    Hx of CABG x 3, 2012         Benign prostatic hyperplasia with lower urinary tract symptoms, symptom details unspecified         Right-sided low back pain without sciatica, unspecified chronicity    Orders:    XR spine lumbar 2 or 3 views injury; Future    XR hip/pelvis 4+ vw right if performed; Future    Ambulatory Referral to Orthopedic Surgery; Future    Anxiety    Orders:    Comprehensive metabolic panel; Future    CBC and differential; Future    Prediabetes    Orders:    Hemoglobin A1C; Future    Comprehensive metabolic panel; Future           History of Present Illness     Follow-up (6 month follow up ) Has right low back pain and posterior right hip pain. Hurts to walk.       Review of Systems   Constitutional: Negative.    HENT: Negative.     Eyes: Negative.    Respiratory: Negative.     Cardiovascular: Negative.    Gastrointestinal: Negative.    Endocrine: Negative.    Genitourinary: Negative.    Musculoskeletal:  Positive for back pain (right low back and posterior hip pain).   Skin: Negative.    Allergic/Immunologic: Negative.    Neurological: Negative.    Hematological: Negative.    Psychiatric/Behavioral: Negative.         Objective   /70   Pulse 63   Temp 97.6  "°F (36.4 °C) (Temporal)   Resp 16   Ht 5' 7\" (1.702 m)   Wt 57.3 kg (126 lb 6.4 oz)   SpO2 94%   BMI 19.80 kg/m²      Physical Exam  Constitutional:       Appearance: Normal appearance. He is well-developed.   HENT:      Head: Normocephalic and atraumatic.      Right Ear: External ear normal.      Left Ear: External ear normal.      Nose: Nose normal.   Eyes:      Conjunctiva/sclera: Conjunctivae normal.      Pupils: Pupils are equal, round, and reactive to light.   Cardiovascular:      Rate and Rhythm: Normal rate and regular rhythm.      Heart sounds: Normal heart sounds.   Pulmonary:      Effort: Pulmonary effort is normal.      Breath sounds: Normal breath sounds.   Musculoskeletal:      Cervical back: Normal range of motion and neck supple.      Comments: Pain with walking in right low back and posterior right hip   Skin:     General: Skin is warm and dry.      Capillary Refill: Capillary refill takes less than 2 seconds.   Neurological:      General: No focal deficit present.      Mental Status: He is alert and oriented to person, place, and time. Mental status is at baseline.      Deep Tendon Reflexes: Reflexes are normal and symmetric.   Psychiatric:         Mood and Affect: Mood normal.         Behavior: Behavior normal.         Thought Content: Thought content normal.         Judgment: Judgment normal.       Administrative Statements   I have spent a total time of 30 minutes in caring for this patient on the day of the visit/encounter including Diagnostic results, Prognosis, Risks and benefits of tx options, Instructions for management, Patient and family education, Importance of tx compliance, Risk factor reductions, Impressions, Counseling / Coordination of care, Documenting in the medical record, Reviewing / ordering tests, medicine, procedures  , and Obtaining or reviewing history  .   "

## 2024-12-14 ENCOUNTER — APPOINTMENT (OUTPATIENT)
Dept: LAB | Facility: HOSPITAL | Age: 84
End: 2024-12-14
Payer: COMMERCIAL

## 2024-12-14 ENCOUNTER — HOSPITAL ENCOUNTER (OUTPATIENT)
Dept: RADIOLOGY | Facility: HOSPITAL | Age: 84
Discharge: HOME | End: 2024-12-14
Payer: COMMERCIAL

## 2024-12-14 DIAGNOSIS — M54.50 RIGHT-SIDED LOW BACK PAIN WITHOUT SCIATICA, UNSPECIFIED CHRONICITY: ICD-10-CM

## 2024-12-14 DIAGNOSIS — R73.03 PREDIABETES: ICD-10-CM

## 2024-12-14 DIAGNOSIS — E78.2 MIXED HYPERLIPIDEMIA: Chronic | ICD-10-CM

## 2024-12-14 DIAGNOSIS — F41.9 ANXIETY: ICD-10-CM

## 2024-12-14 DIAGNOSIS — I10 ESSENTIAL HYPERTENSION: Chronic | ICD-10-CM

## 2024-12-14 LAB
ALBUMIN SERPL BCG-MCNC: 3.6 G/DL (ref 3.5–5)
ALP SERPL-CCNC: 73 U/L (ref 34–104)
ALT SERPL W P-5'-P-CCNC: 15 U/L (ref 7–52)
ANION GAP SERPL CALCULATED.3IONS-SCNC: 3 MMOL/L (ref 4–13)
AST SERPL W P-5'-P-CCNC: 21 U/L (ref 13–39)
BASOPHILS # BLD AUTO: 0.06 THOUSANDS/ÂΜL (ref 0–0.1)
BASOPHILS NFR BLD AUTO: 1 % (ref 0–1)
BILIRUB SERPL-MCNC: 0.65 MG/DL (ref 0.2–1)
BUN SERPL-MCNC: 34 MG/DL (ref 5–25)
CALCIUM SERPL-MCNC: 9.2 MG/DL (ref 8.4–10.2)
CHLORIDE SERPL-SCNC: 104 MMOL/L (ref 96–108)
CO2 SERPL-SCNC: 33 MMOL/L (ref 21–32)
CREAT SERPL-MCNC: 1.35 MG/DL (ref 0.6–1.3)
EOSINOPHIL # BLD AUTO: 0.33 THOUSAND/ÂΜL (ref 0–0.61)
EOSINOPHIL NFR BLD AUTO: 7 % (ref 0–6)
ERYTHROCYTE [DISTWIDTH] IN BLOOD BY AUTOMATED COUNT: 15 % (ref 11.6–15.1)
EST. AVERAGE GLUCOSE BLD GHB EST-MCNC: 126 MG/DL
GFR SERPL CREATININE-BSD FRML MDRD: 47 ML/MIN/1.73SQ M
GLUCOSE P FAST SERPL-MCNC: 98 MG/DL (ref 65–99)
HBA1C MFR BLD: 6 %
HCT VFR BLD AUTO: 37 % (ref 36.5–49.3)
HGB BLD-MCNC: 12 G/DL (ref 12–17)
IMM GRANULOCYTES # BLD AUTO: 0.01 THOUSAND/UL (ref 0–0.2)
IMM GRANULOCYTES NFR BLD AUTO: 0 % (ref 0–2)
LYMPHOCYTES # BLD AUTO: 1 THOUSANDS/ÂΜL (ref 0.6–4.47)
LYMPHOCYTES NFR BLD AUTO: 20 % (ref 14–44)
MCH RBC QN AUTO: 31.7 PG (ref 26.8–34.3)
MCHC RBC AUTO-ENTMCNC: 32.4 G/DL (ref 31.4–37.4)
MCV RBC AUTO: 98 FL (ref 82–98)
MONOCYTES # BLD AUTO: 0.68 THOUSAND/ÂΜL (ref 0.17–1.22)
MONOCYTES NFR BLD AUTO: 13 % (ref 4–12)
NEUTROPHILS # BLD AUTO: 2.98 THOUSANDS/ÂΜL (ref 1.85–7.62)
NEUTS SEG NFR BLD AUTO: 59 % (ref 43–75)
NRBC BLD AUTO-RTO: 0 /100 WBCS
PLATELET # BLD AUTO: 214 THOUSANDS/UL (ref 149–390)
PMV BLD AUTO: 10.2 FL (ref 8.9–12.7)
POTASSIUM SERPL-SCNC: 4.5 MMOL/L (ref 3.5–5.3)
PROT SERPL-MCNC: 6.9 G/DL (ref 6.4–8.4)
RBC # BLD AUTO: 3.79 MILLION/UL (ref 3.88–5.62)
SODIUM SERPL-SCNC: 140 MMOL/L (ref 135–147)
WBC # BLD AUTO: 5.06 THOUSAND/UL (ref 4.31–10.16)

## 2024-12-14 PROCEDURE — 73502 X-RAY EXAM HIP UNI 2-3 VIEWS: CPT

## 2024-12-14 PROCEDURE — 36415 COLL VENOUS BLD VENIPUNCTURE: CPT

## 2024-12-14 PROCEDURE — 85025 COMPLETE CBC W/AUTO DIFF WBC: CPT

## 2024-12-14 PROCEDURE — 72100 X-RAY EXAM L-S SPINE 2/3 VWS: CPT

## 2024-12-14 PROCEDURE — 83036 HEMOGLOBIN GLYCOSYLATED A1C: CPT

## 2024-12-14 PROCEDURE — 80053 COMPREHEN METABOLIC PANEL: CPT

## 2024-12-15 ENCOUNTER — RESULTS FOLLOW-UP (OUTPATIENT)
Dept: FAMILY MEDICINE CLINIC | Facility: CLINIC | Age: 84
End: 2024-12-15

## 2024-12-16 NOTE — TELEPHONE ENCOUNTER
----- Message from Gopi Victoria DO sent at 12/15/2024 11:19 AM EST -----  Please call the patient regarding his abnormal result. Prediabetes, hga1c at 6.0. Low sugar diet encouraged.

## 2024-12-17 ENCOUNTER — RESULTS FOLLOW-UP (OUTPATIENT)
Dept: FAMILY MEDICINE CLINIC | Facility: CLINIC | Age: 84
End: 2024-12-17

## 2024-12-18 ENCOUNTER — TELEPHONE (OUTPATIENT)
Age: 84
End: 2024-12-18

## 2024-12-18 ENCOUNTER — TELEPHONE (OUTPATIENT)
Dept: CARDIOLOGY CLINIC | Facility: CLINIC | Age: 84
End: 2024-12-18

## 2024-12-18 NOTE — TELEPHONE ENCOUNTER
Left message for patient to call back and reschedule 1/2/25 appt with Dr Castillo due to provider schedule change. Contact Melissa Trent or Daksha Nur for assistance.

## 2024-12-18 NOTE — TELEPHONE ENCOUNTER
Patient returned call fro results. Read Dr. Victoria's note to him:   Please call the patient regarding his abnormal result. IMPRESSION:  No acute osseous abnormality.  Large right inguinal hernia.  Needs to see General Surgery Dr. Mcdonough for a large right inguinal hernia.     Patient stated understanding. Gave him contact information for Dr. Mcdonough

## 2025-01-12 ENCOUNTER — APPOINTMENT (EMERGENCY)
Dept: CT IMAGING | Facility: HOSPITAL | Age: 85
DRG: 391 | End: 2025-01-12
Payer: COMMERCIAL

## 2025-01-12 ENCOUNTER — HOSPITAL ENCOUNTER (INPATIENT)
Facility: HOSPITAL | Age: 85
LOS: 1 days | DRG: 391 | End: 2025-01-13
Attending: EMERGENCY MEDICINE | Admitting: SURGERY
Payer: COMMERCIAL

## 2025-01-12 DIAGNOSIS — N18.9 CHRONIC KIDNEY DISEASE, UNSPECIFIED CKD STAGE: ICD-10-CM

## 2025-01-12 DIAGNOSIS — I50.22 HEART FAILURE WITH MID-RANGE EJECTION FRACTION (HFMEF) (HCC): ICD-10-CM

## 2025-01-12 DIAGNOSIS — I47.10 PAROXYSMAL SUPRAVENTRICULAR TACHYCARDIA (HCC): ICD-10-CM

## 2025-01-12 DIAGNOSIS — I48.0 PAROXYSMAL ATRIAL FIBRILLATION (HCC): ICD-10-CM

## 2025-01-12 DIAGNOSIS — I10 ESSENTIAL HYPERTENSION: ICD-10-CM

## 2025-01-12 DIAGNOSIS — K31.89 MESENTEROAXIAL GASTRIC VOLVULUS: Primary | ICD-10-CM

## 2025-01-12 LAB
ALBUMIN SERPL BCG-MCNC: 4 G/DL (ref 3.5–5)
ALP SERPL-CCNC: 73 U/L (ref 34–104)
ALT SERPL W P-5'-P-CCNC: 13 U/L (ref 7–52)
ANION GAP SERPL CALCULATED.3IONS-SCNC: 10 MMOL/L (ref 4–13)
APTT PPP: 30 SECONDS (ref 23–34)
AST SERPL W P-5'-P-CCNC: 20 U/L (ref 13–39)
BASOPHILS # BLD AUTO: 0.04 THOUSANDS/ΜL (ref 0–0.1)
BASOPHILS NFR BLD AUTO: 0 % (ref 0–1)
BILIRUB SERPL-MCNC: 0.72 MG/DL (ref 0.2–1)
BUN SERPL-MCNC: 35 MG/DL (ref 5–25)
CALCIUM SERPL-MCNC: 10 MG/DL (ref 8.4–10.2)
CARDIAC TROPONIN I PNL SERPL HS: 10 NG/L (ref ?–50)
CHLORIDE SERPL-SCNC: 100 MMOL/L (ref 96–108)
CO2 SERPL-SCNC: 30 MMOL/L (ref 21–32)
CREAT SERPL-MCNC: 1.35 MG/DL (ref 0.6–1.3)
EOSINOPHIL # BLD AUTO: 0.04 THOUSAND/ΜL (ref 0–0.61)
EOSINOPHIL NFR BLD AUTO: 0 % (ref 0–6)
ERYTHROCYTE [DISTWIDTH] IN BLOOD BY AUTOMATED COUNT: 14.5 % (ref 11.6–15.1)
GFR SERPL CREATININE-BSD FRML MDRD: 47 ML/MIN/1.73SQ M
GLUCOSE SERPL-MCNC: 139 MG/DL (ref 65–140)
HCT VFR BLD AUTO: 38.7 % (ref 36.5–49.3)
HGB BLD-MCNC: 12.7 G/DL (ref 12–17)
IMM GRANULOCYTES # BLD AUTO: 0.02 THOUSAND/UL (ref 0–0.2)
IMM GRANULOCYTES NFR BLD AUTO: 0 % (ref 0–2)
INR PPP: 0.98 (ref 0.85–1.19)
LIPASE SERPL-CCNC: 498 U/L (ref 11–82)
LYMPHOCYTES # BLD AUTO: 0.63 THOUSANDS/ΜL (ref 0.6–4.47)
LYMPHOCYTES NFR BLD AUTO: 7 % (ref 14–44)
MCH RBC QN AUTO: 31.4 PG (ref 26.8–34.3)
MCHC RBC AUTO-ENTMCNC: 32.8 G/DL (ref 31.4–37.4)
MCV RBC AUTO: 96 FL (ref 82–98)
MONOCYTES # BLD AUTO: 0.33 THOUSAND/ΜL (ref 0.17–1.22)
MONOCYTES NFR BLD AUTO: 4 % (ref 4–12)
NEUTROPHILS # BLD AUTO: 7.91 THOUSANDS/ΜL (ref 1.85–7.62)
NEUTS SEG NFR BLD AUTO: 89 % (ref 43–75)
NRBC BLD AUTO-RTO: 0 /100 WBCS
PLATELET # BLD AUTO: 220 THOUSANDS/UL (ref 149–390)
PMV BLD AUTO: 10.2 FL (ref 8.9–12.7)
POTASSIUM SERPL-SCNC: 4.4 MMOL/L (ref 3.5–5.3)
PROT SERPL-MCNC: 7.9 G/DL (ref 6.4–8.4)
PROTHROMBIN TIME: 13.2 SECONDS (ref 12.3–15)
RBC # BLD AUTO: 4.05 MILLION/UL (ref 3.88–5.62)
SODIUM SERPL-SCNC: 140 MMOL/L (ref 135–147)
WBC # BLD AUTO: 8.97 THOUSAND/UL (ref 4.31–10.16)

## 2025-01-12 PROCEDURE — 83690 ASSAY OF LIPASE: CPT

## 2025-01-12 PROCEDURE — 99285 EMERGENCY DEPT VISIT HI MDM: CPT

## 2025-01-12 PROCEDURE — 74177 CT ABD & PELVIS W/CONTRAST: CPT

## 2025-01-12 PROCEDURE — 84484 ASSAY OF TROPONIN QUANT: CPT

## 2025-01-12 PROCEDURE — 93005 ELECTROCARDIOGRAM TRACING: CPT

## 2025-01-12 PROCEDURE — 85025 COMPLETE CBC W/AUTO DIFF WBC: CPT

## 2025-01-12 PROCEDURE — 85730 THROMBOPLASTIN TIME PARTIAL: CPT

## 2025-01-12 PROCEDURE — 80053 COMPREHEN METABOLIC PANEL: CPT

## 2025-01-12 PROCEDURE — 36415 COLL VENOUS BLD VENIPUNCTURE: CPT

## 2025-01-12 PROCEDURE — 85610 PROTHROMBIN TIME: CPT

## 2025-01-12 PROCEDURE — 96375 TX/PRO/DX INJ NEW DRUG ADDON: CPT

## 2025-01-12 RX ORDER — HYDROMORPHONE HCL/PF 1 MG/ML
0.5 SYRINGE (ML) INJECTION ONCE
Status: COMPLETED | OUTPATIENT
Start: 2025-01-12 | End: 2025-01-12

## 2025-01-12 RX ORDER — ACETAMINOPHEN 10 MG/ML
1000 INJECTION, SOLUTION INTRAVENOUS ONCE
Status: COMPLETED | OUTPATIENT
Start: 2025-01-12 | End: 2025-01-12

## 2025-01-12 RX ADMIN — MORPHINE SULFATE 2 MG: 2 INJECTION, SOLUTION INTRAMUSCULAR; INTRAVENOUS at 22:17

## 2025-01-12 RX ADMIN — ACETAMINOPHEN 1000 MG: 10 INJECTION INTRAVENOUS at 21:44

## 2025-01-12 RX ADMIN — HYDROMORPHONE HYDROCHLORIDE 0.5 MG: 1 INJECTION, SOLUTION INTRAMUSCULAR; INTRAVENOUS; SUBCUTANEOUS at 23:38

## 2025-01-12 RX ADMIN — IOHEXOL 100 ML: 350 INJECTION, SOLUTION INTRAVENOUS at 22:59

## 2025-01-13 ENCOUNTER — APPOINTMENT (EMERGENCY)
Dept: RADIOLOGY | Facility: HOSPITAL | Age: 85
DRG: 391 | End: 2025-01-13
Payer: COMMERCIAL

## 2025-01-13 ENCOUNTER — HOSPITAL ENCOUNTER (INPATIENT)
Facility: HOSPITAL | Age: 85
LOS: 2 days | Discharge: HOME | DRG: 392 | End: 2025-01-15
Attending: THORACIC SURGERY (CARDIOTHORACIC VASCULAR SURGERY) | Admitting: SURGERY
Payer: COMMERCIAL

## 2025-01-13 VITALS
HEART RATE: 71 BPM | WEIGHT: 125 LBS | TEMPERATURE: 97.9 F | BODY MASS INDEX: 18.94 KG/M2 | HEIGHT: 68 IN | SYSTOLIC BLOOD PRESSURE: 124 MMHG | OXYGEN SATURATION: 95 % | RESPIRATION RATE: 20 BRPM | DIASTOLIC BLOOD PRESSURE: 59 MMHG

## 2025-01-13 DIAGNOSIS — I25.5 ISCHEMIC CARDIOMYOPATHY: ICD-10-CM

## 2025-01-13 DIAGNOSIS — I50.22 HEART FAILURE WITH MID-RANGE EJECTION FRACTION (HFMEF) (HCC): ICD-10-CM

## 2025-01-13 DIAGNOSIS — Z95.1 HX OF CABG: ICD-10-CM

## 2025-01-13 DIAGNOSIS — K31.89 MESENTEROAXIAL GASTRIC VOLVULUS: Primary | ICD-10-CM

## 2025-01-13 PROBLEM — N18.9 CKD (CHRONIC KIDNEY DISEASE): Status: ACTIVE | Noted: 2025-01-13

## 2025-01-13 LAB
2HR DELTA HS TROPONIN: 13 NG/L
4HR DELTA HS TROPONIN: 19 NG/L
ANION GAP SERPL CALCULATED.3IONS-SCNC: 5 MMOL/L (ref 4–13)
ATRIAL RATE: 74 BPM
ATRIAL RATE: 83 BPM
ATRIAL RATE: 85 BPM
ATRIAL RATE: 85 BPM
ATRIAL RATE: 92 BPM
BASOPHILS # BLD AUTO: 0.03 THOUSANDS/ΜL (ref 0–0.1)
BASOPHILS NFR BLD AUTO: 0 % (ref 0–1)
BUN SERPL-MCNC: 37 MG/DL (ref 5–25)
CALCIUM SERPL-MCNC: 8.9 MG/DL (ref 8.4–10.2)
CARDIAC TROPONIN I PNL SERPL HS: 23 NG/L (ref ?–50)
CARDIAC TROPONIN I PNL SERPL HS: 29 NG/L (ref ?–50)
CHLORIDE SERPL-SCNC: 102 MMOL/L (ref 96–108)
CO2 SERPL-SCNC: 33 MMOL/L (ref 21–32)
CREAT SERPL-MCNC: 1.42 MG/DL (ref 0.6–1.3)
EOSINOPHIL # BLD AUTO: 0.01 THOUSAND/ΜL (ref 0–0.61)
EOSINOPHIL NFR BLD AUTO: 0 % (ref 0–6)
ERYTHROCYTE [DISTWIDTH] IN BLOOD BY AUTOMATED COUNT: 14.6 % (ref 11.6–15.1)
GFR SERPL CREATININE-BSD FRML MDRD: 45 ML/MIN/1.73SQ M
GLUCOSE SERPL-MCNC: 129 MG/DL (ref 65–140)
HCT VFR BLD AUTO: 35 % (ref 36.5–49.3)
HGB BLD-MCNC: 11.6 G/DL (ref 12–17)
IMM GRANULOCYTES # BLD AUTO: 0.04 THOUSAND/UL (ref 0–0.2)
IMM GRANULOCYTES NFR BLD AUTO: 0 % (ref 0–2)
LACTATE SERPL-SCNC: 1 MMOL/L (ref 0.5–2)
LACTATE SERPL-SCNC: 1 MMOL/L (ref 0.5–2)
LYMPHOCYTES # BLD AUTO: 0.66 THOUSANDS/ΜL (ref 0.6–4.47)
LYMPHOCYTES NFR BLD AUTO: 6 % (ref 14–44)
MCH RBC QN AUTO: 31.6 PG (ref 26.8–34.3)
MCHC RBC AUTO-ENTMCNC: 33.1 G/DL (ref 31.4–37.4)
MCV RBC AUTO: 95 FL (ref 82–98)
MONOCYTES # BLD AUTO: 0.92 THOUSAND/ΜL (ref 0.17–1.22)
MONOCYTES NFR BLD AUTO: 8 % (ref 4–12)
NEUTROPHILS # BLD AUTO: 9.84 THOUSANDS/ΜL (ref 1.85–7.62)
NEUTS SEG NFR BLD AUTO: 86 % (ref 43–75)
NRBC BLD AUTO-RTO: 0 /100 WBCS
P AXIS: 100 DEGREES
P AXIS: 78 DEGREES
P AXIS: 78 DEGREES
P AXIS: 84 DEGREES
P AXIS: 88 DEGREES
PLATELET # BLD AUTO: 207 THOUSANDS/UL (ref 149–390)
PMV BLD AUTO: 9.7 FL (ref 8.9–12.7)
POTASSIUM SERPL-SCNC: 4.7 MMOL/L (ref 3.5–5.3)
PR INTERVAL: 140 MS
PR INTERVAL: 152 MS
PR INTERVAL: 154 MS
PR INTERVAL: 168 MS
PR INTERVAL: 172 MS
QRS AXIS: 34 DEGREES
QRS AXIS: 43 DEGREES
QRS AXIS: 45 DEGREES
QRS AXIS: 47 DEGREES
QRS AXIS: 48 DEGREES
QRSD INTERVAL: 104 MS
QRSD INTERVAL: 106 MS
QRSD INTERVAL: 108 MS
QRSD INTERVAL: 108 MS
QRSD INTERVAL: 132 MS
QT INTERVAL: 374 MS
QT INTERVAL: 378 MS
QT INTERVAL: 380 MS
QT INTERVAL: 388 MS
QT INTERVAL: 394 MS
QTC INTERVAL: 419 MS
QTC INTERVAL: 445 MS
QTC INTERVAL: 446 MS
QTC INTERVAL: 461 MS
QTC INTERVAL: 487 MS
RBC # BLD AUTO: 3.67 MILLION/UL (ref 3.88–5.62)
SODIUM SERPL-SCNC: 140 MMOL/L (ref 135–147)
T WAVE AXIS: 42 DEGREES
T WAVE AXIS: 63 DEGREES
T WAVE AXIS: 75 DEGREES
T WAVE AXIS: 79 DEGREES
T WAVE AXIS: 83 DEGREES
VENTRICULAR RATE: 74 BPM
VENTRICULAR RATE: 83 BPM
VENTRICULAR RATE: 85 BPM
VENTRICULAR RATE: 85 BPM
VENTRICULAR RATE: 92 BPM
WBC # BLD AUTO: 11.5 THOUSAND/UL (ref 4.31–10.16)

## 2025-01-13 PROCEDURE — 71045 X-RAY EXAM CHEST 1 VIEW: CPT

## 2025-01-13 PROCEDURE — 96365 THER/PROPH/DIAG IV INF INIT: CPT

## 2025-01-13 PROCEDURE — 99223 1ST HOSP IP/OBS HIGH 75: CPT | Performed by: SURGERY

## 2025-01-13 PROCEDURE — 84484 ASSAY OF TROPONIN QUANT: CPT

## 2025-01-13 PROCEDURE — 80048 BASIC METABOLIC PNL TOTAL CA: CPT

## 2025-01-13 PROCEDURE — 93005 ELECTROCARDIOGRAM TRACING: CPT

## 2025-01-13 PROCEDURE — 99254 IP/OBS CNSLTJ NEW/EST MOD 60: CPT | Performed by: INTERNAL MEDICINE

## 2025-01-13 PROCEDURE — 93010 ELECTROCARDIOGRAM REPORT: CPT | Performed by: STUDENT IN AN ORGANIZED HEALTH CARE EDUCATION/TRAINING PROGRAM

## 2025-01-13 PROCEDURE — 96361 HYDRATE IV INFUSION ADD-ON: CPT

## 2025-01-13 PROCEDURE — 85025 COMPLETE CBC W/AUTO DIFF WBC: CPT

## 2025-01-13 PROCEDURE — 93010 ELECTROCARDIOGRAM REPORT: CPT | Performed by: INTERNAL MEDICINE

## 2025-01-13 PROCEDURE — 36415 COLL VENOUS BLD VENIPUNCTURE: CPT

## 2025-01-13 PROCEDURE — 83605 ASSAY OF LACTIC ACID: CPT

## 2025-01-13 PROCEDURE — NC001 PR NO CHARGE: Performed by: SPECIALIST

## 2025-01-13 PROCEDURE — 96366 THER/PROPH/DIAG IV INF ADDON: CPT

## 2025-01-13 RX ORDER — FUROSEMIDE 20 MG/1
20 TABLET ORAL DAILY
Status: DISCONTINUED | OUTPATIENT
Start: 2025-01-13 | End: 2025-01-13 | Stop reason: HOSPADM

## 2025-01-13 RX ORDER — ACETAMINOPHEN 10 MG/ML
1000 INJECTION, SOLUTION INTRAVENOUS EVERY 6 HOURS SCHEDULED
Status: DISCONTINUED | OUTPATIENT
Start: 2025-01-13 | End: 2025-01-13 | Stop reason: HOSPADM

## 2025-01-13 RX ORDER — CITALOPRAM HYDROBROMIDE 10 MG/1
10 TABLET ORAL DAILY
Status: DISCONTINUED | OUTPATIENT
Start: 2025-01-13 | End: 2025-01-13 | Stop reason: HOSPADM

## 2025-01-13 RX ORDER — SODIUM CHLORIDE, SODIUM LACTATE, POTASSIUM CHLORIDE, CALCIUM CHLORIDE 600; 310; 30; 20 MG/100ML; MG/100ML; MG/100ML; MG/100ML
75 INJECTION, SOLUTION INTRAVENOUS CONTINUOUS
Status: DISCONTINUED | OUTPATIENT
Start: 2025-01-13 | End: 2025-01-13 | Stop reason: HOSPADM

## 2025-01-13 RX ORDER — HYDROMORPHONE HCL IN WATER/PF 6 MG/30 ML
0.2 PATIENT CONTROLLED ANALGESIA SYRINGE INTRAVENOUS EVERY 2 HOUR PRN
Refills: 0 | Status: DISCONTINUED | OUTPATIENT
Start: 2025-01-13 | End: 2025-01-13 | Stop reason: HOSPADM

## 2025-01-13 RX ORDER — ONDANSETRON 2 MG/ML
4 INJECTION INTRAMUSCULAR; INTRAVENOUS EVERY 4 HOURS PRN
Status: DISCONTINUED | OUTPATIENT
Start: 2025-01-13 | End: 2025-01-13 | Stop reason: HOSPADM

## 2025-01-13 RX ORDER — ASPIRIN 81 MG/1
81 TABLET, CHEWABLE ORAL DAILY
Status: DISCONTINUED | OUTPATIENT
Start: 2025-01-13 | End: 2025-01-13 | Stop reason: HOSPADM

## 2025-01-13 RX ORDER — CARVEDILOL 6.25 MG/1
6.25 TABLET ORAL 2 TIMES DAILY WITH MEALS
Status: DISCONTINUED | OUTPATIENT
Start: 2025-01-13 | End: 2025-01-13 | Stop reason: HOSPADM

## 2025-01-13 RX ORDER — LISINOPRIL 5 MG/1
5 TABLET ORAL
Status: DISCONTINUED | OUTPATIENT
Start: 2025-01-13 | End: 2025-01-13 | Stop reason: HOSPADM

## 2025-01-13 RX ORDER — HEPARIN SODIUM 5000 [USP'U]/ML
5000 INJECTION, SOLUTION INTRAVENOUS; SUBCUTANEOUS EVERY 8 HOURS SCHEDULED
Status: DISCONTINUED | OUTPATIENT
Start: 2025-01-13 | End: 2025-01-13 | Stop reason: HOSPADM

## 2025-01-13 RX ORDER — ATORVASTATIN CALCIUM 40 MG/1
40 TABLET, FILM COATED ORAL
Status: DISCONTINUED | OUTPATIENT
Start: 2025-01-13 | End: 2025-01-13 | Stop reason: HOSPADM

## 2025-01-13 RX ORDER — LIDOCAINE HYDROCHLORIDE 20 MG/ML
JELLY TOPICAL ONCE
Status: COMPLETED | OUTPATIENT
Start: 2025-01-13 | End: 2025-01-13

## 2025-01-13 RX ORDER — FINASTERIDE 5 MG/1
5 TABLET, FILM COATED ORAL
Status: DISCONTINUED | OUTPATIENT
Start: 2025-01-13 | End: 2025-01-13 | Stop reason: HOSPADM

## 2025-01-13 RX ORDER — TAMSULOSIN HYDROCHLORIDE 0.4 MG/1
0.8 CAPSULE ORAL
Status: DISCONTINUED | OUTPATIENT
Start: 2025-01-13 | End: 2025-01-13 | Stop reason: HOSPADM

## 2025-01-13 RX ORDER — LABETALOL HYDROCHLORIDE 5 MG/ML
10 INJECTION, SOLUTION INTRAVENOUS EVERY 4 HOURS PRN
Status: DISCONTINUED | OUTPATIENT
Start: 2025-01-13 | End: 2025-01-13 | Stop reason: HOSPADM

## 2025-01-13 RX ADMIN — SODIUM CHLORIDE, SODIUM LACTATE, POTASSIUM CHLORIDE, AND CALCIUM CHLORIDE 75 ML/HR: .6; .31; .03; .02 INJECTION, SOLUTION INTRAVENOUS at 01:54

## 2025-01-13 RX ADMIN — CITALOPRAM HYDROBROMIDE 10 MG: 20 TABLET ORAL at 13:24

## 2025-01-13 RX ADMIN — FUROSEMIDE 20 MG: 20 TABLET ORAL at 13:24

## 2025-01-13 RX ADMIN — LIDOCAINE HYDROCHLORIDE 5 APPLICATION: 20 JELLY TOPICAL at 00:30

## 2025-01-13 RX ADMIN — SODIUM CHLORIDE, SODIUM LACTATE, POTASSIUM CHLORIDE, AND CALCIUM CHLORIDE 500 ML: .6; .31; .03; .02 INJECTION, SOLUTION INTRAVENOUS at 01:53

## 2025-01-13 RX ADMIN — HEPARIN SODIUM 5000 UNITS: 5000 INJECTION INTRAVENOUS; SUBCUTANEOUS at 21:29

## 2025-01-13 RX ADMIN — CARVEDILOL 6.25 MG: 6.25 TABLET, FILM COATED ORAL at 18:14

## 2025-01-13 RX ADMIN — ASPIRIN 81 MG CHEWABLE TABLET 81 MG: 81 TABLET CHEWABLE at 13:24

## 2025-01-13 RX ADMIN — SODIUM CHLORIDE, SODIUM LACTATE, POTASSIUM CHLORIDE, AND CALCIUM CHLORIDE 75 ML/HR: .6; .31; .03; .02 INJECTION, SOLUTION INTRAVENOUS at 21:29

## 2025-01-13 RX ADMIN — ACETAMINOPHEN 1000 MG: 10 INJECTION INTRAVENOUS at 06:45

## 2025-01-13 NOTE — ED PROVIDER NOTES
Time reflects when diagnosis was documented in both MDM as applicable and the Disposition within this note       Time User Action Codes Description Comment    1/13/2025  1:30 AM Ruth Danii Add [K31.89] Mesenteroaxial gastric volvulus           ED Disposition       ED Disposition   Transfer to Another Facility-In Network    Condition   --    Date/Time   Mon Jan 13, 2025  1:29 AM    Comment   Raleigh Sandoval should be transferred out to [facility] [place].               Assessment & Plan       Medical Decision Making  Will obtain EKG, troponin to evaluate for ACS.  Will obtain CT chest.  Will obtain CBC to evaluate for leukocytosis, anemia.  Will obtain CMP to evaluate kidney function, for electrolyte disturbance.  Will obtain lipase to evaluate for pancreatitis.  EKG with ST elevation in V3, no other leads.     Lipase elevated.  Initial troponin 10, will obtain delta.  Labs otherwise without actionable derangement.  CT pending.  Patient does report ongoing pain despite morphine, will give Dilaudid.  Vital signs remained stable.    Delta troponin 13; will obtain 4 hr.     CT consistent with volvulus.  Discussed with general surgery (Dr. Thompson) who evaluated patient at bedside.  Lactic acid ordered. NG tube placed.  Patient did have episode of hypoxia after NG tube placement, chest x-ray obtained which reveals proper placement of NG tube.  No obvious cardiopulmonary abnormality attributing to hypoxia.  Patient does have chronic left-sided pleural effusion.  Patient denies shortness of breath.  Will place patient on oxygen as needed.    Lactic acid WINL. Patient to be transferred to [facility] for thoracic surgery.    I discussed with the patient and family the diagnosis, treatment plan, and plan for transfer; they were given the opportunity to ask questions and verbalized understanding. They agree with plan. Risks and benefits of transfer were discussed with patient, who verbalized understanding and is agreeable. EMTALA filled  out.     Care turned over to Marcela Joseph PA-C pending transfer.       Problems Addressed:  Mesenteroaxial gastric volvulus: acute illness or injury    Amount and/or Complexity of Data Reviewed  External Data Reviewed: labs, radiology, ECG and notes.  Labs: ordered. Decision-making details documented in ED Course.  Radiology: ordered. Decision-making details documented in ED Course.  ECG/medicine tests: ordered and independent interpretation performed. Decision-making details documented in ED Course.    Risk  Prescription drug management.      EKG sinus rhythm 85 bpm, occasional PAC, ST elevation in V3, no other ST elevation or depression, , QTc 445, as interpreted by me    2130 Repeat EKG sinus rhythm at 74 bpm, , QTc 419, ST elevation in V3, T wave inversion in inferior and lateral leads, no ST depression as interpreted by me    2350 Repeat EKG and normal sinus rhythm 83 bpm, PACs, , QTc 446, ST elevation in V3, T wave inversion in inferior and lateral leads, no ST depression as interpreted by me    0155 sinus rhythm rate 89 bpm with sinus arrhythmia, resolution of ST elevation in V3, , QTc 450, no STEMI as interpreted by me    ED Course as of 01/13/25 0658   Sun Jan 12, 2025   2204 WBC: 8.97   2214 LIPASE(!): 498   2214 GFR, Calculated: 47   2214 ALT: 13   2214 AST: 20   2214 Total Bilirubin: 0.72   2224 hs TnI 0hr: 10   2353 CT abdomen pelvis with contrast  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        Mon Jan 13, 2025   0010 Surgery at bedside       Medications   lactated ringers infusion (75 mL/hr Intravenous New Bag 1/13/25 0154)   HYDROmorphone HCl (DILAUDID) injection 0.2 mg (has no administration in time range)   naloxone (NARCAN) 0.04 mg/mL syringe 0.04 mg (has no administration in time range)   ondansetron (ZOFRAN) injection 4 mg (has no administration in time range)   acetaminophen (Ofirmev) injection 1,000 mg (1,000 mg  "Intravenous New Bag 1/13/25 0645)   acetaminophen (Ofirmev) injection 1,000 mg (0 mg Intravenous Stopped 1/12/25 2159)   morphine injection 2 mg (2 mg Intravenous Given 1/12/25 2217)   iohexol (OMNIPAQUE) 350 MG/ML injection (MULTI-DOSE) 100 mL (100 mL Intravenous Given 1/12/25 2259)   HYDROmorphone (DILAUDID) injection 0.5 mg (0.5 mg Intravenous Given 1/12/25 2338)   lidocaine (URO-JET) 2 % urethral/mucosal gel (5 Applications Topical Given 1/13/25 0030)   lactated ringers bolus 500 mL (0 mL Intravenous Stopped 1/13/25 8653)       ED Risk Strat Scores                      History of Present Illness       Chief Complaint   Patient presents with    Abdominal Pain     Patient reports mid upper abdominal pain, aching since 1600 shortly after eating. Reporting some phlegm with nausea but no vomiting. Denies radiation of pain, other symptoms.        Past Medical History:   Diagnosis Date    BPH (benign prostatic hyperplasia)     Chronic pain disorder     back    Coronary artery disease     Gallstones     History of tracheostomy     per pt had it \"after CABG surgery--was admitted for 53 days including Rehab\"    Hx of CABG     per pt 7-8 yrs ago    Hypercholesterolemia     Hypertension     Moderate exercise     alot of walking-works FT and also walks dog 2 miles daily usually    Myocardial infarction (HCC)     per pt approx 7-8yrs ago --[facility] cardio Dr ASHWIN Castillo sees yearly    Pleural effusion on left 03/02/2020    Prediabetes     Shortness of breath     per pt\"climbing steps or walking long distance\"    Wears glasses       Past Surgical History:   Procedure Laterality Date    CATARACT EXTRACTION Bilateral     COLONOSCOPY      CORONARY ARTERY BYPASS GRAFT      x3 LIMA-LAD, SVG-OM, SVG-DIAG with mitral valve repair by nicole    CYSTOSCOPY  12/13/2014    Diagnostic    CYSTOSCOPY  03/24/2023    [facility]    ESOPHAGOGASTRODUODENOSCOPY  04/07/2015    with biopsy. Dr Mazariegos    FL RETROGRADE PYELOGRAM  5/17/2023    MITRAL VALVE " "REPAIR      26 mm fatmata hernandez physi annuloplasty ring    NY CYSTO W/REMOVAL OF LESIONS SMALL N/A 2023    Procedure: (TURBT);  Surgeon: Kee Moya MD;  Location: [facility];  Service: Urology    NY CYSTO/PYELOSCOPY BX&/FULGURATION PELIVC LESION Right 2023    Procedure: URETEROSCOPY,WITH URETERAL BRUSHING;  Surgeon: Kee Moya MD;  Location: [facility];  Service: Urology    NY CYSTOURETHROSCOPY W/URETERAL CATHETERIZATION Right 2023    Procedure: CYSTO, URETHERAL DILATION, RETROGRADE PYELOGRAM W/ INSERTION STENT URETERAL;  Surgeon: Kee Moya MD;  Location: [facility];  Service: Urology    TRACHEOSTOMY      during his heart surgery      Family History   Problem Relation Age of Onset    Colon cancer Father     Kidney disease Father     Diabetes Mother     Heart disease Mother     Hypertension Mother     Cervical cancer Maternal Aunt     Breast cancer Paternal Aunt     Colon cancer Paternal Grandfather     Heart disease Sister     Heart disease Brother       Social History     Tobacco Use    Smoking status: Former     Current packs/day: 0.00     Average packs/day: 0.5 packs/day for 16.0 years (8.0 ttl pk-yrs)     Types: Cigarettes     Start date:      Quit date:      Years since quittin.0     Passive exposure: Past    Smokeless tobacco: Never   Vaping Use    Vaping status: Never Used   Substance Use Topics    Alcohol use: Yes     Comment: per pt very rarely    Drug use: No      E-Cigarette/Vaping    E-Cigarette Use Never User       E-Cigarette/Vaping Substances      I have reviewed and agree with the history as documented.     The patient is an 84-year-old male with history of CAD s/p CABG, hypertension, hyperlipidemia, PAF, who presents to the ED for evaluation of upper abdominal pain that began at 4 PM shortly after he ate dinner.  He reports having episodes of vomiting phlegm with nausea, but no \"real\" vomiting. It does not radiate to his back, chest, " jaw, or arm. He otherwise denies fever, chills, dysuria, hematuria, diarrhea, hematochezia. Had one dark stool yesterday.           Review of Systems   Constitutional:  Negative for chills and fever.   HENT:  Negative for congestion and rhinorrhea.    Respiratory:  Negative for cough and shortness of breath.    Cardiovascular:  Negative for chest pain and leg swelling.   Gastrointestinal:  Positive for abdominal pain, nausea and vomiting. Negative for constipation and diarrhea.   Genitourinary:  Negative for dysuria and flank pain.   Musculoskeletal:  Negative for arthralgias and myalgias.   Skin:  Negative for rash and wound.   Neurological:  Negative for dizziness, weakness, numbness and headaches.           Objective       ED Triage Vitals [01/12/25 2034]   Temperature Pulse Blood Pressure Respirations SpO2 Patient Position - Orthostatic VS   97.7 °F (36.5 °C) 80 (!) 200/105 16 97 % Lying      Temp Source Heart Rate Source BP Location FiO2 (%) Pain Score    Oral Monitor Right arm -- 7      Vitals      Date and Time Temp Pulse SpO2 Resp BP Pain Score FACES Pain Rating User   01/13/25 0651 -- -- 95 % -- -- -- -- NM   01/13/25 0600 -- 73 98 % 15 148/70 -- -- NM   01/13/25 0441 -- -- 97 % -- -- -- -- NM   01/13/25 0440 -- --  85 % -- -- -- -- NM   01/13/25 0415 -- 76 90 % 17 155/67 -- -- NM   01/13/25 0321 -- -- -- -- -- No Pain -- NM   01/13/25 0200 -- 82 91 % 16 123/65 -- -- NM   01/13/25 0155 -- -- -- -- -- No Pain -- NM   01/13/25 0031 -- -- 93 % -- -- No Pain -- NM   01/13/25 0030 -- 91 84 % 15 -- -- -- NM   01/13/25 0000 -- 83 97 % 18 148/86 -- -- NM   01/12/25 2338 -- -- -- -- -- 10 - Worst Possible Pain -- NM   01/12/25 2217 -- -- -- -- -- 10 - Worst Possible Pain -- NM   01/12/25 2145 -- 76 100 % 16 181/86 -- -- NM   01/12/25 2034 97.7 °F (36.5 °C) 80 97 % 16 200/105 7 -- TMS            Physical Exam  Vitals and nursing note reviewed.   Constitutional:       General: He is not in acute distress.      Appearance: He is well-developed. He is not ill-appearing or toxic-appearing.   HENT:      Head: Normocephalic and atraumatic.   Eyes:      Conjunctiva/sclera: Conjunctivae normal.   Cardiovascular:      Rate and Rhythm: Normal rate and regular rhythm.      Heart sounds: No murmur heard.  Pulmonary:      Effort: Pulmonary effort is normal. No respiratory distress.      Breath sounds: Normal breath sounds.   Abdominal:      Palpations: Abdomen is soft.      Tenderness: There is abdominal tenderness in the epigastric area. There is no right CVA tenderness, left CVA tenderness, guarding or rebound.   Musculoskeletal:         General: No swelling.      Cervical back: Neck supple.   Skin:     General: Skin is warm and dry.      Capillary Refill: Capillary refill takes less than 2 seconds.   Neurological:      Mental Status: He is alert.   Psychiatric:         Mood and Affect: Mood normal.         Results Reviewed       Procedure Component Value Units Date/Time    CBC and differential [[id number]]  (Abnormal) Collected: 01/13/25 0645    Lab Status: Final result Specimen: Blood from Arm, Right Updated: 01/13/25 0654     WBC 11.50 Thousand/uL      RBC 3.67 Million/uL      Hemoglobin 11.6 g/dL      Hematocrit 35.0 %      MCV 95 fL      MCH 31.6 pg      MCHC 33.1 g/dL      RDW 14.6 %      MPV 9.7 fL      Platelets 207 Thousands/uL      nRBC 0 /100 WBCs      Segmented % 86 %      Immature Grans % 0 %      Lymphocytes % 6 %      Monocytes % 8 %      Eosinophils Relative 0 %      Basophils Relative 0 %      Absolute Neutrophils 9.84 Thousands/µL      Absolute Immature Grans 0.04 Thousand/uL      Absolute Lymphocytes 0.66 Thousands/µL      Absolute Monocytes 0.92 Thousand/µL      Eosinophils Absolute 0.01 Thousand/µL      Basophils Absolute 0.03 Thousands/µL     Basic metabolic panel [[id number]] Collected: 01/13/25 0645    Lab Status: In process Specimen: Blood from Arm, Right Updated: 01/13/25 0652    Lactic acid, plasma  (w/reflex if result > 2.0) [[id number]] Collected: 01/13/25 0645    Lab Status: In process Specimen: Blood from Arm, Right Updated: 01/13/25 0652    HS Troponin I 4hr [[id number]]  (Normal) Collected: 01/13/25 0153    Lab Status: Final result Specimen: Blood from Arm, Right Updated: 01/13/25 0226     hs TnI 4hr 29 ng/L      Delta 4hr hsTnI 19 ng/L     Lactic acid, plasma (w/reflex if result > 2.0) [[id number]]  (Normal) Collected: 01/13/25 0028    Lab Status: Final result Specimen: Blood from Arm, Right Updated: 01/13/25 0053     LACTIC ACID 1.0 mmol/L     Narrative:      Result may be elevated if tourniquet was used during collection.    HS Troponin I 2hr [[id number]]  (Normal) Collected: 01/12/25 2333    Lab Status: Final result Specimen: Blood from Arm, Right Updated: 01/13/25 0004     hs TnI 2hr 23 ng/L      Delta 2hr hsTnI 13 ng/L     Protime-INR [[id number]]  (Normal) Collected: 01/12/25 2210    Lab Status: Final result Specimen: Blood from Arm, Right Updated: 01/12/25 2229     Protime 13.2 seconds      INR 0.98    Narrative:      INR Therapeutic Range    Indication                                             INR Range      Atrial Fibrillation                                               2.0-3.0  Hypercoagulable State                                    2.0.2.3  Left Ventricular Asist Device                            2.0-3.0  Mechanical Heart Valve                                  -    Aortic(with afib, MI, embolism, HF, LA enlargement,    and/or coagulopathy)                                     2.0-3.0 (2.5-3.5)     Mitral                                                             2.5-3.5  Prosthetic/Bioprosthetic Heart Valve               2.0-3.0  Venous thromboembolism (VTE: VT, PE        2.0-3.0    APTT [[id number]]  (Normal) Collected: 01/12/25 2210    Lab Status: Final result Specimen: Blood from Arm, Right Updated: 01/12/25 2229     PTT 30 seconds     HS Troponin 0hr (reflex protocol) [[id number]]  (Normal)  Collected: 01/12/25 2144    Lab Status: Final result Specimen: Blood from Arm, Right Updated: 01/12/25 2219     hs TnI 0hr 10 ng/L     Comprehensive metabolic panel [[id number]]  (Abnormal) Collected: 01/12/25 2144    Lab Status: Final result Specimen: Blood from Arm, Right Updated: 01/12/25 2210     Sodium 140 mmol/L      Potassium 4.4 mmol/L      Chloride 100 mmol/L      CO2 30 mmol/L      ANION GAP 10 mmol/L      BUN 35 mg/dL      Creatinine 1.35 mg/dL      Glucose 139 mg/dL      Calcium 10.0 mg/dL      AST 20 U/L      ALT 13 U/L      Alkaline Phosphatase 73 U/L      Total Protein 7.9 g/dL      Albumin 4.0 g/dL      Total Bilirubin 0.72 mg/dL      eGFR 47 ml/min/1.73sq m     Narrative:      National Kid[employer] Foundation guidelines for Chronic Kidney Disease (CKD):     Stage 1 with normal or high GFR (GFR > 90 mL/min/1.73 square meters)    Stage 2 Mild CKD (GFR = 60-89 mL/min/1.73 square meters)    Stage 3A Moderate CKD (GFR = 45-59 mL/min/1.73 square meters)    Stage 3B Moderate CKD (GFR = 30-44 mL/min/1.73 square meters)    Stage 4 Severe CKD (GFR = 15-29 mL/min/1.73 square meters)    Stage 5 End Stage CKD (GFR <15 mL/min/1.73 square meters)  Note: GFR calculation is accurate only with a steady state creatinine    Lipase [[id number]]  (Abnormal) Collected: 01/12/25 2144    Lab Status: Final result Specimen: Blood from Arm, Right Updated: 01/12/25 2210     Lipase 498 u/L     CBC and differential [[id number]]  (Abnormal) Collected: 01/12/25 2144    Lab Status: Final result Specimen: Blood from Arm, Right Updated: 01/12/25 2153     WBC 8.97 Thousand/uL      RBC 4.05 Million/uL      Hemoglobin 12.7 g/dL      Hematocrit 38.7 %      MCV 96 fL      MCH 31.4 pg      MCHC 32.8 g/dL      RDW 14.5 %      MPV 10.2 fL      Platelets 220 Thousands/uL      nRBC 0 /100 WBCs      Segmented % 89 %      Immature Grans % 0 %      Lymphocytes % 7 %      Monocytes % 4 %      Eosinophils Relative 0 %      Basophils Relative 0 %       Absolute Neutrophils 7.91 Thousands/µL      Absolute Immature Grans 0.02 Thousand/uL      Absolute Lymphocytes 0.63 Thousands/µL      Absolute Monocytes 0.33 Thousand/µL      Eosinophils Absolute 0.04 Thousand/µL      Basophils Absolute 0.04 Thousands/µL             XR chest 1 view portable   Final Interpretation by Yolande Villela MD (01/13 0624)      NG tube in stomach with tip below the diaphragm.      Chronic small loculated left pleural effusion.      Large hiatal hernia.            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      The study was marked in EPIC for immediate notification.         Workstation performed: [id number]             Procedures    ED Medication and Procedure Management   Prior to Admission Medications   Prescriptions Last Dose Informant Patient Reported? Taking?   Cyanocobalamin ([employer] VITAMIN B-12 PO)  Self Yes Yes   Sig: Take by mouth   Ferrous Sulfate (IRON) 325 (65 Fe) MG TABS  Self Yes Yes   Sig: Take 1 tablet by mouth Twice daily   Multiple Vitamin Essential TABS  Self Yes Yes   Sig: Take by mouth   Omega 3 1200 MG CAPS  Self Yes Yes   Sig: Take by mouth in the morning   aspirin 81 mg chewable tablet  Self Yes Yes   Sig: Chew 81 mg daily   atorvastatin (LIPITOR) 40 mg tablet  Self No Yes   Sig: Take 1 tablet (40 mg total) by mouth daily   carvedilol (COREG) 6.25 mg tablet  Self No Yes   Sig: TAKE 1 TABLET BY MOUTH TWICE A DAY WITH FOOD   citalopram (CeleXA) 10 mg tablet  Self No Yes   Sig: Take 1 tablet (10 mg total) by mouth daily   finasteride (PROSCAR) 5 mg tablet   No Yes   Sig: Take 1 tablet (5 mg total) by mouth daily   furosemide (LASIX) 20 mg tablet  Self No Yes   Sig: Take 1 tablet (20 mg total) by mouth daily   lisinopril (ZESTRIL) 5 mg tablet  Self No Yes   Sig: Take 1 tablet (5 mg  total) by mouth daily   tamsulosin (FLOMAX) 0.4 mg   No Yes   Sig: Take 2 capsules (0.8 mg total) by mouth daily at bedtime      Facility-Administered Medications: None     Patient's Medications   Discharge Prescriptions    No medications on file     No discharge procedures on file.  ED SEPSIS DOCUMENTATION   Time reflects when diagnosis was documented in both MDM as applicable and the Disposition within this note       Time User Action Codes Description Comment    1/13/2025  1:30 AM Danii Miranda Add [K31.89] Mesenteroaxial gastric volvulus                  Danii Miranda PA-C  01/13/25 0658

## 2025-01-13 NOTE — EMTALA/ACUTE CARE TRANSFER
[facility] EMERGENCY DEPARTMENT  [address] [address]  Dept: [phone]      EMTALA TRANSFER CONSENT    NAME Raleigh Sandoval                                         1940                              MRN [id number]    I have been informed of my rights regarding examination, treatment, and transfer   by Dr. Bhargavi wade. providers found    Benefits: Specialized equipment and/or services available at the receiving facility (Include comment)________________________    Risks: Potential for delay in receiving treatment, Potential deterioration of medical condition, Loss of IV, Possible worsening of condition or death during transfer, Increased discomfort during transfer      Consent for Transfer:  I acknowledge that my medical condition has been evaluated and explained to me by the emergency department physician or other qualified medical person and/or my attending physician, who has recommended that I be transferred to the service of  Accepting Physician: Dr. Miner at Accepting Facility Name, City & State : [facility]. The above potential benefits of such transfer, the potential risks associated with such transfer, and the probable risks of not being transferred have been explained to me, and I fully understand them.  The doctor has explained that, in my case, the benefits of transfer outweigh the risks.  I agree to be transferred.    I authorize the performance of emergency medical procedures and treatments upon me in both transit and upon arrival at the receiving facility.  Additionally, I authorize the release of any and all medical records to the receiving facility and request they be transported with me, if possible.  I understand that the safest mode of transportation during a medical emergency is an ambulance and that the Hospital advocates the use of this mode of transport. Risks of traveling to the receiving facility by car, including absence of medical control,  life sustaining equipment, such as oxygen, and medical personnel has been explained to me and I fully understand them.    (TULIO CORRECT BOX BELOW)  [  ]  I consent to the stated transfer and to be transported by ambulance/helicopter.  [  ]  I consent to the stated transfer, but refuse transportation by ambulance and accept full responsibility for my transportation by car.  I understand the risks of non-ambulance transfers and I exonerate the Hospital and its staff from any deterioration in my condition that results from this refusal.    X___________________________________________    DATE  25  TIME________  Signature of patient or legally responsible individual signing on patient behalf           RELATIONSHIP TO PATIENT_________________________          Provider Certification    NAME Raleigh Sandoval                                         1940                              MRN [id number]    A medical screening exam was performed on the above named patient.  Based on the examination:    Condition Necessitating Transfer The encounter diagnosis was Mesenteroaxial gastric volvulus.    Patient Condition: The patient has been stabilized such that within reasonable medical probability, no material deterioration of the patient condition or the condition of the unborn child(tom) is likely to result from the transfer    Reason for Transfer: Level of Care needed not available at this facility    Transfer Requirements: Facility [facility]   Space available and qualified personnel available for treatment as acknowledged by    Agreed to accept transfer and to provide appropriate medical treatment as acknowledged by       Dr. Miner  Appropriate medical records of the examination and treatment of the patient are provided at the time of transfer   STAFF INITIAL WHEN COMPLETED _______  Transfer will be performed by qualified personnel from    and appropriate transfer equipment as required, including the use  of necessary and appropriate life support measures.    Provider Certification: I have examined the patient and explained the following risks and benefits of being transferred/refusing transfer to the patient/family:  General risk, such as traffic hazards, adverse weather conditions, rough terrain or turbulence, possible failure of equipment (including vehicle or aircraft), or consequences of actions of persons outside the control of the transport personnel, Unanticipated needs of medical equipment and personnel during transport, Risk of worsening condition, The possibility of a transport vehicle being unavailable      Based on these reasonable risks and benefits to the patient and/or the unborn child(tom), and based upon the information available at the time of the patient’s examination, I certify that the medical benefits reasonably to be expected from the provision of appropriate medical treatments at another medical facility outweigh the increasing risks, if any, to the individual’s medical condition, and in the case of labor to the unborn child, from effecting the transfer.    X____________________________________________ DATE 01/13/25        TIME_______      ORIGINAL - SEND TO MEDICAL RECORDS   COPY - SEND WITH PATIENT DURING TRANSFER

## 2025-01-13 NOTE — ASSESSMENT & PLAN NOTE
83 yo M with CT obtained demonstrating gastric volvulus and c/f for mesentery swirling with enlarged associated mesenteric vessels.     -NPO  -NGT placement at bedside/confirmed with KUB  -Tsf to [facility] for thoracic surgery evaluation  -500cc LR Bolus  -75cc/hr LR  -trend Cr/BUN  -monitor abd exam  -aspiration precautions  -HOB >30  -multimodal analgesia  -anti-emetics

## 2025-01-13 NOTE — ED CARE HANDOFF
"Emergency Department Sign Out Note        Sign out and transfer of care from Danii Miranda PA-C. See Separate Emergency Department note.     The patient, Raleigh Sandoval, was evaluated by the previous provider for upper abdominal pain.    Per initial provider note - The patient is an 84-year-old male with history of CAD s/p CABG, hypertension, hyperlipidemia, PAF, who presents to the ED for evaluation of upper abdominal pain that began at 4 PM shortly after he ate dinner. He reports having episodes of vomiting phlegm with nausea, but no \"real\" vomiting. It does not radiate to his back, chest, jaw, or arm. He otherwise denies fever, chills, dysuria, hematuria, diarrhea, hematochezia. Had one dark stool yesterday.     Workup Completed:  Lactic  Troponin x3  PT/INR  PTT  CMP  Lipase  CBC  CXR  CT abdomen pelvis with contrast    ED Course / Workup Pending (followup):  0630 - Per sign out, waiting for transfer to [facility].    0901 Message sent to PACS to see if we have a time for transfer yet.    0907 Still no plan for transfer to [facility]    1119 Spoke with general surgery - Patient can get his daily home medications. Can clamp the NG tube to give oral meds and keep it clamped for 30 minutes after.    1722     Spoke with general surgery resident for possible admission to their service until transfer. Per surgery resident, patient can be admitted to [facility]. Message sent to [facility].    1730     Patient signed out to Betsy Jack PA-C awaiting transfer to [facility] vs possible admission here until able to be transferred to Thoracics at [facility]. Patient is stable.                              ED Course as of 01/13/25 1737   Mon Jan 13, 2025   0901 Message sent to PACS to see if we have a time for transfer yet.   0907 Still no plan for transfer to [facility]   1119 Spoke with general surgery - Patient can get his daily home medications. Can clamp the NG tube to give oral meds and keep it clamped for 30 minutes after     Procedures  Medical Decision " Making        Problems Addressed:  Mesenteroaxial gastric volvulus: acute illness or injury    Amount and/or Complexity of Data Reviewed  Independent Historian:      Details: Patient is historian  Labs: ordered.  Radiology: ordered.    Risk  OTC drugs.  Prescription drug management.            Disposition  Final diagnoses:   Mesenteroaxial gastric volvulus     Time reflects when diagnosis was documented in both MDM as applicable and the Disposition within this note       Time User Action Codes Description Comment    1/13/2025  1:30 AM Danii Miranda Add [K31.89] Mesenteroaxial gastric volvulus           ED Disposition       ED Disposition   Transfer to Another Facility-In Network    Condition   --    Date/Time   Mon Jan 13, 2025  1:29 AM    Comment   Raleigh Sandoval should be transferred out to [facility].               MD Documentation      Flowsheet Row Most Recent Value   Patient Condition The patient has been stabilized such that within reasonable medical probability, no material deterioration of the patient condition or the condition of the unborn child(tom) is likely to result from the transfer   Reason for Transfer Level of Care needed not available at this facility   Benefits of Transfer Specialized equipment and/or services available at the receiving facility (Include comment)________________________   Risks of Transfer Potential for delay in receiving treatment, Potential deterioration of medical condition, Loss of IV, Possible worsening of condition or death during transfer, Increased discomfort during transfer   Accepting Physician Dr. Miner   Accepting Facility Name, [facility]   Sending MD Danii Miranda PA-C (Dr. Rose)   Provider Certification General risk, such as traffic hazards, adverse weather conditions, rough terrain or turbulence, possible failure of equipment (including vehicle or aircraft), or consequences of actions of persons outside the control of the  transport personnel, Unanticipated needs of medical equipment and personnel during transport, Risk of worsening condition, The possibility of a transport vehicle being unavailable          RN Documentation      Flowsheet Row Most Recent Value   Accepting Facility Name, [facility]   Transport Mode Ambulance   Level of Care Advanced life support          Follow-up Information    None       Patient's Medications   Discharge Prescriptions    No medications on file     No discharge procedures on file.       ED Provider  Electronically Signed by     Marcela Joseph PA-C  01/13/25 2571

## 2025-01-14 ENCOUNTER — APPOINTMENT (INPATIENT)
Dept: RADIOLOGY | Facility: HOSPITAL | Age: 85
DRG: 392 | End: 2025-01-14
Payer: COMMERCIAL

## 2025-01-14 PROBLEM — K31.89: Chronic | Status: ACTIVE | Noted: 2025-01-13

## 2025-01-14 LAB
ANION GAP SERPL CALCULATED.3IONS-SCNC: 8 MMOL/L (ref 4–13)
BASOPHILS # BLD AUTO: 0.05 THOUSANDS/ΜL (ref 0–0.1)
BASOPHILS NFR BLD AUTO: 0 % (ref 0–1)
BUN SERPL-MCNC: 31 MG/DL (ref 5–25)
CALCIUM SERPL-MCNC: 8.5 MG/DL (ref 8.4–10.2)
CHLORIDE SERPL-SCNC: 99 MMOL/L (ref 96–108)
CO2 SERPL-SCNC: 34 MMOL/L (ref 21–32)
CREAT SERPL-MCNC: 1.19 MG/DL (ref 0.6–1.3)
EOSINOPHIL # BLD AUTO: 0.3 THOUSAND/ΜL (ref 0–0.61)
EOSINOPHIL NFR BLD AUTO: 2 % (ref 0–6)
ERYTHROCYTE [DISTWIDTH] IN BLOOD BY AUTOMATED COUNT: 14.7 % (ref 11.6–15.1)
GFR SERPL CREATININE-BSD FRML MDRD: 55 ML/MIN/1.73SQ M
GLUCOSE SERPL-MCNC: 104 MG/DL (ref 65–140)
GLUCOSE SERPL-MCNC: 189 MG/DL (ref 65–140)
GLUCOSE SERPL-MCNC: 69 MG/DL (ref 65–140)
GLUCOSE SERPL-MCNC: 73 MG/DL (ref 65–140)
GLUCOSE SERPL-MCNC: 79 MG/DL (ref 65–140)
GLUCOSE SERPL-MCNC: 79 MG/DL (ref 65–140)
HCT VFR BLD AUTO: 34.4 % (ref 36.5–49.3)
HGB BLD-MCNC: 11.1 G/DL (ref 12–17)
IMM GRANULOCYTES # BLD AUTO: 0.04 THOUSAND/UL (ref 0–0.2)
IMM GRANULOCYTES NFR BLD AUTO: 0 % (ref 0–2)
LYMPHOCYTES # BLD AUTO: 0.79 THOUSANDS/ΜL (ref 0.6–4.47)
LYMPHOCYTES NFR BLD AUTO: 6 % (ref 14–44)
MAGNESIUM SERPL-MCNC: 2 MG/DL (ref 1.9–2.7)
MCH RBC QN AUTO: 31.1 PG (ref 26.8–34.3)
MCHC RBC AUTO-ENTMCNC: 32.3 G/DL (ref 31.4–37.4)
MCV RBC AUTO: 96 FL (ref 82–98)
MONOCYTES # BLD AUTO: 0.85 THOUSAND/ΜL (ref 0.17–1.22)
MONOCYTES NFR BLD AUTO: 7 % (ref 4–12)
NEUTROPHILS # BLD AUTO: 10.61 THOUSANDS/ΜL (ref 1.85–7.62)
NEUTS SEG NFR BLD AUTO: 85 % (ref 43–75)
NRBC BLD AUTO-RTO: 0 /100 WBCS
PHOSPHATE SERPL-MCNC: 3.8 MG/DL (ref 2.3–4.1)
PLATELET # BLD AUTO: 196 THOUSANDS/UL (ref 149–390)
PLATELET # BLD AUTO: 207 THOUSANDS/UL (ref 149–390)
PMV BLD AUTO: 10.4 FL (ref 8.9–12.7)
PMV BLD AUTO: 9.8 FL (ref 8.9–12.7)
POTASSIUM SERPL-SCNC: 4.1 MMOL/L (ref 3.5–5.3)
RBC # BLD AUTO: 3.57 MILLION/UL (ref 3.88–5.62)
SODIUM SERPL-SCNC: 141 MMOL/L (ref 135–147)
WBC # BLD AUTO: 12.64 THOUSAND/UL (ref 4.31–10.16)

## 2025-01-14 PROCEDURE — 97166 OT EVAL MOD COMPLEX 45 MIN: CPT

## 2025-01-14 PROCEDURE — 74018 RADEX ABDOMEN 1 VIEW: CPT

## 2025-01-14 PROCEDURE — 85025 COMPLETE CBC W/AUTO DIFF WBC: CPT

## 2025-01-14 PROCEDURE — 83735 ASSAY OF MAGNESIUM: CPT

## 2025-01-14 PROCEDURE — 85049 AUTOMATED PLATELET COUNT: CPT

## 2025-01-14 PROCEDURE — 80048 BASIC METABOLIC PNL TOTAL CA: CPT

## 2025-01-14 PROCEDURE — 99223 1ST HOSP IP/OBS HIGH 75: CPT | Performed by: INTERNAL MEDICINE

## 2025-01-14 PROCEDURE — 99223 1ST HOSP IP/OBS HIGH 75: CPT | Performed by: SURGERY

## 2025-01-14 PROCEDURE — 93005 ELECTROCARDIOGRAM TRACING: CPT

## 2025-01-14 PROCEDURE — 97163 PT EVAL HIGH COMPLEX 45 MIN: CPT

## 2025-01-14 PROCEDURE — 84100 ASSAY OF PHOSPHORUS: CPT

## 2025-01-14 PROCEDURE — 74240 X-RAY XM UPR GI TRC 1CNTRST: CPT

## 2025-01-14 PROCEDURE — 82948 REAGENT STRIP/BLOOD GLUCOSE: CPT

## 2025-01-14 RX ORDER — HYDROMORPHONE HCL/PF 1 MG/ML
0.5 SYRINGE (ML) INJECTION
Refills: 0 | Status: DISCONTINUED | OUTPATIENT
Start: 2025-01-14 | End: 2025-01-14

## 2025-01-14 RX ORDER — TAMSULOSIN HYDROCHLORIDE 0.4 MG/1
0.8 CAPSULE ORAL
Status: DISCONTINUED | OUTPATIENT
Start: 2025-01-14 | End: 2025-01-15 | Stop reason: HOSPADM

## 2025-01-14 RX ORDER — HEPARIN SODIUM 5000 [USP'U]/ML
5000 INJECTION, SOLUTION INTRAVENOUS; SUBCUTANEOUS EVERY 8 HOURS SCHEDULED
Status: DISCONTINUED | OUTPATIENT
Start: 2025-01-14 | End: 2025-01-15 | Stop reason: HOSPADM

## 2025-01-14 RX ORDER — HYDROMORPHONE HCL IN WATER/PF 6 MG/30 ML
0.2 PATIENT CONTROLLED ANALGESIA SYRINGE INTRAVENOUS
Refills: 0 | Status: DISCONTINUED | OUTPATIENT
Start: 2025-01-14 | End: 2025-01-14

## 2025-01-14 RX ORDER — ACETAMINOPHEN 10 MG/ML
1000 INJECTION, SOLUTION INTRAVENOUS EVERY 6 HOURS SCHEDULED
Status: DISCONTINUED | OUTPATIENT
Start: 2025-01-14 | End: 2025-01-14

## 2025-01-14 RX ORDER — ATORVASTATIN CALCIUM 40 MG/1
40 TABLET, FILM COATED ORAL
Status: DISCONTINUED | OUTPATIENT
Start: 2025-01-14 | End: 2025-01-15 | Stop reason: HOSPADM

## 2025-01-14 RX ORDER — CITALOPRAM HYDROBROMIDE 10 MG/1
10 TABLET ORAL DAILY
Status: DISCONTINUED | OUTPATIENT
Start: 2025-01-14 | End: 2025-01-15 | Stop reason: HOSPADM

## 2025-01-14 RX ORDER — INSULIN LISPRO 100 [IU]/ML
1-5 INJECTION, SOLUTION INTRAVENOUS; SUBCUTANEOUS
Status: DISCONTINUED | OUTPATIENT
Start: 2025-01-14 | End: 2025-01-15 | Stop reason: HOSPADM

## 2025-01-14 RX ORDER — FINASTERIDE 5 MG/1
5 TABLET, FILM COATED ORAL
Status: DISCONTINUED | OUTPATIENT
Start: 2025-01-14 | End: 2025-01-15 | Stop reason: HOSPADM

## 2025-01-14 RX ORDER — CARVEDILOL 6.25 MG/1
6.25 TABLET ORAL 2 TIMES DAILY WITH MEALS
Status: DISCONTINUED | OUTPATIENT
Start: 2025-01-14 | End: 2025-01-15 | Stop reason: HOSPADM

## 2025-01-14 RX ORDER — SODIUM CHLORIDE, SODIUM GLUCONATE, SODIUM ACETATE, POTASSIUM CHLORIDE, MAGNESIUM CHLORIDE, SODIUM PHOSPHATE, DIBASIC, AND POTASSIUM PHOSPHATE .53; .5; .37; .037; .03; .012; .00082 G/100ML; G/100ML; G/100ML; G/100ML; G/100ML; G/100ML; G/100ML
75 INJECTION, SOLUTION INTRAVENOUS CONTINUOUS
Status: DISCONTINUED | OUTPATIENT
Start: 2025-01-14 | End: 2025-01-15

## 2025-01-14 RX ORDER — METOPROLOL TARTRATE 1 MG/ML
10 INJECTION, SOLUTION INTRAVENOUS 2 TIMES DAILY
Status: DISCONTINUED | OUTPATIENT
Start: 2025-01-14 | End: 2025-01-14

## 2025-01-14 RX ORDER — ASPIRIN 81 MG/1
81 TABLET, CHEWABLE ORAL DAILY
Status: DISCONTINUED | OUTPATIENT
Start: 2025-01-14 | End: 2025-01-15 | Stop reason: HOSPADM

## 2025-01-14 RX ORDER — ACETAMINOPHEN 325 MG/1
650 TABLET ORAL EVERY 6 HOURS PRN
Status: DISCONTINUED | OUTPATIENT
Start: 2025-01-14 | End: 2025-01-15 | Stop reason: HOSPADM

## 2025-01-14 RX ORDER — INSULIN LISPRO 100 [IU]/ML
1-5 INJECTION, SOLUTION INTRAVENOUS; SUBCUTANEOUS EVERY 6 HOURS SCHEDULED
Status: DISCONTINUED | OUTPATIENT
Start: 2025-01-14 | End: 2025-01-14

## 2025-01-14 RX ADMIN — FINASTERIDE 5 MG: 5 TABLET, FILM COATED ORAL at 22:14

## 2025-01-14 RX ADMIN — ATORVASTATIN CALCIUM 40 MG: 40 TABLET, FILM COATED ORAL at 22:14

## 2025-01-14 RX ADMIN — TAMSULOSIN HYDROCHLORIDE 0.8 MG: 0.4 CAPSULE ORAL at 22:14

## 2025-01-14 RX ADMIN — ACETAMINOPHEN 1000 MG: 1000 INJECTION, SOLUTION INTRAVENOUS at 05:08

## 2025-01-14 RX ADMIN — METOPROLOL TARTRATE 10 MG: 1 INJECTION, SOLUTION INTRAVENOUS at 01:44

## 2025-01-14 RX ADMIN — HEPARIN SODIUM 5000 UNITS: 5000 INJECTION, SOLUTION INTRAVENOUS; SUBCUTANEOUS at 22:14

## 2025-01-14 RX ADMIN — IOHEXOL 60 ML: 350 INJECTION, SOLUTION INTRAVENOUS at 09:45

## 2025-01-14 RX ADMIN — SODIUM CHLORIDE, SODIUM GLUCONATE, SODIUM ACETATE, POTASSIUM CHLORIDE, MAGNESIUM CHLORIDE, SODIUM PHOSPHATE, DIBASIC, AND POTASSIUM PHOSPHATE 75 ML/HR: .53; .5; .37; .037; .03; .012; .00082 INJECTION, SOLUTION INTRAVENOUS at 12:07

## 2025-01-14 RX ADMIN — ACETAMINOPHEN 1000 MG: 1000 INJECTION, SOLUTION INTRAVENOUS at 00:19

## 2025-01-14 RX ADMIN — CARVEDILOL 6.25 MG: 6.25 TABLET, FILM COATED ORAL at 16:23

## 2025-01-14 RX ADMIN — SODIUM CHLORIDE, SODIUM GLUCONATE, SODIUM ACETATE, POTASSIUM CHLORIDE, MAGNESIUM CHLORIDE, SODIUM PHOSPHATE, DIBASIC, AND POTASSIUM PHOSPHATE 75 ML/HR: .53; .5; .37; .037; .03; .012; .00082 INJECTION, SOLUTION INTRAVENOUS at 00:19

## 2025-01-14 RX ADMIN — CITALOPRAM HYDROBROMIDE 10 MG: 10 TABLET ORAL at 13:45

## 2025-01-14 RX ADMIN — HEPARIN SODIUM 5000 UNITS: 5000 INJECTION, SOLUTION INTRAVENOUS; SUBCUTANEOUS at 05:08

## 2025-01-14 RX ADMIN — METOPROLOL TARTRATE 10 MG: 1 INJECTION, SOLUTION INTRAVENOUS at 08:43

## 2025-01-14 RX ADMIN — HEPARIN SODIUM 5000 UNITS: 5000 INJECTION, SOLUTION INTRAVENOUS; SUBCUTANEOUS at 13:31

## 2025-01-14 NOTE — ASSESSMENT & PLAN NOTE
Lab Results   Component Value Date    EGFR 45 01/13/2025    EGFR 47 01/12/2025    EGFR 47 12/14/2024    CREATININE 1.42 (H) 01/13/2025    CREATININE 1.35 (H) 01/12/2025    CREATININE 1.35 (H) 12/14/2024   Patient's previous baseline approximately 1 to 1.16 months ago, 1 month ago creatinine was 1.35.  Unclear if this is new baseline  Continue IV fluids  Monitor off Lasix and lisinopril at this time

## 2025-01-14 NOTE — H&P
H&P - Surgery-General   Name: Raleigh Sandoval 84 y.o. male I MRN: [id number]  Unit/Bed#: [facility] 508-01 I Date of Admission: 1/13/2025   Date of Service: 1/14/2025 I Hospital Day: 1     Assessment & Plan  Mesenteroaxial gastric volvulus  Patient presented with acute onset epigastric pain in the setting of a known hiatal hernia and was found to have a mesial axial gastric volvulus on CT.  NG tube was successfully placed and was passed into the abdomen.  Abdominal pain resolved and patient remained hemodynamically stable with no evidence of ongoing ischemia on his labs/endpoints.    - NPO/NG tube  - Plan for surgical repair on this admission  - Pain and nausea control as needed  - DVT prophylaxis  - Will attempt to medically optimize patient preoperatively  Ischemic cardiomyopathy  - Conservative IV fluid administration; 75 cc/h while n.p.o.  - Monitor fluid status closely; strict input and output, daily weights  - Home Coreg, Lasix, aspirin and lisinopril held; will restart as able    Hyperlipidemia  Home atorvastatin held while NPO  Essential hypertension  Home antihypertensives held while NPO    History of Present Illness   Raleigh Sandoval is a 84 y.o. male with pmhx of HTN, DM2, HLD, CHF (EF 40%, basal inferior wall hypokenesis), CABG X3 in 2012, s/p MV Repair 2023, b/l inguinal hernia, PAF not on AC, and this known mod/large type IV hiatal hernia. He comes in after persistent abd pain after dinner, mild nausea but without yared emesis. He is HD stable on RA. labs were significant for lipase 498 and ARSH with Cr of 1.3, lactate wnl. NGT placed and was immediately productive of 600cc of light tan/pink gastric contents immediately out, NGT placement confirmed on KUB with the tip present in the abdomen.  Reports that his abdominal pain has improved significantly since presentation and after NG tube was placed.  Denied chest pain, shortness of breath, diarrhea, constipation, or dysuria.       Review of Systems; as  stated in the above HPI  I have reviewed the patient's PMH, PSH, Social History, Family History, Meds, and Allergies    Objective :  Temp:  [97.9 °F (36.6 °C)-98 °F (36.7 °C)] 98 °F (36.7 °C)  HR:  [64-82] 67  BP: (123-155)/(59-72) 142/65  Resp:  [12-20] 20  SpO2:  [85 %-98 %] 95 %  O2 Device: None (Room air)      Physical Exam    Lab Results: I have reviewed the following results:  Recent Labs     01/12/25  2144 01/12/25  2210 01/12/25  2333 01/13/25  0028 01/13/25  0645 01/14/25  0047   WBC 8.97  --   --   --  11.50*  --    HGB 12.7  --   --   --  11.6*  --    HCT 38.7  --   --   --  35.0*  --      --   --   --  207 196   SODIUM 140  --   --   --  140  --    K 4.4  --   --   --  4.7  --      --   --   --  102  --    CO2 30  --   --   --  33*  --    BUN 35*  --   --   --  37*  --    CREATININE 1.35*  --   --   --  1.42*  --    GLUC 139  --   --   --  129  --    AST 20  --   --   --   --   --    ALT 13  --   --   --   --   --    ALB 4.0  --   --   --   --   --    TBILI 0.72  --   --   --   --   --    ALKPHOS 73  --   --   --   --   --    PTT  --  30  --   --   --   --    INR  --  0.98  --   --   --   --    HSTNI0 10  --   --   --   --   --    HSTNI2  --   --  23  --   --   --    LACTICACID  --   --   --    < > 1.0  --     < > = values in this interval not displayed.       Imaging Results Review: I personally reviewed the following image studies/reports in PACS and discussed pertinent findings with Radiology: CT abdomen/pelvis. My interpretation of the radiology images/reports is: mesenteroaxial gastric voluvulus with twisting and mild engorgement of associated mesentery.      VTE Pharmacologic Prophylaxis: Heparin  VTE Mechanical Prophylaxis: sequential compression device

## 2025-01-14 NOTE — H&P
"H&P - Surgery-General   Name: Raleigh Sandoval 84 y.o. male I MRN: [id number]  Unit/Bed#: ED-05 I Date of Admission: 1/12/2025   Date of Service: 1/13/2025 I Hospital Day: 0     Assessment & Plan  Mesenteroaxial gastric volvulus  83 yo M with CT obtained demonstrating gastric volvulus and c/f for mesentery swirling with enlarged associated mesenteric vessels.     Plan:  -NPO/NGT, IVF  -txfr pending to [facility] [place] for thoracic surgery evaluation  -trend Cr/BUN  -monitor abd exam  -aspiration precautions, HOB >30  -DVT ppx  Ischemic cardiomyopathy    Hx of CABG x 3, 2012    S/P mitral valve repair, 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, May 2023      HPI:  Raleigh Sandoval is a 84 y.o. male P with pmhx of HTN, DM2, HLD, CHF (EF 40%, basal inferior wall hypokenesis), CABG X3 in 2012, s/p MV Repair 2023, b/l inguinal hernia, PAF not on AC, and this known mod/large type IV hiatal hernia. He comes in after persistent abd pain after dinner, mild nausea but without yared emesis. He is HD stable on RA. labs significant for lipase 498 and ARSH with Cr of 1.3, lactate wnl. NGT and got 600cc of light tan/pink gastric contents immediately out, NGT placement confirmed on KUB. says he felt better after ngt and some analgesia. Pt also reports feeling symptomatically improved after NGT placement.     Objective   Patient Vitals for the past 24 hrs:   BP Temp Temp src Pulse Resp SpO2 Height Weight   01/13/25 1900 144/68 -- -- 64 20 97 % -- --   01/13/25 1700 134/72 -- -- 66 14 90 % -- --   01/13/25 1500 142/69 -- -- 65 16 92 % -- --   01/13/25 1400 144/65 -- -- 66 16 91 % -- --   01/13/25 1300 138/67 -- -- 69 16 92 % -- --   01/13/25 1200 149/64 -- -- 68 16 93 % -- --   01/13/25 1100 143/71 -- -- 70 16 93 % 5' 8\" (1.727 m) 56.7 kg (125 lb)   01/13/25 1000 153/72 -- -- 67 13 96 % -- --   01/13/25 0900 131/63 -- -- 67 12 94 % -- --   01/13/25 0651 -- -- -- -- -- 95 % -- --   01/13/25 0600 148/70 -- -- 73 15 98 % -- --   01/13/25 0441 -- -- -- -- -- 97 % " "-- --   01/13/25 0440 -- -- -- -- -- (!) 85 % -- --   01/13/25 0415 155/67 -- -- 76 17 90 % -- --   01/13/25 0200 123/65 -- -- 82 16 91 % -- --   01/13/25 0031 -- -- -- -- -- 93 % -- --   01/13/25 0030 -- -- -- 91 15 (!) 84 % -- --   01/13/25 0000 148/86 -- -- 83 18 97 % -- --   01/12/25 2145 (!) 181/86 -- -- 76 16 100 % -- --   01/12/25 2034 (!) 200/105 97.7 °F (36.5 °C) Oral 80 16 97 % 5' 8\" (1.727 m) 56.1 kg (123 lb 10.9 oz)       Physical Exam  Constitutional:       General: He is not in acute distress.  HENT:      Head: Normocephalic and atraumatic.   Eyes:      Extraocular Movements: Extraocular movements intact.      Conjunctiva/sclera: Conjunctivae normal.   Cardiovascular:      Rate and Rhythm: Normal rate.      Pulses: Normal pulses.   Pulmonary:      Effort: Pulmonary effort is normal. No respiratory distress.   Abdominal:      General: There is no distension.      Palpations: Abdomen is soft.      Comments: Minimal upper epigastric tenderness, R inguinal hernia   Musculoskeletal:         General: Normal range of motion.      Cervical back: Normal range of motion.   Skin:     General: Skin is warm and dry.   Neurological:      General: No focal deficit present.      Mental Status: He is alert and oriented to person, place, and time.   Psychiatric:         Mood and Affect: Mood normal.       Review of Systems   Constitutional:  Negative for chills and fever.   HENT:  Negative for ear pain and sore throat.    Eyes:  Negative for pain and visual disturbance.   Respiratory:  Negative for cough and shortness of breath.    Cardiovascular:  Negative for chest pain and palpitations.   Gastrointestinal:  Positive for abdominal pain and nausea. Negative for vomiting.   Genitourinary:  Negative for dysuria and hematuria.   Musculoskeletal:  Negative for arthralgias and back pain.   Skin:  Negative for color change and rash.   Neurological:  Negative for seizures and syncope.   All other systems reviewed and are " negative.    Imaging Results Review: I reviewed radiology reports from this admission including: chest xray and CT abdomen/pelvis.     Recent Labs     01/12/25  2144 01/12/25  2210 01/13/25  0028 01/13/25  0645   WBC 8.97  --   --  11.50*   HGB 12.7  --   --  11.6*     --   --  207   SODIUM 140  --   --  140   K 4.4  --   --  4.7     --   --  102   CO2 30  --   --  33*   BUN 35*  --   --  37*   CREATININE 1.35*  --   --  1.42*   GLUC 139  --   --  129   CALCIUM 10.0  --   --  8.9   AST 20  --   --   --    ALT 13  --   --   --    ALKPHOS 73  --   --   --    TBILI 0.72  --   --   --    LIPASE 498*  --   --   --    EGFR 47  --   --  45   PTT  --  30  --   --    INR  --  0.98  --   --    LACTICACID  --   --  1.0 1.0     Lab Results   Component Value Date    BLOODCX No Growth After 5 Days. 07/09/2024    URINECX No Growth <1000 cfu/mL 04/22/2023    LEUKOCYTESUR - 12/03/2024    LEUKOCYTESUR Negative 07/09/2024    LEUKOCYTESUR Trace (A) 03/16/2015    NITRITE - 12/03/2024    NITRITE Negative 07/09/2024    NITRITE Negative 03/16/2015    GLUCOSEU - 12/03/2024    GLUCOSEU Trace (A) 07/09/2024    GLUCOSEU Negative 03/16/2015    KETONESU - 12/03/2024    KETONESU Negative 07/09/2024    KETONESU Negative 03/16/2015    BLOODU - 12/03/2024    BLOODU Negative 07/09/2024    BLOODU Trace (A) 03/16/2015       Lab Results: I have personally reviewed pertinent lab results.  Imaging: I have personally reviewed pertinent reports.  EKG, Pathology, and Other Studies: I have personally reviewed pertinent reports.  All current active meds have been reviewed  Counseling / Coordination of Care: Total floor / unit time spent today 30 minutes.  Greater than 50% of total time was spent with the patient and / or family counseling and / or coordination of care.

## 2025-01-14 NOTE — NURSING NOTE
Patient and daughter left via EMS to [facility]. Two IV access points on the right arm. This RN confirmed that all belongings were accounted for prior to discharge. Transfer paperwork given to transport team. Patient report called to receiving nurse at [facility].

## 2025-01-14 NOTE — ASSESSMENT & PLAN NOTE
Not in exacerbation  Follows with Dr. Castillo in the office  Last echo July 2024 TTE: EF 43%, mildly reduced RV systolic function, dilated atria, mitral valve s/p repair with mild to moderate regurg, mild to moderate tricuspid regurg  EKG this admission unchanged from prior  Home meds: Coreg 6.25 mg twice daily, lisinopril 5 mg daily, Lasix 20 mg daily  Jardiance was being keller checked as outpatient but not yet started    Plan  Discussed with attending  Possibly resume lisinopril 5 mg daily and oral lasix 20mg daily tomorrow 1/15  Resume Coreg 6.25 mg twice daily today if patient is tolerating orals

## 2025-01-14 NOTE — ASSESSMENT & PLAN NOTE
- Conservative IV fluid administration; 75 cc/h while n.p.o.  - Monitor fluid status closely; strict input and output, daily weights  - Home Coreg, Lasix, aspirin and lisinopril held; will restart as able

## 2025-01-14 NOTE — CASE MANAGEMENT
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) 2024     Pulmonary emphysema, unspecified emphysema type (HCC) 2024     Paroxysmal atrial fibrillation (HCC) 2024     Abnormal CT of the chest 2024     Moderate protein-calorie malnutrition (HCC) 2024     Bladder tumor 2023     Hiatal hernia 2023     Iron deficiency anemia due to chronic blood loss 2023     PMR (polymyalgia rheumatica) ([facility]) 2023     Chronic rhinitis 2023     Heart failure with mid-range ejection fraction (HFmEF) ([facility]) 2022     Iron deficiency 06/10/2022     Anxiety 06/10/2022     Bilateral inguinal hernia without obstruction or gangrene 2022     Protein-calorie malnutrition, unspecified severity ([facility]) 2021     Paroxysmal supraventricular tachycardia ([facility]) 2021     Pleural effusion on left 2020     Benign prostatic hyperplasia 2018     Hyperlipidemia 2018     Prediabetes 2018     Essential hypertension 2018     Ischemic cardiomyopathy 2018     Hx of CABG x 3, 2018     S/P mitral valve repair, 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, 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 2018       LOS (days): 1  Geometric Mean LOS (GMLOS) (days):   Days to GMLOS:     OBJECTIVE:    Risk of Unplanned Readmission Score: 19.97         Current admission status: Inpatient       Preferred Pharmacy:   [facility] - [place] - [address]  [address]  [address]  [place] [address]  Phone: [phone] Fax:  [phone]    [facility]/pharmacy #[id number] - [place] - [address]  [address] [facility] [address]  Phone: [phone] Fax: [phone]    Primary Care Provider: Gopi Victoria DO    Primary Insurance: AETBRAD  Secondary Insurance: MEDICARE    ASSESSMENT:  Active Health Care Proxies       Jaz Sandoval Health Care Agent - Daughter   Primary Phone: [phone] (Home)                 Advance Directives  Does patient have a Health Care POA?: Yes  Does patient have Advance Directives?: Yes  Advance Directives: Living will, Power of  for health care, Power of  for finance  Primary Contact: daughterJaz         Readmission Root Cause  30 Day Readmission: No    Patient Information  Admitted from:: Facility (transfer SLA)  Mental Status: Alert  During Assessment patient was accompanied by: Not accompanied during assessment  Assessment information provided by:: Patient  Primary Caregiver: Self  Support Systems: Self, Son, Daughter  County of Residence: [place]  What city do you live in?: [place]  Home entry access options. Select all that apply.: Stairs  Number of steps to enter home.: 4  Type of Current Residence: 2 story home  Upon entering residence, is there a bedroom on the main floor (no further steps)?: No  A bedroom is located on the following floor levels of residence (select all that apply):: 2nd Floor  Upon entering residence, is there a bathroom on the main floor (no further steps)?: Yes  Living Arrangements: Lives w/ Daughter, Lives w/ Son  Is patient a ?: No    Activities of Daily Living Prior to Admission  Functional Status: Independent  Completes ADLs independently?: Yes  Ambulates independently?: Yes  Does patient use assisted devices?: No  Does patient have a history of Outpatient Therapy (PT/OT)?: Yes (cardiac rehab)  Does the patient have a history of Short-Term Rehab?: No  Does patient have a history of HHC?: Yes  Does patient currently have  HHC?: No         Patient Information Continued  Income Source: Employed  Does patient have prescription coverage?: Yes  Does patient receive dialysis treatments?: No  Does patient have a history of substance abuse?: No  Does patient have a history of Mental Health Diagnosis?: No         Means of Transportation  [facility]          DISCHARGE DETAILS:    Discharge planning discussed with:: patient at bedside  Freedom of Choice: Yes  Comments - Freedom of Choice: therapy rec Level III out pt rehab. Provided pt w/ SL facility list  CM contacted family/caregiver?: No- see comments (declined)  Were Treatment Team discharge recommendations reviewed with patient/caregiver?: Yes  Did patient/caregiver verbalize understanding of patient care needs?: Yes  Were patient/caregiver advised of the risks associated with not following Treatment Team discharge recommendations?: Yes    Contacts  Patient Contacts: Jaz Sandoval (Daughter) [phone]    Requested Home Health Care         Is the patient interested in HHC at discharge?: No    DME Referral Provided  Referral made for DME?: No    Other Referral/Resources/Interventions Provided:  Interventions: Outpatient PT    Would you like to participate in our [facility] service program?  : No - Declined    Treatment Team Recommendation: Outpatient Rehab  Discharge Destination Plan:: Outpatient Rehab           Additional Comments: Met with pt to introduce CM role, obtain baseline assessment information and discuss DCP. Pt lives w/ son and dtr in 2 story home w/ 5STE. IPTA. Works FT and drives. No DME. Pt has been to outpt cardiac rehab s/p CABG years ago and also had HHC at that time. No prior inpt rehab. Denies h/o MH or SA treatment. Therapy rec level III outpt rehab. DCP pending medical course. CM will follow

## 2025-01-14 NOTE — UTILIZATION REVIEW
NOTIFICATION OF ADMISSION DISCHARGE   This is a Notification of Discharge from [facility]. Please be advised that this patient has been discharge from our facility. Below you will find the admission and discharge date and time including the patient’s disposition.   UTILIZATION REVIEW CONTACT:  Carmen Thomson  Utilization   Network Utilization Review Department  Phone: [phone] x carefully listen to the prompts. All voicemails are confidential.  Email: [email or web address]     ADMISSION INFORMATION  PRESENTATION DATE: 1/12/2025  8:32 PM  OBERVATION ADMISSION DATE: N/A  INPATIENT ADMISSION DATE: 1/13/25  7:39 PM   DISCHARGE DATE: 1/13/2025 11:17 PM   DISPOSITION:Admitted as an inpatient to this hospital    Network Utilization Review Department  ATTENTION: Please call with any questions or concerns to [phone] and carefully listen to the prompts so that you are directed to the right person. All voicemails are confidential.   For Discharge needs, contact Care Management DC Support Team at [phone] opt. 2  Send all requests for admission clinical reviews, approved or denied determinations and any other requests to dedicated fax number below belonging to the campus where the patient is receiving treatment. List of dedicated fax numbers for the Facilities:  FACILITY NAME UR FAX NUMBER   ADMISSION DENIALS (Administrative/Medical Necessity) [phone]   DISCHARGE SUPPORT TEAM ([facility]) [phone]   PARENT CHILD HEALTH (Maternity/NICU/Pediatrics) [phone]   [facility] [phone]   [facility] [phone]   [facility] [phone]   [facility] [phone]   [facility] [phone]   [facility] [phone]   [facility] [phone]   [facility]  [facility] [phone]   [facility] [phone]   [facility] [phone]   [facility] [phone]   [facility] [phone]

## 2025-01-14 NOTE — ASSESSMENT & PLAN NOTE
Presented 1/12 to [facility] with abdominal pain and transferred to [facility] for thoracic surgery eval  Planning for outpatient surgery in the next few weeks    Overall patient's revised cardiac index risk is 15% of major adverse cardiac event given his history of CAD, heart failure, and higher risk surgery  Patient is a moderate risk for a moderate - higher risk surgery. OK to proceed from a cardiology perspective.   Recommend holding aspirin for 7 days prior to surgery, holding lisinopril for 24-48 hours before surgery.

## 2025-01-14 NOTE — UTILIZATION REVIEW
Initial Clinical Review    Admission: Date/Time/Statement:   Admission Orders (From admission, onward)       Ordered        01/13/25 1942  Inpatient Admission  Once                          Orders Placed This Encounter   Procedures    Inpatient Admission     Standing Status:   Standing     Number of Occurrences:   1     Level of Care:   Med Surg [16]     Estimated length of stay:   More than 2 Midnights     Certification:   I certify that inpatient services are medically necessary for this patient for a duration of greater than two midnights. See H&P and MD Progress Notes for additional information about the patient's course of treatment.     ED Arrival Information       Expected   -    Arrival   1/12/2025 20:27    Acuity   Urgent              Means of arrival   Walk-In    Escorted by   Self    Service   Surgery-General    Admission type   Emergency              Arrival complaint   chest pain             Chief Complaint   Patient presents with    Abdominal Pain     Patient reports mid upper abdominal pain, aching since 1600 shortly after eating. Reporting some phlegm with nausea but no vomiting. Denies radiation of pain, other symptoms.        Initial Presentation: 84 y.o. male presents  to  ED  from home  with persistent  abdominal pain since  eating dinner the  evening of admission,  some nausea but no  emesis.  Ct  scan  shows gastric volvulus.  NGT  inserted   for  600 cc.  Felt  improved  with   NGT.  PMH  is   HTN,  DM2,  CHF,  CABG, S/P  MVR and PAF  not on  AC.  Admit  Ip with  Mesenteric  Gastric  Volvulus  and plan is  NPO/NGT, IVF,  serial abdominal exams, aspiration precautions, monitor labs  and plan transfer to [facility].    1/13    1117  PM  Transfer to [facility]      ED Treatment-Medication Administration from 01/12/2025 2026 to 01/13/2025 2100         Date/Time Order Dose Route Action     01/12/2025 2144 acetaminophen (Ofirmev) injection 1,000 mg 1,000 mg Intravenous New Bag     01/12/2025 2217 morphine  injection 2 mg 2 mg Intravenous Given     01/12/2025 2259 iohexol (OMNIPAQUE) 350 MG/ML injection (MULTI-DOSE) 100 mL 100 mL Intravenous Given     01/12/2025 2338 HYDROmorphone (DILAUDID) injection 0.5 mg 0.5 mg Intravenous Given     01/13/2025 0030 lidocaine (URO-JET) 2 % urethral/mucosal gel 5 Application Topical Given     01/13/2025 0154 lactated ringers infusion 75 mL/hr Intravenous New Bag     01/13/2025 0153 lactated ringers bolus 500 mL 500 mL Intravenous New Bag     01/13/2025 0645 acetaminophen (Ofirmev) injection 1,000 mg 1,000 mg Intravenous New Bag     01/13/2025 1324 aspirin chewable tablet 81 mg 81 mg Oral Given     01/13/2025 1814 carvedilol (COREG) tablet 6.25 mg 6.25 mg Oral Given     01/13/2025 1324 citalopram (CeleXA) tablet 10 mg 10 mg Oral Given     01/13/2025 1324 furosemide (LASIX) tablet 20 mg 20 mg Oral Given            Scheduled Medications:        Continuous IV Infusions:      PRN Meds:    ED Triage Vitals [01/12/25 2034]   Temperature Pulse Respirations Blood Pressure SpO2 Pain Score   97.7 °F (36.5 °C) 80 16 (!) 200/105 97 % 7     Weight (last 2 days) before discharge       Date/Time Weight    01/13/25 1100 56.7 (125)    01/12/25 2034 56.1 (123.68)            Vital Signs (last 3 days) before discharge       Date/Time Temp Pulse Resp BP MAP (mmHg) SpO2 O2 Flow Rate (L/min) O2 Device Patient Position - Orthostatic VS Thu Coma Scale Score Pain    01/13/25 2200 -- -- -- -- -- -- -- None (Room air) -- -- --    01/13/25 2109 -- -- -- -- -- -- -- -- -- -- No Pain    01/13/25 21:07:33 97.9 °F (36.6 °C) 71 -- 124/59 81 95 % -- -- -- -- --    01/13/25 21:07:12 97.9 °F (36.6 °C) 66 -- 124/59 81 96 % -- -- -- -- --    01/13/25 1900 -- 64 20 144/68 98 97 % -- None (Room air) Lying -- --    01/13/25 1700 -- 66 14 134/72 97 90 % -- None (Room air) Lying -- --    01/13/25 1500 -- 65 16 142/69 99 92 % -- None (Room air) Lying -- --    01/13/25 1458 -- -- -- -- -- -- -- -- -- -- No Pain    01/13/25  1457 -- -- -- -- -- -- -- -- -- 15 --    01/13/25 1400 -- 66 16 144/65 94 91 % -- None (Room air) Lying -- --    01/13/25 1300 -- 69 16 138/67 96 92 % -- None (Room air) Lying -- --    01/13/25 1200 -- 68 16 149/64 92 93 % -- None (Room air) Lying -- --    01/13/25 1100 -- 70 16 143/71 102 93 % -- None (Room air) Lying -- No Pain    01/13/25 1000 -- 67 13 153/72 104 96 % -- None (Room air) -- -- --    01/13/25 0958 -- -- -- -- -- -- -- -- -- 15 --    01/13/25 0900 -- 67 12 131/63 90 94 % -- None (Room air) -- -- --    01/13/25 0700 -- -- -- -- -- -- -- -- -- 15 --    01/13/25 0651 -- -- -- -- -- 95 % -- None (Room air)  -- -- --    01/13/25 0600 -- 73 15 148/70 101 98 % 2 L/min Simple mask Lying -- --    01/13/25 0441 -- -- -- -- -- 97 % 2 L/min Simple mask -- -- --    01/13/25 0440 -- -- -- -- -- 85 %  -- None (Room air) -- -- --    01/13/25 0415 -- 76 17 155/67 97 90 % -- None (Room air) Sitting -- --    01/13/25 0321 -- -- -- -- -- -- -- -- -- -- No Pain    01/13/25 0200 -- 82 16 123/65 89 91 % -- None (Room air) Lying -- --    01/13/25 0155 -- -- -- -- -- -- -- -- -- -- No Pain    01/13/25 0031 -- -- -- -- -- 93 % 3 L/min Simple mask -- -- No Pain    01/13/25 0030 -- 91 15 -- -- 84 % -- None (Room air) -- -- --    01/13/25 0000 -- 83 18 148/86 113 97 % -- None (Room air) Lying -- --    01/12/25 2338 -- -- -- -- -- -- -- -- -- -- 10 - Worst Possible Pain    01/12/25 2217 -- -- -- -- -- -- -- -- -- -- 10 - Worst Possible Pain    01/12/25 2145 -- 76 16 181/86 124 100 % -- None (Room air) Lying -- --    01/12/25 2034 97.7 °F (36.5 °C) 80 16 200/105 -- 97 % -- None (Room air) Lying -- 7              Pertinent Labs/Diagnostic Test Results:   Radiology:  XR chest 1 view portable   Final Interpretation by Yolande Villela MD (01/13 0624)      NG tube in stomach with tip below the diaphragm.      Chronic small loculated left pleural effusion.      Large hiatal hernia.            Workstation performed: [id number]          CT abdomen pelvis with contrast   Final Interpretation by Kristel Pizarro MD (01/12 2347)      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      The study was marked in EPIC for immediate notification.         Workstation performed: [id number]           Cardiology:        Results from last 7 days   Lab Units 01/14/25  0520 01/14/25  0047 01/13/25  0645 01/12/25  2144   WBC Thousand/uL 12.64*  --  11.50* 8.97   HEMOGLOBIN g/dL 11.1*  --  11.6* 12.7   HEMATOCRIT % 34.4*  --  35.0* 38.7   PLATELETS Thousands/uL 207 196 207 220   TOTAL NEUT ABS Thousands/µL 10.61*  --  9.84* 7.91*         Results from last 7 days   Lab Units 01/14/25  0520 01/13/25  0645 01/12/25  2144   SODIUM mmol/L 141 140 140   POTASSIUM mmol/L 4.1 4.7 4.4   CHLORIDE mmol/L 99 102 100   CO2 mmol/L 34* 33* 30   ANION GAP mmol/L 8 5 10   BUN mg/dL 31* 37* 35*   CREATININE mg/dL 1.19 1.42* 1.35*   EGFR ml/min/1.73sq m 55 45 47   CALCIUM mg/dL 8.5 8.9 10.0   MAGNESIUM mg/dL 2.0  --   --    PHOSPHORUS mg/dL 3.8  --   --      Results from last 7 days   Lab Units 01/12/25  2144   AST U/L 20   ALT U/L 13   ALK PHOS U/L 73   TOTAL PROTEIN g/dL 7.9   ALBUMIN g/dL 4.0   TOTAL BILIRUBIN mg/dL 0.72     Results from last 7 days   Lab Units 01/14/25  1030 01/14/25  0624   POC GLUCOSE mg/dl 73 79     Results from last 7 days   Lab Units 01/14/25  0520 01/13/25  0645 01/12/25  2144   GLUCOSE RANDOM mg/dL 79 129 139           Results from last 7 days   Lab Units 01/13/25  0153 01/12/25  2333 01/12/25  2144   HS TNI 0HR ng/L  --   --  10   HS TNI 2HR ng/L  --  23  --    HSTNI D2 ng/L  --  13  --    HS TNI 4HR ng/L 29  --   --    HSTNI D4 ng/L 19  --   --          Results from last 7 days   Lab Units 01/12/25  2210   PROTIME seconds 13.2   INR  0.98   PTT seconds 30             Results from last 7 days   Lab Units 01/13/25  0645 01/13/25  0028   LACTIC ACID mmol/L 1.0 1.0           Results from last 7 days   Lab Units  01/12/25  2144   LIPASE u/L 498*         Present on Admission:   Ischemic cardiomyopathy   Hyperlipidemia   Essential hypertension   Paroxysmal atrial fibrillation (HCC)      Admitting Diagnosis: Paroxysmal supraventricular tachycardia (HCC) [I47.10]  Mesenteroaxial gastric volvulus [K31.89]  Paroxysmal atrial fibrillation (HCC) [I48.0]  Chest pain [R07.9]  Essential hypertension [I10]  Chronic kidney disease, unspecified CKD stage [N18.9]  Heart failure with mid-range ejection fraction (HFmEF) (HCC) [I50.22]  Age/Sex: 84 y.o. male    Network Utilization Review Department  ATTENTION: Please call with any questions or concerns to [phone] and carefully listen to the prompts so that you are directed to the right person. All voicemails are confidential.   For Discharge needs, contact Care Management DC Support Team at [phone] opt. 2  Send all requests for admission clinical reviews, approved or denied determinations and any other requests to dedicated fax number below belonging to the campus where the patient is receiving treatment. List of dedicated fax numbers for the Facilities:  FACILITY NAME UR FAX NUMBER   ADMISSION DENIALS (Administrative/Medical Necessity) [phone]   DISCHARGE SUPPORT TEAM (NETWORK) [phone]   PARENT CHILD HEALTH (Maternity/NICU/Pediatrics) [phone]   [facility] [phone]   [facility] [phone]   [facility] [phone]   [facility] [phone]   [facility] [phone]   General acute hospital [phone]   [facility] [phone]   [facility] [phone]   [facility] [phone]   [facility] [phone]   [facility] [phone]   [facility]  [facility] [phone]

## 2025-01-14 NOTE — ASSESSMENT & PLAN NOTE
Evaluated by general surgery on admission  Recommend n.p.o., NG tube placement, IV fluids  Plan for transfer to thoracic surgery service at [facility]  Unfortunately, no beds are available at this time and due to prolonged transfer time patient admitted to the hospital

## 2025-01-14 NOTE — PLAN OF CARE
Problem: PAIN - ADULT  Goal: Verbalizes/displays adequate comfort level or baseline comfort level  Description: Interventions:  - Encourage patient to monitor pain and request assistance  - Assess pain using appropriate pain scale  - Administer analgesics based on type and severity of pain and evaluate response  - Implement non-pharmacological measures as appropriate and evaluate response  - Consider cultural and social influences on pain and pain management  - Notify physician/advanced practitioner if interventions unsuccessful or patient reports new pain  Outcome: Progressing     Problem: SAFETY ADULT  Goal: Patient will remain free of falls  Description: INTERVENTIONS:  - Educate patient/family on patient safety including physical limitations  - Instruct patient to call for assistance with activity   - Consult OT/PT to assist with strengthening/mobility   - Keep Call bell within reach  - Keep bed low and locked with side rails adjusted as appropriate  - Keep care items and personal belongings within reach  - Initiate and maintain comfort rounds  - Make Fall Risk Sign visible to staff  - Offer Toileting every 2 Hours, in advance of need  - Initiate/Maintain bed alarm  - Obtain necessary fall risk management equipment  - Apply yellow socks and bracelet for high fall risk patients  - Consider moving patient to room near nurses station  Outcome: Progressing     Problem: DISCHARGE PLANNING  Goal: Discharge to home or other facility with appropriate resources  Description: INTERVENTIONS:  - Identify barriers to discharge w/patient and caregiver  - Arrange for needed discharge resources and transportation as appropriate  - Identify discharge learning needs (meds, wound care, etc.)  - Arrange for interpretive services to assist at discharge as needed  - Refer to Case Management Department for coordinating discharge planning if the patient needs post-hospital services based on physician/advanced practitioner order or  complex needs related to functional status, cognitive ability, or social support system  Outcome: Progressing     Problem: Knowledge Deficit  Goal: Patient/family/caregiver demonstrates understanding of disease process, treatment plan, medications, and discharge instructions  Description: Complete learning assessment and assess knowledge base.  Interventions:  - Provide teaching at level of understanding  - Provide teaching via preferred learning methods  Outcome: Progressing     Problem: GASTROINTESTINAL - ADULT  Goal: Maintains or returns to baseline bowel function  Description: INTERVENTIONS:  - Assess bowel function  - Encourage oral fluids to ensure adequate hydration  - Administer IV fluids if ordered to ensure adequate hydration  - Administer ordered medications as needed  - Encourage mobilization and activity  - Consider nutritional services referral to assist patient with adequate nutrition and appropriate food choices  Outcome: Progressing  Goal: Maintains adequate nutritional intake  Description: INTERVENTIONS:  - Monitor percentage of each meal consumed  - Identify factors contributing to decreased intake, treat as appropriate  - Assist with meals as needed  - Monitor I&O, weight, and lab values if indicated  - Obtain nutrition services referral as needed  Outcome: Progressing  Goal: Oral mucous membranes remain intact  Description: INTERVENTIONS  - Assess oral mucosa and hygiene practices  - Implement preventative oral hygiene regimen  - Implement oral medicated treatments as ordered  - Initiate Nutrition services referral as needed  Outcome: Progressing

## 2025-01-14 NOTE — ASSESSMENT & PLAN NOTE
Patient follows with cardiology  Resume goal-directed medical therapy when tolerating oral  Judicious use of fluids and monitor for volume overload

## 2025-01-14 NOTE — RESPIRATORY THERAPY NOTE
"RT Protocol Note  Raleigh Sandoval 84 y.o. male MRN: [id number]  Unit/Bed#: [facility] 508-01 Encounter: [id number]    Assessment    Active Problems:  There are no active Hospital Problems.      Home Pulmonary Medications:  none   01/14/25 0018   Respiratory Protocol   Protocol Initiated? Yes   Protocol Selection Respiratory;Airway Clearance   Language Barrier? No   Medical & Social History Reviewed? Yes   Diagnostic Studies Reviewed? Yes   Physical Assessment Performed? Yes   Airway Clearance Plan Incentive Spirometer   Respiratory Plan Discontinue Protocol   Respiratory Assessment   Assessment Type Assess only   General Appearance Alert;Awake   Respiratory Pattern Normal   Chest Assessment Chest expansion symmetrical   R Breath Sounds Clear;Diminished   L Breath Sounds Crackles   Resp Comments Assessment of Respiratory and Airway protocol.  Patient was a transfer from [facility].  Patient here for evaluation from [facility] team. Pt c/o Abd pain. Pt does have a hx of CABG X3 2012, Cardiac hx, Pulmonary emphysema. Xray was done showing large hiatal hernia, chronic small loculated left pleural effusion. BS mostly clear with some crackles and scattered rhonchi  on left side. Pt currently on R/A ; 92%. Mostly clear with some crackles on left with scattered rhonchi. Patient does not take any inhalers or nebulizers. Pt not in any distress. Not her for respiratory issues. Will start IS; pt does 750cc; well. Will continue Airway protocol and DC Respiratory protocol.            Past Medical History:   Diagnosis Date    BPH (benign prostatic hyperplasia)     Chronic pain disorder     back    Coronary artery disease     Gallstones     History of tracheostomy     per pt had it \"after CABG surgery--was admitted for 53 days including Rehab\"    Hx of CABG     per pt 7-8 yrs ago    Hypercholesterolemia     Hypertension     Moderate exercise     alot of walking-works FT and also walks dog 2 miles daily usually    " "Myocardial infarction (HCC)     per pt approx 7-8yrs ago --[facility] cardio Dr ASHWIN Castillo sees yearly    Pleural effusion on left 2020    Prediabetes     Shortness of breath     per pt\"climbing steps or walking long distance\"    Wears glasses      Social History     Socioeconomic History    Marital status:      Spouse name: Not on file    Number of children: Not on file    Years of education: Not on file    Highest education level: Not on file   Occupational History    Not on file   Tobacco Use    Smoking status: Former     Current packs/day: 0.00     Average packs/day: 0.5 packs/day for 16.0 years (8.0 ttl pk-yrs)     Types: Cigarettes     Start date:      Quit date:      Years since quittin.0     Passive exposure: Past    Smokeless tobacco: Never   Vaping Use    Vaping status: Never Used   Substance and Sexual Activity    Alcohol use: Yes     Comment: per pt very rarely    Drug use: No    Sexual activity: Not on file     Comment: defer   Other Topics Concern    Not on file   Social History Narrative    Consumes 2-3 ups of tea per week     Social Drivers of Health     Financial Resource Strain: Not on file   Food Insecurity: No Food Insecurity (2025)    Nursing - Inadequate Food Risk Classification     Worried About Running Out of Food in the Last Year: Never true     Ran Out of Food in the Last Year: Never true     Ran Out of Food in the Last Year: Never true   Transportation Needs: No Transportation Needs (2025)    Nursing - Transportation Risk Classification     Lack of Transportation: Not on file     Lack of Transportation: No   Physical Activity: Not on file   Stress: Not on file (2024)   Social Connections: Not on file   Intimate Partner Violence: Unknown (2025)    Nursing IPS     Feels Physically and Emotionally Safe: Not on file     Physically Hurt by Someone: Not on file     Humiliated or Emotionally Abused by Someone: Not on file     Physically Hurt by Someone: No     " "Hurt or Threatened by Someone: No   Housing Stability: Unknown (2025)    Nursing: Inadequate Housing Risk Classification     Has Housing: Not on file     Worried About Losing Housing: Not on file     Unable to Get Utilities: Not on file     Unable to Pay for Housing in the Last Year: No     Has Housin       Subjective         Objective    Physical Exam:   Assessment Type: Assess only  General Appearance: Alert, Awake  Respiratory Pattern: Normal  Chest Assessment: Chest expansion symmetrical  R Breath Sounds: Clear, Diminished  L Breath Sounds: Crackles    Vitals:  Blood pressure 142/65, pulse 67, temperature 98 °F (36.7 °C), temperature source Oral, resp. rate 20, height 5' 8\" (1.727 m), weight 56 kg (123 lb 6.4 oz).          Imaging and other studies:           Plan    Respiratory Plan: Discontinue Protocol  Airway Clearance Plan: Incentive Spirometer     Resp Comments: Assessment of Respiratory and Airway protocol.  Patient was a transfer from [facility].  Patient here for evaluation from [facility] team. Pt c/o Abd pain. Pt does have a hx of CABG X3 , Cardiac hx, Pulmonary emphysema. Xray was done showing large hiatal hernia, chronic small loculated left pleural effusion. BS mostly clear with some crackles and scattered rhonchi  on left side. Pt currently on R/A ; 92%. Mostly clear with some crackles on left with scattered rhonchi. Patient does not take any inhalers or nebulizers. Pt not in any distress. Not her for respiratory issues. Will start IS; pt does 750cc; well. Will continue Airway protocol and DC Respiratory protocol.   "

## 2025-01-14 NOTE — ASSESSMENT & PLAN NOTE
Patient presented with acute onset epigastric pain in the setting of a known hiatal hernia and was found to have a mesial axial gastric volvulus on CT.  NG tube was successfully placed and was passed into the abdomen.  Abdominal pain resolved and patient remained hemodynamically stable with no evidence of ongoing ischemia on his labs/endpoints.    - NPO/NG tube  - Plan for surgical repair on this admission  - Pain and nausea control as needed  - DVT prophylaxis  - Will attempt to medically optimize patient preoperatively

## 2025-01-14 NOTE — PHYSICAL THERAPY NOTE
"   Physical Therapy Evaluation     Patient's Name: Raleigh Sandoval    Admitting Diagnosis  Gastric volvulus [K31.89]    Problem List  Patient Active Problem List   Diagnosis    Ischemic cardiomyopathy    Hx of CABG x 3, 2012    S/P mitral valve repair, 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, May 2023    Benign prostatic hyperplasia    Hyperlipidemia    Prediabetes    Essential hypertension    Pleural effusion on left    Paroxysmal supraventricular tachycardia (HCC)    Protein-calorie malnutrition, unspecified severity (HCC)    Bilateral inguinal hernia without obstruction or gangrene    Iron deficiency    Anxiety    Heart failure with mid-range ejection fraction (HFmEF) (HCC)    Chronic rhinitis    PMR (polymyalgia rheumatica) (HCC)    Bladder tumor    Hiatal hernia    Iron deficiency anemia due to chronic blood loss    Moderate protein-calorie malnutrition (HCC)    Abnormal CT of the chest    Paroxysmal atrial fibrillation (HCC)    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)    Pulmonary emphysema, unspecified emphysema type (HCC)    Mesenteroaxial gastric volvulus    CKD (chronic kidney disease)       Past Medical History  Past Medical History:   Diagnosis Date    BPH (benign prostatic hyperplasia)     Chronic pain disorder     back    Coronary artery disease     Gallstones     History of tracheostomy     per pt had it \"after CABG surgery--was admitted for 53 days including Rehab\"    Hx of CABG     per pt 7-8 yrs ago    Hypercholesterolemia     Hypertension     Moderate exercise     alot of walking-works FT and also walks dog 2 miles daily usually    Myocardial infarction (HCC)     per pt approx 7-8yrs ago --[facility] cardio Dr ASHWIN Castillo sees yearly    Pleural effusion on left 03/02/2020    Prediabetes     Shortness of breath     per pt\"climbing steps or walking long distance\"    Wears glasses        Past Surgical History  Past Surgical History:   Procedure Laterality Date    CATARACT EXTRACTION Bilateral     " COLONOSCOPY      CORONARY ARTERY BYPASS GRAFT      x3 LIMA-LAD, SVG-OM, SVG-DIAG with mitral valve repair by nicole    CYSTOSCOPY  12/13/2014    Diagnostic    CYSTOSCOPY  03/24/2023    Nita    ESOPHAGOGASTRODUODENOSCOPY  04/07/2015    with biopsy. Dr Mazariegos    FL RETROGRADE PYELOGRAM  5/17/2023    MITRAL VALVE REPAIR      26 mm fatmata hernandez physi annuloplasty ring    MS CYSTO W/REMOVAL OF LESIONS SMALL N/A 5/17/2023    Procedure: (TURBT);  Surgeon: Kee Moya MD;  Location: [facility];  Service: Urology    MS CYSTO/PYELOSCOPY BX&/FULGURATION PELIVC LESION Right 5/17/2023    Procedure: URETEROSCOPY,WITH URETERAL BRUSHING;  Surgeon: Kee Moya MD;  [facility];  Service: Urology    MS CYSTOURETHROSCOPY W/URETERAL CATHETERIZATION Right 5/17/2023    Procedure: CYSTO, URETHERAL DILATION, RETROGRADE PYELOGRAM W/ INSERTION STENT URETERAL;  Surgeon: Kee Moya MD;  Location: [facility];  Service: Urology    TRACHEOSTOMY      during his heart surgery          01/14/25 0832   PT Last Visit   PT Visit Date 01/14/25   Note Type   Note type Evaluation   Additional Comments 84 y.o. male admitted to [facility] on 1/13/2025 with Mesenteroaxial gastric volvulus.   Pain Assessment   Pain Assessment Tool 0-10   Pain Score No Pain   Restrictions/Precautions   Weight Bearing Precautions Per Order No   Other Precautions Chair Alarm;Bed Alarm;Multiple lines;Telemetry;Fall Risk;Pain  (NGT)   Home Living   Type of Home House   Home Layout Two level;Stairs to enter with rails;Bed/bath upstairs   Bathroom Shower/Tub Tub/shower unit   Bathroom Toilet Standard   Bathroom Equipment Grab bars in shower   Bathroom Accessibility Accessible   Home Equipment Walker   Additional Comments At baseline, pt resides with daughter and son in [place]SH with multiple RHONDA and bedroom/bathroom on the second floor and was independent ADLs with use of RW prior to hospital admission.   Prior Function  "  Level of [place] Independent with ADLs;Independent with functional mobility;Independent with IADLS   Lives With Son;Daughter   Receives Help From Family   IADLs Independent with driving;Independent with medication management;Independent with meal prep   Falls in the last 6 months 0   Vocational Full time employment   General   Additional Pertinent History Patient  has a past medical history of BPH (benign prostatic hyperplasia), Chronic pain disorder, Coronary artery disease, Gallstones, History of tracheostomy, CABG, Hypercholesterolemia, Hypertension, Moderate exercise, Myocardial infarction (HCC), Pleural effusion on left (03/02/2020), Prediabetes, Shortness of breath, and Wears glasses. At   Family/Caregiver Present No   Cognition   Overall Cognitive Status WFL   Arousal/Participation Alert   Attention Within functional limits   Orientation Level Oriented X4   Memory Within functional limits   Following Commands Follows all commands and directions without difficulty   Comments patient pleasant and cooperative, good insight of functional deficits and safety awareness   Subjective   Subjective \"I feel pretty good\"   RLE Assessment   RLE Assessment   (4-/5 grossly)   LLE Assessment   LLE Assessment   (4-/5 grossly)   Bed Mobility   Supine to Sit 5  Supervision   Additional items HOB elevated;Bedrails;Increased time required;Verbal cues   Sit to Supine Unable to assess   Additional Comments patient left OOB in bedside chair with alarm and all needs met   Transfers   Sit to Stand 5  Supervision   Additional items Increased time required;Verbal cues   Stand to Sit 5  Supervision   Additional items Increased time required;Verbal cues   Additional Comments rw   Ambulation/Elevation   Gait pattern Forward Flexion;Decreased foot clearance;Short stride;Step to;Excessively slow   Gait Assistance 5  Supervision   Additional items Verbal cues;Tactile cues   Assistive Device Rolling walker   Distance 80'x2   Stair " Management Assistance Not tested  (trial next treatment session as able)   Ambulation/Elevation Additional Comments Patient ambulated 80'x2 with RW, requiring occasional verbal cues for RW management, hallway navigation, and improving stride/step length. Trial stair negotiation next treatment session as able.   Balance   Static Sitting Good   Dynamic Sitting Fair +   Static Standing Fair   Dynamic Standing Fair   Ambulatory Fair -   Endurance Deficit   Endurance Deficit Yes   Endurance Deficit Description generalized weakness, fatigue   Activity Tolerance   Activity Tolerance Patient tolerated treatment well   Medical Staff Made Aware FREDDY Adams; This high complexity evaluation was performed with an occupational therapist due to the patient's co-morbidities, clinically unstable presentation, and present impairments which are a regression from the patient's baseline.   Nurse Made Aware RN cleared   Assessment   Prognosis Good   Problem List Decreased strength;Decreased endurance;Impaired balance;Decreased mobility   Assessment PT completed evaluation of 84 y.o. male admitted to [facility] on 1/13/2025 with Mesenteroaxial gastric volvulus. Patient's current status instabilities include multiple lines, NGT, ongoing pain, continuous O2/HR monitoring, regression in function from baseline. Patient  has a past medical history of BPH (benign prostatic hyperplasia), Chronic pain disorder, Coronary artery disease, Gallstones, History of tracheostomy, CABG, Hypercholesterolemia, Hypertension, Moderate exercise, Myocardial infarction (HCC), Pleural effusion on left (03/02/2020), Prediabetes, Shortness of breath, and Wears glasses. At baseline, pt resides with daughter and son in 3SH with multiple RHONDA and bedroom/bathroom on the second floor and was independent ADLs with use of RW prior to hospital admission.     Patient presents at time of PT evaluation functioning below baseline and currently w/ overall mobility  deficits due to: impaired balance, decreased endurance, gait dysfunction, decreased activity tolerance and fall risk. During PT evaluation, patient currently is requiring supervision  for bed mobility skills;  supervision for functional transfers and  supervision for ambulation with RW. Patient performed supine to sit to edge of bed requiring HOB elevated, verbal cues for set up, increased time, and use of bed rails. Patient ambulated 80'x2 with RW, requiring occasional verbal cues for RW management, hallway navigation, and improving stride/step length. Trial stair negotiation next treatment session as able. Pt left OOB in bedside chair with alarm and all needs met.       This patient is functioning below their baseline and is recommended for level III. Patient will benefit from continued skilled PT this admission to achieve maximal function and safety. The patients AM-PAC Basic Mobility Inpatient Short From Raw Score is 17 . Based on AM-PAC scoring and patient presentation, PT currently recommending Level III (Minimum Resource Intensity). Please also refer to the recommendation of the Physical Therapist for safe discharge planning.   Goals   Patient Goals to return home   STG Expiration Date 01/28/25   Short Term Goal #1 1) Perform bed mobility mod-I to participate in frequent repositioning and improve skin integrity; 2) Perform functional transfers mod-I to promote I with toileting and OOB mobility; 3) Ambulate 200 feet mod-I with least restrictive device to participate in household and community level mobility; 4) Improve b/l LE strength by 1/2 grade in order to improve efficiency of tranfers; 5) Improve balance by 1 grade to reduce risk for falls; 6) Improve overall activity tolerance to 60 minutes in order to increase patient's ability to engage in mobility tasks; 7) Navigate 12 steps S level in order to safely navigate multiple floors at home   PT Treatment Day 0   Plan   Treatment/Interventions ADL  retraining;Functional transfer training;LE strengthening/ROM;Elevations;Therapeutic exercise;Endurance training;Patient/family training;Bed mobility;Gait training;Spoke to nursing;Spoke to case management;OT   PT Frequency 3-5x/wk   Discharge Recommendation   Rehab Resource Intensity Level, PT III (Minimum Resource Intensity)   Equipment Recommended Walker   Walker Package Recommended Wheeled walker   Change/add to Walker Package? No   AM-PAC Basic Mobility Inpatient   Turning in Flat Bed Without Bedrails 3   Lying on Back to Sitting on Edge of Flat Bed Without Bedrails 3   Moving Bed to Chair 3   Standing Up From Chair Using Arms 3   Walk in Room 3   Climb 3-5 Stairs With Railing 2   Basic Mobility Inpatient Raw Score 17   Basic Mobility Standardized Score 39.67   [facility] Highest Level Of Mobility   -HLM Goal 5: Stand one or more mins   -HLM Achieved 7: Walk 25 feet or more   End of Consult   Patient Position at End of Consult Bedside chair;Bed/Chair alarm activated;All needs within reach         Anenl Nelson, PT, DPT

## 2025-01-14 NOTE — PLAN OF CARE
Problem: PHYSICAL THERAPY ADULT  Goal: Performs mobility at highest level of function for planned discharge setting.  See evaluation for individualized goals.  Description: Treatment/Interventions: ADL retraining, Functional transfer training, LE strengthening/ROM, Elevations, Therapeutic exercise, Endurance training, Patient/family training, Bed mobility, Gait training, Spoke to nursing, Spoke to case management, OT  Equipment Recommended: Walker       See flowsheet documentation for full assessment, interventions and recommendations.  Note: Prognosis: Good  Problem List: Decreased strength, Decreased endurance, Impaired balance, Decreased mobility  Assessment: PT completed evaluation of 84 y.o. male admitted to [facility] on 1/13/2025 with Mesenteroaxial gastric volvulus. Patient's current status instabilities include multiple lines, NGT, ongoing pain, continuous O2/HR monitoring, regression in function from baseline. Patient  has a past medical history of BPH (benign prostatic hyperplasia), Chronic pain disorder, Coronary artery disease, Gallstones, History of tracheostomy, CABG, Hypercholesterolemia, Hypertension, Moderate exercise, Myocardial infarction (HCC), Pleural effusion on left (03/02/2020), Prediabetes, Shortness of breath, and Wears glasses. At baseline, pt resides with daughter and son in [place] with multiple RHONDA and bedroom/bathroom on the second floor and was independent ADLs with use of RW prior to hospital admission. Patient presents at time of PT evaluation functioning below baseline and currently w/ overall mobility deficits due to: impaired balance, decreased endurance, gait dysfunction, decreased activity tolerance and fall risk. During PT evaluation, patient currently is requiring supervision  for bed mobility skills;  supervision for functional transfers and  supervision for ambulation with RW. Patient performed supine to sit to edge of bed requiring HOB elevated, verbal cues for  set up, increased time, and use of bed rails. Patient ambulated 80'x2 with RW, requiring occasional verbal cues for RW management, hallway navigation, and improving stride/step length. Trial stair negotiation next treatment session as able. Pt left OOB in bedside chair with alarm and all needs met. This patient is functioning below their baseline and is recommended for level III. Patient will benefit from continued skilled PT this admission to achieve maximal function and safety. The patients AM-PAC Basic Mobility Inpatient Short From Raw Score is 17 . Based on AM-PAC scoring and patient presentation, PT currently recommending Level III (Minimum Resource Intensity). Please also refer to the recommendation of the Physical Therapist for safe discharge planning.        Rehab Resource Intensity Level, PT: III (Minimum Resource Intensity)    See flowsheet documentation for full assessment.

## 2025-01-14 NOTE — UTILIZATION REVIEW
Initial Clinical Review    Admission: Date/Time/Statement:   Admission Orders (From admission, onward)       Ordered        01/14/25 0001  Inpatient Admission  Once                          Orders Placed This Encounter   Procedures    Inpatient Admission     Standing Status:   Standing     Number of Occurrences:   1     Level of Care:   Med Surg [16]     Estimated length of stay:   More than 2 Midnights     Certification:   I certify that inpatient services are medically necessary for this patient for a duration of greater than two midnights. See H&P and MD Progress Notes for additional information about the patient's course of treatment.     ED Arrival Information       Patient not seen in ED                       No chief complaint on file.      Initial Presentation: 84 y.o. male with pmhx of HTN, DM2, HLD, CHF (EF 40%, basal inferior wall hypokenesis), CABG X3 in 2012, s/p MV Repair 2023, b/l inguinal hernia, PAF, known mod/large type IV hiatal hernia was transferred form [facility] to [facility] for a higher level of care.  Pt initially presented to [facility] w/ persistent abd pain after dinner, mild nausea but without yared emesis. Labs significant for lipase 498 and ARSH with Cr of 1.3. CT obtained demonstrating gastric volvulus and c/f for mesentery swirling with enlarged associated mesenteric vessels. NGT placed w/ output of 600cc of light tan/pink gastric contents immediately. Transferred to [facility] for thoracic surg eval.  On arrival to [facility], pt NGT in place, reports abdominal pain improved s/p NGT placement. WBC 11.50.     Admit as Inpatient for evaluation and treatment of mesenteroaxial gastric volvulus.  Plan: NPO/NG tube.  Plan for surgical repair on this admission.  Pain and nausea control as needed.  DVT prophylaxis. attempt to medically optimize patient preoperatively. Conservative IV fluid administration; 75 cc/h while n.p.o.  Monitor fluid status closely; strict input and output, daily weights.  Home  Coreg, Lasix, aspirin and lisinopril held; will restart as able.    HF Consult:Chronic HFmEF, Stage C, NYHA 2   Plan: resume lasix 20 mg daily. Pt is acceptable risk for his intermediate risk surgery, moderate risk given age and CAD. May hold aspirin one week before surgery. Continue asa, atorvastatin and coreg for CAD. continue atorvastatin for previous lipid check at goal. Echo follow up on mitral valve. 2g sodium diet. Daily weights    Gen Surg Notes: NGT was placed in ED and subsequent resolution of pain and nausea. Cr improving. WBC 12.6. NGT output 50 ml. Abd soft, nt,,nd on exam.  Will try to manage conservatively with short interval follow-up out-patient. Ideally he would be optimized out-patient, recover from his ARSH, and return shortly for a scheduled surgery. NGT was pulled back to 37cm. UGI ordered. Will place contrast down NGT. If no obstruction will remove NGT and give fulls.   Cont IVF right now.     Date: 01/15   Day 2: No acute events overnight. Pt is doing well complete resolution of his pain. NGT was removed on 01/14 and pt was started on CLD, tolerated. Ambulating without issue. Voiding   Plan: Advance to full liquid diet. Planning for short interval follow-up and surgical repair. Pain and nausea control as needed. DVT prophylaxis. Encourage ambulation. PT/OT. Hold aspirin for 7 days preoperatively. obtain TTE for preoperative restratification    ED Treatment-Medication Administration - No Administrations Displayed (No Start Event Found)       None            Scheduled Medications:  acetaminophen, 1,000 mg, Intravenous, Q6H KEENAN  [Held by provider] aspirin, 81 mg, Oral, Daily  [Held by provider] atorvastatin, 40 mg, Oral, HS  [Held by provider] carvedilol, 6.25 mg, Oral, BID With Meals  [Held by provider] citalopram, 10 mg, Oral, Daily  [Held by provider] finasteride, 5 mg, Oral, HS  heparin (porcine), 5,000 Units, Subcutaneous, Q8H KEENAN  insulin lispro, 1-5 Units, Subcutaneous, Q6H  KEENAN  metoprolol, 10 mg, Intravenous, BID  [Held by provider] tamsulosin, 0.8 mg, Oral, HS      Continuous IV Infusions:  multi-electrolyte, 75 mL/hr, Intravenous, Continuous      PRN Meds:  acetaminophen, 650 mg, Oral, Q6H PRN      ED Triage Vitals   Temperature Pulse Respirations Blood Pressure SpO2 Pain Score   01/13/25 2330 01/13/25 2330 01/13/25 2330 01/13/25 2330 01/14/25 0243 01/13/25 2330   98 °F (36.7 °C) 67 20 142/65 94 % No Pain     Weight (last 2 days)       Date/Time Weight    01/15/25 0400 55.2 (121.69)    01/14/25 0600 55.9 (123.24)    01/13/25 2330 56 (123.4)            Vital Signs (last 3 days)       Date/Time Temp Pulse Resp BP MAP (mmHg) SpO2 O2 Device Patient Position - Orthostatic VS [place] Coma Scale Score Pain    01/15/25 10:40:59 97.7 °F (36.5 °C) 60 18 122/59 80 94 % None (Room air) Lying -- --    01/15/25 0800 -- -- -- -- -- 93 % None (Room air) -- 15 No Pain    01/15/25 07:50:29 97.3 °F (36.3 °C) 79 18 127/60 82 95 % None (Room air) Lying -- --    01/15/25 07:38:12 -- -- -- -- -- 95 % -- -- -- --    01/15/25 06:41:17 -- 63 18 118/52 74 95 % None (Room air) Lying -- --    01/14/25 22:20:31 98.4 °F (36.9 °C) 63 18 136/62 87 93 % None (Room air) Lying -- --    01/14/25 2040 -- -- -- -- -- -- -- -- 15 No Pain    01/14/25 19:36:34 97.8 °F (36.6 °C) 51 18 126/62 83 95 % None (Room air) Lying -- --    01/14/25 16:27:51 -- -- -- 133/61 85 -- -- -- -- --    01/14/25 1535 -- -- -- -- -- -- -- -- 15 No Pain    01/14/25 15:09:34 97.9 °F (36.6 °C) -- 17 135/61 86 93 % -- Lying -- --    01/14/25 1100 97.5 °F (36.4 °C) 68 17 140/65 94 94 % None (Room air) Lying -- --    01/14/25 0832 -- -- -- -- -- -- -- -- -- No Pain    01/14/25 0831 -- -- -- -- -- -- -- -- -- No Pain    01/14/25 0745 -- -- -- -- -- 97 % None (Room air) -- 15 No Pain    01/14/25 0701 97.5 °F (36.4 °C) 75 18 119/56 81 96 % None (Room air) Lying -- --    01/14/25 0400 -- -- -- -- -- -- -- -- 15 --    01/14/25 0243 97.3 °F (36.3 °C) 56 20  127/60 87 94 % -- Lying -- --    01/13/25 2359 -- -- -- -- -- -- -- -- 15 --    01/13/25 2330 98 °F (36.7 °C) 67 20 142/65 94 -- -- Lying -- No Pain              Pertinent Labs/Diagnostic Test Results:   Radiology:  XR abdomen 1 view kub   Final Interpretation by Kaitlyn Gandhi MD (01/14 1433)   The contrast has reached colon demonstrated throughout the colon and rectum.   Small bowel within the right inguinal hernia.   Mild gaseous dilatation of the small and large bowel.   Small left pleural effusion.            Workstation performed: [id number]         FL upper GI UGI   Final Interpretation by Garry Delatorre MD (01/14 1328)      Moderately large lateral hernia. Previously noted mesenteroaxial gastric volvulus is no longer seen. There is however slow gastric emptying noted.      Mildly patulous esophagus with tertiary contractions.         Workstation performed: [id number]           Cardiology:  No orders to display     GI:  No orders to display           Results from last 7 days   Lab Units 01/15/25  0416 01/14/25  0520 01/14/25  0047 01/13/25  0645 01/12/25  2144   WBC Thousand/uL 10.41* 12.64*  --  11.50* 8.97   HEMOGLOBIN g/dL 10.8* 11.1*  --  11.6* 12.7   HEMATOCRIT % 32.3* 34.4*  --  35.0* 38.7   PLATELETS Thousands/uL 186 207 196 207 220   TOTAL NEUT ABS Thousands/µL  --  10.61*  --  9.84* 7.91*         Results from last 7 days   Lab Units 01/15/25  0416 01/14/25  0520 01/13/25  0645 01/12/25  2144   SODIUM mmol/L 139 141 140 140   POTASSIUM mmol/L 3.6 4.1 4.7 4.4   CHLORIDE mmol/L 100 99 102 100   CO2 mmol/L 31 34* 33* 30   ANION GAP mmol/L 8 8 5 10   BUN mg/dL 25 31* 37* 35*   CREATININE mg/dL 1.11 1.19 1.42* 1.35*   EGFR ml/min/1.73sq m 60 55 45 47   CALCIUM mg/dL 7.9* 8.5 8.9 10.0   MAGNESIUM mg/dL  --  2.0  --   --    PHOSPHORUS mg/dL  --  3.8  --   --      Results from last 7 days   Lab Units 01/12/25  2144   AST U/L 20   ALT U/L 13   ALK PHOS U/L 73   TOTAL PROTEIN g/dL 7.9   ALBUMIN g/dL 4.0  "  TOTAL BILIRUBIN mg/dL 0.72     Results from last 7 days   Lab Units 01/15/25  1040 01/15/25  0645 01/14/25  2052 01/14/25  1612 01/14/25  1201 01/14/25  1030 01/14/25  0624   POC GLUCOSE mg/dl 210* 94 104 189* 69 73 79     Results from last 7 days   Lab Units 01/15/25  0416 01/14/25  0520 01/13/25  0645 01/12/25  2144   GLUCOSE RANDOM mg/dL 87 79 129 139             No results found for: \"BETA-HYDROXYBUTYRATE\"                   Results from last 7 days   Lab Units 01/13/25  0153 01/12/25  2333 01/12/25  2144   HS TNI 0HR ng/L  --   --  10   HS TNI 2HR ng/L  --  23  --    HSTNI D2 ng/L  --  13  --    HS TNI 4HR ng/L 29  --   --    HSTNI D4 ng/L 19  --   --          Results from last 7 days   Lab Units 01/12/25  2210   PROTIME seconds 13.2   INR  0.98   PTT seconds 30             Results from last 7 days   Lab Units 01/13/25  0645 01/13/25  0028   LACTIC ACID mmol/L 1.0 1.0                                 Results from last 7 days   Lab Units 01/12/25  2144   LIPASE u/L 498*                                                                   Past Medical History:   Diagnosis Date    BPH (benign prostatic hyperplasia)     Chronic pain disorder     back    Coronary artery disease     Gallstones     History of tracheostomy     per pt had it \"after CABG surgery--was admitted for 53 days including Rehab\"    Hx of CABG     per pt 7-8 yrs ago    Hypercholesterolemia     Hypertension     Moderate exercise     alot of walking-works FT and also walks dog 2 miles daily usually    Myocardial infarction (HCC)     per pt approx 7-8yrs ago -- cardio Dr ASHWIN Castillo sees yearly    Pleural effusion on left 03/02/2020    Prediabetes     Shortness of breath     per pt\"climbing steps or walking long distance\"    Wears glasses      Present on Admission:   Ischemic cardiomyopathy   Hyperlipidemia   Essential hypertension   Mesenteroaxial gastric volvulus   Heart failure with mid-range ejection fraction (HFmEF) ([facility])      Admitting Diagnosis: " Gastric volvulus [K31.89]  Age/Sex: 84 y.o. male    Network Utilization Review Department  ATTENTION: Please call with any questions or concerns to [phone] and carefully listen to the prompts so that you are directed to the right person. All voicemails are confidential.   For Discharge needs, contact Care Management DC Support Team at [phone] opt. [phone]  Send all requests for admission clinical reviews, approved or denied determinations and any other requests to dedicated fax number below belonging to the campus where the patient is receiving treatment. List of dedicated fax numbers for the Facilities:  FACILITY NAME UR FAX NUMBER   ADMISSION DENIALS (Administrative/Medical Necessity) [phone]   DISCHARGE SUPPORT TEAM (NETWORK) [phone]   PARENT CHILD HEALTH (Maternity/NICU/Pediatrics) [phone]   [facility] [phone]   [facility] [phone]   [facility] [phone]   [facility] [phone]   [facility] [phone]   [facility] [phone]   [facility] [phone]   [facility] [phone]   [facility] [phone]   Critical access hospital [phone]   [facility] [phone]   [facility] [phone]

## 2025-01-14 NOTE — ASSESSMENT & PLAN NOTE
Cardiomyopathy likely ischemic, has not had eval since CABG however no significant EKG changes noted    Plan  Continue aspirin 81 mg daily, atorvastatin 40 mg daily

## 2025-01-14 NOTE — ASSESSMENT & PLAN NOTE
85 yo M with CT obtained demonstrating gastric volvulus and c/f for mesentery swirling with enlarged associated mesenteric vessels.     Plan:  -NPO/NGT, IVF  -txfr pending to [facility] for thoracic surgery evaluation  -trend Cr/BUN  -monitor abd exam  -aspiration precautions, HOB >30  -DVT ppx

## 2025-01-14 NOTE — CONSULTS
Consultation - Hospitalist   Name: Raleigh Sandoval 84 y.o. male I MRN: [id number]  Unit/Bed#: ED-05 I Date of Admission: 1/12/2025   Date of Service: 1/13/2025 I Hospital Day: 0     Physician Requesting Evaluation: Lino Keene MD   Reason for Evaluation / Principal Problem: Medical Management     Assessment & Plan  Ischemic cardiomyopathy  Patient follows with cardiology  Resume goal-directed medical therapy when tolerating oral  Judicious use of fluids and monitor for volume overload  Hx of CABG x 3, 2012  Resume aspirin, statin, Coreg when tolerating oral  S/P mitral valve repair, 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, May 2023  Noted  Hyperlipidemia  Resume statin when tolerating oral  Essential hypertension  Will utilize IV labetalol as needed while n.p.o.  Mesenteroaxial gastric volvulus  Evaluated by general surgery on admission  Recommend n.p.o., NG tube placement, IV fluids  Plan for transfer to [facility] surgery [facility] at [facility]  Unfortunately, no beds are available at this time and due to prolonged transfer time patient admitted to the hospital  Paroxysmal atrial fibrillation (HCC)  Currently rate controlled in normal sinus rhythm  Not maintained on AC    CKD (chronic kidney disease)  Lab Results   Component Value Date    EGFR 45 01/13/2025    EGFR 47 01/12/2025    EGFR 47 12/14/2024    CREATININE 1.42 (H) 01/13/2025    CREATININE 1.35 (H) 01/12/2025    CREATININE 1.35 (H) 12/14/2024   Patient's previous baseline approximately 1 to 1.16 months ago, 1 month ago creatinine was 1.35.  Unclear if this is new baseline  Continue IV fluids  Monitor off Lasix and lisinopril at this time      VTE Prophylaxis:  Heparin     Counseling / Coordination of Care Time: I have spent a total time of 70 minutes on 01/13/25 in caring for this patient including Diagnostic results, Risks and benefits of tx options, Instructions for management, Patient and family education, Impressions, Counseling / Coordination of care,  "Documenting in the medical record, Reviewing / ordering tests, medicine, procedures  , and Communicating with other healthcare professionals .      History of Present Illness:    Raleigh Sandoval is a 84 y.o. male With past medical history of ischemic cardiomyopathy, chronic heart failure with reduced ejection fraction, CAD status post CABG in 2012, hypertension, hyperlipidemia, status post mitral valve repair who presents to the hospital with acute onset abdominal pain.  Otherwise reports he has been in his normal state of health.     Review of Systems:    Review of Systems   Constitutional:  Negative for chills and fever.   HENT:  Negative for sore throat and trouble swallowing.    Eyes:  Negative for photophobia and visual disturbance.   Respiratory:  Negative for shortness of breath and wheezing.    Cardiovascular:  Negative for chest pain and palpitations.   Gastrointestinal:  Negative for constipation, diarrhea, nausea and vomiting.   Genitourinary:  Negative for difficulty urinating and dysuria.   Musculoskeletal:  Negative for arthralgias and myalgias.   Skin:  Negative for rash and wound.   Neurological:  Negative for dizziness, light-headedness and headaches.       Past Medical and Surgical History:     Past Medical History:   Diagnosis Date    BPH (benign prostatic hyperplasia)     Chronic pain disorder     back    Coronary artery disease     Gallstones     History of tracheostomy     per pt had it \"after CABG surgery--was admitted for 53 days including Rehab\"    Hx of CABG     per pt 7-8 yrs ago    Hypercholesterolemia     Hypertension     Moderate exercise     alot of walking-works FT and also walks dog 2 miles daily usually    Myocardial infarction (HCC)     per pt approx 7-8yrs ago --[facility] cardio Dr AHSWIN Castillo sees yearly    Pleural effusion on left 03/02/2020    Prediabetes     Shortness of breath     per pt\"climbing steps or walking long distance\"    Wears glasses        Past Surgical History: "   Procedure Laterality Date    CATARACT EXTRACTION Bilateral     COLONOSCOPY      CORONARY ARTERY BYPASS GRAFT      x3 LIMA-LAD, SVG-OM, SVG-DIAG with mitral valve repair by niocle    CYSTOSCOPY  2014    Diagnostic    CYSTOSCOPY  2023    [facility]    ESOPHAGOGASTRODUODENOSCOPY  2015    with biopsy. Dr Mazariegos    FL RETROGRADE PYELOGRAM  2023    MITRAL VALVE REPAIR      26 mm fatmata hernandez physi annuloplasty ring    DE CYSTO W/REMOVAL OF LESIONS SMALL N/A 2023    Procedure: (TURBT);  Surgeon: Kee Moya MD;  [facility]: [facility];  Service: Urology    DE CYSTO/PYELOSCOPY BX&/FULGURATION PELIVC LESION Right 2023    Procedure: URETEROSCOPY,WITH URETERAL BRUSHING;  Surgeon: Kee Moya MD;  [facility];  Service: Urology    DE CYSTOURETHROSCOPY W/URETERAL CATHETERIZATION Right 2023    Procedure: CYSTO, URETHERAL DILATION, RETROGRADE PYELOGRAM W/ INSERTION STENT URETERAL;  Surgeon: Kee Moya MD;  Location[facility];  Service: Urology    TRACHEOSTOMY      during his heart surgery       Meds/Allergies:    Pertinent medications reviewed    Allergies: No Known Allergies    Social History:     Marital Status:     Substance Use History:   Social History     Substance and Sexual Activity   Alcohol Use Yes    Comment: per pt very rarely     Social History     Tobacco Use   Smoking Status Former    Current packs/day: 0.00    Average packs/day: 0.5 packs/day for 16.0 years (8.0 ttl pk-yrs)    Types: Cigarettes    Start date:     Quit date:     Years since quittin.0    Passive exposure: Past   Smokeless Tobacco Never     Social History     Substance and Sexual Activity   Drug Use No       Family History:    Pertinent family history reviewed    Physical Exam:     Vitals:   Blood Pressure: 144/68 (250)  Pulse: 64 (25)  Temperature: 97.7 °F (36.5 °C) (25)  Temp Source: Oral (25  "2034)  Respirations: 20 (01/13/25 1900)  Height: 5' 8\" (172.7 cm) (01/13/25 1100)  Weight - Scale: 56.7 kg (125 lb) (01/13/25 1100)  SpO2: 97 % (01/13/25 1900)    Physical Exam  Vitals reviewed.   Constitutional:       General: He is not in acute distress.     Appearance: He is well-developed. He is not ill-appearing, toxic-appearing or diaphoretic.   HENT:      Head: Normocephalic and atraumatic.      Nose:      Comments: Ngt in place      Mouth/Throat:      Mouth: Mucous membranes are moist.   Eyes:      General: No scleral icterus.     Extraocular Movements: Extraocular movements intact.   Cardiovascular:      Rate and Rhythm: Normal rate and regular rhythm.      Heart sounds: Normal heart sounds.   Pulmonary:      Effort: Pulmonary effort is normal. No respiratory distress.      Breath sounds: Normal breath sounds. No wheezing or rales.   Abdominal:      General: There is no distension.      Palpations: Abdomen is soft.      Tenderness: There is no abdominal tenderness. There is no guarding or rebound.   Musculoskeletal:         General: No swelling, tenderness or deformity.   Skin:     General: Skin is warm and dry.   Neurological:      General: No focal deficit present.      Mental Status: He is alert. Mental status is at baseline.   Psychiatric:         Mood and Affect: Mood normal.         Behavior: Behavior normal.         Thought Content: Thought content normal.         Judgment: Judgment normal.           Additional Data:     Lab Results: I have reviewed pertinent results     Results from last 7 days   Lab Units 01/13/25  0645   WBC Thousand/uL 11.50*   HEMOGLOBIN g/dL 11.6*   HEMATOCRIT % 35.0*   PLATELETS Thousands/uL 207   SEGS PCT % 86*   LYMPHO PCT % 6*   MONO PCT % 8   EOS PCT % 0     Results from last 7 days   Lab Units 01/13/25  0645 01/12/25  2144   SODIUM mmol/L 140 140   POTASSIUM mmol/L 4.7 4.4   CHLORIDE mmol/L 102 100   CO2 mmol/L 33* 30   BUN mg/dL 37* 35*   CREATININE mg/dL 1.42* 1.35* "   ANION GAP mmol/L 5 10   CALCIUM mg/dL 8.9 10.0   ALBUMIN g/dL  --  4.0   TOTAL BILIRUBIN mg/dL  --  0.72   ALK PHOS U/L  --  73   ALT U/L  --  13   AST U/L  --  20   GLUCOSE RANDOM mg/dL 129 139     Results from last 7 days   Lab Units 01/12/25  2210   INR  0.98         Lab Results   Component Value Date/Time    HGBA1C 6.0 (H) 12/14/2024 08:14 AM    HGBA1C 5.6 01/09/2021 08:14 AM    HGBA1C 6.0 11/02/2019 08:54 AM    HGBA1C 5.8 (H) 11/28/2015 08:40 AM    HGBA1C 6.2 (H) 05/11/2015 09:15 AM    HGBA1C 6.3 (H) 02/12/2015 09:23 AM         Results from last 7 days   Lab Units 01/13/25  0645 01/13/25  0028   LACTIC ACID mmol/L 1.0 1.0       Imaging: I have reviewed pertinent images     XR chest 1 view portable   Final Result by Yolande Villela MD (01/13 2089)      NG tube in stomach with tip below the diaphragm.      Chronic small loculated left pleural effusion.      Large hiatal hernia.            Workstation performed: [id number]         CT abdomen pelvis with contrast   Final Result by Kristel Pizarro MD (01/12 2858)      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      The study was marked in EPIC for immediate notification.         Workstation performed: [id number]             EKG, Pathology, and Other Studies Reviewed on Admission:   EKG:  Reviewed    ** Please Note: This note has been constructed using a voice recognition system. **

## 2025-01-14 NOTE — CONSULTS
Consultation - Heart Failure   Name: Raleigh Sandoval 84 y.o. male I MRN: [id number]  Unit/Bed#: [facility] 508-01 I Date of Admission: 1/13/2025   Date of Service: 1/14/2025 I Hospital Day: 1   Inpatient consult to Cardiology  Consult performed by: Jann Durant DO  Consult ordered by: Cari Loredo MD        Physician Requesting Evaluation: Dio Amin, *   Reason for Evaluation / Principal Problem: ARSH, pre-op eval    Assessment & Plan  Mesenteroaxial gastric volvulus  Presented 1/12 to [facility] with abdominal pain and transferred to [facility] for thoracic surgery eval  Planning for outpatient surgery in the next few weeks    Overall patient's revised cardiac index risk is 15% of major adverse cardiac event given his history of CAD, heart failure, and higher risk surgery  Patient is a moderate risk for a moderate - higher risk surgery. OK to proceed from a cardiology perspective.   Recommend holding aspirin for 7 days prior to surgery, holding lisinopril for 24-48 hours before surgery.   Heart failure with mid-range ejection fraction (HFmEF) (HCC)  Not in exacerbation  Follows with Dr. Castillo in the office  Last echo July 2024 TTE: EF 43%, mildly reduced RV systolic function, dilated atria, mitral valve s/p repair with mild to moderate regurg, mild to moderate tricuspid regurg  EKG this admission unchanged from prior  Home meds: Coreg 6.25 mg twice daily, lisinopril 5 mg daily, Lasix 20 mg daily  Jardiance was being keller checked as outpatient but not yet started    Plan  Discussed with attending  Possibly resume lisinopril 5 mg daily and oral lasix 20mg daily tomorrow 1/15  Resume Coreg 6.25 mg twice daily today if patient is tolerating orals  Ischemic cardiomyopathy  Cardiomyopathy likely ischemic, has not had eval since CABG however no significant EKG changes noted    Plan  Continue aspirin 81 mg daily, atorvastatin 40 mg daily  Hyperlipidemia  Continue statin  Essential  hypertension  Plan as above under HFmEF      History of Present Illness   Raleigh Sandoval is a 84 y.o. male who presents with abdominal pain.  He was found to have mesenteroaxial gastric volvulus and transferred to [facility] for thoracic surgery evaluation.  He had a mild ARSH on admission which is now resolved.  From a cardiac perspective he is doing well, no shortness of breath, chest pain, lower extremity edema.  Take medications as prescribed.  He walks with his daughter and dog regularly.  Surgery is planning to operatively fix his volvulus and esophageal hernia in the coming weeks.    Review of Systems  I have reviewed the patient's PMH, PSH, Social History, Family History, Meds, and Allergies    Objective :  Temp:  [97.3 °F (36.3 °C)-98 °F (36.7 °C)] 97.5 °F (36.4 °C)  HR:  [56-75] 68  BP: (119-144)/(56-72) 140/65  Resp:  [14-20] 17  SpO2:  [90 %-97 %] 94 %  O2 Device: None (Room air)  Orthostatic Blood Pressures      Flowsheet Row Most Recent Value   Blood Pressure 140/65 filed at 01/14/2025 1100   Patient Position - Orthostatic VS Lying filed at 01/14/2025 1100          First Weight: Weight - Scale: 56 kg (123 lb 6.4 oz) (01/13/25 2330)  Vitals:    01/13/25 2330 01/14/25 0600   Weight: 56 kg (123 lb 6.4 oz) 55.9 kg (123 lb 3.8 oz)       Physical Exam  Constitutional:       General: He is not in acute distress.  HENT:      Mouth/Throat:      Mouth: Mucous membranes are moist.      Pharynx: Oropharynx is clear.   Neck:      Comments: No JVD  Cardiovascular:      Rate and Rhythm: Normal rate and regular rhythm.      Heart sounds: No murmur heard.  Pulmonary:      Effort: Pulmonary effort is normal.      Breath sounds: Normal breath sounds.   Musculoskeletal:      Right lower leg: No edema.      Left lower leg: No edema.   Skin:     General: Skin is warm and dry.   Neurological:      Mental Status: He is alert.           Lab Results: I have reviewed the following results:  Results from last 7 days   Lab Units  01/14/25  0520 01/14/25  0047 01/13/25  0645 01/12/25  2144   WBC Thousand/uL 12.64*  --  11.50* 8.97   HEMOGLOBIN g/dL 11.1*  --  11.6* 12.7   HEMATOCRIT % 34.4*  --  35.0* 38.7   PLATELETS Thousands/uL 207 196 207 220     Results from last 7 days   Lab Units 01/14/25  0520 01/13/25  0645 01/12/25  2144   POTASSIUM mmol/L 4.1 4.7 4.4   CHLORIDE mmol/L 99 102 100   CO2 mmol/L 34* 33* 30   BUN mg/dL 31* 37* 35*   CREATININE mg/dL 1.19 1.42* 1.35*   CALCIUM mg/dL 8.5 8.9 10.0     Results from last 7 days   Lab Units 01/12/25  2210   INR  0.98   PTT seconds 30     Lab Results   Component Value Date    HGBA1C 6.0 (H) 12/14/2024     Lab Results   Component Value Date    CKTOTAL 63 03/12/2022    TROPONINI 0.04 03/17/2015             VTE Prophylaxis: VTE covered by:  heparin (porcine), Subcutaneous, 5,000 Units at 01/14/25 1331

## 2025-01-14 NOTE — OCCUPATIONAL THERAPY NOTE
"    Occupational Therapy Evaluation     Patient Name: Raleigh Sandoval  Today's Date: 1/14/2025  Problem List  Principal Problem:    Mesenteroaxial gastric volvulus  Active Problems:    Ischemic cardiomyopathy    Hyperlipidemia    Essential hypertension    Past Medical History  Past Medical History:   Diagnosis Date    BPH (benign prostatic hyperplasia)     Chronic pain disorder     back    Coronary artery disease     Gallstones     History of tracheostomy     per pt had it \"after CABG surgery--was admitted for 53 days including Rehab\"    Hx of CABG     per pt 7-8 yrs ago    Hypercholesterolemia     Hypertension     Moderate exercise     alot of walking-works FT and also walks dog 2 miles daily usually    Myocardial infarction (HCC)     per pt approx 7-8yrs ago --[facility] cardio Dr ASHWIN Castillo sees yearly    Pleural effusion on left 03/02/2020    Prediabetes     Shortness of breath     per pt\"climbing steps or walking long distance\"    Wears glasses      Past Surgical History  Past Surgical History:   Procedure Laterality Date    CATARACT EXTRACTION Bilateral     COLONOSCOPY      CORONARY ARTERY BYPASS GRAFT      x3 LIMA-LAD, SVG-OM, SVG-DIAG with mitral valve repair by nicole    CYSTOSCOPY  12/13/2014    Diagnostic    CYSTOSCOPY  03/24/2023    [facility]    ESOPHAGOGASTRODUODENOSCOPY  04/07/2015    with biopsy. Dr Mazariegos    FL RETROGRADE PYELOGRAM  5/17/2023    MITRAL VALVE REPAIR      26 mm fatmata hernandez physi annuloplasty ring    ND CYSTO W/REMOVAL OF LESIONS SMALL N/A 5/17/2023    Procedure: (TURBT);  Surgeon: Kee Moya MD;  Location: [facility];  Service: Urology    ND CYSTO/PYELOSCOPY BX&/FULGURATION PELIVC LESION Right 5/17/2023    Procedure: URETEROSCOPY,WITH URETERAL BRUSHING;  Surgeon: Kee Moya MD;  [facility];  Service: Urology    ND CYSTOURETHROSCOPY W/URETERAL CATHETERIZATION Right 5/17/2023    Procedure: CYSTO, URETHERAL DILATION, RETROGRADE PYELOGRAM W/ INSERTION " STENT URETERAL;  Surgeon: Kee Moya MD;  Location: [facility];  Service: Urology    TRACHEOSTOMY      during his heart surgery            01/14/25 0831   OT Last Visit   OT Visit Date 01/14/25   Note Type   Note type Evaluation   Pain Assessment   Pain Assessment Tool 0-10   Pain Score No Pain   Restrictions/Precautions   Weight Bearing Precautions Per Order No   Other Precautions Multiple lines;Telemetry  (NG Tube)   Home Living   Type of Home House   Home Layout Two level;Stairs to enter with rails;Bed/bath upstairs   Bathroom Shower/Tub Tub/shower unit   Bathroom Toilet Standard   Bathroom Equipment Grab bars in shower   Bathroom Accessibility Accessible   Home Equipment Walker  (uses)   Prior Function   Level of [place] Independent with ADLs;Independent with functional mobility;Independent with IADLS   Lives With Son;Daughter   Receives Help From Family   IADLs Independent with driving;Independent with medication management;Independent with meal prep   Falls in the last 6 months 0   Vocational Full time employment   Lifestyle   Autonomy pta, pt was I WADL/IADL, rw mobility. +    Reciprocal Relationships supportive son and dtr who live with him and can provide A as needed.   Service to Others FTE @ [facility]   Intrinsic Gratification spending time w his family   ADL   Where Assessed Edge of bed   Eating Assistance 6  Modified independent   Grooming Assistance 6  Modified Independent   UB Bathing Assistance 6  Modified Independent   LB Bathing Assistance 5  Supervision/Setup   UB Dressing Assistance 6  Modified independent   LB Dressing Assistance 5  Supervision/Setup   Toileting Assistance  5  Supervision/Setup   Functional Assistance 5  Supervision/Setup   Bed Mobility   Supine to Sit 5  Supervision   Additional items Increased time required   Additional Comments found in bed, left in chair w all needs in reach and alarm on.   Transfers   Sit to Stand 5  Supervision   Stand to Sit 5   Supervision   Additional Comments rw   Functional Mobility   Functional Mobility 5  Supervision   Additional Comments household distance   Additional items Rolling walker   Balance   Static Sitting Normal   Dynamic Sitting Good   Static Standing Fair +   Dynamic Standing Fair   Ambulatory Fair   Activity Tolerance   Activity Tolerance Patient tolerated treatment well   Medical Staff Made Aware DPT 2' pts med complexity, comorbidities and regression from baseline.   Nurse Made Aware cleared   RUE Assessment   RUE Assessment WFL   LUE Assessment   LUE Assessment WFL   Hand Function   Gross Motor Coordination Functional   Fine Motor Coordination Functional   Cognition   Overall Cognitive Status WFL   Arousal/Participation Responsive;Alert;Cooperative   Attention Within functional limits   Orientation Level Oriented X4   Memory Within functional limits   Following Commands Follows all commands and directions without difficulty   Comments pt cooperative w G safety awareness and insight to condition   Assessment   Prognosis Good   Assessment Pt is a 84 y.o. male admitted 1/13/25 epigastric pain w known hiatal hernia. Pt w sx pending at this time, OT eval and treat orders are active. PMH includes  has a past medical history of BPH (benign prostatic hyperplasia), Chronic pain disorder, Coronary artery disease, Gallstones, History of tracheostomy, CABG, Hypercholesterolemia, Hypertension, Moderate exercise, Myocardial infarction (HCC), Pleural effusion on left, Prediabetes, Shortness of breath, and Wears glasses. Pt lives w son and dtr in a 3 sh w tub shower w grab bars and standard toilet on the second floor of the home. Pta, pt was independent w/ ADL/IADL and functional mobility, was  driving and was not using any DME at baseline. Currently, pt is mod I for ub adl, s for lb adl and completed transfers/fm w s w rw for support. From OT standpoint, pt is functioning at baseline level and does not appear to require additional  acute OT at this time. Discussed pt's comfort with d/c from OT and any concerns with returning to previous environment; pt does not report any at this time. The patient's raw score on the AM-PAC Daily Activity inpatient short form is 24, standardized score is 57.54, greater than 39.4. Patients at this level are likely to benefit from discharge to home. Please refer to the recommendation of the Occupational Therapist for safe discharge planning. From OT perspective, pt should D/C HOME when medically stable. Acute OT no longer rq at this time, D/C OT.   Goals   Patient Goals get home   Discharge Recommendation   Rehab Resource Intensity Level, OT No post-acute rehabilitation needs   AM-PAC Daily Activity Inpatient   Lower Body Dressing 4   Bathing 4   Toileting 4   Upper Body Dressing 4   Grooming 4   Eating 4   Daily Activity Raw Score 24   Daily Activity Standardized Score (Calc for Raw Score >=11) 57.54   AM-PAC Applied Cognition Inpatient   Following a Speech/Presentation 4   Understanding Ordinary Conversation 4   Taking Medications 4   Remembering Where Things Are Placed or Put Away 4   Remembering List of 4-5 Errands 4   Taking Care of Complicated Tasks 4   Applied Cognition Raw Score 24   Applied Cognition Standardized Score 62.21   Modified [place] Scale   Modified [place] Scale 2   End of Consult   Education Provided Yes   Patient Position at End of Consult Bedside chair;Bed/Chair alarm activated;All needs within reach   Nurse Communication Nurse aware of consult       HEAVENLY Parker, OTR/L

## 2025-01-14 NOTE — ASSESSMENT & PLAN NOTE
Lab Results   Component Value Date    EGFR 45 01/13/2025    EGFR 47 01/12/2025    EGFR 47 12/14/2024    CREATININE 1.42 (H) 01/13/2025    CREATININE 1.35 (H) 01/12/2025    CREATININE 1.35 (H) 12/14/2024

## 2025-01-15 VITALS
RESPIRATION RATE: 18 BRPM | TEMPERATURE: 97.7 F | HEIGHT: 68 IN | BODY MASS INDEX: 18.44 KG/M2 | OXYGEN SATURATION: 94 % | SYSTOLIC BLOOD PRESSURE: 122 MMHG | HEART RATE: 60 BPM | DIASTOLIC BLOOD PRESSURE: 59 MMHG | WEIGHT: 121.69 LBS

## 2025-01-15 LAB
ANION GAP SERPL CALCULATED.3IONS-SCNC: 8 MMOL/L (ref 4–13)
BUN SERPL-MCNC: 25 MG/DL (ref 5–25)
CALCIUM SERPL-MCNC: 7.9 MG/DL (ref 8.4–10.2)
CHLORIDE SERPL-SCNC: 100 MMOL/L (ref 96–108)
CO2 SERPL-SCNC: 31 MMOL/L (ref 21–32)
CREAT SERPL-MCNC: 1.11 MG/DL (ref 0.6–1.3)
ERYTHROCYTE [DISTWIDTH] IN BLOOD BY AUTOMATED COUNT: 14.6 % (ref 11.6–15.1)
GFR SERPL CREATININE-BSD FRML MDRD: 60 ML/MIN/1.73SQ M
GLUCOSE SERPL-MCNC: 210 MG/DL (ref 65–140)
GLUCOSE SERPL-MCNC: 87 MG/DL (ref 65–140)
GLUCOSE SERPL-MCNC: 94 MG/DL (ref 65–140)
HCT VFR BLD AUTO: 32.3 % (ref 36.5–49.3)
HGB BLD-MCNC: 10.8 G/DL (ref 12–17)
MCH RBC QN AUTO: 32.3 PG (ref 26.8–34.3)
MCHC RBC AUTO-ENTMCNC: 33.4 G/DL (ref 31.4–37.4)
MCV RBC AUTO: 97 FL (ref 82–98)
PLATELET # BLD AUTO: 186 THOUSANDS/UL (ref 149–390)
PMV BLD AUTO: 9.9 FL (ref 8.9–12.7)
POTASSIUM SERPL-SCNC: 3.6 MMOL/L (ref 3.5–5.3)
RBC # BLD AUTO: 3.34 MILLION/UL (ref 3.88–5.62)
SODIUM SERPL-SCNC: 139 MMOL/L (ref 135–147)
WBC # BLD AUTO: 10.41 THOUSAND/UL (ref 4.31–10.16)

## 2025-01-15 PROCEDURE — 80048 BASIC METABOLIC PNL TOTAL CA: CPT | Performed by: SURGERY

## 2025-01-15 PROCEDURE — 97116 GAIT TRAINING THERAPY: CPT

## 2025-01-15 PROCEDURE — 82948 REAGENT STRIP/BLOOD GLUCOSE: CPT

## 2025-01-15 PROCEDURE — 85027 COMPLETE CBC AUTOMATED: CPT | Performed by: SURGERY

## 2025-01-15 PROCEDURE — 97530 THERAPEUTIC ACTIVITIES: CPT

## 2025-01-15 RX ORDER — LISINOPRIL 5 MG/1
5 TABLET ORAL
Status: DISCONTINUED | OUTPATIENT
Start: 2025-01-15 | End: 2025-01-15 | Stop reason: HOSPADM

## 2025-01-15 RX ORDER — FUROSEMIDE 20 MG/1
20 TABLET ORAL DAILY
Status: DISCONTINUED | OUTPATIENT
Start: 2025-01-15 | End: 2025-01-15 | Stop reason: HOSPADM

## 2025-01-15 RX ORDER — POTASSIUM CHLORIDE 1500 MG/1
40 TABLET, EXTENDED RELEASE ORAL ONCE
Status: COMPLETED | OUTPATIENT
Start: 2025-01-15 | End: 2025-01-15

## 2025-01-15 RX ADMIN — CITALOPRAM HYDROBROMIDE 10 MG: 10 TABLET ORAL at 09:06

## 2025-01-15 RX ADMIN — ASPIRIN 81 MG CHEWABLE TABLET 81 MG: 81 TABLET CHEWABLE at 09:06

## 2025-01-15 RX ADMIN — POTASSIUM CHLORIDE 40 MEQ: 1500 TABLET, EXTENDED RELEASE ORAL at 09:06

## 2025-01-15 RX ADMIN — FUROSEMIDE 20 MG: 20 TABLET ORAL at 09:06

## 2025-01-15 RX ADMIN — SODIUM CHLORIDE, SODIUM GLUCONATE, SODIUM ACETATE, POTASSIUM CHLORIDE, MAGNESIUM CHLORIDE, SODIUM PHOSPHATE, DIBASIC, AND POTASSIUM PHOSPHATE 75 ML/HR: .53; .5; .37; .037; .03; .012; .00082 INJECTION, SOLUTION INTRAVENOUS at 04:16

## 2025-01-15 RX ADMIN — INSULIN LISPRO 2 UNITS: 100 INJECTION, SOLUTION INTRAVENOUS; SUBCUTANEOUS at 11:18

## 2025-01-15 RX ADMIN — HEPARIN SODIUM 5000 UNITS: 5000 INJECTION, SOLUTION INTRAVENOUS; SUBCUTANEOUS at 06:41

## 2025-01-15 RX ADMIN — CARVEDILOL 6.25 MG: 6.25 TABLET, FILM COATED ORAL at 06:41

## 2025-01-15 NOTE — PROGRESS NOTES
01/14/25 1900   Clinical Encounter Type   Visited With Patient   Mosque Encounters   Mosque Needs Prayer     Fr. Jo pastoral visit.

## 2025-01-15 NOTE — PHYSICAL THERAPY NOTE
"                                                                                  PHYSICAL THERAPY NOTE          Patient Name: Raleigh Sandoval  Today's Date: 1/15/2025     01/15/25 1418   PT Last Visit   PT Visit Date 01/15/25   Note Type   Note Type Treatment   Pain Assessment   Pain Assessment Tool 0-10   Pain Score No Pain   Restrictions/Precautions   Weight Bearing Precautions Per Order No   Other Precautions Chair Alarm;Bed Alarm;Fall Risk   General   Chart Reviewed Yes   Response to Previous Treatment Patient with no complaints from previous session.   Family/Caregiver Present Yes  (daughter)   Cognition   Overall Cognitive Status WFL   Arousal/Participation Responsive;Alert;Cooperative   Attention Within functional limits   Orientation Level Oriented X4   Memory Within functional limits   Following Commands Follows all commands and directions without difficulty   Comments patient pleasant and cooperative, highly motivated to return home   Subjective   Subjective \"I feel good\"   Bed Mobility   Supine to Sit Unable to assess   Sit to Supine Unable to assess   Additional Comments patient found and left OOB in bedside chair with alarm and all needs met   Transfers   Sit to Stand 6  Modified independent   Additional items Armrests;Verbal cues   Stand to Sit 6  Modified independent   Additional items Armrests;Verbal cues   Additional Comments x2 STS with no AD   Ambulation/Elevation   Gait pattern Forward Flexion;Decreased foot clearance;Shuffling;Short stride;Step through pattern   Gait Assistance 5  Supervision   Additional items Verbal cues;Tactile cues   Assistive Device Rolling walker;None   Distance 40'x2 + 300'   Stair Management Assistance 5  Supervision   Additional items Verbal cues;Tactile cues   Stair Management Technique Two rails;Reciprocal   Number of Stairs 14   Ambulation/Elevation Additional Comments Patient demonstrated the ability to ambulate 40'x2 with RW + 300' with no AD, requiring " intermittent verbal cues for RW management and hallway navigation to improve patient safety and reduce risk of falls. Patient ambulated with excessively fast gait speed with use of RW, trialed with no AD noting improve gait speed and step/stride length. Patient negotiated the stairs x14 with use of b/l hand rails, demonstrating adequate LE strength and endurance to complete safely at home at this time.   Balance   Static Sitting Normal   Dynamic Sitting Good   Static Standing Good   Dynamic Standing Fair +   Ambulatory Fair   Endurance Deficit   Endurance Deficit Yes   Endurance Deficit Description generalized weakness, fatigue   Activity Tolerance   Activity Tolerance Patient tolerated treatment well   Nurse Made Aware RN cleared   Assessment   Prognosis Good   Problem List Decreased strength;Decreased endurance   Assessment PT initiated treatment session in order to assist patient in achieving goals to improve transfers, gait training, and overall activity tolerance. Patient demonstrated progress toward achieving functional mobility goals as evidenced by improving activity tolerance, ambulation, and overall mobility. Patient pleasant, cooperative, and agreeable to today's treatment session. Patient received OOB in bedside chair. Patient is currently mod-I bed mobility, transfers, and supervision ambulation. Throughout treatment session patient required both verbal and tactile cuing to improve safety, efficiency, and mechanics of mobility in addition to hands on assistance for all aspects of functional mobility. Additionally, pt required increased time to execute specific mobility tasks with rest breaks in between secondary to gross fatigue and weakness. Patient demonstrated the ability to ambulate 40'x2 with RW + 300' with no AD, requiring intermittent verbal cues for RW management and hallway navigation to improve patient safety and reduce risk of falls. Patient ambulated with excessively fast gait speed with use  of RW, trialed with no AD noting improve gait speed and step/stride length. Patient negotiated the stairs x14 with use of b/l hand rails, demonstrating adequate LE strength and endurance to complete safely at home at this time. Patient left OOB in bedside chair with alarm and all needs met.       Patient demonstrated safe functional mobility at this time, no further skilled PT services indicated. Will d/c from IPPT at this time. Encourage continued mobility throughout hospital stay with staff and restorative therapy. The patients AM-PAC Basic Mobility Inpatient Short From Raw Score is 22 .  Based on AM-PAC scoring and patient presentation, PT currently recommending Level III (Minimum Resource Intensity). Please also refer to the recommendation of the Physical Therapist for safe discharge planning.   Barriers to Discharge None   Goals   Patient Goals to return home   STG Expiration Date 01/28/25   PT Treatment Day 1   Plan   Treatment/Interventions   (d/c IPPT)   Progress Progressing toward goals   PT Frequency Other (Comment)  (d/c IPPT)   Discharge Recommendation   Rehab Resource Intensity Level, PT III (Minimum Resource Intensity)  (OPPT)   AM-PAC Basic Mobility Inpatient   Turning in Flat Bed Without Bedrails 4   Lying on Back to Sitting on Edge of Flat Bed Without Bedrails 4   Moving Bed to Chair 4   Standing Up From Chair Using Arms 4   Walk in Room 3   Climb 3-5 Stairs With Railing 3   Basic Mobility Inpatient Raw Score 22   Basic Mobility Standardized Score 47.4   [facility] Highest Level Of Mobility   -HLM Goal 7: Walk 25 feet or more   -HLM Achieved 8: Walk 250 feet ot more   Education   Education Provided Mobility training;Assistive device;Home exercise program   Patient Demonstrates acceptance/verbal understanding   End of Consult   Patient Position at End of Consult Bedside chair;Bed/Chair alarm activated;Other (comment)     Annel Nelson PT, DPT

## 2025-01-15 NOTE — DISCHARGE SUMMARY
Discharge Summary - Surgery-General   Name: Raleigh Sandoval 84 y.o. male I MRN: [id number]  Unit/Bed#: [facility] 508-01 I Date of Admission: 1/13/2025   Date of Service: 1/15/2025 I Hospital Day: 2  { ?Quick Links I Problem List I [facility] I Billing Tip:59611}  Admission Date: 1/13/2025 2319  Discharge Date: 01/15/25  Admitting Diagnosis: Gastric volvulus [K31.89]  Discharge Diagnosis:   Medical Problems       Resolved Problems  Date Reviewed: 12/12/2024   None         HPI: ***    Procedures Performed: No orders of the defined types were placed in this encounter.      Summary of Hospital Course: {Hospital Course:84044} ***    Significant Findings, Care, Treatment and Services Provided: ***    Complications: ***    Condition at Discharge: {condition:59150}       Discharge instructions/Information to patient and family:   See After Visit Summary (AVS) for information provided to patient and family.      Provisions for Follow-Up Care:  See after visit summary for information related to follow-up care and any pertinent home health orders.      PCP: Gopi Victoria DO    Disposition: {Discharge Disposition:96213}    Planned Readmission: {EXAM; YES/NO:13581}     Discharge Medications:  See after visit summary for reconciled discharge medications provided to patient and family.      Discharge Statement:  I have spent a total time of *** minutes in caring for this patient on the day of the visit/encounter. {>30 minutes of time was spent on:00722}.

## 2025-01-15 NOTE — PLAN OF CARE
Problem: PHYSICAL THERAPY ADULT  Goal: Performs mobility at highest level of function for planned discharge setting.  See evaluation for individualized goals.  Description: Treatment/Interventions: ADL retraining, Functional transfer training, LE strengthening/ROM, Elevations, Therapeutic exercise, Endurance training, Patient/family training, Bed mobility, Gait training, Spoke to nursing, Spoke to case management, OT  Equipment Recommended: Walker       See flowsheet documentation for full assessment, interventions and recommendations.  Outcome: Adequate for Discharge  Note: Prognosis: Good  Problem List: Decreased strength, Decreased endurance  Assessment: PT initiated treatment session in order to assist patient in achieving goals to improve transfers, gait training, and overall activity tolerance. Patient demonstrated progress toward achieving functional mobility goals as evidenced by improving activity tolerance, ambulation, and overall mobility. Patient pleasant, cooperative, and agreeable to today's treatment session. Patient received OOB in bedside chair. Patient is currently mod-I bed mobility, transfers, and supervision ambulation. Throughout treatment session patient required both verbal and tactile cuing to improve safety, efficiency, and mechanics of mobility in addition to hands on assistance for all aspects of functional mobility. Additionally, pt required increased time to execute specific mobility tasks with rest breaks in between secondary to gross fatigue and weakness. Patient demonstrated the ability to ambulate 40'x2 with RW + 300' with no AD, requiring intermittent verbal cues for RW management and hallway navigation to improve patient safety and reduce risk of falls. Patient ambulated with excessively fast gait speed with use of RW, trialed with no AD noting improve gait speed and step/stride length. Patient negotiated the stairs x14 with use of b/l hand rails, demonstrating adequate LE  strength and endurance to complete safely at home at this time. Patient left OOB in bedside chair with alarm and all needs met. Patient demonstrated safe functional mobility at this time, no further skilled PT services indicated. Will d/c from IPPT at this time. Encourage continued mobility throughout hospital stay with staff and restorative therapy. The patients AM-PAC Basic Mobility Inpatient Short From Raw Score is 22 .  Based on AM-PAC scoring and patient presentation, PT currently recommending Level III (Minimum Resource Intensity). Please also refer to the recommendation of the Physical Therapist for safe discharge planning.  Barriers to Discharge: None     Rehab Resource Intensity Level, PT: III (Minimum Resource Intensity) (OPPT)    See flowsheet documentation for full assessment.

## 2025-01-15 NOTE — RESTORATIVE TECHNICIAN NOTE
Restorative Technician Note      Patient Name: Raleigh Sandoval     Restorative Tech Visit Date: 01/15/25  Note Type: Mobility  Patient Position Upon Consult: Bedside chair  Activity Performed: Ambulated  Assistive Device: Roller walker  Patient Position at End of Consult: Bedside chair; All needs within reach; Bed/Chair alarm activated

## 2025-01-15 NOTE — ASSESSMENT & PLAN NOTE
Wt Readings from Last 3 Encounters:   01/15/25 55.2 kg (121 lb 11.1 oz)   01/13/25 56.7 kg (125 lb)   12/12/24 57.3 kg (126 lb 6.4 oz)

## 2025-01-15 NOTE — ASSESSMENT & PLAN NOTE
Patient presented with acute onset epigastric pain in the setting of a known hiatal hernia and was found to have a mesial axial gastric volvulus on CT.  NG tube was successfully placed and patient improved clinically.  NG tube was removed on 1/14 and patient started on clear liquid diet which she is tolerating.    - Advance to full liquid diet  - Planning for short interval follow-up and surgical repair  - Pain and nausea control as needed  - DVT prophylaxis  - Encourage ambulation  - PT/OT

## 2025-01-15 NOTE — ASSESSMENT & PLAN NOTE
- Appreciate cardiology recommendations; DC on Lasix 20, continue previous home regimen  - Hold aspirin for 7 days preoperatively  - Will obtain TTE for preoperative restratification

## 2025-01-15 NOTE — PROGRESS NOTES
Progress Note - Thoracic    Name: Raleigh Sandoval 84 y.o. male I MRN: [id number]  Unit/Bed#: [facility] 508-01 I Date of Admission: 1/13/2025   Date of Service: 1/15/2025 I Hospital Day: 2    Assessment & Plan  Mesenteroaxial gastric volvulus  Patient presented with acute onset epigastric pain in the setting of a known hiatal hernia and was found to have a mesial axial gastric volvulus on CT.  NG tube was successfully placed and patient improved clinically.  NG tube was removed on 1/14 and patient started on clear liquid diet which she is tolerating.    - Advance to full liquid diet  - Planning for short interval follow-up and surgical repair  - Pain and nausea control as needed  - DVT prophylaxis  - Encourage ambulation  - PT/OT  Ischemic cardiomyopathy  - Appreciate cardiology recommendations; DC on Lasix 20, continue previous home regimen  - Hold aspirin for 7 days preoperatively  - Will obtain TTE for preoperative restratification  Hyperlipidemia    Essential hypertension    Heart failure with mid-range ejection fraction (HFmEF) (HCC)  Wt Readings from Last 3 Encounters:   01/15/25 55.2 kg (121 lb 11.1 oz)   01/13/25 56.7 kg (125 lb)   12/12/24 57.3 kg (126 lb 6.4 oz)                   Subjective   No acute events overnight.  Patient is doing well complete resolution of his pain.  Tolerating clear liquid diet without nausea or vomiting having.  Ambulating without issue.  Voiding freely.    Objective :  Temp:  [97.5 °F (36.4 °C)-98.4 °F (36.9 °C)] 98.4 °F (36.9 °C)  HR:  [51-75] 63  BP: (119-140)/(56-65) 136/62  Resp:  [17-18] 17  SpO2:  [93 %-97 %] 93 %  O2 Device: None (Room air)    I/O         01/13 0701  01/14 0700 01/14 0701  01/15 0700    P.O.  300    I.V. (mL/kg) 52.5 (0.9)     IV Piggyback 130     Total Intake(mL/kg) 182.5 (3.3) 300 (5.4)    Urine (mL/kg/hr) 250 125 (0.1)    Emesis/NG output 50 50    Stool  0    Total Output 300 175    Net -117.5 +125          Unmeasured Stool Occurrence  2 x             Physical Exam  General: NAD  Skin: Warm, dry, anicteric  HEENT: Normocephalic, atraumatic  CV: RRR  Pulm:  Nonlabored breathing on room air  Abd: Soft, ND/NT  MSK: Symmetric, no edema, no tenderness, no deformity  Neuro: AOx3, GCS 15     Lab Results: I have reviewed the following results:  Recent Labs     01/12/25  2144 01/12/25  2210 01/12/25  2333 01/13/25  0028 01/13/25  0645 01/14/25  0047 01/14/25  0520 01/15/25  0416   WBC 8.97  --   --   --  11.50*  --  12.64* 10.41*   HGB 12.7  --   --   --  11.6*  --  11.1* 10.8*   HCT 38.7  --   --   --  35.0*  --  34.4* 32.3*     --   --   --  207   < > 207 186   SODIUM 140  --   --   --  140  --  141 139   K 4.4  --   --   --  4.7  --  4.1 3.6     --   --   --  102  --  99 100   CO2 30  --   --   --  33*  --  34* 31   BUN 35*  --   --   --  37*  --  31* 25   CREATININE 1.35*  --   --   --  1.42*  --  1.19 1.11   GLUC 139  --   --   --  129  --  79 87   MG  --   --   --   --   --   --  2.0  --    PHOS  --   --   --   --   --   --  3.8  --    AST 20  --   --   --   --   --   --   --    ALT 13  --   --   --   --   --   --   --    ALB 4.0  --   --   --   --   --   --   --    TBILI 0.72  --   --   --   --   --   --   --    ALKPHOS 73  --   --   --   --   --   --   --    PTT  --  30  --   --   --   --   --   --    INR  --  0.98  --   --   --   --   --   --    HSTNI0 10  --   --   --   --   --   --   --    HSTNI2  --   --  23  --   --   --   --   --    LACTICACID  --   --   --    < > 1.0  --   --   --     < > = values in this interval not displayed.             VTE Pharmacologic Prophylaxis: Heparin  VTE Mechanical Prophylaxis: sequential compression device

## 2025-01-15 NOTE — DISCHARGE INSTR - AVS FIRST PAGE
- You have an appointment with Dr. Amin to further discuss surgery on 1/27/2025 at 10:20am. The appointment is in the [facility] at [address]. Our office is on the fifth floor, [address].   - Remain on a full liquid diet until you are seen in the office. Please refer to the diet sheet that was given to you.  - Call out office with any difficulty eating, abdominal pain, nausea or vomiting. Our office number is [phone]. Please go to the emergency room with any acute concerns.     Activity:  - Walking and normal light activities are encouraged.  - Normal daily activities including climbing steps are okay.  - No driving until no longer using pain medications.     Diet:    -Full liquid diet; please see examples as listed below     Medications:    - You may resume all of your regular medications, including blood thinners and aspirin, after going home unless otherwise instructed. Please refer to your discharge medication list for further details.    Additional Instructions:  - If you have any questions or concerns after discharge please call the office.  - Call office or return to ER if fever greater than 101, chills, persistent nausea/vomiting, worsening/uncontrollable pain, and/or increasing redness or purulent/foul smelling drainage from incision(s).

## 2025-01-16 ENCOUNTER — TRANSITIONAL CARE MANAGEMENT (OUTPATIENT)
Dept: FAMILY MEDICINE CLINIC | Facility: CLINIC | Age: 85
End: 2025-01-16

## 2025-01-16 ENCOUNTER — PATIENT OUTREACH (OUTPATIENT)
Dept: CASE MANAGEMENT | Facility: OTHER | Age: 85
End: 2025-01-16

## 2025-01-16 DIAGNOSIS — Z71.89 COMPLEX CARE COORDINATION: Primary | ICD-10-CM

## 2025-01-16 NOTE — UTILIZATION REVIEW
NOTIFICATION OF ADMISSION DISCHARGE   This is a Notification of Discharge from [facility]. Please be advised that this patient has been discharge from our facility. Below you will find the admission and discharge date and time including the patient’s disposition.   UTILIZATION REVIEW CONTACT:  Ld Marte  Utilization   Network Utilization Review Department  Phone: [phone] x carefully listen to the prompts. All voicemails are confidential.  Email: [email or web address]     ADMISSION INFORMATION  PRESENTATION DATE: 1/13/2025 11:19 PM  OBERVATION ADMISSION DATE: N/A  INPATIENT ADMISSION DATE: 1/13/25 11:19 PM   DISCHARGE DATE: 1/15/2025  4:47 PM   DISPOSITION:Home/Self Care    Network Utilization Review Department  ATTENTION: Please call with any questions or concerns to [phone] and carefully listen to the prompts so that you are directed to the right person. All voicemails are confidential.   For Discharge needs, contact Care Management DC Support Team at [phone] opt. 2  Send all requests for admission clinical reviews, approved or denied determinations and any other requests to dedicated fax number below belonging to the campus where the patient is receiving treatment. List of dedicated fax numbers for the Facilities:  FACILITY NAME UR FAX NUMBER   ADMISSION DENIALS (Administrative/Medical Necessity) [phone]   DISCHARGE SUPPORT TEAM ([facility]) [phone]   PARENT CHILD HEALTH (Maternity/NICU/Pediatrics) [phone]   [facility] [phone]   General acute hospital [phone]   [facility] [phone]   [facility] [phone]   [facility] [phone]   [facility] [phone]   [facility] [phone]   [facility] [phone]   [facility]  [facility] [phone]   [facility] [phone]   [facility] [phone]   [facility] [phone]

## 2025-01-16 NOTE — PROGRESS NOTES
"Outpatient Care Management Note    HRR referral received.  Chart review completed.    Patient was hospitalized at [facility] 1/13/25-1/15/25 for mesenteroaxial gastric volvulus. Patient was a transferred from [facility].  Per chart, patient presented with acute onset of epigastric pain.  Found to have a mesial axial gastric volvulus on CT.  NG tube placed with resolution of pain.  Plan is for short interval follow up and surgical repair.  Patient received referral for ambulatory physical therapy.  Per chart, patient has an appointment scheduled with Cardiothoracic Surgery on 1/27/25.    RN CM will place outreach call to patient to follow up after hospitalization and to assess for care management needs.    Call placed and spoke briefly with patient who states that he is \"doing okay so far\".    Patient denies any pain, fevers, or nausea/vomiting at this time. He states that he is tolerating full liquid diet. He mentioned having some diarrhea. RN CM will route message to PCP to make aware.    Patient confirmed that he received a copy of his AVS instructions and medication list which he states his daughter has fully reviewed.  Patient states that he lives with his daughter and son. Patient states that he has all of his medications.  He declined need to review medications with RN CM at this time.    Patient confirmed that he has an appointment with CT Surgery on 1/27/25 and has transportation to appointment.    Patient offers no complaints or concerns at this time and denied any current needs.  Patient agreed to RN CM outreach again in one week.    RN CM instructed patient to notify physician for any changes in condition or for any new or worsening symptoms of concern- patient verbalized understanding.     RN CM will schedule next outreach for one week.  "

## 2025-01-21 LAB
ATRIAL RATE: 71 BPM
P AXIS: 69 DEGREES
PR INTERVAL: 146 MS
QRS AXIS: 51 DEGREES
QRSD INTERVAL: 112 MS
QT INTERVAL: 422 MS
QTC INTERVAL: 459 MS
T WAVE AXIS: -16 DEGREES
VENTRICULAR RATE: 71 BPM

## 2025-01-21 PROCEDURE — 93010 ELECTROCARDIOGRAM REPORT: CPT | Performed by: INTERNAL MEDICINE

## 2025-01-22 ENCOUNTER — TELEPHONE (OUTPATIENT)
Age: 85
End: 2025-01-22

## 2025-01-22 NOTE — TELEPHONE ENCOUNTER
Patients daughter called to confirm patients appointment for tomorrow 01/23/2025. Patients daughter advised. Patients daughter is listed on the [facility].

## 2025-01-23 ENCOUNTER — OFFICE VISIT (OUTPATIENT)
Dept: FAMILY MEDICINE CLINIC | Facility: CLINIC | Age: 85
End: 2025-01-23
Payer: COMMERCIAL

## 2025-01-23 VITALS
OXYGEN SATURATION: 93 % | WEIGHT: 120 LBS | SYSTOLIC BLOOD PRESSURE: 130 MMHG | TEMPERATURE: 96.3 F | BODY MASS INDEX: 18.83 KG/M2 | HEIGHT: 67 IN | HEART RATE: 81 BPM | DIASTOLIC BLOOD PRESSURE: 72 MMHG

## 2025-01-23 DIAGNOSIS — I25.5 ISCHEMIC CARDIOMYOPATHY: Chronic | ICD-10-CM

## 2025-01-23 DIAGNOSIS — E78.2 MIXED HYPERLIPIDEMIA: Chronic | ICD-10-CM

## 2025-01-23 DIAGNOSIS — I50.22 HEART FAILURE WITH MID-RANGE EJECTION FRACTION (HFMEF) (HCC): Chronic | ICD-10-CM

## 2025-01-23 DIAGNOSIS — Z78.9 TRANSITION OF CARE: Primary | ICD-10-CM

## 2025-01-23 DIAGNOSIS — K31.89 MESENTEROAXIAL GASTRIC VOLVULUS: Chronic | ICD-10-CM

## 2025-01-23 DIAGNOSIS — I10 ESSENTIAL HYPERTENSION: Chronic | ICD-10-CM

## 2025-01-23 PROCEDURE — 99495 TRANSJ CARE MGMT MOD F2F 14D: CPT | Performed by: FAMILY MEDICINE

## 2025-01-23 NOTE — PATIENT INSTRUCTIONS
Discharge summer reviewed:    Mesenteroaxial gastric volvulus  Patient presented with acute onset epigastric pain in the setting of a known hiatal hernia and was found to have a mesial axial gastric volvulus on CT.  NG tube was successfully placed and patient improved clinically.  NG tube was removed on 1/14 and patient started on clear liquid diet which she is tolerating.     - Advance to full liquid diet  - Planning for short interval follow-up and surgical repair  - Pain and nausea control as needed  - DVT prophylaxis  - Encourage ambulation  - PT/OT  Ischemic cardiomyopathy  - Appreciate cardiology recommendations; DC on Lasix 20, continue previous home regimen  - Hold aspirin for 7 days preoperatively  - Will obtain TTE for preoperative restratification  Hyperlipidemia     Essential hypertension     Heart failure with mid-range ejection fraction (HFmEF) ([facility])      Wt Readings from Last 3 Encounters:   01/15/25 55.2 kg (121 lb 11.1 oz)   01/13/25 56.7 kg (125 lb)   12/12/24 57.3 kg (126 lb 6.4 oz)             Doing well and denies pain or vomiting and no abdominal pain or distention, f-up with general Surgery next Monday aand scheduled surgery next week. F-up with pain management for chronic low back pain.

## 2025-01-23 NOTE — ASSESSMENT & PLAN NOTE
Wt Readings from Last 3 Encounters:   01/23/25 54.4 kg (120 lb)   01/15/25 55.2 kg (121 lb 11.1 oz)   01/13/25 56.7 kg (125 lb)       Sees cardiology and is stable today

## 2025-01-23 NOTE — PROGRESS NOTES
Transition of Care Visit  Name: Raleigh Sandoval      : 1940      MRN: [id number]  Encounter Provider: Gopi Victoria DO  Encounter Date: 2025   Encounter department: [facility]  Chief Complaint   Patient presents with   • Transition of Care Management     Patient Instructions   Discharge samuel reviewed:    Mesenteroaxial gastric volvulus  Patient presented with acute onset epigastric pain in the setting of a known hiatal hernia and was found to have a mesial axial gastric volvulus on CT.  NG tube was successfully placed and patient improved clinically.  NG tube was removed on  and patient started on clear liquid diet which she is tolerating.     - Advance to full liquid diet  - Planning for short interval follow-up and surgical repair  - Pain and nausea control as needed  - DVT prophylaxis  - Encourage ambulation  - PT/OT  Ischemic cardiomyopathy  - Appreciate cardiology recommendations; DC on Lasix 20, continue previous home regimen  - Hold aspirin for 7 days preoperatively  - Will obtain TTE for preoperative restratification  Hyperlipidemia     Essential hypertension     Heart failure with mid-range ejection fraction (HFmEF) (HCC)      Wt Readings from Last 3 Encounters:   01/15/25 55.2 kg (121 lb 11.1 oz)   25 56.7 kg (125 lb)   24 57.3 kg (126 lb 6.4 oz)             Doing well and denies pain or vomiting and no abdominal pain or distention, f-up with general Surgery next Monday aand scheduled surgery next week. F-up with pain management for chronic low back pain.     Assessment & Plan  Transition of care         Mesenteroaxial gastric volvulus  Sees specialist next Monday with general Surgery  and scheduled for surgery       Ischemic cardiomyopathy  Sees cardiology and stable       Mixed hyperlipidemia  Low cholesterol diet encouraged       Essential hypertension  stable       Heart failure with mid-range ejection fraction (HFmEF) (HCC)  Wt Readings  "from Last 3 Encounters:   01/23/25 54.4 kg (120 lb)   01/15/25 55.2 kg (121 lb 11.1 oz)   01/13/25 56.7 kg (125 lb)       Sees cardiology and is stable today                  History of Present Illness     Transitional Care Management Review:   Raleigh Sandoval is a 84 y.o. male here for TCM follow up.     During the TCM phone call patient stated:  TCM Call       Date and time call was made  1/16/2025  2:12 PM    Hospital care reviewed  Records reviewed    Patient was hospitialized at  [facility]    Date of Admission  01/13/25    Date of discharge  01/15/25    Diagnosis  Mesenteroaxial gastric volvulus    Disposition  Home    Were the patients medications reviewed and updated  No    Current Symptoms  Loose Stools    Loose stool severity  Mild          TCM Call       Post hospital issues  None    Should patient be enrolled in anticoag monitoring?  No    Scheduled for follow up?  Yes    Did you obtain your prescribed medications  Yes    Do you need help managing your prescriptions or medications  No    Is transportation to your appointment needed  Yes    Specify why  Pt does not drive    I have advised the patient to call PCP with any new or worsening symptoms  Yasmin Soto MA          Here for TCM and seeing thoracic surgeon next Monday and scheduled for surgery 2 Fridays from today.         Review of Systems   Constitutional: Negative.    HENT: Negative.     Eyes: Negative.    Respiratory: Negative.     Cardiovascular: Negative.  Negative for chest pain.   Gastrointestinal: Negative.  Negative for abdominal distention, abdominal pain and blood in stool.   Endocrine: Negative.    Genitourinary: Negative.    Musculoskeletal: Negative.    Skin: Negative.    Allergic/Immunologic: Negative.    Neurological: Negative.    Hematological: Negative.    Psychiatric/Behavioral: Negative.       Objective   /72   Pulse 81   Temp (!) 96.3 °F (35.7 °C) (Temporal)   Ht 5' 7\" (1.702 m)   " Wt 54.4 kg (120 lb)   SpO2 93%   BMI 18.79 kg/m²     Physical Exam  Constitutional:       Appearance: Normal appearance. He is well-developed.   HENT:      Head: Normocephalic and atraumatic.      Right Ear: External ear normal.      Left Ear: External ear normal.      Nose: Nose normal.      Mouth/Throat:      Mouth: Mucous membranes are moist.   Eyes:      Conjunctiva/sclera: Conjunctivae normal.      Pupils: Pupils are equal, round, and reactive to light.   Cardiovascular:      Rate and Rhythm: Normal rate and regular rhythm.      Pulses: Normal pulses.      Heart sounds: Normal heart sounds.   Pulmonary:      Effort: Pulmonary effort is normal.      Breath sounds: Normal breath sounds.   Abdominal:      General: Abdomen is flat. Bowel sounds are normal.      Palpations: Abdomen is soft.   Musculoskeletal:         General: Normal range of motion.      Cervical back: Normal range of motion and neck supple.   Skin:     General: Skin is warm and dry.      Capillary Refill: Capillary refill takes less than 2 seconds.   Neurological:      General: No focal deficit present.      Mental Status: He is alert and oriented to person, place, and time. Mental status is at baseline.      Deep Tendon Reflexes: Reflexes are normal and symmetric.   Psychiatric:         Mood and Affect: Mood normal.         Behavior: Behavior normal.         Thought Content: Thought content normal.         Judgment: Judgment normal.     Medications have been reviewed by provider in current encounter    Administrative Statements   I have spent a total time of 30 minutes in caring for this patient on the day of the visit/encounter including Diagnostic results, Prognosis, Risks and benefits of tx options, Instructions for management, Patient and family education, Importance of tx compliance, Risk factor reductions, Impressions, Counseling / Coordination of care, Documenting in the medical record, Reviewing / ordering tests, medicine, procedures  ,  and Obtaining or reviewing history  .

## 2025-01-24 ENCOUNTER — PATIENT OUTREACH (OUTPATIENT)
Dept: CASE MANAGEMENT | Facility: OTHER | Age: 85
End: 2025-01-24

## 2025-01-24 NOTE — PROGRESS NOTES
Outpatient Care Management Note    RN SHANTANU scheduled patient outreach for care management follow up.  Chart reviewed.    Noted, patient had an appointment with PCP yesterday 1/23/25 and is scheduled to see CT Surgery 1/27/25.    Call placed for care management follow up. No patient answer received.  Left VM message and included RN CM contact information and request for return call.  RN CM will scheduled a second outreach attempt in one week if no patient response received prior.

## 2025-01-26 NOTE — PROGRESS NOTES
Name: Raleigh Sandoval      : 1940      MRN: [id number]  Encounter Provider: Dio Amin DO  Encounter Date: 2025   Encounter department: [facility]  :  Assessment & Plan  Hiatal hernia  Large chronic hiatal hernia recently obstructing requiring admission with NGT placement. This was able to be conservatively managed. Given size, recent volvulus and obstruction recommend surgical repair. Surgery does have risks given his comorbidities and age but I feel the benefit outweighs this. He was seen by his cardiologist Dr. Castillo as an inpatient.     In the perioperative period his volume status will need to be carefully monitored. He will likely be taking in less PO for a period of time postoperatively so his diuresis, PO volume and weight will be checked.     He can start a soft diet as tolerating for now.     Discussed the risks, benefits and alternatives of the surgical repair of a hiatal hernia repair with partial fundoplication. Risks include bleeding, infection, dysphagia, injury to the vagal nerves, injury to the esophagus or another viscera, and recurrence. We discussed the typical postoperative course. Most patients are discharged home the following day after surgery on a full liquid diet for a few days. After this a soft diet can be started using our Diet Sheet Recommendations. Consent was obtained in the office today for a EGD, robotic-assisted hiatal hernia reduction with partial fundoplication, possible Biologic mesh implantation, and possible gastroplasty.       Orders:  •  Case request operating room: REPAIR HERNIA PARAESOPHAGEAL LAPAROSCOPIC W ROBOTICS WITH PARTIAL FUNDOPLICATIOIN,POSSIBLE GASTROPLASTY, POSSIBLE MESH INSERTION, ESOPHAGOGASTRODUODENOSCOPY (EGD); Standing  •  Basic metabolic panel; Future    Mesenteroaxial gastric volvulus    Orders:  •  Case request operating room: REPAIR HERNIA PARAESOPHAGEAL LAPAROSCOPIC W ROBOTICS WITH PARTIAL  FUNDOPLICATIOIN,POSSIBLE GASTROPLASTY, POSSIBLE MESH INSERTION, ESOPHAGOGASTRODUODENOSCOPY (EGD); Standing    Mild protein-calorie malnutrition (HCC)  Malnutrition Findings:   Weight loss in the setting of limited PO given hospitalization and now FLD. Will advance to surgical soft diet.                                 BMI Findings:           Body mass index is 18.54 kg/m².            Pulmonary emphysema, unspecified emphysema type (HCC)  Continue to monitor.            Thoracic History   Diagnosis: Hiatal hernia  Procedures/Surgeries: None  Pathology: None  Adjuvant Therapy: None  Problem   Mesenteroaxial Gastric Volvulus   Pulmonary Emphysema, Unspecified Emphysema Type (Hcc)   Hiatal Hernia        History of Present Illness   HPI  Raleigh Sandoval is a 84 y.o. male with medical history of CABGx3 (2012), MVr (2023, CHF (EF 40%), history of tracheostomy with PEG, and a known large hiatal hernia. He presented to ED on 1/12/25 with obstruction that was able to be conservatively managed. He had an ARSH that improved with hydration.     He was seen while admitted by Dr. Castillo his Cardiologist. He is deemed acceptable risk for an intermediate risk surgery.     He is currently tolerating a FLD without any dysphagia. No nausea. No abdominal pain. No chest pain. Breathing is stable.     Data:  The following results contain my personal interpretation and summarization.   1/14/25 UGI: Moderate-large hiatal hernia. Distended esophagus.   1/12/25 CTAP: Large hiatal hernia with gastric distention and volvulus.       ,   Review of Systems   Constitutional:  Negative for activity change, appetite change, chills, fatigue, fever and unexpected weight change.   HENT:  Negative for trouble swallowing and voice change.    Eyes:  Negative for photophobia and visual disturbance.   Respiratory:  Negative for chest tightness and shortness of breath.    Cardiovascular:  Negative for chest pain and palpitations.   Gastrointestinal:   "Negative for abdominal pain, diarrhea, nausea and vomiting.   Musculoskeletal:  Positive for gait problem. Negative for back pain.   Skin:  Negative for rash and wound.   Neurological:  Negative for dizziness, weakness, light-headedness and headaches.   All other systems reviewed and are negative.          Objective   /77 (BP Location: Left arm, Patient Position: Sitting, Cuff Size: Standard)   Pulse 78   Temp 98 °F (36.7 °C) (Temporal)   Resp 15   Ht 5' 7\" (1.702 m)   Wt 53.7 kg (118 lb 6.2 oz)   SpO2 98%   BMI 18.54 kg/m²     Pain Screening:  Pain Score: 0-No pain  ECOG    Physical Exam  Constitutional:       General: He is not in acute distress.     Appearance: Normal appearance.      Comments: Thin   HENT:      Head: Normocephalic and atraumatic.   Cardiovascular:      Rate and Rhythm: Normal rate and regular rhythm.      Pulses: Normal pulses.      Heart sounds: Normal heart sounds.   Pulmonary:      Effort: Pulmonary effort is normal. No respiratory distress.      Breath sounds: Normal breath sounds.   Abdominal:      General: Abdomen is flat. There is no distension.      Palpations: Abdomen is soft.      Tenderness: There is no abdominal tenderness.   Musculoskeletal:      Cervical back: Normal range of motion.   Lymphadenopathy:      Cervical: No cervical adenopathy.   Skin:     Capillary Refill: Capillary refill takes less than 2 seconds.      Comments: Sternotomy scar.    Neurological:      General: No focal deficit present.      Mental Status: He is alert and oriented to person, place, and time.   Psychiatric:         Mood and Affect: Mood normal.         Behavior: Behavior normal.         Thought Content: Thought content normal.         Judgment: Judgment normal.         Labs:       Pathology: I have reviewed pathology reports described above.      "

## 2025-01-26 NOTE — ASSESSMENT & PLAN NOTE
Large chronic hiatal hernia recently obstructing requiring admission with NGT placement. This was able to be conservatively managed. Given size, recent volvulus and obstruction recommend surgical repair. Surgery does have risks given his comorbidities and age but I feel the benefit outweighs this. He was seen by his cardiologist Dr. Castillo as an inpatient.     In the perioperative period his volume status will need to be carefully monitored. He will likely be taking in less PO for a period of time postoperatively so his diuresis, PO volume and weight will be checked.     He can start a soft diet as tolerating for now.     Discussed the risks, benefits and alternatives of the surgical repair of a hiatal hernia repair with partial fundoplication. Risks include bleeding, infection, dysphagia, injury to the vagal nerves, injury to the esophagus or another viscera, and recurrence. We discussed the typical postoperative course. Most patients are discharged home the following day after surgery on a full liquid diet for a few days. After this a soft diet can be started using our Diet Sheet Recommendations. Consent was obtained in the office today for a EGD, robotic-assisted hiatal hernia reduction with partial fundoplication, possible Biologic mesh implantation, and possible gastroplasty.       Orders:  •  Case request operating room: REPAIR HERNIA PARAESOPHAGEAL LAPAROSCOPIC W ROBOTICS WITH PARTIAL FUNDOPLICATIOIN,POSSIBLE GASTROPLASTY, POSSIBLE MESH INSERTION, ESOPHAGOGASTRODUODENOSCOPY (EGD); Standing  •  Basic metabolic panel; Future

## 2025-01-26 NOTE — ASSESSMENT & PLAN NOTE
Orders:  •  Case request operating room: REPAIR HERNIA PARAESOPHAGEAL LAPAROSCOPIC W ROBOTICS WITH PARTIAL FUNDOPLICATIOIN,POSSIBLE GASTROPLASTY, POSSIBLE MESH INSERTION, ESOPHAGOGASTRODUODENOSCOPY (EGD); Standing

## 2025-01-27 ENCOUNTER — PREP FOR PROCEDURE (OUTPATIENT)
Dept: CARDIAC SURGERY | Facility: CLINIC | Age: 85
End: 2025-01-27

## 2025-01-27 ENCOUNTER — CONSULT (OUTPATIENT)
Dept: CARDIAC SURGERY | Facility: CLINIC | Age: 85
End: 2025-01-27
Payer: COMMERCIAL

## 2025-01-27 VITALS
WEIGHT: 118.39 LBS | SYSTOLIC BLOOD PRESSURE: 100 MMHG | HEIGHT: 67 IN | HEART RATE: 78 BPM | RESPIRATION RATE: 15 BRPM | BODY MASS INDEX: 18.58 KG/M2 | TEMPERATURE: 98 F | DIASTOLIC BLOOD PRESSURE: 77 MMHG | OXYGEN SATURATION: 98 %

## 2025-01-27 DIAGNOSIS — J43.9 PULMONARY EMPHYSEMA, UNSPECIFIED EMPHYSEMA TYPE (HCC): ICD-10-CM

## 2025-01-27 DIAGNOSIS — K44.9 HIATAL HERNIA: ICD-10-CM

## 2025-01-27 DIAGNOSIS — K31.89 MESENTEROAXIAL GASTRIC VOLVULUS: Primary | Chronic | ICD-10-CM

## 2025-01-27 DIAGNOSIS — E44.1 MILD PROTEIN-CALORIE MALNUTRITION (HCC): ICD-10-CM

## 2025-01-27 PROCEDURE — 99205 OFFICE O/P NEW HI 60 MIN: CPT | Performed by: SURGERY

## 2025-01-27 RX ORDER — METRONIDAZOLE 500 MG/100ML
500 INJECTION, SOLUTION INTRAVENOUS ONCE
OUTPATIENT
Start: 2025-01-27 | End: 2025-01-27

## 2025-01-27 RX ORDER — LEVOFLOXACIN 5 MG/ML
500 INJECTION, SOLUTION INTRAVENOUS ONCE
OUTPATIENT
Start: 2025-01-27 | End: 2025-01-27

## 2025-01-28 ENCOUNTER — PREP FOR PROCEDURE (OUTPATIENT)
Dept: CARDIAC SURGERY | Facility: CLINIC | Age: 85
End: 2025-01-28

## 2025-01-28 ENCOUNTER — TELEPHONE (OUTPATIENT)
Dept: CARDIAC SURGERY | Facility: CLINIC | Age: 85
End: 2025-01-28

## 2025-01-28 NOTE — TELEPHONE ENCOUNTER
Met with patient yesterday to schedule his surgery. Advised him and his daughter Jaz that I should have a solidified surgery date today. Confirmed that surgical date with Jaz. We are proceeding with SX date of 3/21/25 and scheduled post op appt on 4/7/25 with Dr. Amin. Asked patient if they are open to moving up their surgery, if something opens up sooner. They are flexible with their schedule. Advised her that patient should have his pre-op blood work completed a week before surgery. Patient is on 81 mg ASA- No hold instructions required. Patient is also on fish oil, Pre- Admission testing will provide hold instructions. Jaz verbalized her understanding on next steps and has my direct phone should they have any further questions leading up to the surgery.

## 2025-01-31 ENCOUNTER — PATIENT OUTREACH (OUTPATIENT)
Dept: CASE MANAGEMENT | Facility: OTHER | Age: 85
End: 2025-01-31

## 2025-01-31 NOTE — PROGRESS NOTES
Outpatient Care Management Note    IB reminder: RN CM scheduled patient outreach for care management follow up- second call attempt.  Chart reviewed.  Patient had appointments with PCP on 1/23/25 and CT surgery on 1/27/25.    RN CM made second outreach call attempt now for care management follow up. No patient answer receive.  Left VM message and included RN SHANTANU contact information.    An UTR letter will be sent to patient.    RN CM will remove self from care team and close episode/referral at this time due to no patient response to last two outreach attempts.

## 2025-02-02 DIAGNOSIS — I50.22 CHRONIC SYSTOLIC CHF (CONGESTIVE HEART FAILURE), NYHA CLASS 2 (HCC): ICD-10-CM

## 2025-02-03 RX ORDER — CARVEDILOL 6.25 MG/1
6.25 TABLET ORAL 2 TIMES DAILY WITH MEALS
Qty: 180 TABLET | Refills: 1 | Status: SHIPPED | OUTPATIENT
Start: 2025-02-03

## 2025-02-04 ENCOUNTER — TELEPHONE (OUTPATIENT)
Dept: CARDIOLOGY CLINIC | Facility: CLINIC | Age: 85
End: 2025-02-04

## 2025-02-04 ENCOUNTER — PATIENT OUTREACH (OUTPATIENT)
Dept: CASE MANAGEMENT | Facility: OTHER | Age: 85
End: 2025-02-04

## 2025-02-04 NOTE — PROGRESS NOTES
Outpatient Care Management Note    IB reminder: Needs UTR letter sent via US mail. Chart reviewed.    RN CM was unable to reach patient during last two telephone outreach attempts.    An UTR letter is being sent to patient now via US mail, as patient does not have an active MyChart.    RN CM will remove self from care team and close referral episode at this time due to no patient response to outreach.

## 2025-02-04 NOTE — LETTER
Date: 02/04/25    Dear Raleigh Sandoval,   My name is Mounika; I am a registered nurse care manager working with   [facility] [place] [id number] [facility] [address]  I have not been able to reach you and would like to set a time that I can talk with you over the phone.  My work is to help patients that have complex medical conditions get the care they need. This includes patients who may have been in the hospital or emergency room.    Please call me with any questions you may have. I look forward to speaking with you.  Sincerely,  Mounika SUAREZ, BSN, RN  [phone]  Outpatient Care Manager

## 2025-02-04 NOTE — TELEPHONE ENCOUNTER
Patient has an appointment on 6/9/25 with Dr Castillo. Left message for patient to call back to reschedule due to change in provider's schedule.

## 2025-03-06 ENCOUNTER — TELEPHONE (OUTPATIENT)
Dept: CARDIAC SURGERY | Facility: CLINIC | Age: 85
End: 2025-03-06

## 2025-03-06 ENCOUNTER — TELEPHONE (OUTPATIENT)
Age: 85
End: 2025-03-06

## 2025-03-06 NOTE — TELEPHONE ENCOUNTER
Spoke with patient's daughter Jaz to inform her that patient needs a cardiology clearance appt prior to surgery. Confirmed with her that an appt with Dr. Castillo (cardiologist) has been scheduled for Monday, 3/10/2025 at 10am at the [place] location.

## 2025-03-09 NOTE — PROGRESS NOTES
Heart Failure Outpatient Progress Note - Raleigh Sandoval 84 y.o. 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) 12/12/2024    Pulmonary emphysema, unspecified emphysema type (HCC) 12/12/2024    Paroxysmal atrial fibrillation (HCC) 07/17/2024    Abnormal CT of the chest 07/12/2024    Moderate protein-calorie malnutrition (HCC) 07/11/2024    Bladder tumor 11/28/2023    Hiatal hernia 11/28/2023    Iron deficiency anemia due to chronic blood loss 11/28/2023    PMR (polymyalgia rheumatica) (HCC) 04/26/2023    Chronic rhinitis 03/20/2023    Iron deficiency 06/10/2022    Anxiety 06/10/2022    Bilateral inguinal hernia without obstruction or gangrene 05/09/2022    Protein-calorie malnutrition, unspecified severity (HCC) 11/12/2021    Pleural effusion on left 03/02/2020    Benign prostatic hyperplasia 11/07/2018    Hyperlipidemia 11/07/2018    Prediabetes 11/07/2018    Essential hypertension 11/07/2018    Hx of CABG x 3, 2012 03/12/2018    S/P mitral valve repair, 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, May 2023 03/12/2018    Paroxysmal supraventricular tachycardia (HCC) 09/02/2021     # Chronic HFmEF, Stage C, NYHA 2  Etiology: iCM    Weight: 127 lbs--> 111 lbs  NT proBNP:     Studies- personally reviewed by me  Echo 7/10/24:  LVEF: 45%, inferior hypo  RV: mildly dilated, mildly reduced  MV annular ring, mild to moderate MR  Normal gradient  PASP: 45 mmHg    Echo 6/11/21:  LVEF: 45%, hypo of inferior wall  LVEDd:  RV: mildly dilated, mildly reduced  MV annuloplasty, moderate MR- anteriorly directed  Mild MS, mean gradient of 5.7 mmHg  Mild to moderate TR  PASP: 45     Echo 3/27/18:   LVEF:  45%  LVEDd 6.1 cm  Moderate to severe MR, eccentric  PASP 30 mmHg    TTE 7/28/16: LVEF: 40%, LVEDd 5.9cm, grade 2 DD  Moderate MR    Neurohormonal Blockade:  --Beta-Blocker: coreg 6.25 mg BID  --ACEi, ARB or ARNi: lisinopril 5 mg   --Aldosterone Receptor Blocker:  --Diuretic: lasix 20 mg     Sudden Cardiac Death Risk Reduction:  --ICD: none, EF > 35%    Electrical Resynchronization:  --Candidacy for BiV device: narrow QRS    # CAD with CABG x 3 2012  TWI in 2,3 - no ischemic eval since CABG- EKG today- same abnormality but unchanged- pt declining stress test again- no cp or SOB.:no need for nitro tabs  Meds: asa, atorvastatin 40 mg daily, coreg 6.25 mg BID  Angina: none    # hyperlipidemia: Atorvastatin 40 mg daily  6/7/24: LDL 76, HDL 67  10/16/21: LDL 74, HDL 53  5/4/19- HDL 59, LDL 65    # Mitral valve repair with 26 mm CE annuloplasty ring   Has moderate to severe MR on 3/27/18 echo - eccentric    # Post op Trach and peg at that time  # polymyalgia rheumatica  # anemia of chronic disease, iron def- resolved  HgB stable at 11.6  # pre diabetes  # left pleural effusion- likely loculated- has seen Pulm    TODAY'S PLAN:  Continue asa, atorvastatin and coreg for CAD  continue atorvastatin for previous lipid check at goal  Continue coreg and lisinopril for his HFmEF   Echo follow up on mitral valve- stable   EKG unchanged today    --2g sodium diet  - Daily weights    HPI:   85 yo male with history of CAD with CABG x 3 in 2012 with mitral valve repair with ring, polymalgia rheumatica, BPH, ICM with last EF: 40% on June 2016 echo and moderate MR on the echo. He reportedly had VDRF after CABG with trach and PEG. Has not been admitted with heart failure  Last eval, pt declined stress test. On follow up 8/29 he denies SOB or CP. EKG today is abnormal but unchanged from Feb EKG.      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 Negative for activity change, appetite change and unexpected weight change.   HENT:  Negative for congestion and nosebleeds.    Eyes: Negative.    Respiratory:  Negative for cough, chest tightness and shortness of breath.    Cardiovascular:  Negative for chest pain, palpitations and leg swelling.   Gastrointestinal:  Negative for abdominal distention.   Endocrine: Negative.    Genitourinary: Negative.    Musculoskeletal: Negative.    Skin: Negative.    Neurological:  Negative for dizziness, syncope and weakness.   Hematological: Negative.    Psychiatric/Behavioral: Negative.           Past Medical History:   Diagnosis Date    BPH (benign prostatic hyperplasia)     Chronic pain disorder     back    Coronary artery disease     Gallstones     History of tracheostomy     per pt had it \"after CABG surgery--was admitted for 53 days including Rehab\"    Hx of CABG     per pt 7-8 yrs ago    Hypercholesterolemia     Hypertension     Moderate exercise     alot of walking-works FT and also walks dog 2 miles daily usually    Myocardial infarction (HCC)     per pt approx 7-8yrs ago --[facility] cardio Dr ASHWIN Castillo sees yearly    Pleural effusion on left 03/02/2020    Prediabetes     Shortness of breath     per pt\"climbing steps or walking long distance\"    Wears glasses          No Known Allergies  .    Current Outpatient Medications:     aspirin 81 mg chewable tablet, Chew 81 mg daily, Disp: , Rfl:     atorvastatin (LIPITOR) 40 mg tablet, Take 1 tablet (40 mg total) by mouth daily (Patient taking differently: Take 40 mg by mouth daily at bedtime), Disp: 100 tablet, Rfl: 1    carvedilol (COREG) 6.25 mg tablet, TAKE 1 TABLET BY MOUTH TWICE A DAY WITH FOOD, Disp: 180 tablet, Rfl: 1    citalopram (CeleXA) 10 mg tablet, Take 1 " tablet (10 mg total) by mouth daily, Disp: 90 tablet, Rfl: 1    Cyanocobalamin ([employer] VITAMIN B-12 PO), Take by mouth, Disp: , Rfl:     Ferrous Sulfate (IRON) 325 (65 Fe) MG TABS, Take 1 tablet by mouth Twice daily, Disp: , Rfl:     finasteride (PROSCAR) 5 mg tablet, Take 1 tablet (5 mg total) by mouth daily (Patient taking differently: Take 5 mg by mouth daily at bedtime), Disp: 90 tablet, Rfl: 3    furosemide (LASIX) 20 mg tablet, Take 1 tablet (20 mg total) by mouth daily, Disp: 90 tablet, Rfl: 1    lisinopril (ZESTRIL) 5 mg tablet, Take 1 tablet (5 mg total) by mouth daily (Patient taking differently: Take 5 mg by mouth daily at bedtime), Disp: 90 tablet, Rfl: 1    Multiple Vitamin Essential TABS, Take by mouth, Disp: , Rfl:     Omega 3 1200 MG CAPS, Take by mouth in the morning, Disp: , Rfl:     tamsulosin (FLOMAX) 0.4 mg, Take 2 capsules (0.8 mg total) by mouth daily at bedtime, Disp: 90 capsule, Rfl: 3    Social History     Socioeconomic History    Marital status:      Spouse name: Not on file    Number of children: Not on file    Years of education: Not on file    Highest education level: Not on file   Occupational History    Not on file   Tobacco Use    Smoking status: Former     Current packs/day: 0.00     Average packs/day: 0.5 packs/day for 16.0 years (8.0 ttl pk-yrs)     Types: Cigarettes     Start date:      Quit date:      Years since quittin.2     Passive exposure: Past    Smokeless tobacco: Never   Vaping Use    Vaping status: Never Used   Substance and Sexual Activity    Alcohol use: Yes     Comment: per pt very rarely    Drug use: No    Sexual activity: Not on file     Comment: defer   Other Topics Concern    Not on file   Social History Narrative    Consumes 2-3 ups of tea per week     Social Drivers of Health     Financial Resource Strain: Not on file   Food Insecurity: No Food Insecurity (2025)    Nursing - Inadequate Food Risk Classification     Worried About Running  Out of Food in the Last Year: Never true     Ran Out of Food in the Last Year: Never true     Ran Out of Food in the Last Year: Never true   Transportation Needs: No Transportation Needs (2025)    Nursing - Transportation Risk Classification     Lack of Transportation: Not on file     Lack of Transportation: No   Physical Activity: Not on file   Stress: Not on file (2024)   Social Connections: Not on file   Intimate Partner Violence: Unknown (2025)    Nursing IPS     Feels Physically and Emotionally Safe: Not on file     Physically Hurt by Someone: Not on file     Humiliated or Emotionally Abused by Someone: Not on file     Physically Hurt by Someone: No     Hurt or Threatened by Someone: No   Housing Stability: Unknown (2025)    Nursing: Inadequate Housing Risk Classification     Has Housing: Not on file     Worried About Losing Housing: Not on file     Unable to Get Utilities: Not on file     Unable to Pay for Housing in the Last Year: No     Has Housin       Family History   Problem Relation Age of Onset    Colon cancer Father     Kidney disease Father     Diabetes Mother     Heart disease Mother     Hypertension Mother     Cervical cancer Maternal Aunt     Breast cancer Paternal Aunt     Colon cancer Paternal Grandfather     Heart disease Sister     Heart disease Brother        Physical Exam:    Vitals:   There were no vitals filed for this visit.      Wt Readings from Last 3 Encounters:   25 53.7 kg (118 lb 6.2 oz)   25 54.4 kg (120 lb)   01/15/25 55.2 kg (121 lb 11.1 oz)       Physical Exam  Constitutional:       Appearance: He is well-developed. He is not diaphoretic.   HENT:      Head: Normocephalic and atraumatic.   Eyes:      Pupils: Pupils are equal, round, and reactive to light.   Neck:      Vascular: No JVD.   Cardiovascular:      Rate and Rhythm: Normal rate and regular rhythm.      Heart sounds: Murmur (apical) heard.   Pulmonary:      Effort: Pulmonary effort is  "normal.      Breath sounds: Normal breath sounds. No rales.   Abdominal:      General: Bowel sounds are normal. There is no distension.      Palpations: Abdomen is soft.   Musculoskeletal:         General: Normal range of motion.      Cervical back: Normal range of motion.   Skin:     General: Skin is warm and dry.   Neurological:      Mental Status: He is alert and oriented to person, place, and time.       Labs & Results:    Lab Results   Component Value Date    GLUCOSE 104 01/08/2016    CALCIUM 7.9 (L) 01/15/2025     01/08/2016    K 3.6 01/15/2025    CO2 31 01/15/2025     01/15/2025    BUN 25 01/15/2025    CREATININE 1.11 01/15/2025     Lab Results   Component Value Date    WBC 10.41 (H) 01/15/2025    HGB 10.8 (L) 01/15/2025    HCT 32.3 (L) 01/15/2025    MCV 97 01/15/2025     01/15/2025     No results found for: \"NTBNP\"   Lab Results   Component Value Date    CHOL 127 11/28/2015    CHOL 105 10/11/2014    CHOL 132 07/14/2014     Lab Results   Component Value Date    HDL 67 06/07/2024    HDL 53 10/16/2021    HDL 65 01/09/2021     Lab Results   Component Value Date    LDLCALC 76 06/07/2024    LDLCALC 74 10/16/2021    LDLCALC 84 01/09/2021     Lab Results   Component Value Date    TRIG 52 06/07/2024    TRIG 40 10/16/2021    TRIG 47 01/09/2021     No results found for: \"CHOLHDL\"      EKG personally reviewed by Hermes Amezquita.     Counseling / Coordination of Care  Time spent today 25 minutes.  Greater than 50% of total time was spent with the patient and / or family counseling and / or coordination of care.  We discussed diagnoses, most recent studies, tests and any changes in treatment plan  Thank you for the opportunity to participate in the care of this patient.    HERMES AMEZQUITA D.O.  DIRECTOR OF HEART FAILURE/ PULMONARY HYPERTENSION  MEDICAL DIRECTOR OF LVAD PROGRAM  [facility] [address]        "

## 2025-03-10 ENCOUNTER — OFFICE VISIT (OUTPATIENT)
Dept: CARDIOLOGY CLINIC | Facility: CLINIC | Age: 85
End: 2025-03-10
Payer: COMMERCIAL

## 2025-03-10 VITALS
BODY MASS INDEX: 17.48 KG/M2 | WEIGHT: 111.4 LBS | HEIGHT: 67 IN | DIASTOLIC BLOOD PRESSURE: 64 MMHG | HEART RATE: 71 BPM | SYSTOLIC BLOOD PRESSURE: 118 MMHG

## 2025-03-10 DIAGNOSIS — I25.5: ICD-10-CM

## 2025-03-10 DIAGNOSIS — I50.9: ICD-10-CM

## 2025-03-10 DIAGNOSIS — I10 ESSENTIAL HYPERTENSION: Primary | Chronic | ICD-10-CM

## 2025-03-10 DIAGNOSIS — I50.22 CHRONIC SYSTOLIC CHF (CONGESTIVE HEART FAILURE), NYHA CLASS 2 (HCC): ICD-10-CM

## 2025-03-10 DIAGNOSIS — I47.10 PAROXYSMAL SUPRAVENTRICULAR TACHYCARDIA (HCC): ICD-10-CM

## 2025-03-10 DIAGNOSIS — I48.0 PAROXYSMAL ATRIAL FIBRILLATION (HCC): ICD-10-CM

## 2025-03-10 DIAGNOSIS — I25.5 ISCHEMIC CARDIOMYOPATHY: Chronic | ICD-10-CM

## 2025-03-10 DIAGNOSIS — I50.22 HEART FAILURE WITH MID-RANGE EJECTION FRACTION (HFMEF) (HCC): Chronic | ICD-10-CM

## 2025-03-10 DIAGNOSIS — E78.5 HYPERLIPIDEMIA, UNSPECIFIED HYPERLIPIDEMIA TYPE: ICD-10-CM

## 2025-03-10 DIAGNOSIS — I50.21 ACUTE SYSTOLIC CONGESTIVE HEART FAILURE (HCC): ICD-10-CM

## 2025-03-10 PROCEDURE — 93000 ELECTROCARDIOGRAM COMPLETE: CPT | Performed by: INTERNAL MEDICINE

## 2025-03-10 PROCEDURE — 99214 OFFICE O/P EST MOD 30 MIN: CPT | Performed by: INTERNAL MEDICINE

## 2025-03-10 RX ORDER — LISINOPRIL 5 MG/1
5 TABLET ORAL DAILY
Qty: 90 TABLET | Refills: 1 | Status: SHIPPED | OUTPATIENT
Start: 2025-03-10

## 2025-03-10 RX ORDER — CARVEDILOL 6.25 MG/1
6.25 TABLET ORAL 2 TIMES DAILY WITH MEALS
Qty: 180 TABLET | Refills: 1 | Status: SHIPPED | OUTPATIENT
Start: 2025-03-10

## 2025-03-10 RX ORDER — FUROSEMIDE 20 MG/1
20 TABLET ORAL DAILY
Qty: 90 TABLET | Refills: 1 | Status: SHIPPED | OUTPATIENT
Start: 2025-03-10

## 2025-03-10 RX ORDER — ATORVASTATIN CALCIUM 40 MG/1
40 TABLET, FILM COATED ORAL
Qty: 90 TABLET | Refills: 2 | Status: SHIPPED | OUTPATIENT
Start: 2025-03-10

## 2025-03-11 ENCOUNTER — ANESTHESIA EVENT (OUTPATIENT)
Dept: PERIOP | Facility: HOSPITAL | Age: 85
DRG: 328 | End: 2025-03-11
Payer: COMMERCIAL

## 2025-03-11 ENCOUNTER — APPOINTMENT (OUTPATIENT)
Dept: LAB | Facility: HOSPITAL | Age: 85
End: 2025-03-11
Payer: COMMERCIAL

## 2025-03-11 DIAGNOSIS — K44.9 HIATAL HERNIA: ICD-10-CM

## 2025-03-11 LAB
ANION GAP SERPL CALCULATED.3IONS-SCNC: 5 MMOL/L (ref 4–13)
BUN SERPL-MCNC: 36 MG/DL (ref 5–25)
CALCIUM SERPL-MCNC: 8.6 MG/DL (ref 8.4–10.2)
CHLORIDE SERPL-SCNC: 102 MMOL/L (ref 96–108)
CO2 SERPL-SCNC: 31 MMOL/L (ref 21–32)
CREAT SERPL-MCNC: 1.31 MG/DL (ref 0.6–1.3)
GFR SERPL CREATININE-BSD FRML MDRD: 49 ML/MIN/1.73SQ M
GLUCOSE P FAST SERPL-MCNC: 100 MG/DL (ref 65–99)
POTASSIUM SERPL-SCNC: 4.5 MMOL/L (ref 3.5–5.3)
SODIUM SERPL-SCNC: 138 MMOL/L (ref 135–147)

## 2025-03-11 PROCEDURE — 36415 COLL VENOUS BLD VENIPUNCTURE: CPT

## 2025-03-11 PROCEDURE — 80048 BASIC METABOLIC PNL TOTAL CA: CPT

## 2025-03-12 NOTE — PRE-PROCEDURE INSTRUCTIONS
Pre-Surgery Instructions:   Medication Instructions    aspirin 81 mg chewable tablet Take day of surgery.    atorvastatin (LIPITOR) 40 mg tablet Take night before surgery    carvedilol (COREG) 6.25 mg tablet Take day of surgery.    citalopram (CeleXA) 10 mg tablet Take day of surgery.    Cyanocobalamin ([employer]S VITAMIN B-12 PO) Stop taking 7 days prior to surgery.    Ferrous Sulfate (IRON) 325 (65 Fe) MG TABS Hold day of surgery.    finasteride (PROSCAR) 5 mg tablet Take night before surgery    furosemide (LASIX) 20 mg tablet Hold day of surgery.    lisinopril (ZESTRIL) 5 mg tablet Hold day of surgery.    Multiple Vitamin Essential TABS Stop taking 7 days prior to surgery.    [place] 3 1200 MG CAPS Stop taking 7 days prior to surgery.    tamsulosin (FLOMAX) 0.4 mg Take night before surgery    Medication instructions for day of surgery reviewed. Please take all instructed medications with only a sip of water.       You will receive a call one business day prior to surgery with an arrival time and hospital directions. If your surgery is scheduled on a Monday, the hospital will be calling you on the Friday prior to your surgery. If you have not heard from anyone by 8pm, please call the hospital supervisor through the hospital  at [phone]. ([place] [phone] or [place] [phone]).    Do not eat or drink anything after midnight the night before your surgery, including candy, mints, lifesavers, or chewing gum. Do not drink alcohol 24hrs before your surgery. Try not to smoke at least 24hrs before your surgery.       Follow the pre surgery showering instructions as listed in the “My Surgical Experience Booklet” or otherwise provided by your surgeon's office. Do not use a blade to shave the surgical area 1 week before surgery. It is okay to use a clean electric clippers up to 24 hours before surgery. Do not apply any lotions, creams, including makeup, cologne, deodorant, or perfumes after showering on the day  of your surgery. Do not use dry shampoo, hair spray, hair gel, or any type of hair products.     No contact lenses, eye make-up, or artificial eyelashes. Remove nail polish, including gel polish, and any artificial, gel, or acrylic nails if possible. Remove all jewelry including rings and body piercing jewelry.     Wear causal clothing that is easy to take on and off. Consider your type of surgery.    Keep any valuables, jewelry, piercings at home. Please bring any specially ordered equipment (sling, braces) if indicated.    Arrange for a responsible person to drive you to and from the hospital on the day of your surgery. Please confirm the visitor policy for the day of your procedure when you receive your phone call with an arrival time.     Call the surgeon's office with any new illnesses, exposures, or additional questions prior to surgery.    Please reference your “My Surgical Experience Booklet” for additional information to prepare for your upcoming surgery.

## 2025-03-20 NOTE — ANESTHESIA PREPROCEDURE EVALUATION
Procedure:  REPAIR HERNIA PARAESOPHAGEAL LAPAROSCOPIC W ROBOTICS WITH PARTIAL FUNDOPLICATIOIN,POSSIBLE GASTROPLASTY, POSSIBLE MESH INSERTION (Abdomen)  ESOPHAGOGASTRODUODENOSCOPY (EGD) (Esophagus)    Relevant Problems   ANESTHESIA (within normal limits)      CARDIO   (+) Essential hypertension   (+) Hyperlipidemia   (+) Paroxysmal atrial fibrillation (HCC)   (+) Paroxysmal supraventricular tachycardia (HCC)      ENDO (within normal limits)      GI/HEPATIC   (+) Hiatal hernia      /RENAL   (+) Benign prostatic hyperplasia   (+) CKD (chronic kidney disease)      HEMATOLOGY   (+) Iron deficiency anemia due to chronic blood loss   (+) 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 Wall thickness is normal. The left ventricular ejection fraction is 43% by visual estimation. Systolic function is mildly reduced. There is basal inferior wall hypokinesis with paradoxical so wall motion consistent with postoperative state. There is probable diastolic dysfunction.  Diastolic staging precluded by (of mitral valve repair.  There are echocardiographic indications of elevated left atrial pressures.    Right Ventricle: Right ventricular cavity size is mildly dilated. Systolic function is mildly reduced.    Left Atrium: The atrium is severely dilated.    Right Atrium: The atrium is moderately dilated.    Aortic Valve: There is trace regurgitation. There is aortic valve sclerosis.    Mitral Valve: The valve has been repaired with an annular ring. There is mild to  moderate regurgitation with an eccentrically directed jet. The gradient recorded across the prosthetic mitral valve is above the expected range with MVA 1.02 cm2 with mean PG 4.5 mmHg at 65 bpm.    Tricuspid Valve: There is mild to moderate regurgitation. Pulmonary artery systolic pressures are estimated at 45 mmHg.    Aorta: The aortic root is ectatic 3.6 cm. The ascending aorta is normal in size.    Compared to report from June 11, 2021, there are no significant changes to degree of mitral valve annuloplasty ring stenosis.    Lab Results   Component Value Date    WBC 10.41 (H) 01/15/2025    HGB 10.8 (L) 01/15/2025    HCT 32.3 (L) 01/15/2025    MCV 97 01/15/2025     01/15/2025     Lab Results   Component Value Date    SODIUM 138 03/11/2025    K 4.5 03/11/2025     03/11/2025    CO2 31 03/11/2025    BUN 36 (H) 03/11/2025    CREATININE 1.31 (H) 03/11/2025    GLUC 87 01/15/2025    CALCIUM 8.6 03/11/2025     Lab Results   Component Value Date    INR 0.98 01/12/2025    INR 1.10 07/09/2024    INR 1.02 12/18/2022    PROTIME 13.2 01/12/2025    PROTIME 14.4 07/09/2024    PROTIME 13.4 12/18/2022     Lab Results   Component Value Date    HGBA1C 6.0 (H) 12/14/2024          Physical Exam    Airway    Mallampati score: II  TM Distance: >3 FB  Neck ROM: full     Dental   No notable dental hx     Cardiovascular  Cardiovascular exam normal    Pulmonary  Pulmonary exam normal     Other Findings        Anesthesia Plan  ASA Score- 3     Anesthesia Type- general with ASA Monitors.         Additional Monitors: arterial line.    Airway Plan: ETT.    Comment: Discussed with patient plan for anesthesia, as well as risks/benefits, including likely chance of PONV and sore throat, as well as rare possibilities of dental/oropharyngeal/ocular injuries, aspiration, and severe/life-threatening surgical and anesthetic emergencies.  Patient expressed understanding and agreement.  .       Plan Factors-Exercise tolerance (METS): >4  METS.    Chart reviewed. EKG reviewed.  Existing labs reviewed. Patient summary reviewed.                  Induction- intravenous and rapid sequence induction.    Postoperative Plan- Plan for postoperative opioid use. Planned trial extubation        Informed Consent- Anesthetic plan and risks discussed with patient.  I personally reviewed this patient with the CRNA. Discussed and agreed on the Anesthesia Plan with the CRNA..      NPO Status:  No vitals data found for the desired time range.

## 2025-03-21 ENCOUNTER — HOSPITAL ENCOUNTER (INPATIENT)
Facility: HOSPITAL | Age: 85
LOS: 2 days | Discharge: HOME | DRG: 328 | End: 2025-03-23
Attending: SURGERY | Admitting: SURGERY
Payer: COMMERCIAL

## 2025-03-21 ENCOUNTER — ANESTHESIA (OUTPATIENT)
Dept: PERIOP | Facility: HOSPITAL | Age: 85
DRG: 328 | End: 2025-03-21
Payer: COMMERCIAL

## 2025-03-21 DIAGNOSIS — K31.89 MESENTEROAXIAL GASTRIC VOLVULUS: ICD-10-CM

## 2025-03-21 DIAGNOSIS — K44.9 HIATAL HERNIA: ICD-10-CM

## 2025-03-21 LAB
BASE EXCESS BLDA CALC-SCNC: -5 MMOL/L (ref -2–3)
CA-I BLD-SCNC: 1.17 MMOL/L (ref 1.12–1.32)
GLUCOSE SERPL-MCNC: 125 MG/DL (ref 65–140)
HCO3 BLDA-SCNC: 22.4 MMOL/L (ref 22–28)
HCT VFR BLD CALC: 28 % (ref 36.5–49.3)
HGB BLDA-MCNC: 9.5 G/DL (ref 12–17)
PCO2 BLD: 24 MMOL/L (ref 21–32)
PCO2 BLD: 49.4 MM HG (ref 36–44)
PH BLD: 7.26 [PH] (ref 7.35–7.45)
PLATELET # BLD AUTO: 138 THOUSANDS/UL (ref 149–390)
PMV BLD AUTO: 9.1 FL (ref 8.9–12.7)
PO2 BLD: 295 MM HG (ref 75–129)
POTASSIUM BLD-SCNC: 4.6 MMOL/L (ref 3.5–5.3)
SAO2 % BLD FROM PO2: 100 % (ref 60–85)
SODIUM BLD-SCNC: 139 MMOL/L (ref 136–145)
SPECIMEN SOURCE: ABNORMAL

## 2025-03-21 PROCEDURE — C1781 MESH (IMPLANTABLE): HCPCS | Performed by: SURGERY

## 2025-03-21 PROCEDURE — 85049 AUTOMATED PLATELET COUNT: CPT | Performed by: PHYSICIAN ASSISTANT

## 2025-03-21 PROCEDURE — NC001 PR NO CHARGE: Performed by: SURGERY

## 2025-03-21 PROCEDURE — 82947 ASSAY GLUCOSE BLOOD QUANT: CPT

## 2025-03-21 PROCEDURE — NC001 PR NO CHARGE: Performed by: PHYSICIAN ASSISTANT

## 2025-03-21 PROCEDURE — 8E0W4CZ ROBOTIC ASSISTED PROCEDURE OF TRUNK REGION, PERCUTANEOUS ENDOSCOPIC APPROACH: ICD-10-PCS | Performed by: SURGERY

## 2025-03-21 PROCEDURE — 84295 ASSAY OF SERUM SODIUM: CPT

## 2025-03-21 PROCEDURE — 85014 HEMATOCRIT: CPT

## 2025-03-21 PROCEDURE — S2900 ROBOTIC SURGICAL SYSTEM: HCPCS | Performed by: SURGERY

## 2025-03-21 PROCEDURE — 94664 DEMO&/EVAL PT USE INHALER: CPT

## 2025-03-21 PROCEDURE — 0BUT4JZ SUPPLEMENT DIAPHRAGM WITH SYNTHETIC SUBSTITUTE, PERCUTANEOUS ENDOSCOPIC APPROACH: ICD-10-PCS | Performed by: SURGERY

## 2025-03-21 PROCEDURE — 43282 LAP PARAESOPH HER RPR W/MESH: CPT | Performed by: SURGERY

## 2025-03-21 PROCEDURE — 3E0G76Z INTRODUCTION OF NUTRITIONAL SUBSTANCE INTO UPPER GI, VIA NATURAL OR ARTIFICIAL OPENING: ICD-10-PCS | Performed by: SURGERY

## 2025-03-21 PROCEDURE — 43282 LAP PARAESOPH HER RPR W/MESH: CPT | Performed by: PHYSICIAN ASSISTANT

## 2025-03-21 PROCEDURE — 82330 ASSAY OF CALCIUM: CPT

## 2025-03-21 PROCEDURE — 88302 TISSUE EXAM BY PATHOLOGIST: CPT | Performed by: PATHOLOGY

## 2025-03-21 PROCEDURE — 0DV44ZZ RESTRICTION OF ESOPHAGOGASTRIC JUNCTION, PERCUTANEOUS ENDOSCOPIC APPROACH: ICD-10-PCS | Performed by: SURGERY

## 2025-03-21 PROCEDURE — 82803 BLOOD GASES ANY COMBINATION: CPT

## 2025-03-21 PROCEDURE — 84132 ASSAY OF SERUM POTASSIUM: CPT

## 2025-03-21 DEVICE — BIO-A TISSUE REINFORCEMENT 7CMX10CM
Type: IMPLANTABLE DEVICE | Site: STOMACH | Status: FUNCTIONAL
Brand: GORE BIO-A TISSUE REINFORCEMENT

## 2025-03-21 RX ORDER — SUCCINYLCHOLINE/SOD CL,ISO/PF 100 MG/5ML
SYRINGE (ML) INTRAVENOUS AS NEEDED
Status: DISCONTINUED | OUTPATIENT
Start: 2025-03-21 | End: 2025-03-21

## 2025-03-21 RX ORDER — FENTANYL CITRATE 50 UG/ML
INJECTION, SOLUTION INTRAMUSCULAR; INTRAVENOUS AS NEEDED
Status: DISCONTINUED | OUTPATIENT
Start: 2025-03-21 | End: 2025-03-21

## 2025-03-21 RX ORDER — LEVOFLOXACIN 5 MG/ML
500 INJECTION, SOLUTION INTRAVENOUS ONCE
Status: COMPLETED | OUTPATIENT
Start: 2025-03-21 | End: 2025-03-21

## 2025-03-21 RX ORDER — SODIUM CHLORIDE 9 MG/ML
75 INJECTION, SOLUTION INTRAVENOUS CONTINUOUS
Status: DISCONTINUED | OUTPATIENT
Start: 2025-03-21 | End: 2025-03-22

## 2025-03-21 RX ORDER — FINASTERIDE 5 MG/1
5 TABLET, FILM COATED ORAL
Status: DISCONTINUED | OUTPATIENT
Start: 2025-03-21 | End: 2025-03-23 | Stop reason: HOSPADM

## 2025-03-21 RX ORDER — ALBUMIN HUMAN 50 G/1000ML
SOLUTION INTRAVENOUS CONTINUOUS PRN
Status: DISCONTINUED | OUTPATIENT
Start: 2025-03-21 | End: 2025-03-21

## 2025-03-21 RX ORDER — PROPOFOL 10 MG/ML
INJECTION, EMULSION INTRAVENOUS CONTINUOUS PRN
Status: DISCONTINUED | OUTPATIENT
Start: 2025-03-21 | End: 2025-03-21

## 2025-03-21 RX ORDER — HEPARIN SODIUM 5000 [USP'U]/ML
5000 INJECTION, SOLUTION INTRAVENOUS; SUBCUTANEOUS EVERY 8 HOURS SCHEDULED
Status: DISCONTINUED | OUTPATIENT
Start: 2025-03-21 | End: 2025-03-23 | Stop reason: HOSPADM

## 2025-03-21 RX ORDER — TAMSULOSIN HYDROCHLORIDE 0.4 MG/1
0.8 CAPSULE ORAL
Status: DISCONTINUED | OUTPATIENT
Start: 2025-03-21 | End: 2025-03-23 | Stop reason: HOSPADM

## 2025-03-21 RX ORDER — LIDOCAINE HYDROCHLORIDE 10 MG/ML
INJECTION, SOLUTION EPIDURAL; INFILTRATION; INTRACAUDAL; PERINEURAL AS NEEDED
Status: DISCONTINUED | OUTPATIENT
Start: 2025-03-21 | End: 2025-03-21

## 2025-03-21 RX ORDER — EPHEDRINE SULFATE 50 MG/ML
INJECTION INTRAVENOUS AS NEEDED
Status: DISCONTINUED | OUTPATIENT
Start: 2025-03-21 | End: 2025-03-21

## 2025-03-21 RX ORDER — ASPIRIN 81 MG/1
81 TABLET, CHEWABLE ORAL DAILY
Status: DISCONTINUED | OUTPATIENT
Start: 2025-03-21 | End: 2025-03-23 | Stop reason: HOSPADM

## 2025-03-21 RX ORDER — PANTOPRAZOLE SODIUM 40 MG/10ML
40 INJECTION, POWDER, LYOPHILIZED, FOR SOLUTION INTRAVENOUS DAILY
Status: DISCONTINUED | OUTPATIENT
Start: 2025-03-21 | End: 2025-03-23 | Stop reason: HOSPADM

## 2025-03-21 RX ORDER — BUPIVACAINE HYDROCHLORIDE 2.5 MG/ML
INJECTION, SOLUTION EPIDURAL; INFILTRATION; INTRACAUDAL; PERINEURAL AS NEEDED
Status: DISCONTINUED | OUTPATIENT
Start: 2025-03-21 | End: 2025-03-21 | Stop reason: HOSPADM

## 2025-03-21 RX ORDER — GLYCOPYRROLATE 0.2 MG/ML
INJECTION INTRAMUSCULAR; INTRAVENOUS AS NEEDED
Status: DISCONTINUED | OUTPATIENT
Start: 2025-03-21 | End: 2025-03-21

## 2025-03-21 RX ORDER — SENNOSIDES 8.6 MG
650 CAPSULE ORAL EVERY 6 HOURS
Qty: 28 TABLET | Refills: 0 | Status: SHIPPED | OUTPATIENT
Start: 2025-03-21 | End: 2025-03-28

## 2025-03-21 RX ORDER — ACETAMINOPHEN 160 MG/5ML
975 SUSPENSION ORAL EVERY 6 HOURS
Status: DISCONTINUED | OUTPATIENT
Start: 2025-03-21 | End: 2025-03-21

## 2025-03-21 RX ORDER — FUROSEMIDE 20 MG/1
20 TABLET ORAL DAILY
Status: DISCONTINUED | OUTPATIENT
Start: 2025-03-22 | End: 2025-03-23 | Stop reason: HOSPADM

## 2025-03-21 RX ORDER — ACETAMINOPHEN 160 MG/5ML
975 SUSPENSION ORAL EVERY 6 HOURS
Status: DISCONTINUED | OUTPATIENT
Start: 2025-03-22 | End: 2025-03-23 | Stop reason: HOSPADM

## 2025-03-21 RX ORDER — SODIUM CHLORIDE 9 MG/ML
INJECTION, SOLUTION INTRAVENOUS CONTINUOUS PRN
Status: DISCONTINUED | OUTPATIENT
Start: 2025-03-21 | End: 2025-03-21

## 2025-03-21 RX ORDER — FENTANYL CITRATE/PF 50 MCG/ML
25 SYRINGE (ML) INJECTION
Status: DISCONTINUED | OUTPATIENT
Start: 2025-03-21 | End: 2025-03-21 | Stop reason: HOSPADM

## 2025-03-21 RX ORDER — LISINOPRIL 5 MG/1
5 TABLET ORAL DAILY
Status: DISCONTINUED | OUTPATIENT
Start: 2025-03-22 | End: 2025-03-23 | Stop reason: HOSPADM

## 2025-03-21 RX ORDER — LIDOCAINE HYDROCHLORIDE 10 MG/ML
0.5 INJECTION, SOLUTION EPIDURAL; INFILTRATION; INTRACAUDAL; PERINEURAL ONCE AS NEEDED
Status: COMPLETED | OUTPATIENT
Start: 2025-03-21 | End: 2025-03-21

## 2025-03-21 RX ORDER — SODIUM CHLORIDE, SODIUM LACTATE, POTASSIUM CHLORIDE, CALCIUM CHLORIDE 600; 310; 30; 20 MG/100ML; MG/100ML; MG/100ML; MG/100ML
75 INJECTION, SOLUTION INTRAVENOUS CONTINUOUS
Status: DISCONTINUED | OUTPATIENT
Start: 2025-03-21 | End: 2025-03-21

## 2025-03-21 RX ORDER — OXYCODONE HCL 5 MG/5 ML
2.5 SOLUTION, ORAL ORAL EVERY 4 HOURS PRN
Refills: 0 | Status: DISCONTINUED | OUTPATIENT
Start: 2025-03-21 | End: 2025-03-23 | Stop reason: HOSPADM

## 2025-03-21 RX ORDER — ROCURONIUM BROMIDE 10 MG/ML
INJECTION, SOLUTION INTRAVENOUS AS NEEDED
Status: DISCONTINUED | OUTPATIENT
Start: 2025-03-21 | End: 2025-03-21

## 2025-03-21 RX ORDER — ONDANSETRON 2 MG/ML
INJECTION INTRAMUSCULAR; INTRAVENOUS AS NEEDED
Status: DISCONTINUED | OUTPATIENT
Start: 2025-03-21 | End: 2025-03-21

## 2025-03-21 RX ORDER — SODIUM CHLORIDE, SODIUM LACTATE, POTASSIUM CHLORIDE, CALCIUM CHLORIDE 600; 310; 30; 20 MG/100ML; MG/100ML; MG/100ML; MG/100ML
125 INJECTION, SOLUTION INTRAVENOUS CONTINUOUS
Status: DISCONTINUED | OUTPATIENT
Start: 2025-03-21 | End: 2025-03-21

## 2025-03-21 RX ORDER — DOCUSATE SODIUM 100 MG/1
100 CAPSULE, LIQUID FILLED ORAL 2 TIMES DAILY
Status: DISCONTINUED | OUTPATIENT
Start: 2025-03-21 | End: 2025-03-23 | Stop reason: HOSPADM

## 2025-03-21 RX ORDER — ACETAMINOPHEN 10 MG/ML
INJECTION, SOLUTION INTRAVENOUS AS NEEDED
Status: DISCONTINUED | OUTPATIENT
Start: 2025-03-21 | End: 2025-03-21

## 2025-03-21 RX ORDER — ONDANSETRON 2 MG/ML
4 INJECTION INTRAMUSCULAR; INTRAVENOUS ONCE AS NEEDED
Status: DISCONTINUED | OUTPATIENT
Start: 2025-03-21 | End: 2025-03-21 | Stop reason: HOSPADM

## 2025-03-21 RX ORDER — SODIUM CHLORIDE, SODIUM LACTATE, POTASSIUM CHLORIDE, CALCIUM CHLORIDE 600; 310; 30; 20 MG/100ML; MG/100ML; MG/100ML; MG/100ML
INJECTION, SOLUTION INTRAVENOUS CONTINUOUS PRN
Status: DISCONTINUED | OUTPATIENT
Start: 2025-03-21 | End: 2025-03-21

## 2025-03-21 RX ORDER — OXYCODONE HYDROCHLORIDE 5 MG/1
5 TABLET ORAL EVERY 6 HOURS PRN
Qty: 15 TABLET | Refills: 0 | Status: SHIPPED | OUTPATIENT
Start: 2025-03-21 | End: 2025-03-31

## 2025-03-21 RX ORDER — CARVEDILOL 3.12 MG/1
3.12 TABLET ORAL 2 TIMES DAILY WITH MEALS
Status: DISCONTINUED | OUTPATIENT
Start: 2025-03-21 | End: 2025-03-23 | Stop reason: HOSPADM

## 2025-03-21 RX ORDER — OXYCODONE HCL 5 MG/5 ML
5 SOLUTION, ORAL ORAL EVERY 4 HOURS PRN
Refills: 0 | Status: DISCONTINUED | OUTPATIENT
Start: 2025-03-21 | End: 2025-03-23 | Stop reason: HOSPADM

## 2025-03-21 RX ORDER — METRONIDAZOLE 500 MG/100ML
500 INJECTION, SOLUTION INTRAVENOUS ONCE
Status: COMPLETED | OUTPATIENT
Start: 2025-03-21 | End: 2025-03-21

## 2025-03-21 RX ORDER — ATORVASTATIN CALCIUM 40 MG/1
40 TABLET, FILM COATED ORAL
Status: DISCONTINUED | OUTPATIENT
Start: 2025-03-21 | End: 2025-03-23 | Stop reason: HOSPADM

## 2025-03-21 RX ORDER — HYDROMORPHONE HCL IN WATER/PF 6 MG/30 ML
0.2 PATIENT CONTROLLED ANALGESIA SYRINGE INTRAVENOUS
Status: DISCONTINUED | OUTPATIENT
Start: 2025-03-21 | End: 2025-03-21 | Stop reason: HOSPADM

## 2025-03-21 RX ORDER — PROPOFOL 10 MG/ML
INJECTION, EMULSION INTRAVENOUS AS NEEDED
Status: DISCONTINUED | OUTPATIENT
Start: 2025-03-21 | End: 2025-03-21

## 2025-03-21 RX ORDER — MORPHINE SULFATE 10 MG/ML
2 INJECTION, SOLUTION INTRAMUSCULAR; INTRAVENOUS
Status: DISCONTINUED | OUTPATIENT
Start: 2025-03-21 | End: 2025-03-23 | Stop reason: HOSPADM

## 2025-03-21 RX ADMIN — TAMSULOSIN HYDROCHLORIDE 0.8 MG: 0.4 CAPSULE ORAL at 22:58

## 2025-03-21 RX ADMIN — SODIUM CHLORIDE 75 ML/HR: 0.9 INJECTION, SOLUTION INTRAVENOUS at 20:29

## 2025-03-21 RX ADMIN — PROPOFOL 20 MG: 10 INJECTION, EMULSION INTRAVENOUS at 11:44

## 2025-03-21 RX ADMIN — PHENYLEPHRINE HYDROCHLORIDE 40 MCG/MIN: 10 INJECTION INTRAVENOUS at 11:18

## 2025-03-21 RX ADMIN — Medication 70 MG: at 11:16

## 2025-03-21 RX ADMIN — ROCURONIUM BROMIDE 10 MG: 10 INJECTION, SOLUTION INTRAVENOUS at 14:27

## 2025-03-21 RX ADMIN — ROCURONIUM BROMIDE 20 MG: 10 INJECTION, SOLUTION INTRAVENOUS at 13:22

## 2025-03-21 RX ADMIN — EPHEDRINE SULFATE 10 MG: 50 INJECTION, SOLUTION INTRAVENOUS at 13:26

## 2025-03-21 RX ADMIN — ACETAMINOPHEN 1000 MG: 10 INJECTION INTRAVENOUS at 14:54

## 2025-03-21 RX ADMIN — ROCURONIUM BROMIDE 10 MG: 10 INJECTION, SOLUTION INTRAVENOUS at 12:39

## 2025-03-21 RX ADMIN — NOREPINEPHRINE BITARTRATE 7 MCG: 1 INJECTION, SOLUTION, CONCENTRATE INTRAVENOUS at 13:05

## 2025-03-21 RX ADMIN — FENTANYL CITRATE 50 MCG: 50 INJECTION INTRAMUSCULAR; INTRAVENOUS at 11:44

## 2025-03-21 RX ADMIN — FENTANYL CITRATE 50 MCG: 50 INJECTION INTRAMUSCULAR; INTRAVENOUS at 11:16

## 2025-03-21 RX ADMIN — EPHEDRINE SULFATE 5 MG: 50 INJECTION, SOLUTION INTRAVENOUS at 13:42

## 2025-03-21 RX ADMIN — ATORVASTATIN CALCIUM 40 MG: 40 TABLET, FILM COATED ORAL at 20:24

## 2025-03-21 RX ADMIN — NOREPINEPHRINE BITARTRATE 16 MCG: 1 INJECTION, SOLUTION, CONCENTRATE INTRAVENOUS at 12:44

## 2025-03-21 RX ADMIN — CARVEDILOL 3.12 MG: 3.12 TABLET, FILM COATED ORAL at 20:36

## 2025-03-21 RX ADMIN — ROCURONIUM BROMIDE 30 MG: 10 INJECTION, SOLUTION INTRAVENOUS at 11:31

## 2025-03-21 RX ADMIN — DEXMEDETOMIDINE HYDROCHLORIDE 8 MCG: 100 INJECTION, SOLUTION INTRAVENOUS at 11:59

## 2025-03-21 RX ADMIN — GLYCOPYRROLATE 0.2 MG: 0.2 INJECTION, SOLUTION INTRAMUSCULAR; INTRAVENOUS at 11:27

## 2025-03-21 RX ADMIN — NOREPINEPHRINE BITARTRATE 8 MCG: 1 INJECTION, SOLUTION, CONCENTRATE INTRAVENOUS at 12:32

## 2025-03-21 RX ADMIN — EPHEDRINE SULFATE 10 MG: 50 INJECTION, SOLUTION INTRAVENOUS at 14:35

## 2025-03-21 RX ADMIN — LIDOCAINE HYDROCHLORIDE 0.5 ML: 10 INJECTION, SOLUTION EPIDURAL; INFILTRATION; INTRACAUDAL; PERINEURAL at 10:53

## 2025-03-21 RX ADMIN — NOREPINEPHRINE BITARTRATE 16 MCG: 1 INJECTION, SOLUTION, CONCENTRATE INTRAVENOUS at 12:54

## 2025-03-21 RX ADMIN — SODIUM CHLORIDE: 0.9 INJECTION, SOLUTION INTRAVENOUS at 11:25

## 2025-03-21 RX ADMIN — METRONIDAZOLE: 500 SOLUTION INTRAVENOUS at 11:30

## 2025-03-21 RX ADMIN — ONDANSETRON 4 MG: 2 INJECTION INTRAMUSCULAR; INTRAVENOUS at 15:00

## 2025-03-21 RX ADMIN — SUGAMMADEX 400 MG: 100 INJECTION, SOLUTION INTRAVENOUS at 15:13

## 2025-03-21 RX ADMIN — NOREPINEPHRINE BITARTRATE 16 MCG: 1 INJECTION, SOLUTION, CONCENTRATE INTRAVENOUS at 13:14

## 2025-03-21 RX ADMIN — SODIUM CHLORIDE, SODIUM LACTATE, POTASSIUM CHLORIDE, AND CALCIUM CHLORIDE: .6; .31; .03; .02 INJECTION, SOLUTION INTRAVENOUS at 11:13

## 2025-03-21 RX ADMIN — EPHEDRINE SULFATE 15 MG: 50 INJECTION, SOLUTION INTRAVENOUS at 11:27

## 2025-03-21 RX ADMIN — DOCUSATE SODIUM 100 MG: 100 CAPSULE, LIQUID FILLED ORAL at 20:24

## 2025-03-21 RX ADMIN — LIDOCAINE HYDROCHLORIDE 50 MG: 10 INJECTION, SOLUTION EPIDURAL; INFILTRATION; INTRACAUDAL; PERINEURAL at 11:16

## 2025-03-21 RX ADMIN — ACETAMINOPHEN 975 MG: 650 SUSPENSION ORAL at 20:24

## 2025-03-21 RX ADMIN — ROCURONIUM BROMIDE 10 MG: 10 INJECTION, SOLUTION INTRAVENOUS at 12:02

## 2025-03-21 RX ADMIN — VASOPRESSIN 0.03 UNITS/MIN: 20 INJECTION INTRAVENOUS at 13:13

## 2025-03-21 RX ADMIN — PROPOFOL 100 MG: 10 INJECTION, EMULSION INTRAVENOUS at 11:16

## 2025-03-21 RX ADMIN — NOREPINEPHRINE BITARTRATE 8 MCG: 1 INJECTION, SOLUTION, CONCENTRATE INTRAVENOUS at 12:33

## 2025-03-21 RX ADMIN — LEVOFLOXACIN: 5 INJECTION, SOLUTION INTRAVENOUS at 11:43

## 2025-03-21 RX ADMIN — HEPARIN SODIUM 5000 UNITS: 5000 INJECTION INTRAVENOUS; SUBCUTANEOUS at 22:59

## 2025-03-21 RX ADMIN — ASPIRIN 81 MG: 81 TABLET, CHEWABLE ORAL at 20:24

## 2025-03-21 RX ADMIN — FINASTERIDE 5 MG: 5 TABLET, FILM COATED ORAL at 22:59

## 2025-03-21 RX ADMIN — ALBUMIN (HUMAN): 12.5 INJECTION, SOLUTION INTRAVENOUS at 13:25

## 2025-03-21 RX ADMIN — SODIUM CHLORIDE, SODIUM LACTATE, POTASSIUM CHLORIDE, AND CALCIUM CHLORIDE 125 ML/HR: .6; .31; .03; .02 INJECTION, SOLUTION INTRAVENOUS at 10:55

## 2025-03-21 RX ADMIN — ALBUMIN (HUMAN): 12.5 INJECTION, SOLUTION INTRAVENOUS at 12:13

## 2025-03-21 RX ADMIN — PROPOFOL 50 MCG/KG/MIN: 10 INJECTION, EMULSION INTRAVENOUS at 11:18

## 2025-03-21 NOTE — ANESTHESIA PROCEDURE NOTES
Arterial Line Insertion    Performed by: Francine Moralez CRNA  Authorized by: Dio Martinez MD

## 2025-03-21 NOTE — OP NOTE
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 Esophagus without Bougie in place. Can appreciate over 3cm of intra-abdominal esophagus without tension.         Complications:   None    Procedure and Technique:  After obtaining informed consent from the patient, they were transferred to the operating room and placed supine on the operating table. General anesthesia was then induced and a single-lumen endotracheal tube was placed without incident.  A footboard was placed at the base of the bed and the patients feet, legs and thighs were secured to the bed. All bony prominences were padded and checked.      A formal time-out was called verifying patient , date of birth, procedure, consent, antibiotics, beta-blocker as indicated, anticipated specimens with handling, SCD use and other special considerations.     The abdomen was then prepped with ChloraPrep and draped in standard surgical fashion. Given his prior  PEG I did not perform a Veress entry. The PEG site was just superior and lateral to the umbilicus. Given that position, I performed a cut down entrance below the umbilicus. A curvilinear incision was made and dissection was performed to the fascia. The fascia was grasped and opened sharply. The peritoneum was grasped and opened with scissors. An 8mm robotic trochar was advanced showing entrance to the abdomen. Insufflation was started to a pressure of 15. Just superior to the entrance there was a small umbilical hernia with some incarcerated fat. There was a thin fibrous band from the stomach to the anterior abdomina wall that was likely an old tract from the PEG site. All other ports were placed under direct visualization using the laparoscopic camera.  3mL of 0.25% Marcaine was used to anesthetize the peritoneum for all additional port placements. Three 8mm robotic ports were placed in a row - one to the patient's right and two to the left. A RLQ 8mm assistant port was placed between the camera and the RUQ robotic port.  Finally a 5 mm incision was made just to the left of the xiphoid process.  A small Tristan liver retractor was placed through here and secured to the bedside with a sterile ackerman. The supraumbilical port was exchanged out for a robotic 8mm port. The patient was then placed in full reverse [place] at 25 degrees.     The [employer] Playlist[employer] Xi robot was docked at this time with the 30 degree robotic scope. A tip-up fenestrated grasper, caudier grasper and robotic vessel sealer were inserted and tested. I un-scrubbed at this time and went to the robotic console.     I started with dealing with the omentum that was stuck to the umbilical hernia. This was reduced using traction and some dissection with the vessel sealer. Once reduced this was inspected and without concern for bowel involvement. Next, the long and thin fibrous band from the stomach was taken down.     A large type III hiatal hernia was  seen. I started the dissection at the pars using the vessel sealer. Dissection was continued anteriorly and to the left. The avascular plane of the hernia sac was developed circumferentially. I then moved to the greater curvature and took the superior most 1/3 of the short gastric vessels with the vessel sealer. My dissection was continued to meet the hernia sac dissection at the left jyoti. The esophagus was encircled with a penrose. Dissection was continued high into the chest above the inferior pulmonary veins. This was an extensive dissection involving mobilizing the esophagus very high and circumferentially off of the aorta and spine. Both vagus nerves were seen and protected. There was a large hernia sac that was developed. This was removed with a vessel sealer and set aside.       An EGD was performed.  The adult endoscope was inserted into the patient's oropharynx and directed down into their esophagus. There were no abnormalities seen in the esophagus. Pylorus was open. There were no abnormalities in the stomach. I was able to measure and verify that I had 3cm of intra-abdominal esophagus without tension.      Next I placed a 56Fr bougie and performed a crural repair. Interrupted 0 Ethibond sutures were used to reapproximate the crura posteriorly.  A total of 6 sutures were placed posteriorly.  When finished the esophagus without any bougie or EGD had a few mm of room circumferentially around the crura.  Next a Bio A Sandy semi-biologic mesh was placed at the hiatus given the exposed muscle of the left jyoti and the thin crural fibers. This was positioned in place behind the liver and underneath the gastroesophageal junction.    I then performed a partial anterior Quinten fundoplication using interrupted 2-0 Ethibond incorporating fundus, esophagus and the crura. 7 sutures were used.      The abdomen was then inspected and thoroughly aspirated dry.  There was no active bleeding identified. The robot was undocked  at this time and all robotic instruments were removed.. The skin and soft tissue was closed with 4 Monocryl.  Surgical glue was applied to all incisions.     Sponge, needle and instrument counts were correct at the conclusion of the procedure. The patient was extubated without incident in the operating room and was transported to the PACU in stable condition. LUCIE Mora was scrubbed as a bedside assistant for the entirety of the procedure as no other qualified assistant or general surgery resident was available. She was critical to the performance of this operation as she was the bedside robotic assistant. In doing so, she aided with retraction, needle insertion/removal, irrigation, suctioning and instrument exchange.       I was present for the entire procedure.       Patient Disposition:  PACU              SIGNATURE: Dio Amin DO  DATE: March 21, 2025  TIME: 3:45 PM

## 2025-03-21 NOTE — ANESTHESIA PROCEDURE NOTES
"Arterial Line Insertion    Performed by: Dio Martinez MD  Authorized by: Dio Martinez MD  Consent: Verbal consent obtained. Written consent obtained.  Risks and benefits: risks, benefits and alternatives were discussed  Consent given by: patient  Patient understanding: patient states understanding of the procedure being performed (preop consent anesthesia)  Patient consent: the patient's understanding of the procedure matches consent given  Procedure consent: procedure consent matches procedure scheduled  Patient identity confirmed: arm band  Time out: Immediately prior to procedure a \"time out\" was called to verify the correct patient, procedure, equipment, support staff and site/side marked as required.  Preparation: Patient was prepped and draped in the usual sterile fashion.  Indications: hemodynamic monitoring  Orientation:  Right  Location: radial artery  Sedation:  Patient sedated: yes (post induciton)    Procedure Details:      Placement technique:  Anatomical landmarks  Number of attempts: 1    Post-procedure:  Post-procedure: dressing applied  Waveform: good waveform  Post-procedure CNS: normal  Patient tolerance: patient tolerated the procedure well with no immediate complications          "

## 2025-03-21 NOTE — LETTER
[facility]  [address]  [place] [address]  Dept: [phone]    March 23, 2025     Patient: Raleigh Sandoval   YOB: 1940   Date of Visit: 3/21/2025       To Whom it May Concern:    Raleigh Sandoval is under my professional care. He was seen in the hospital from 3/21/2025 to 03/23/25. He  may not return to work until cleared by his surgeon at his follow-up appointment in 2 weeks .    If you have any questions or concerns, please don't hesitate to call.         Sincerely,          Cari Loredo MD

## 2025-03-21 NOTE — DISCHARGE INSTR - AVS FIRST PAGE
"Paraesophageal hernia repair    During this hospitalization you underwent a minimally invasive paraesophageal hernia repair. Your discharge instructions are below.     Incision Care:  - Your incisions were closed with stitches underneath of the skin which will dissolve. There is purple surgical glue on top of the incisions. The surgical glue will flake off over the next few weeks.   - You do not need dressings over your other incisions.  Do not apply any cream or ointments to the incisions unless instructed by your surgeon.  - You may shower daily - no soaking in a tub bath. Wash your incisions gently with soap and water and pat dry. Do not scrub the incisions.  - Bruising at your incisions is normal. This will resolve over the next few weeks.     Activity  - No lifting greater than 10lbs until you are evaluated in the office.   - Walking and light activity is encouraged. Recommend that you are active during the day.  - No driving while you are using narcotic pain medications. If you are no longer taking narcotics and considering driving, you must make sure you can quickly react to changes on the road. This can be challenging in the first few weeks after surgery.     Pain:  - Some degree of pain or discomfort after surgery is expected.    - Your pain regiment includes the following:   - Tylenol 650 mg tablet. Take 1 tablet, every 6 hours for 1 week.    - Oxycodone (Narcotic) 5mg tablet. Take 1 tablet, every 4-6 hours as needed for pain.     Diet:  - Refer to the \"Diet after Paraesophageal Hernia Repair\" diet instructions you were given preoperatively.   - It is normal to have difficulty with fluids and food in the postoperative period. This is due to swelling and will improve with time.     Medications:  - Continue on your home medications including any blood thinner and aspirin, as instructed.     Bowel Regiment:  - Constipation after surgery while on narcotic pain medications is normal.  - You should continue " taking a bowel regiment while on a narcotic pain medication to keep your bowel movements soft. Your discharge bowel regiment:   - Docusate (Colace) 100mg tablet. Take 1 tablet, twice a day.    - Senna 8.6mg tablet. Take 1 tablet daily.   - If your bowel movements become lose, stop taking the bowel regiment above.     Follow-up:  - You have a scheduled follow-up appointment on: 4/7/2025 at 3:40pm  - A couple of days after discharge our office will call you to check in with how you are recovering at home.     Concerns:  - Call the office for any questions or concerns. Many postoperative issues can be sorted out over the phone.   - Call the office or go to the Emergency Department if you develop a fever greater than 101, persistent chills, persistent nausea/vomiting, inability to take in sufficient food or fluids, worsening or uncontrolled pain, and/or increasing redness or foul smelling drainage from an incision.     Thoracic Surgery Office: [phone]    Dr. William Burfeind, MD Rachael Hart, PA-C Dr. Meredith Harrison, MD Amylyn Mortimer, PA-C Dr. Dustin Manchester, MD Dr. Stephen Dingley, DO

## 2025-03-21 NOTE — ANESTHESIA POSTPROCEDURE EVALUATION
Post-Op Assessment Note    CV Status:  Stable  Pain Score: 0    Pain management: adequate       Mental Status:  Arousable and sleepy   Hydration Status:  Euvolemic and stable   PONV Controlled:  None   Airway Patency:  Patent  Two or more mitigation strategies used for obstructive sleep apnea   Post Op Vitals Reviewed: Yes    No anethesia notable event occurred.    Staff: CRNA, Anesthesiologist           Last Filed PACU Vitals:  Vitals Value Taken Time   Temp 97.2    Pulse 71 03/21/25 1531   /60 03/21/25 1531   Resp 26 03/21/25 1531   SpO2 99 % 03/21/25 1531   Vitals shown include unfiled device data.    Modified Edmundo:     Vitals Value Taken Time   Activity 0 03/21/25 1527   Respiration 2 03/21/25 1527   Circulation 2 03/21/25 1527   Consciousness 0 03/21/25 1527   Oxygen Saturation 1 03/21/25 1527     Modified Edmundo Score: 5             Private car

## 2025-03-21 NOTE — H&P
"H&P - Thoracic    Name: Raleigh Sandoval 84 y.o. male I MRN: [id number]  Unit/Bed#: OR POOL I Date of Admission: 3/21/2025   Date of Service: 3/21/2025 I Hospital Day: 0     Assessment & Plan  Hiatal hernia  OR today for robotic hiatal hernia repair with possible fundoplication possible gastropexy    History of Present Illness   Raleigh Sandoval is a 84 y.o. male who presents today for surgery. From my prior notes.     From my prior note: \"Raleigh Sandoval is a 84 y.o. male with medical history of CABGx3 (2012), MVr (2023, CHF (EF 40%), history of tracheostomy with PEG, and a known large hiatal hernia. He presented to ED on 1/12/25 with obstruction that was able to be conservatively managed. He had an ARSH that improved with hydration.    He was seen while admitted by Dr. Castillo his Cardiologist. He is deemed acceptable risk for an intermediate risk surgery.    He is currently tolerating a FLD without any dysphagia. No nausea. No abdominal pain. No chest pain. Breathing is stable.      Data:  The following results contain my personal interpretation and summarization.   1/14/25 UGI: Moderate-large hiatal hernia. Distended esophagus.   1/12/25 CTAP: Large hiatal hernia with gastric distention and volvulus.     Review of Systems   Constitutional:  Negative for chills, fever and unexpected weight change.   HENT:  Negative for trouble swallowing and voice change.    Eyes:  Negative for photophobia and visual disturbance.   Respiratory:  Negative for chest tightness and shortness of breath.    Cardiovascular:  Negative for chest pain and palpitations.   Gastrointestinal:  Negative for nausea and vomiting.   Musculoskeletal:  Negative for back pain and neck pain.   Skin:  Negative for rash and wound.   Neurological:  Negative for dizziness, weakness and light-headedness.   All other systems reviewed and are negative.    Historical Information   Past Medical History:   Diagnosis Date    BPH (benign prostatic " "hyperplasia)     Chronic pain disorder     back    Coronary artery disease     Gallstones     History of tracheostomy     per pt had it \"after CABG surgery--was admitted for 53 days including Rehab\"    Hx of CABG     per pt 7-8 yrs ago    Hypercholesterolemia     Hypertension     Moderate exercise     alot of walking-works FT and also walks dog 2 miles daily usually    Myocardial infarction (HCC)     per pt approx 7-8yrs ago --[facility] cardio Dr ASHWIN Castillo sees yearly    Pleural effusion on left 03/02/2020    Prediabetes     Shortness of breath     per pt\"climbing steps or walking long distance\"    Wears glasses      Past Surgical History:   Procedure Laterality Date    CATARACT EXTRACTION Bilateral     COLONOSCOPY      CORONARY ARTERY BYPASS GRAFT      x3 LIMA-LAD, SVG-OM, SVG-DIAG with mitral valve repair by nicole    CYSTOSCOPY  12/13/2014    Diagnostic    CYSTOSCOPY  03/24/2023    [facility]    ESOPHAGOGASTRODUODENOSCOPY  04/07/2015    with biopsy. Dr Mazariegos    FL RETROGRADE PYELOGRAM  5/17/2023    MITRAL VALVE REPAIR      26 mm fatmata hernandez physi annuloplasty ring    NH CYSTO W/REMOVAL OF LESIONS SMALL N/A 5/17/2023    Procedure: (TURBT);  Surgeon: Kee Moya MD;  Location: [facility];  Service: Urology    NH CYSTO/PYELOSCOPY BX&/FULGURATION PELIVC LESION Right 5/17/2023    Procedure: URETEROSCOPY,WITH URETERAL BRUSHING;  Surgeon: Kee Moya MD;  Location: [facility];  Service: Urology    NH CYSTOURETHROSCOPY W/URETERAL CATHETERIZATION Right 5/17/2023    Procedure: CYSTO, URETHERAL DILATION, RETROGRADE PYELOGRAM W/ INSERTION STENT URETERAL;  Surgeon: Kee Moya MD;  Location: [facility];  Service: Urology    TRACHEOSTOMY      during his heart surgery     Social History     Tobacco Use    Smoking status: Former     Current packs/day: 0.00     Average packs/day: 0.5 packs/day for 16.0 years (8.0 ttl pk-yrs)     Types: Cigarettes     Start date: 1962     Quit date: 1978     " "Years since quittin.2     Passive exposure: Past    Smokeless tobacco: Never   Vaping Use    Vaping status: Never Used   Substance and Sexual Activity    Alcohol use: Not Currently    Drug use: Never    Sexual activity: Not on file     Comment: defer     E-Cigarette/Vaping    E-Cigarette Use Never User      E-Cigarette/Vaping Substances    Nicotine No     THC No     CBD No     Flavoring No     Other No     Unknown No      Family history non-contributory    Objective :  Temp:  [96.3 °F (35.7 °C)] 96.3 °F (35.7 °C)  HR:  [65] 65  BP: (141)/(77) 141/77  SpO2:  [98 %] 98 %  O2 Device: None (Room air)    I/O       None            Physical Exam  Vitals reviewed.   Constitutional:       Appearance: Normal appearance.   HENT:      Head: Normocephalic and atraumatic.   Cardiovascular:      Rate and Rhythm: Normal rate and regular rhythm.      Pulses: Normal pulses.      Heart sounds: Murmur heard.   Pulmonary:      Effort: Pulmonary effort is normal. No respiratory distress.      Breath sounds: Normal breath sounds.   Abdominal:      General: Abdomen is flat. There is no distension.      Palpations: Abdomen is soft.      Comments: Prior abdominal scars.   Musculoskeletal:         General: Normal range of motion.      Cervical back: Normal range of motion.      Right lower leg: No edema.      Left lower leg: No edema.   Lymphadenopathy:      Cervical: No cervical adenopathy.   Skin:     Capillary Refill: Capillary refill takes less than 2 seconds.   Neurological:      General: No focal deficit present.      Mental Status: He is alert and oriented to person, place, and time.   Psychiatric:         Mood and Affect: Mood normal.         Behavior: Behavior normal.         Thought Content: Thought content normal.         Judgment: Judgment normal.            Lab Results: I have reviewed the following results:  No results for input(s): \"WBC\", \"HGB\", \"HCT\", \"PLT\", \"BANDSPCT\", \"SODIUM\", \"K\", \"CL\", \"CO2\", \"BUN\", \"CREATININE\", " "\"GLUC\", \"CAIONIZED\", \"MG\", \"PHOS\", \"AST\", \"ALT\", \"ALB\", \"TBILI\", \"DBILI\", \"ALKPHOS\", \"PTT\", \"INR\", \"HSTNI0\", \"HSTNI2\", \"BNP\", \"LACTICACID\" in the last 72 hours.        "

## 2025-03-22 ENCOUNTER — APPOINTMENT (INPATIENT)
Dept: RADIOLOGY | Facility: HOSPITAL | Age: 85
DRG: 328 | End: 2025-03-22
Payer: COMMERCIAL

## 2025-03-22 LAB
ANION GAP SERPL CALCULATED.3IONS-SCNC: 3 MMOL/L (ref 4–13)
BUN SERPL-MCNC: 18 MG/DL (ref 5–25)
CALCIUM SERPL-MCNC: 8.2 MG/DL (ref 8.4–10.2)
CHLORIDE SERPL-SCNC: 109 MMOL/L (ref 96–108)
CO2 SERPL-SCNC: 27 MMOL/L (ref 21–32)
CREAT SERPL-MCNC: 1.08 MG/DL (ref 0.6–1.3)
ERYTHROCYTE [DISTWIDTH] IN BLOOD BY AUTOMATED COUNT: 14.1 % (ref 11.6–15.1)
GFR SERPL CREATININE-BSD FRML MDRD: 62 ML/MIN/1.73SQ M
GLUCOSE SERPL-MCNC: 85 MG/DL (ref 65–140)
HCT VFR BLD AUTO: 29.8 % (ref 36.5–49.3)
HGB BLD-MCNC: 9.4 G/DL (ref 12–17)
MAGNESIUM SERPL-MCNC: 2 MG/DL (ref 1.9–2.7)
MCH RBC QN AUTO: 31.6 PG (ref 26.8–34.3)
MCHC RBC AUTO-ENTMCNC: 31.5 G/DL (ref 31.4–37.4)
MCV RBC AUTO: 100 FL (ref 82–98)
PLATELET # BLD AUTO: 149 THOUSANDS/UL (ref 149–390)
PMV BLD AUTO: 9.8 FL (ref 8.9–12.7)
POTASSIUM SERPL-SCNC: 4.6 MMOL/L (ref 3.5–5.3)
RBC # BLD AUTO: 2.97 MILLION/UL (ref 3.88–5.62)
SODIUM SERPL-SCNC: 139 MMOL/L (ref 135–147)
WBC # BLD AUTO: 6.57 THOUSAND/UL (ref 4.31–10.16)

## 2025-03-22 PROCEDURE — 85027 COMPLETE CBC AUTOMATED: CPT | Performed by: PHYSICIAN ASSISTANT

## 2025-03-22 PROCEDURE — 80048 BASIC METABOLIC PNL TOTAL CA: CPT | Performed by: PHYSICIAN ASSISTANT

## 2025-03-22 PROCEDURE — 74220 X-RAY XM ESOPHAGUS 1CNTRST: CPT

## 2025-03-22 PROCEDURE — 83735 ASSAY OF MAGNESIUM: CPT | Performed by: PHYSICIAN ASSISTANT

## 2025-03-22 PROCEDURE — 99024 POSTOP FOLLOW-UP VISIT: CPT | Performed by: THORACIC SURGERY (CARDIOTHORACIC VASCULAR SURGERY)

## 2025-03-22 RX ORDER — FUROSEMIDE 10 MG/ML
10 INJECTION INTRAMUSCULAR; INTRAVENOUS ONCE
Status: COMPLETED | OUTPATIENT
Start: 2025-03-22 | End: 2025-03-22

## 2025-03-22 RX ADMIN — LISINOPRIL 5 MG: 5 TABLET ORAL at 10:19

## 2025-03-22 RX ADMIN — ACETAMINOPHEN 975 MG: 650 SUSPENSION ORAL at 10:00

## 2025-03-22 RX ADMIN — TAMSULOSIN HYDROCHLORIDE 0.8 MG: 0.4 CAPSULE ORAL at 20:49

## 2025-03-22 RX ADMIN — IOHEXOL 60 ML: 350 INJECTION, SOLUTION INTRAVENOUS at 10:12

## 2025-03-22 RX ADMIN — ASPIRIN 81 MG: 81 TABLET, CHEWABLE ORAL at 10:00

## 2025-03-22 RX ADMIN — FUROSEMIDE 20 MG: 20 TABLET ORAL at 10:19

## 2025-03-22 RX ADMIN — ATORVASTATIN CALCIUM 40 MG: 40 TABLET, FILM COATED ORAL at 17:09

## 2025-03-22 RX ADMIN — ACETAMINOPHEN 975 MG: 650 SUSPENSION ORAL at 20:49

## 2025-03-22 RX ADMIN — DOCUSATE SODIUM 100 MG: 100 CAPSULE, LIQUID FILLED ORAL at 17:09

## 2025-03-22 RX ADMIN — ACETAMINOPHEN 975 MG: 650 SUSPENSION ORAL at 02:03

## 2025-03-22 RX ADMIN — CARVEDILOL 3.12 MG: 3.12 TABLET, FILM COATED ORAL at 10:00

## 2025-03-22 RX ADMIN — HEPARIN SODIUM 5000 UNITS: 5000 INJECTION INTRAVENOUS; SUBCUTANEOUS at 06:01

## 2025-03-22 RX ADMIN — CARVEDILOL 3.12 MG: 3.12 TABLET, FILM COATED ORAL at 17:09

## 2025-03-22 RX ADMIN — HEPARIN SODIUM 5000 UNITS: 5000 INJECTION INTRAVENOUS; SUBCUTANEOUS at 13:57

## 2025-03-22 RX ADMIN — PANTOPRAZOLE SODIUM 40 MG: 40 INJECTION, POWDER, FOR SOLUTION INTRAVENOUS at 10:00

## 2025-03-22 RX ADMIN — HEPARIN SODIUM 5000 UNITS: 5000 INJECTION INTRAVENOUS; SUBCUTANEOUS at 20:49

## 2025-03-22 RX ADMIN — DOCUSATE SODIUM 100 MG: 100 CAPSULE, LIQUID FILLED ORAL at 10:19

## 2025-03-22 RX ADMIN — SODIUM CHLORIDE 75 ML/HR: 0.9 INJECTION, SOLUTION INTRAVENOUS at 10:28

## 2025-03-22 RX ADMIN — FINASTERIDE 5 MG: 5 TABLET, FILM COATED ORAL at 20:49

## 2025-03-22 RX ADMIN — FUROSEMIDE 10 MG: 10 INJECTION, SOLUTION INTRAVENOUS at 13:56

## 2025-03-22 NOTE — QUICK NOTE
Thoracic Surgery Post-Op Check  Raleigh Sandoval 84 y.o. male MRN: [id number]  Unit/Bed#: [facility] 505-01 Encounter: [id number]     S: Patient seen and evaluated at bedside after arrival to floor. No acute events overnight. Patient reports pain is mostly in the epigastric region. They have not yet eaten, ambulated, or voided. They deny nausea, vomiting, chest pain, shortness of breath, fevers, chills.      O:   Vitals:    03/21/25 2031   BP: 118/63   Pulse: 74   Resp:    Temp:    SpO2: 95%     I/O         03/20 0701  03/21 0700 03/21 0701 03/22 0700    I.V. (mL/kg)  1700 (34.1)    IV Piggyback  700    Total Intake(mL/kg)  2400 (48.1)    Emesis/NG output  10    Blood  20    Total Output  30    Net  +2370                PE:  Gen:  No acute distress  CV:  Warm, well-perfused  Lung:  Normal work of breathing, no respiratory distress  Abd:  Soft, appropriately tender, nondistended   Ext:  Moving all extremities  Neuro:  Alert and oriented, M/S grossly intact  Skin:  Incisions clean, dry, intact     Lab Results   Component Value Date    WBC 10.41 (H) 01/15/2025    HGB 9.5 (L) 03/21/2025    HCT 28 (L) 03/21/2025    MCV 97 01/15/2025     (L) 03/21/2025     Lab Results   Component Value Date    GLUCOSE 125 03/21/2025    CALCIUM 8.6 03/11/2025     01/08/2016    K 4.5 03/11/2025    CO2 24 03/21/2025     03/11/2025    BUN 36 (H) 03/11/2025    CREATININE 1.31 (H) 03/11/2025         A/P: 84 y.o. male * Day of Surgery * s/p Procedure(s) (LRB):  REPAIR HERNIA PARAESOPHAGEAL LAPAROSCOPIC W ROBOTICS WITH PARTIAL FUNDOPLICATIOIN, MESH INSERTION (N/A)  ESOPHAGOGASTRODUODENOSCOPY (EGD) (N/A)    Plan:  Clears  UGI tomorrow, if OK advance to fulls  DVT ppx  Out of bed, encourage ambulation  Encourage incentive spirometer use  Strict I's and O's  Pain and nausea control p.r.n.  Dispo planning      Cari Loredo MD  PGYI, General Surgery

## 2025-03-22 NOTE — ASSESSMENT & PLAN NOTE
POD1 s/p robotic hiatal hernia repair with mesh and partial fundoplication    Plan:  Clears  UGI today, if OK advance to fulls  DVT ppx  Out of bed, encourage ambulation  Encourage incentive spirometer use  Strict I's and O's  Pain and nausea control p.r.n.  Dispo planning

## 2025-03-22 NOTE — PLAN OF CARE
Problem: RESPIRATORY - ADULT  Goal: Achieves optimal ventilation and oxygenation  Description: INTERVENTIONS:- Assess for changes in respiratory status- Assess for changes in mentation and behavior- Position to facilitate oxygenation and minimize respiratory effort- Oxygen administered by appropriate delivery if ordered- Initiate smoking cessation education as indicated- Encourage broncho-pulmonary hygiene including cough, deep breathe, Incentive Spirometry- Assess the need for suctioning and aspirate as needed- Assess and instruct to report SOB or any respiratory difficulty- Respiratory Therapy support as indicated  Outcome: Progressing     Problem: GASTROINTESTINAL - ADULT  Goal: Minimal or absence of nausea and/or vomiting  Description: INTERVENTIONS:- Administer IV fluids if ordered to ensure adequate hydration- Maintain NPO status until nausea and vomiting are resolved- Nasogastric tube if ordered- Administer ordered antiemetic medications as needed- Provide nonpharmacologic comfort measures as appropriate- Advance diet as tolerated, if ordered- Consider nutrition services referral to assist patient with adequate nutrition and appropriate food choices  Outcome: Progressing  Goal: Maintains or returns to baseline bowel function  Description: INTERVENTIONS:- Assess bowel function- Encourage oral fluids to ensure adequate hydration- Administer IV fluids if ordered to ensure adequate hydration- Administer ordered medications as needed- Encourage mobilization and activity- Consider nutritional services referral to assist patient with adequate nutrition and appropriate food choices  Outcome: Progressing  Goal: Maintains adequate nutritional intake  Description: INTERVENTIONS:- Monitor percentage of each meal consumed- Identify factors contributing to decreased intake, treat as appropriate- Assist with meals as needed- Monitor I&O, weight, and lab values if indicated- Obtain nutrition services referral as  needed  Outcome: Progressing  Goal: Establish and maintain optimal ostomy function  Description: INTERVENTIONS:- Assess bowel function- Encourage oral fluids to ensure adequate hydration- Administer IV fluids if ordered to ensure adequate hydration - Administer ordered medications as needed- Encourage mobilization and activity- Nutrition services referral to assist patient with appropriate food choices- Assess stoma site- Consider wound care consult   Outcome: Progressing  Goal: Oral mucous membranes remain intact  Description: INTERVENTIONS- Assess oral mucosa and hygiene practices- Implement preventative oral hygiene regimen- Implement oral medicated treatments as ordered- Initiate Nutrition services referral as needed  Outcome: Progressing      1.58

## 2025-03-22 NOTE — PROGRESS NOTES
Progress Note - Thoracic    Name: Raleigh Sandoval 84 y.o. male I MRN: [id number]  Unit/Bed#: [facility] 505-01 I Date of Admission: 3/21/2025   Date of Service: 3/21/2025 I Hospital Day: 0    Assessment & Plan  Hiatal hernia  POD1 s/p robotic hiatal hernia repair with mesh and partial fundoplication    Plan:  Clears  UGI today, if OK advance to fulls  DVT ppx  Out of bed, encourage ambulation  Encourage incentive spirometer use  Strict I's and O's  Pain and nausea control p.r.n.  Dispo planning    Please contact the SecureChat role for the Thoracic  service with any questions/concerns.    24 Hour Events : None  Subjective : No acute events overnight. Patient reports pain is in shoulder. They are tolerating their clear liquid diet and had water or jello. They are not having flatus or BM. They are not voiding on own, required straight cath x1. They are ambulating to the bathroom. They are using their IS to 1000. They deny nausea, vomiting, chest pain, shortness of breath, fevers, chills.      Objective :  Temp:  [96.3 °F (35.7 °C)-97.8 °F (36.6 °C)] 97.8 °F (36.6 °C)  HR:  [60-79] 71  BP: (100-141)/(50-94) 106/64  Resp:  [16-24] 17  SpO2:  [93 %-100 %] 96 %  O2 Device: None (Room air)    I/O         03/20 0701  03/21 0700 03/21 0701  03/22 0700    I.V. (mL/kg)  1700 (34.1)    IV Piggyback  700    Total Intake(mL/kg)  2400 (48.1)    Emesis/NG output  10    Blood  20    Total Output  30    Net  +2370                  Physical Exam  Constitutional:       General: He is not in acute distress.  HENT:      Head: Normocephalic and atraumatic.      Right Ear: External ear normal.      Left Ear: External ear normal.      Nose: Nose normal.      Mouth/Throat:      Pharynx: Oropharynx is clear.   Eyes:      Extraocular Movements: Extraocular movements intact.   Cardiovascular:      Rate and Rhythm: Normal rate.   Pulmonary:      Effort: Pulmonary effort is normal.   Abdominal:      General: There is no distension.      Palpations:  Abdomen is soft.      Tenderness: There is no abdominal tenderness.   Musculoskeletal:         General: No swelling.      Cervical back: Normal range of motion.   Skin:     General: Skin is warm and dry.      Comments: Incisions clean, dry, intact   Neurological:      Mental Status: He is alert. Mental status is at baseline.   Psychiatric:         Mood and Affect: Mood normal.           Lab Results: I have reviewed the following results:  Recent Labs     03/21/25  1301 03/21/25  2110   HGB 9.5*  --    HCT 28*  --    PLT  --  138*   CO2 24  --    CAIONIZED 1.17  --        Imaging Results Review: No pertinent imaging studies reviewed.  Other Study Results Review: No additional pertinent studies reviewed.    VTE Pharmacologic Prophylaxis: VTE covered by:  heparin (porcine), Subcutaneous, 5,000 Units at 03/21/25 1340     VTE Mechanical Prophylaxis: sequential compression device   Vomiting, intractability of vomiting not specified, presence of nausea not specified, unspecified vomiting type

## 2025-03-23 VITALS
OXYGEN SATURATION: 93 % | SYSTOLIC BLOOD PRESSURE: 116 MMHG | RESPIRATION RATE: 22 BRPM | DIASTOLIC BLOOD PRESSURE: 57 MMHG | TEMPERATURE: 99.5 F | HEIGHT: 68 IN | HEART RATE: 90 BPM | BODY MASS INDEX: 16.67 KG/M2 | WEIGHT: 110 LBS

## 2025-03-23 PROCEDURE — NC001 PR NO CHARGE: Performed by: THORACIC SURGERY (CARDIOTHORACIC VASCULAR SURGERY)

## 2025-03-23 PROCEDURE — 99024 POSTOP FOLLOW-UP VISIT: CPT | Performed by: THORACIC SURGERY (CARDIOTHORACIC VASCULAR SURGERY)

## 2025-03-23 RX ADMIN — HEPARIN SODIUM 5000 UNITS: 5000 INJECTION INTRAVENOUS; SUBCUTANEOUS at 14:11

## 2025-03-23 RX ADMIN — DOCUSATE SODIUM 100 MG: 100 CAPSULE, LIQUID FILLED ORAL at 08:02

## 2025-03-23 RX ADMIN — HEPARIN SODIUM 5000 UNITS: 5000 INJECTION INTRAVENOUS; SUBCUTANEOUS at 07:50

## 2025-03-23 RX ADMIN — ASPIRIN 81 MG: 81 TABLET, CHEWABLE ORAL at 08:02

## 2025-03-23 RX ADMIN — ACETAMINOPHEN 975 MG: 650 SUSPENSION ORAL at 07:56

## 2025-03-23 RX ADMIN — PANTOPRAZOLE SODIUM 40 MG: 40 INJECTION, POWDER, FOR SOLUTION INTRAVENOUS at 08:11

## 2025-03-23 RX ADMIN — ACETAMINOPHEN 975 MG: 650 SUSPENSION ORAL at 14:09

## 2025-03-23 NOTE — UTILIZATION REVIEW
Initial Clinical Review    Elective outpatient procedure, changed to inpatient admission 3/21 due to UGI, Urinary retention requiring Hannon cath, Post op care, pain control and safe d/c plan    Elective Outpatient surgical procedure  Age/Sex: 84 y.o. male  Surgery Date: 3/21  Procedure: S/p   EGD  Robotic assisted hiatal hernia repair with Quinten fundoplication  BioA mesh implantation  Anesthesia: General    POD#1 Progress Note:   3/22  Urinary retention this am. Iv Lasix x1 given  Clears, UGI today, if Ok advance to fulls. Monitor for urinary retention  Pain and nausea control prn.     3/23  Progress notes; POD #2  Hannon placed due to retention, needs urology follow up, 1.2L out  Full liquid diet. Pain and nausea control prn.     Admission Orders: Date/Time/Statement:   Admission Orders (From admission, onward)       Ordered        03/21/25 1538  Inpatient Admission  Once                          Orders Placed This Encounter   Procedures    Inpatient Admission     Standing Status:   Standing     Number of Occurrences:   1     Level of Care:   Med Surg [16]     Estimated length of stay:   Inpatient Only Surgery     Diet: Full liquid surgical, no Carbonation  Mobility: Ambulate  DVT Prophylaxis: SCD    Medications/Pain Control:   Scheduled Medications:  acetaminophen, 975 mg, Oral, Q6H  aspirin, 81 mg, Oral, Daily  atorvastatin, 40 mg, Oral, Daily With Dinner  carvedilol, 3.125 mg, Oral, BID With Meals  docusate sodium, 100 mg, Oral, BID  finasteride, 5 mg, Oral, HS  furosemide, 20 mg, Oral, Daily  heparin (porcine), 5,000 Units, Subcutaneous, Q8H KEENAN  lisinopril, 5 mg, Oral, Daily  pantoprazole, 40 mg, Intravenous, Daily  tamsulosin, 0.8 mg, Oral, HS    furosemide (LASIX) injection 10 mg  Dose: 10 mg  Freq: Once Route: IV  Start: 03/22/25 1300 End: 03/22/25 1356      Continuous IV Infusions:    sodium chloride 0.9 % infusion  Rate: 75 mL/hr Dose: 75 mL/hr  Freq: Continuous Route: IV  Indications of Use: IV  Hydration  Last Dose: Stopped (03/22/25 3923)  Start: 03/21/25 1530 End: 03/22/25 1257     PRN Meds:  morphine injection, 2 mg, Intravenous, Q1H PRN  oxyCODONE, 2.5 mg, Oral, Q4H PRN  oxyCODONE, 5 mg, Oral, Q4H PRN      Vital Signs (last 3 days)       Date/Time Temp Pulse Resp BP MAP (mmHg) SpO2 O2 Flow Rate (L/min) O2 Device Cardiac (WDL) Patient Position - Orthostatic VS Pain    03/23/25 0756 -- -- -- -- -- -- -- None (Room air) -- -- 4    03/23/25 07:12:56 97.4 °F (36.3 °C) 79 16 106/60 75 92 % -- None (Room air) -- Sitting --    03/22/25 21:59:32 98.4 °F (36.9 °C) -- 18 121/59 80 -- -- -- -- -- --    03/22/25 2049 -- -- -- -- -- -- -- -- -- -- 3    03/22/25 1709 -- 71 -- 120/60 -- -- -- -- -- -- --    03/22/25 15:00:51 97.9 °F (36.6 °C) 74 16 119/60 80 94 % -- None (Room air) -- Lying --    03/22/25 1000 -- -- -- 118/52 -- -- -- -- -- -- 2    03/22/25 07:11:20 98 °F (36.7 °C) 67 19 108/51 70 94 % -- None (Room air) -- Lying --    03/22/25 0400 -- -- -- -- -- -- -- -- -- -- 1    03/22/25 0203 -- -- -- -- -- -- -- -- -- -- 5    03/21/25 2210 -- 71 -- 106/64 78 96 % -- None (Room air) -- Lying --    03/21/25 2209 97.8 °F (36.6 °C) 78 -- -- -- 96 % -- -- -- -- --    03/21/25 2100 -- 66 -- 115/61 79 95 % -- -- -- -- --    03/21/25 20:31:42 -- 74 -- 118/63 81 95 % -- None (Room air) -- -- --    03/21/25 2024 -- -- -- -- -- -- -- -- -- -- 3    03/21/25 20:16:58 97.7 °F (36.5 °C) 79 17 114/60 78 93 % -- -- -- -- --    03/21/25 2000 -- 75 18 -- -- 94 % -- None (Room air) WDL -- No Pain    03/21/25 1945 -- 70 18 -- -- 98 % -- None (Room air) -- -- No Pain    03/21/25 1930 -- 72 18 125/69 92 97 % -- None (Room air) WDL -- No Pain    03/21/25 1915 -- 70 20 -- -- 98 % -- None (Room air) -- -- No Pain    03/21/25 1900 -- 72 20 126/58 84 98 % -- None (Room air) WDL -- No Pain    03/21/25 1845 -- 70 20 132/65 93 96 % -- None (Room air) -- -- No Pain    03/21/25 1830 -- 60 20 133/63 91 97 % -- None (Room air) WDL -- No Pain     03/21/25 1815 -- 68 20 128/60 86 97 % -- None (Room air) -- -- No Pain    03/21/25 1800 -- 68 20 131/61 88 96 % -- None (Room air) -- -- No Pain    03/21/25 1745 -- 64 20 124/60 87 97 % -- None (Room air) -- -- No Pain    03/21/25 1730 -- 63 18 121/59 81 96 % -- None (Room air) WDL -- No Pain    03/21/25 1715 -- 60 20 107/55 76 96 % -- None (Room air) -- -- No Pain    03/21/25 1700 -- 61 20 106/57 81 97 % -- None (Room air) -- -- No Pain    03/21/25 1645 -- 60 20 100/50 70 97 % -- None (Room air) -- -- No Pain    03/21/25 1630 -- 61 20 112/53 76 97 % -- None (Room air) WDL -- No Pain    03/21/25 1615 97 °F (36.1 °C) 62 18 116/56 80 97 % -- None (Room air) WDL -- No Pain    03/21/25 1600 -- 76 24 134/62 89 98 % -- None (Room air) -- -- No Pain    03/21/25 1545 -- 67 16 124/63 90 100 % -- -- -- -- --    03/21/25 1530 -- 71 24 140/60 87 99 % -- -- -- -- --    03/21/25 1527 97 °F (36.1 °C) 72 18 122/94 105 99 % 6 L/min Simple mask WDL -- --    03/21/25 1041 96.3 °F (35.7 °C) 65 -- 141/77 -- 98 % -- None (Room air) -- -- No Pain          Weight (last 2 days)       Date/Time Weight    03/21/25 1041 49.9 (110)            Pertinent Labs/Diagnostic Test Results:   Radiology:  Barium Swallow Esophagus Post-Op Day 1   Final Interpretation by Lino Shaw MD (03/22 1029)      No evidence of a leak.            Resident: Crow Thornton I, the attending radiologist, have reviewed the images and agree with the final report above.      Workstation performed: [id number]           Cardiology:  No orders to display     GI:  No orders to display           Results from last 7 days   Lab Units 03/22/25  0644 03/21/25 2110 03/21/25  1301   WBC Thousand/uL 6.57  --   --    HEMOGLOBIN g/dL 9.4*  --   --    I STAT HEMOGLOBIN g/dl  --   --  9.5*   HEMATOCRIT % 29.8*  --   --    HEMATOCRIT, ISTAT %  --   --  28*   PLATELETS Thousands/uL 149 138*  --          Results from last 7 days   Lab Units 03/22/25  0644 03/21/25  1301    SODIUM mmol/L 139  --    POTASSIUM mmol/L 4.6  --    CHLORIDE mmol/L 109*  --    CO2 mmol/L 27  --    CO2, I-STAT mmol/L  --  24   ANION GAP mmol/L 3*  --    BUN mg/dL 18  --    CREATININE mg/dL 1.08  --    EGFR ml/min/1.73sq m 62  --    CALCIUM mg/dL 8.2*  --    CALCIUM, IONIZED, ISTAT mmol/L  --  1.17   MAGNESIUM mg/dL 2.0  --              Results from last 7 days   Lab Units 03/22/25  0644   GLUCOSE RANDOM mg/dL 85       Results from last 7 days   Lab Units 03/21/25  1301   I STAT BASE EXC mmol/L -5*   I STAT O2 SAT % 100*   ISTAT PH ART  7.265*   I STAT ART PCO2 mm HG 49.4*   I STAT ART PO2 mm .0*   I STAT ART HCO3 mmol/L 22.4         Network Utilization Review Department  ATTENTION: Please call with any questions or concerns to [phone] and carefully listen to the prompts so that you are directed to the right person. All voicemails are confidential.   For Discharge needs, contact Care Management DC Support Team at [phone] opt. 2  Send all requests for admission clinical reviews, approved or denied determinations and any other requests to dedicated fax number below belonging to the campus where the patient is receiving treatment. List of dedicated fax numbers for the Facilities:  FACILITY NAME UR FAX NUMBER   ADMISSION DENIALS (Administrative/Medical Necessity) [phone]   DISCHARGE SUPPORT TEAM (NETWORK) [phone]   PARENT CHILD HEALTH (Maternity/NICU/Pediatrics) [phone]   [facility] [phone]   [facility] [phone]   [facility] [phone]   [facility] [phone]   [facility] [phone]   [facility] [phone]   [facility] [phone]   [facility] [phone]   [facility] [phone]   [facility]  [place] [facility] [phone]   [facility] [phone]   [facility] [phone]

## 2025-03-23 NOTE — MALNUTRITION/BMI
This medical record reflects one or more clinical indicators suggestive of malnutrition and/or morbid obesity.    Malnutrition Findings:                               BMI Findings:  Adult BMI Classifications: Underweight < 18.5        Body mass index is 16.73 kg/m².     See Nutrition note dated 3/23/25 for additional details.  Completed nutrition assessment is viewable in the nutrition documentation.

## 2025-03-23 NOTE — ANESTHESIA POSTPROCEDURE EVALUATION
Post-Op Assessment Note    CV Status:  Stable    Pain management: adequate       Mental Status:  Alert and awake   Hydration Status:  Euvolemic   PONV Controlled:  Controlled   Airway Patency:  Patent     Post Op Vitals Reviewed: Yes    No anethesia notable event occurred.    Staff: Anesthesiologist           Last Filed PACU Vitals:  Vitals Value Taken Time   Temp 97 °F (36.1 °C) 03/21/25 1615   Pulse 76 03/21/25 2004   /69 03/21/25 1930   Resp 18 03/21/25 2000   SpO2 93 % 03/21/25 2004   Vitals shown include unfiled device data.    Modified Edmundo:     Vitals Value Taken Time   Activity 2 03/21/25 2000   Respiration 2 03/21/25 2000   Circulation 2 03/21/25 2000   Consciousness 2 03/21/25 2000   Oxygen Saturation 2 03/21/25 2000     Modified Edmundo Score: 10

## 2025-03-23 NOTE — ASSESSMENT & PLAN NOTE
POD1 s/p robotic hiatal hernia repair with mesh and partial fundoplication    AVSS on room air  Hannon catheter now in place due to retention, 1.2L out      Plan:  FLD  Hannon placed due to retention, needs urology follow up  DVT ppx  Out of bed, encourage ambulation  Encourage incentive spirometer use  Pain and nausea control p.r.n.  Dispo planning

## 2025-03-23 NOTE — PROGRESS NOTES
Progress Note - Thoracic    Name: Raleigh Sandoval 84 y.o. male I MRN: [id number]  Unit/Bed#: [facility] 505-01 I Date of Admission: 3/21/2025   Date of Service: 3/23/2025 I Hospital Day: 2    Assessment & Plan  Hiatal hernia  POD1 s/p robotic hiatal hernia repair with mesh and partial fundoplication    AVSS on room air  Hannon catheter now in place due to retention, 1.2L out      Plan:  FLD  Hannon placed due to retention, needs urology follow up  DVT ppx  Out of bed, encourage ambulation  Encourage incentive spirometer use  Pain and nausea control p.r.n.  Dispo planning    Please contact the SecureChat role for the Thoracic  service with any questions/concerns.    24 Hour Events : None  Subjective : No acute events overnight.  Patient reports that his pain is well-controlled.  He has been out of bed and ambulating.  He is having bowel function and voiding.  No complaints, would like to go home.      Objective :  Temp:  [97.9 °F (36.6 °C)-98.4 °F (36.9 °C)] 98.4 °F (36.9 °C)  HR:  [67-74] 71  BP: (108-121)/(51-60) 121/59  Resp:  [16-19] 18  SpO2:  [94 %] 94 %  O2 Device: None (Room air)    I/O         03/20 0701  03/21 0700 03/21 0701  03/22 0700    I.V. (mL/kg)  1700 (34.1)    IV Piggyback  700    Total Intake(mL/kg)  2400 (48.1)    Emesis/NG output  10    Blood  20    Total Output  30    Net  +2370                Lines/Drains/Airways       Active Status       Name Placement date Placement time Site Days    Urethral Catheter 16 Fr. 03/22/25 2059  --  less than 1                  Physical Exam  Constitutional:       General: He is not in acute distress.  HENT:      Head: Normocephalic and atraumatic.      Right Ear: External ear normal.      Left Ear: External ear normal.      Nose: Nose normal.      Mouth/Throat:      Pharynx: Oropharynx is clear.   Eyes:      Extraocular Movements: Extraocular movements intact.   Cardiovascular:      Rate and Rhythm: Normal rate.   Pulmonary:      Effort: Pulmonary effort is normal.    Abdominal:      General: There is no distension.      Palpations: Abdomen is soft.      Tenderness: There is no abdominal tenderness.   Musculoskeletal:         General: No swelling.      Cervical back: Normal range of motion.   Skin:     General: Skin is warm and dry.      Comments: Incisions clean, dry, intact   Neurological:      Mental Status: He is alert. Mental status is at baseline.   Psychiatric:         Mood and Affect: Mood normal.           Lab Results: I have reviewed the following results:  Recent Labs     03/21/25  1301 03/21/25  2110 03/22/25  0644   WBC  --   --  6.57   HGB 9.5*  --  9.4*   HCT 28*  --  29.8*   PLT  --    < > 149   SODIUM  --   --  139   K  --   --  4.6   CL  --   --  109*   CO2 24  --  27   BUN  --   --  18   CREATININE  --   --  1.08   GLUC  --   --  85   CAIONIZED 1.17  --   --    MG  --   --  2.0    < > = values in this interval not displayed.       Imaging Results Review: No pertinent imaging studies reviewed.  Other Study Results Review: No additional pertinent studies reviewed.    VTE Pharmacologic Prophylaxis: VTE covered by:  heparin (porcine), Subcutaneous, 5,000 Units at 03/22/25 2049      VTE Mechanical Prophylaxis: sequential compression device

## 2025-03-24 ENCOUNTER — TRANSITIONAL CARE MANAGEMENT (OUTPATIENT)
Dept: FAMILY MEDICINE CLINIC | Facility: CLINIC | Age: 85
End: 2025-03-24

## 2025-03-24 NOTE — UTILIZATION REVIEW
NOTIFICATION OF ADMISSION DISCHARGE   This is a Notification of Discharge from [facility]. Please be advised that this patient has been discharge from our facility. Below you will find the admission and discharge date and time including the patient’s disposition.   UTILIZATION REVIEW CONTACT:  Ld Marte  Utilization   Network Utilization Review Department  Phone: [phone] x carefully listen to the prompts. All voicemails are confidential.  Email: [email or web address]     ADMISSION INFORMATION  PRESENTATION DATE: 3/21/2025  9:55 AM  OBERVATION ADMISSION DATE: N/A  INPATIENT ADMISSION DATE: 3/21/25  3:38 PM   DISCHARGE DATE: 3/23/2025  4:19 PM   DISPOSITION:Home/Self Care    Network Utilization Review Department  ATTENTION: Please call with any questions or concerns to [phone] and carefully listen to the prompts so that you are directed to the right person. All voicemails are confidential.   For Discharge needs, contact Care Management DC Support Team at [phone] opt. 2  Send all requests for admission clinical reviews, approved or denied determinations and any other requests to dedicated fax number below belonging to the campus where the patient is receiving treatment. List of dedicated fax numbers for the Facilities:  FACILITY NAME UR FAX NUMBER   ADMISSION DENIALS (Administrative/Medical Necessity) [phone]   DISCHARGE SUPPORT TEAM ([facility]) [phone]   PARENT CHILD HEALTH (Maternity/NICU/Pediatrics) [phone]   [facility] [phone]   [facility] [phone]   [facility] [phone]   [facility] [phone]   [facility] [phone]   [facility] [phone]   [facility] [phone]   [facility] [phone]   [facility] [phone]   Psychiatric hospital [phone]   [facility] [phone]   [facility] [phone]

## 2025-03-24 NOTE — DISCHARGE SUMMARY
"Discharge Summary - Surgery-General   Name: Raleigh Sandoval 84 y.o. 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 male with medical history of CABGx3 (2012), MVr (2023, CHF (EF 40%), history of tracheostomy with PEG, and a known large hiatal hernia. He presented to ED on 1/12/25 with obstruction that was able to be conservatively managed. He had an ARSH that improved with hydration.    He was seen while admitted by Dr. Castillo his Cardiologist. He is deemed acceptable risk for an intermediate risk surgery.    He is currently tolerating a FLD without any dysphagia. No nausea. No abdominal pain. No chest pain. Breathing is stable.    Procedures Performed: No orders of the defined types were placed in this encounter.      Summary of Hospital Course:  Patient presented on 3/21 for elective PEHR. Post-operative course complicated by urinary rentention requiring dukes placement. Patient tolerating diet.    Significant Findings, Care, Treatment and Services Provided:  Patient presented on 3/21 for elective PEHR. Post-operative course complicated by urinary rentention requiring dukes placement. Patient tolerating diet.    Complications: None    Condition at Discharge: good       Discharge instructions/Information to patient and family:   See After Visit Summary (AVS) for information provided to patient and family.      Provisions for Follow-Up Care:  See after visit summary for information related to follow-up care and any pertinent home health orders.      PCP: Gopi Victoria DO    Disposition: Home    Planned Readmission: No     Discharge " Medications:  See after visit summary for reconciled discharge medications provided to patient and family.      Discharge Statement:  I have spent a total time of 21 minutes in caring for this patient on the day of the visit/encounter. >30 minutes of time was spent on: Diagnostic results, Prognosis, Risks and benefits of tx options, Instructions for management, Patient and family education, Counseling / Coordination of care, Documenting in the medical record, Reviewing / ordering tests, medicine, procedures  , and Communicating with other healthcare professionals .

## 2025-03-28 ENCOUNTER — TELEPHONE (OUTPATIENT)
Age: 85
End: 2025-03-28

## 2025-03-28 NOTE — TELEPHONE ENCOUNTER
Called and spoke with the patient asking if he is taking his Flomax.  Patient is taking the medication, keep TOV for 3/31/2025.

## 2025-03-28 NOTE — TELEPHONE ENCOUNTER
Pt called stating he needs to have dukes catheter removed post hernia surgery,I was going to schedule 3/31/25 8:30/2:30 if this is incorrect contact pt otherwise he will present to office Mon.

## 2025-03-31 ENCOUNTER — PROCEDURE VISIT (OUTPATIENT)
Dept: UROLOGY | Facility: CLINIC | Age: 85
End: 2025-03-31
Payer: COMMERCIAL

## 2025-03-31 VITALS
HEIGHT: 68 IN | WEIGHT: 112 LBS | OXYGEN SATURATION: 96 % | HEART RATE: 64 BPM | BODY MASS INDEX: 16.97 KG/M2 | RESPIRATION RATE: 20 BRPM | SYSTOLIC BLOOD PRESSURE: 80 MMHG | DIASTOLIC BLOOD PRESSURE: 50 MMHG

## 2025-03-31 DIAGNOSIS — R33.9 URINARY RETENTION: Primary | ICD-10-CM

## 2025-03-31 LAB — POST-VOID RESIDUAL VOLUME, ML POC: 18 ML

## 2025-03-31 PROCEDURE — 51798 US URINE CAPACITY MEASURE: CPT

## 2025-03-31 NOTE — PROGRESS NOTES
Progress Note - Thoracic    Name: Raleigh Sandoval 84 y.o. male I MRN: [id number]  Unit/Bed#: [facility] 505-01 I Date of Admission: 3/21/2025   Date of Service: 3/31/2025 I Hospital Day: 2     Assessment & Plan  Hiatal hernia  POD1 s/p robotic hiatal hernia repair with mesh and partial fundoplication    AVSS on room air  Hannon catheter now in place due to retention, 1.2L out      Plan:  FLD  Hannon placed due to retention, needs urology follow up  DVT ppx  Out of bed, encourage ambulation  Encourage incentive spirometer use  Pain and nausea control p.r.n.  Dispo planning    We are monitoring the BMI of 16.7

## 2025-03-31 NOTE — PROGRESS NOTES
"3/31/2025    Raleigh Sandoval  1940  [id number]    Diagnosis  Chief Complaint    Urinary Retention         Patient presents for TOV  managed by our office. Patient developed post op urinary retention.    Plan  Keep yearly appointment scheduled for 12/9/2025 with AP.  Continue Flomax.  Procedure Hannon removal/voiding trial    Hannon catheter removed after deflation of an intact balloon. Patient tolerated well. Encouraged patient to hydrate well and return this afternoon for post void residual.  he knows he may return early if uncomfortable and unable to urinate. Patient agrees to this plan.    Patient returned this afternoon. Patient states able to void. Patient voided 0 ml while in office. Bladder ultrasound performed and PVR measured 18ml.            Vitals:    03/31/25 0837   BP: (!) 80/50   BP Location: Right arm   Patient Position: Sitting   Cuff Size: Standard   Pulse: 64   Resp: 20   SpO2: 96%   Weight: 50.8 kg (112 lb)   Height: 5' 8\" (1.727 m)           Mila Moya RN       "

## 2025-04-01 ENCOUNTER — TELEPHONE (OUTPATIENT)
Dept: CARDIAC SURGERY | Facility: CLINIC | Age: 85
End: 2025-04-01

## 2025-04-01 ENCOUNTER — OFFICE VISIT (OUTPATIENT)
Dept: FAMILY MEDICINE CLINIC | Facility: CLINIC | Age: 85
End: 2025-04-01
Payer: COMMERCIAL

## 2025-04-01 VITALS
BODY MASS INDEX: 17.03 KG/M2 | HEART RATE: 80 BPM | HEIGHT: 68 IN | DIASTOLIC BLOOD PRESSURE: 58 MMHG | WEIGHT: 112.4 LBS | SYSTOLIC BLOOD PRESSURE: 96 MMHG | OXYGEN SATURATION: 96 % | TEMPERATURE: 97.9 F

## 2025-04-01 DIAGNOSIS — K44.9 HIATAL HERNIA: ICD-10-CM

## 2025-04-01 DIAGNOSIS — Z78.9 TRANSITION OF CARE: Primary | ICD-10-CM

## 2025-04-01 DIAGNOSIS — R33.9 URINARY RETENTION: ICD-10-CM

## 2025-04-01 PROCEDURE — 99495 TRANSJ CARE MGMT MOD F2F 14D: CPT | Performed by: FAMILY MEDICINE

## 2025-04-01 NOTE — PROGRESS NOTES
"Transition of Care Visit  Name: Raleigh Sandoval      : 1940      MRN: [id number]  Encounter Provider: Gopi Victoria DO  Encounter Date: 2025   Encounter department: [facility] PRIMARY CARE  Chief Complaint   Patient presents with    Follow-up     Hospital F/U Admitting Diagnosis: Hiatal hernia   Mesenteroaxial gastric volvulus       Patient Instructions   TCM today and overall doing well and has appt to see surgery next Monday. No urinary retention. No cp or sob, or ha. No fever. Take all meds as directed.     Assessment & Plan  Transition of care  See General Surgery as directed next week - Monday       Hiatal hernia  Stable after surgery       Urinary retention  Stable and without complaints            History of Present Illness     Transitional Care Management Review:   Raleigh Sandoval is a 84 y.o. male here for TCM follow up.     During the TCM phone call patient stated:  TCM Call (since 3/18/2025)       Date and time call was made  3/24/2025 10:59 AM    Hospital care reviewed  Records reviewed    Patient was hospitialized at  [facility]    Date of Admission  25    Date of discharge  25    Diagnosis  Hiatal Hernia    Disposition  Home    Were the patients medications reviewed and updated  Yes    Current Symptoms  None          TCM Call (since 3/18/2025)       Post hospital issues  None    Scheduled for follow up?  Yes    Did you obtain your prescribed medications  Yes    Do you need help managing your prescriptions or medications  No    Is transportation to your appointment needed  No    I have advised the patient to call PCP with any new or worsening symptoms  Yasmin Soto MA    Living Arrangements  Children          Here for TCM and overall stable and general surgery note reviewed:      HPI: \"Raleigh Sandoval is a 84 y.o. male with medical history of CABGx3 (), MVr (, CHF (EF 40%), history of tracheostomy with PEG, and a known large hiatal " "hernia. He presented to ED on 1/12/25 with obstruction that was able to be conservatively managed. He had an ARSH that improved with hydration.    He was seen while admitted by Dr. Castillo his Cardiologist. He is deemed acceptable risk for an intermediate risk surgery.    He is currently tolerating a FLD without any dysphagia. No nausea. No abdominal pain. No chest pain. Breathing is stable.     Procedures Performed: No orders of the defined types were placed in this encounter.        Summary of Hospital Course:  Patient presented on 3/21 for elective PEHR. Post-operative course complicated by urinary rentention requiring dukes placement. Patient tolerating diet.     Significant Findings, Care, Treatment and Services Provided:  Patient presented on 3/21 for elective PEHR. Post-operative course complicated by urinary rentention requiring dukes placement. Patient tolerating diet.     Complications: None     Condition at Discharge: good      Overall feeling well with no fever or cp or sob and no blood in stool. Urinating without difficulty.       Review of Systems   Constitutional: Negative.    HENT: Negative.     Eyes: Negative.    Respiratory: Negative.     Cardiovascular: Negative.    Gastrointestinal: Negative.    Endocrine: Negative.    Genitourinary: Negative.    Musculoskeletal: Negative.    Skin: Negative.    Allergic/Immunologic: Negative.    Neurological: Negative.    Hematological: Negative.    Psychiatric/Behavioral: Negative.       Objective   BP 96/58 (BP Location: Left arm, Patient Position: Sitting, Cuff Size: Standard)   Pulse 80   Temp 97.9 °F (36.6 °C) (Temporal)   Ht 5' 8\" (1.727 m)   Wt 51 kg (112 lb 6.4 oz)   SpO2 96%   BMI 17.09 kg/m²     Physical Exam  Constitutional:       Appearance: He is well-developed.   HENT:      Head: Normocephalic and atraumatic.      Right Ear: External ear normal.      Left Ear: External ear normal.      Nose: Nose normal.   Eyes:      Conjunctiva/sclera: " Conjunctivae normal.      Pupils: Pupils are equal, round, and reactive to light.   Cardiovascular:      Rate and Rhythm: Normal rate and regular rhythm.      Pulses: Normal pulses.      Heart sounds: Normal heart sounds.   Pulmonary:      Effort: Pulmonary effort is normal.      Breath sounds: Normal breath sounds.   Abdominal:      General: Abdomen is flat. Bowel sounds are normal.      Palpations: Abdomen is soft.   Musculoskeletal:         General: Normal range of motion.      Cervical back: Normal range of motion and neck supple.   Skin:     General: Skin is warm and dry.      Capillary Refill: Capillary refill takes less than 2 seconds.   Neurological:      General: No focal deficit present.      Mental Status: He is alert and oriented to person, place, and time. Mental status is at baseline.      Deep Tendon Reflexes: Reflexes are normal and symmetric.   Psychiatric:         Mood and Affect: Mood normal.         Behavior: Behavior normal.         Thought Content: Thought content normal.         Judgment: Judgment normal.       Medications have been reviewed by provider in current encounter    Administrative Statements   I have spent a total time of 40 minutes in caring for this patient on the day of the visit/encounter including Diagnostic results, Prognosis, Risks and benefits of tx options, Instructions for management, Patient and family education, Importance of tx compliance, Risk factor reductions, Impressions, Counseling / Coordination of care, Documenting in the medical record, Reviewing/placing orders in the medical record (including tests, medications, and/or procedures), and Obtaining or reviewing history  .

## 2025-04-01 NOTE — PATIENT INSTRUCTIONS
TCM today and overall doing well and has appt to see surgery next Monday. No urinary retention. No cp or sob, or ha. No fever. Take all meds as directed.

## 2025-04-04 NOTE — TELEPHONE ENCOUNTER
Surgeon/Procedure/Date: , hernia repair   Post op appt: 4/7          1. Diet: (full liquid diet for 4-5 days following discharge/transition to soft foods on day 5)           (continue to take PPI until follow up visit. Sit upright for 4 hours after eating, prior to going to bed)           (nausea, vomiting, difficulty or pain with swallowing?)    Drinks, soups broth, fish, jello mashed potatoes. He is unsure if he is still taking PPI.  Denies nausea, vomiting, difficulty or pain with swallowing.     2. Constipation: (Yes: colace 100 bid, senokot 2 tabs qhs or senokot S 2 tabs qhs. No BM: Dulcolax/Miralax/suppository)    Had bowel movement yesterday but feels like he is full advised to try colace he states PCP recommended apricot juice.      3. Pain Management: (Tylenol 650 mg q6 hrs, Oxycodone 5-10 mg q4-6 hrs prn, +/- Advil 600 mg TID prn)    No pain. Takes tylenol as needed.     4. Fever/Chills/Cough/Shortness of breath: (Call for temps over 100.4)    Denies fever chills cough or SOB.     5. Incisions: (Redness/warmth/drainage? Shower okay, no baths)    Denies warmth, drainage or redness.     6. Activity: (Walking/stationary biking. No lifting over 10-15 lbs)    Walking well getting around as normal.

## 2025-04-05 NOTE — ASSESSMENT & PLAN NOTE
84yM initially seen for a large partially obstructing hiatal hernia. He is now s/p elective robotic assisted hiatal hernia repair with partial fundoplication.     He is recovering excellently and asked about returning to work.   He is tolerating a diet with minimal dysphagia. He will continue to try more soft food and liberalize his diet. He may return to work with no heavy lifting. I will see him back in the office in 4 weeks.

## 2025-04-05 NOTE — PROGRESS NOTES
"Name: Raleigh Sandoval      : 1940      MRN: [id number]  Encounter Provider: Dio Amin DO  Encounter Date: 2025   Encounter department: [facility]  :  Assessment & Plan  Hiatal hernia  84yM initially seen for a large partially obstructing hiatal hernia. He is now s/p elective robotic assisted hiatal hernia repair with partial fundoplication.     He is recovering excellently and asked about returning to work.   He is tolerating a diet with minimal dysphagia. He will continue to try more soft food and liberalize his diet. He may return to work with no heavy lifting. I will see him back in the office in 4 weeks.            Thoracic History   Diagnosis: Large hiatal hernia  Procedures/Surgeries: 3/21/25 Robotic hiatal hernia repair with Quinten  Pathology: N/a  Adjuvant Therapy: N/a  Problem   Hiatal Hernia        History of Present Illness   HPI  Raleigh Sandoval is a 84 y.o. male who presents for his first postoperative follow-up appointment.     From my preoperative notes: \"Raleigh Sandoval is a 84 y.o. male with medical history of CABGx3 (), MVr (, CHF (EF 40%), history of tracheostomy with PEG, and a known large hiatal hernia. He presented to ED on 25 with obstruction that was able to be conservatively managed. He had an ARSH that improved with hydration.   He was seen while admitted by Dr. Castillo his Cardiologist. He is deemed acceptable risk for an intermediate risk surgery.   He is currently tolerating a FLD without any dysphagia. No nausea. No abdominal pain. No chest pain. Breathing is stable. \"    He underwent a robotic hiatal hernia repair with a Quinten fundoplication on 3/21/25. He did well postoperatively.     Data:  The following results contain my personal interpretation and summarization.   25 UGI: Moderate-large hiatal hernia. Distended esophagus.   25 CTAP: Large hiatal hernia with gastric distention and volvulus. " "    Review of Systems   Constitutional:  Negative for chills and fever.   HENT:  Positive for trouble swallowing. Negative for voice change.    Eyes:  Negative for photophobia and visual disturbance.   Respiratory:  Negative for chest tightness and shortness of breath.    Cardiovascular:  Negative for chest pain and palpitations.   Gastrointestinal:  Negative for abdominal pain, nausea and vomiting.   Musculoskeletal:  Negative for back pain and gait problem.   Skin:  Negative for rash and wound.   Neurological:  Negative for dizziness, weakness and light-headedness.   All other systems reviewed and are negative.          Objective   /52   Pulse 72   Temp (!) 97.3 °F (36.3 °C)   Ht 5' 8\" (1.727 m)   Wt 51 kg (112 lb 7 oz)   SpO2 99%   BMI 17.10 kg/m²     Pain Screening:  Pain Score: 0-No pain  ECOG    Physical Exam  Vitals reviewed.   Constitutional:       General: He is not in acute distress.     Appearance: Normal appearance.   HENT:      Head: Normocephalic and atraumatic.   Cardiovascular:      Rate and Rhythm: Normal rate and regular rhythm.      Pulses: Normal pulses.      Heart sounds: Normal heart sounds.   Pulmonary:      Effort: Pulmonary effort is normal. No respiratory distress.      Breath sounds: Normal breath sounds.   Abdominal:      General: Abdomen is flat.      Palpations: Abdomen is soft.      Tenderness: There is no abdominal tenderness.   Musculoskeletal:      Cervical back: Neck supple.      Right lower leg: No edema.      Left lower leg: No edema.   Lymphadenopathy:      Cervical: No cervical adenopathy.   Skin:     General: Skin is warm.      Comments: Well-healed abdominal surgical incisions.    Neurological:      General: No focal deficit present.      Mental Status: He is alert and oriented to person, place, and time. Mental status is at baseline.   Psychiatric:         Mood and Affect: Mood normal.         Behavior: Behavior normal.         Thought Content: Thought content " normal.         Judgment: Judgment normal.         Labs:       Pathology: I have reviewed pathology reports described above.

## 2025-04-07 ENCOUNTER — OFFICE VISIT (OUTPATIENT)
Dept: CARDIAC SURGERY | Facility: CLINIC | Age: 85
End: 2025-04-07

## 2025-04-07 VITALS
WEIGHT: 112.43 LBS | DIASTOLIC BLOOD PRESSURE: 52 MMHG | SYSTOLIC BLOOD PRESSURE: 106 MMHG | HEIGHT: 68 IN | TEMPERATURE: 97.3 F | OXYGEN SATURATION: 99 % | BODY MASS INDEX: 17.04 KG/M2 | HEART RATE: 72 BPM

## 2025-04-07 DIAGNOSIS — K44.9 HIATAL HERNIA: Primary | ICD-10-CM

## 2025-04-07 PROCEDURE — 99024 POSTOP FOLLOW-UP VISIT: CPT | Performed by: SURGERY

## 2025-04-07 NOTE — LETTER
April 7, 2025     Patient: Raleigh Sandoval  YOB: 1940  Date of Visit: 4/7/2025      To Whom it May Concern:    Raleigh Sandoval is under my professional care. Raleigh was seen in my office on 4/7/2025.    He may return to work at this time. No heavy lifting greater than 15 lbs.     If you have any questions or concerns, please don't hesitate to call.          Sincerely,          Dio Amin,         CC: No Recipients

## 2025-04-21 ENCOUNTER — TELEPHONE (OUTPATIENT)
Dept: HEMATOLOGY ONCOLOGY | Facility: CLINIC | Age: 85
End: 2025-04-21

## 2025-04-21 NOTE — TELEPHONE ENCOUNTER
Called PT to reschedule appt.  No answer, LVM Due to provider change in schedule the Appt for 5/5 with Dr. Amin needed to be rescheduled with our Thoracic AP for 5/7 at 9AM.  I also left the [employer] number incase the date and time are a inconvenience for PT.

## 2025-05-02 ENCOUNTER — TELEPHONE (OUTPATIENT)
Dept: CARDIAC SURGERY | Facility: CLINIC | Age: 85
End: 2025-05-02

## 2025-05-02 NOTE — TELEPHONE ENCOUNTER
Called pt, left message that we need to cx appt on 5/7/25 due to Aps being needed in the OR.  Offered 5/5/25 at 9:00am.  Patient was instructed to call the office if this date/time works.  If not, we can reschedule to any availability with the Aps.

## 2025-05-05 NOTE — TELEPHONE ENCOUNTER
I called and spoke with the pt's daughter Jaz in regards to rescheduling the pt's follow-up appointment with Dr. Amin. I have rescheduled him for 06/12/2025 @   8 AM. Jaz verbalized understanding and was appreciative of the call back.

## 2025-05-05 NOTE — TELEPHONE ENCOUNTER
Patient calling back, stated he will be going to work at 2pm, please call him back to assist with rescheduling post op visit at [phone].

## 2025-06-06 DIAGNOSIS — N40.1 BENIGN PROSTATIC HYPERPLASIA WITH LOWER URINARY TRACT SYMPTOMS, SYMPTOM DETAILS UNSPECIFIED: ICD-10-CM

## 2025-06-06 RX ORDER — TAMSULOSIN HYDROCHLORIDE 0.4 MG/1
0.8 CAPSULE ORAL
Qty: 180 CAPSULE | Refills: 1 | Status: SHIPPED | OUTPATIENT
Start: 2025-06-06

## 2025-06-06 RX ORDER — FINASTERIDE 5 MG/1
5 TABLET, FILM COATED ORAL DAILY
Qty: 90 TABLET | Refills: 1 | Status: SHIPPED | OUTPATIENT
Start: 2025-06-06

## 2025-06-06 NOTE — TELEPHONE ENCOUNTER
Reason for call:   [x] Refill   [] Prior Auth  [] Other:     Office:   [] PCP/Provider -   [x] Specialty/Provider - Urology    Medication:   tamsulosin (FLOMAX) 0.4 mg/Take 2 capsules (0.8 mg total) by mouth daily at bedtime     finasteride (PROSCAR) 5 mg/Take 1 tablet (5 mg total) by mouth daily      Quantity:     Pharmacy:   [facility] - [address] [place] [address] Pharmacy   Does the patient have enough for 3 days?   [x] Yes   [] No - Send as HP to POD    Mail Away Pharmacy   Does the patient have enough for 10 days?   [] Yes   [] No - Send as HP to POD

## 2025-06-09 ENCOUNTER — TELEPHONE (OUTPATIENT)
Age: 85
End: 2025-06-09

## 2025-06-09 NOTE — TELEPHONE ENCOUNTER
Returned call to patient and spoke with him. He requests to R/S his appt on 6/12 and states he needs a morning appt time. He is in agreement with this appt R/S to 6/23 @ 8:30am here in our [place] office. All appt details reviewed.

## 2025-06-16 ENCOUNTER — OFFICE VISIT (OUTPATIENT)
Dept: FAMILY MEDICINE CLINIC | Facility: CLINIC | Age: 85
End: 2025-06-16
Payer: COMMERCIAL

## 2025-06-16 VITALS
DIASTOLIC BLOOD PRESSURE: 60 MMHG | WEIGHT: 118.2 LBS | HEIGHT: 65 IN | TEMPERATURE: 97.2 F | SYSTOLIC BLOOD PRESSURE: 128 MMHG | OXYGEN SATURATION: 99 % | HEART RATE: 67 BPM | BODY MASS INDEX: 19.69 KG/M2

## 2025-06-16 DIAGNOSIS — F41.9 ANXIETY: ICD-10-CM

## 2025-06-16 DIAGNOSIS — Z95.1 HX OF CABG: ICD-10-CM

## 2025-06-16 DIAGNOSIS — E78.5 HYPERLIPIDEMIA, UNSPECIFIED HYPERLIPIDEMIA TYPE: ICD-10-CM

## 2025-06-16 DIAGNOSIS — E78.2 MIXED HYPERLIPIDEMIA: Chronic | ICD-10-CM

## 2025-06-16 DIAGNOSIS — N40.1 BENIGN PROSTATIC HYPERPLASIA WITH LOWER URINARY TRACT SYMPTOMS, SYMPTOM DETAILS UNSPECIFIED: ICD-10-CM

## 2025-06-16 DIAGNOSIS — Z00.00 HEALTH CARE MAINTENANCE: Primary | ICD-10-CM

## 2025-06-16 DIAGNOSIS — I25.5: ICD-10-CM

## 2025-06-16 DIAGNOSIS — I50.9: ICD-10-CM

## 2025-06-16 PROCEDURE — 99397 PER PM REEVAL EST PAT 65+ YR: CPT | Performed by: FAMILY MEDICINE

## 2025-06-16 RX ORDER — CITALOPRAM HYDROBROMIDE 10 MG/1
10 TABLET ORAL DAILY
Qty: 90 TABLET | Refills: 2 | Status: SHIPPED | OUTPATIENT
Start: 2025-06-16

## 2025-06-16 RX ORDER — ATORVASTATIN CALCIUM 40 MG/1
40 TABLET, FILM COATED ORAL
Qty: 90 TABLET | Refills: 2 | Status: SHIPPED | OUTPATIENT
Start: 2025-06-16

## 2025-06-16 RX ORDER — LISINOPRIL 5 MG/1
5 TABLET ORAL DAILY
Qty: 90 TABLET | Refills: 2 | Status: SHIPPED | OUTPATIENT
Start: 2025-06-16

## 2025-06-16 NOTE — PATIENT INSTRUCTIONS
Medicare Preventive Visit Patient Instructions  Thank you for completing your Welcome to Medicare Visit or Medicare Annual Wellness Visit today. Your next wellness visit will be due in one year (6/17/2026).  The screening/preventive services that you may require over the next 5-10 years are detailed below. Some tests may not apply to you based off risk factors and/or age. Screening tests ordered at today's visit but not completed yet may show as past due. Also, please note that scanned in results may not display below.  Preventive Screenings:  Service Recommendations Previous Testing/Comments   Colorectal Cancer Screening  Colonoscopy    Fecal Occult Blood Test (FOBT)/Fecal Immunochemical Test (FIT)  Fecal DNA/Cologuard Test  Flexible Sigmoidoscopy Age: 45-75 years old   Colonoscopy: every 10 years (May be performed more frequently if at higher risk)  OR  FOBT/FIT: every 1 year  OR  Cologuard: every 3 years  OR  Sigmoidoscopy: every 5 years  Screening may be recommended earlier than age 45 if at higher risk for colorectal cancer. Also, an individualized decision between you and your healthcare provider will decide whether screening between the ages of 76-85 would be appropriate. Colonoscopy: 01/13/2022  FOBT/FIT: Not on file  Cologuard: Not on file  Sigmoidoscopy: Not on file    Screening Current     Prostate Cancer Screening Individualized decision between patient and health care provider in men between ages of 55-69   Medicare will cover every 12 months beginning on the day after your 50th birthday PSA: No results in last 5 years     Screening Not Indicated     Hepatitis C Screening Once for adults born between 1945 and 1965  More frequently in patients at high risk for Hepatitis C Hep C Antibody: Not on file        Diabetes Screening 1-2 times per year if you're at risk for diabetes or have pre-diabetes Fasting glucose: 100 mg/dL (3/11/2025)  A1C: 6.0 % (12/14/2024)  Screening Current   Cholesterol Screening Once  every 5 years if you don't have a lipid disorder. May order more often based on risk factors. Lipid panel: 06/07/2024  Screening Not Indicated  History Lipid Disorder      Other Preventive Screenings Covered by Medicare:  Abdominal Aortic Aneurysm (AAA) Screening: covered once if your at risk. You're considered to be at risk if you have a family history of AAA or a male between the age of 65-75 who smoking at least 100 cigarettes in your lifetime.  Lung Cancer Screening: covers low dose CT scan once per year if you meet all of the following conditions: (1) Age 55-77; (2) No signs or symptoms of lung cancer; (3) Current smoker or have quit smoking within the last 15 years; (4) You have a tobacco smoking history of at least 20 pack years (packs per day x number of years you smoked); (5) You get a written order from a healthcare provider.  Glaucoma Screening: covered annually if you're considered high risk: (1) You have diabetes OR (2) Family history of glaucoma OR (3)  aged 50 and older OR (4)  American aged 65 and older  Osteoporosis Screening: covered every 2 years if you meet one of the following conditions: (1) Have a vertebral abnormality; (2) On glucocorticoid therapy for more than 3 months; (3) Have primary hyperparathyroidism; (4) On osteoporosis medications and need to assess response to drug therapy.  HIV Screening: covered annually if you're between the age of 15-65. Also covered annually if you are younger than 15 and older than 65 with risk factors for HIV infection. For pregnant patients, it is covered up to 3 times per pregnancy.    Immunizations:  Immunization Recommendations   Influenza Vaccine Annual influenza vaccination during flu season is recommended for all persons aged >= 6 months who do not have contraindications   Pneumococcal Vaccine   * Pneumococcal conjugate vaccine = PCV13 (Prevnar 13), PCV15 (Vaxneuvance), PCV20 (Prevnar 20)  * Pneumococcal polysaccharide vaccine  = PPSV23 (Pneumovax) Adults 19-63 yo with certain risk factors or if 65+ yo  If never received any pneumonia vaccine: recommend Prevnar 20 (PCV20)  Give PCV20 if previously received 1 dose of PCV13 or PPSV23   Hepatitis B Vaccine 3 dose series if at intermediate or high risk (ex: diabetes, end stage renal disease, liver disease)   Respiratory syncytial virus (RSV) Vaccine - COVERED BY MEDICARE PART D  * RSVPreF3 (Arexvy) CDC recommends that adults 60 years of age and older may receive a single dose of RSV vaccine using shared clinical decision-making (SCDM)   Tetanus (Td) Vaccine - COST NOT COVERED BY MEDICARE PART B Following completion of primary series, a booster dose should be given every 10 years to maintain immunity against tetanus. Td may also be given as tetanus wound prophylaxis.   Tdap Vaccine - COST NOT COVERED BY MEDICARE PART B Recommended at least once for all adults. For pregnant patients, recommended with each pregnancy.   Shingles Vaccine (Shingrix) - COST NOT COVERED BY MEDICARE PART B  2 shot series recommended in those 19 years and older who have or will have weakened immune systems or those 50 years and older     Health Maintenance Due:      Topic Date Due    Colorectal Cancer Screening  Discontinued     Immunizations Due:      Topic Date Due    DTaP,Tdap,and Td Vaccines (1 - Tdap) Never done    COVID-19 Vaccine (4 - 2024-25 season) 09/01/2024    Influenza Vaccine (Season Ended) 09/01/2025     Advance Directives   What are advance directives?  Advance directives are legal documents that state your wishes and plans for medical care. These plans are made ahead of time in case you lose your ability to make decisions for yourself. Advance directives can apply to any medical decision, such as the treatments you want, and if you want to donate organs.   What are the types of advance directives?  There are many types of advance directives, and each state has rules about how to use them. You may choose  a combination of any of the following:  Living will:  This is a written record of the treatment you want. You can also choose which treatments you do not want, which to limit, and which to stop at a certain time. This includes surgery, medicine, IV fluid, and tube feedings.   Durable power of  for healthcare (DPAHC):  This is a written record that states who you want to make healthcare choices for you when you are unable to make them for yourself. This person, called a proxy, is usually a family member or a friend. You may choose more than 1 proxy.  Do not resuscitate (DNR) order:  A DNR order is used in case your heart stops beating or you stop breathing. It is a request not to have certain forms of treatment, such as CPR. A DNR order may be included in other types of advance directives.  Medical directive:  This covers the care that you want if you are in a coma, near death, or unable to make decisions for yourself. You can list the treatments you want for each condition. Treatment may include pain medicine, surgery, blood transfusions, dialysis, IV or tube feedings, and a ventilator (breathing machine).  Values history:  This document has questions about your views, beliefs, and how you feel and think about life. This information can help others choose the care that you would choose.  Why are advance directives important?  An advance directive helps you control your care. Although spoken wishes may be used, it is better to have your wishes written down. Spoken wishes can be misunderstood, or not followed. Treatments may be given even if you do not want them. An advance directive may make it easier for your family to make difficult choices about your care.   Fall Prevention    Fall prevention  includes ways to make your home and other areas safer. It also includes ways you can move more carefully to prevent a fall. Health conditions that cause changes in your blood pressure, vision, or muscle strength and  coordination may increase your risk for falls. Medicines may also increase your risk for falls if they make you dizzy, weak, or sleepy.   Fall prevention tips:   Stand or sit up slowly.    Use assistive devices as directed.    Wear shoes that fit well and have soles that .    Wear a personal alarm.    Stay active.    Manage your medical conditions.    Home Safety Tips:  Add items to prevent falls in the bathroom.    Keep paths clear.    Install bright lights in your home.    Keep items you use often on shelves within reach.    Paint or place reflective tape on the edges of your stairs.     © Copyright [employer] 2018 Information is for End User's use only and may not be sold, redistributed or otherwise used for commercial purposes. All illustrations and images included in CareNotes® are the copyrighted property of [employer] or [employer]    Get labs as directed. See Cardiology as directed for hx of CABG, and follow a low cholesterol diet and take Citalopram as directed for anxiety, See urology for BPH and take all meds as directed and refilled meds for HTN and cholesterol and anxiety today. AWV is UTD and home is safe and gets help from family at home.

## 2025-06-16 NOTE — ASSESSMENT & PLAN NOTE
Low cholesterol diet  Orders:    Comprehensive metabolic panel; Future    Lipid Panel with Direct LDL reflex; Future

## 2025-06-16 NOTE — ASSESSMENT & PLAN NOTE
Low cholesterol diet encouraged  Orders:    Comprehensive metabolic panel; Future    Lipid Panel with Direct LDL reflex; Future    atorvastatin (LIPITOR) 40 mg tablet; Take 1 tablet (40 mg total) by mouth daily at bedtime

## 2025-06-16 NOTE — PROGRESS NOTES
Name: Raleigh Sandoval      : 1940      MRN: [id number]  Encounter Provider: Gopi Victoria DO  Encounter Date: 2025   Encounter department: [facility] PRIMARY CARE  :  Chief Complaint   Patient presents with    Medicare Wellness Visit     Patient Instructions   Medicare Preventive Visit Patient Instructions  Thank you for completing your Welcome to Medicare Visit or Medicare Annual Wellness Visit today. Your next wellness visit will be due in one year (2026).  The screening/preventive services that you may require over the next 5-10 years are detailed below. Some tests may not apply to you based off risk factors and/or age. Screening tests ordered at today's visit but not completed yet may show as past due. Also, please note that scanned in results may not display below.  Preventive Screenings:  Service Recommendations Previous Testing/Comments   Colorectal Cancer Screening  Colonoscopy    Fecal Occult Blood Test (FOBT)/Fecal Immunochemical Test (FIT)  Fecal DNA/Cologuard Test  Flexible Sigmoidoscopy Age: 45-75 years old   Colonoscopy: every 10 years (May be performed more frequently if at higher risk)  OR  FOBT/FIT: every 1 year  OR  Cologuard: every 3 years  OR  Sigmoidoscopy: every 5 years  Screening may be recommended earlier than age 45 if at higher risk for colorectal cancer. Also, an individualized decision between you and your healthcare provider will decide whether screening between the ages of 76-85 would be appropriate. Colonoscopy: 2022  FOBT/FIT: Not on file  Cologuard: Not on file  Sigmoidoscopy: Not on file    Screening Current     Prostate Cancer Screening Individualized decision between patient and health care provider in men between ages of 55-69   Medicare will cover every 12 months beginning on the day after your 50th birthday PSA: No results in last 5 years     Screening Not Indicated     Hepatitis C Screening Once for adults born between 1945 and  1965  More frequently in patients at high risk for Hepatitis C Hep C Antibody: Not on file        Diabetes Screening 1-2 times per year if you're at risk for diabetes or have pre-diabetes Fasting glucose: 100 mg/dL (3/11/2025)  A1C: 6.0 % (12/14/2024)  Screening Current   Cholesterol Screening Once every 5 years if you don't have a lipid disorder. May order more often based on risk factors. Lipid panel: 06/07/2024  Screening Not Indicated  History Lipid Disorder      Other Preventive Screenings Covered by Medicare:  Abdominal Aortic Aneurysm (AAA) Screening: covered once if your at risk. You're considered to be at risk if you have a family history of AAA or a male between the age of 65-75 who smoking at least 100 cigarettes in your lifetime.  Lung Cancer Screening: covers low dose CT scan once per year if you meet all of the following conditions: (1) Age 55-77; (2) No signs or symptoms of lung cancer; (3) Current smoker or have quit smoking within the last 15 years; (4) You have a tobacco smoking history of at least 20 pack years (packs per day x number of years you smoked); (5) You get a written order from a healthcare provider.  Glaucoma Screening: covered annually if you're considered high risk: (1) You have diabetes OR (2) Family history of glaucoma OR (3)  aged 50 and older OR (4)  American aged 65 and older  Osteoporosis Screening: covered every 2 years if you meet one of the following conditions: (1) Have a vertebral abnormality; (2) On glucocorticoid therapy for more than 3 months; (3) Have primary hyperparathyroidism; (4) On osteoporosis medications and need to assess response to drug therapy.  HIV Screening: covered annually if you're between the age of 15-65. Also covered annually if you are younger than 15 and older than 65 with risk factors for HIV infection. For pregnant patients, it is covered up to 3 times per pregnancy.    Immunizations:  Immunization Recommendations    Influenza Vaccine Annual influenza vaccination during flu season is recommended for all persons aged >= 6 months who do not have contraindications   Pneumococcal Vaccine   * Pneumococcal conjugate vaccine = PCV13 (Prevnar 13), PCV15 (Vaxneuvance), PCV20 (Prevnar 20)  * Pneumococcal polysaccharide vaccine = PPSV23 (Pneumovax) Adults 19-65 yo with certain risk factors or if 65+ yo  If never received any pneumonia vaccine: recommend Prevnar 20 (PCV20)  Give PCV20 if previously received 1 dose of PCV13 or PPSV23   Hepatitis B Vaccine 3 dose series if at intermediate or high risk (ex: diabetes, end stage renal disease, liver disease)   Respiratory syncytial virus (RSV) Vaccine - COVERED BY MEDICARE PART D  * RSVPreF3 (Arexvy) CDC recommends that adults 60 years of age and older may receive a single dose of RSV vaccine using shared clinical decision-making (SCDM)   Tetanus (Td) Vaccine - COST NOT COVERED BY MEDICARE PART B Following completion of primary series, a booster dose should be given every 10 years to maintain immunity against tetanus. Td may also be given as tetanus wound prophylaxis.   Tdap Vaccine - COST NOT COVERED BY MEDICARE PART B Recommended at least once for all adults. For pregnant patients, recommended with each pregnancy.   Shingles Vaccine (Shingrix) - COST NOT COVERED BY MEDICARE PART B  2 shot series recommended in those 19 years and older who have or will have weakened immune systems or those 50 years and older     Health Maintenance Due:      Topic Date Due    Colorectal Cancer Screening  Discontinued     Immunizations Due:      Topic Date Due    DTaP,Tdap,and Td Vaccines (1 - Tdap) Never done    COVID-19 Vaccine (4 - 2024-25 season) 09/01/2024    Influenza Vaccine (Season Ended) 09/01/2025     Advance Directives   What are advance directives?  Advance directives are legal documents that state your wishes and plans for medical care. These plans are made ahead of time in case you lose your  ability to make decisions for yourself. Advance directives can apply to any medical decision, such as the treatments you want, and if you want to donate organs.   What are the types of advance directives?  There are many types of advance directives, and each state has rules about how to use them. You may choose a combination of any of the following:  Living will:  This is a written record of the treatment you want. You can also choose which treatments you do not want, which to limit, and which to stop at a certain time. This includes surgery, medicine, IV fluid, and tube feedings.   Durable power of  for healthcare (DPAHC):  This is a written record that states who you want to make healthcare choices for you when you are unable to make them for yourself. This person, called a proxy, is usually a family member or a friend. You may choose more than 1 proxy.  Do not resuscitate (DNR) order:  A DNR order is used in case your heart stops beating or you stop breathing. It is a request not to have certain forms of treatment, such as CPR. A DNR order may be included in other types of advance directives.  Medical directive:  This covers the care that you want if you are in a coma, near death, or unable to make decisions for yourself. You can list the treatments you want for each condition. Treatment may include pain medicine, surgery, blood transfusions, dialysis, IV or tube feedings, and a ventilator (breathing machine).  Values history:  This document has questions about your views, beliefs, and how you feel and think about life. This information can help others choose the care that you would choose.  Why are advance directives important?  An advance directive helps you control your care. Although spoken wishes may be used, it is better to have your wishes written down. Spoken wishes can be misunderstood, or not followed. Treatments may be given even if you do not want them. An advance directive may make it easier  for your family to make difficult choices about your care.   Fall Prevention    Fall prevention  includes ways to make your home and other areas safer. It also includes ways you can move more carefully to prevent a fall. Health conditions that cause changes in your blood pressure, vision, or muscle strength and coordination may increase your risk for falls. Medicines may also increase your risk for falls if they make you dizzy, weak, or sleepy.   Fall prevention tips:   Stand or sit up slowly.    Use assistive devices as directed.    Wear shoes that fit well and have soles that .    Wear a personal alarm.    Stay active.    Manage your medical conditions.    Home Safety Tips:  Add items to prevent falls in the bathroom.    Keep paths clear.    Install bright lights in your home.    Keep items you use often on shelves within reach.    Paint or place reflective tape on the edges of your stairs.     © Copyright [employer] 2018 Information is for End User's use only and may not be sold, redistributed or otherwise used for commercial purposes. All illustrations and images included in CareNotes® are the copyrighted property of [email or web address]. or [employer]    Get labs as directed. See Cardiology as directed for hx of CABG, and follow a low cholesterol diet and take Citalopram as directed for anxiety, See urology for BPH and take all meds as directed and refilled meds for HTN and cholesterol and anxiety today. AWV is UTD and home is safe and gets help from family at home.       Assessment & Plan  Health care maintenance  Stable and UTD  Orders:    Comprehensive metabolic panel; Future    CBC and differential; Future    Lipid Panel with Direct LDL reflex; Future    Hx of CABG x 3, 2012  Sees Cardiology as directed       Mixed hyperlipidemia  Low cholesterol diet  Orders:    Comprehensive metabolic panel; Future    Lipid Panel with Direct LDL reflex; Future    Anxiety  Stable on citalopram  Orders:     Comprehensive metabolic panel; Future    CBC and differential; Future    citalopram (CeleXA) 10 mg tablet; Take 1 tablet (10 mg total) by mouth daily    Benign prostatic hyperplasia with lower urinary tract symptoms, symptom details unspecified  Sees urology       Hyperlipidemia, unspecified hyperlipidemia type  Low cholesterol diet encouraged  Orders:    Comprehensive metabolic panel; Future    Lipid Panel with Direct LDL reflex; Future    atorvastatin (LIPITOR) 40 mg tablet; Take 1 tablet (40 mg total) by mouth daily at bedtime    ACC/AHA stage C congestive heart failure due to ischemic cardiomyopathy  (HCC)  Wt Readings from Last 3 Encounters:   06/16/25 53.6 kg (118 lb 3.2 oz)   04/07/25 51 kg (112 lb 7 oz)   04/01/25 51 kg (112 lb 6.4 oz)     Sees cardiology          Orders:    Comprehensive metabolic panel; Future    lisinopril (ZESTRIL) 5 mg tablet; Take 1 tablet (5 mg total) by mouth daily      Depression Screening and Follow-up Plan: Patient was screened for depression during today's encounter. They screened negative with a PHQ-2 score of 0.        Preventive health issues were discussed with patient, and age appropriate screening tests were ordered as noted in patient's After Visit Summary. Personalized health advice and appropriate referrals for health education or preventive services given if needed, as noted in patient's After Visit Summary.    History of Present Illness     Here for awv and is UTD and home is safe and lives with son and daughter. Asdvanced care planning is UTD and home is safe. No memory concerns. Has smoke detectors.        Patient Care Team:  Gopi Victoria DO as PCP - General  Gopi Victoria DO as PCP - PCP-[facility]  Hermes Castillo DO as Cardiologist (Cardiology)  MD Ruddy Butler MD Mary L Matchette RN as Registered Nurse (Cardiology)  Dio Amin DO (Thoracic Surgery)  Amylyn Jena Mortimer, PA-C as Physician  Assistant (Thoracic Surgery)  Aundrea Collins PA-C as Physician Assistant (Thoracic Surgery)    Review of Systems   Constitutional: Negative.    HENT: Negative.     Eyes: Negative.    Respiratory: Negative.     Cardiovascular: Negative.    Gastrointestinal: Negative.    Endocrine: Negative.    Genitourinary: Negative.    Musculoskeletal: Negative.    Skin: Negative.    Allergic/Immunologic: Negative.    Neurological: Negative.    Hematological: Negative.    Psychiatric/Behavioral: Negative.       Medical History Reviewed by provider this encounter:  Tobacco  Allergies  Meds  Problems  Med Hx  Surg Hx  Fam Hx       Annual Wellness Visit Questionnaire   [place] is here for his Subsequent Wellness visit.     Health Risk Assessment:   Patient rates overall health as fair. Patient feels that their physical health rating is slightly worse. Patient is very satisfied with their life. Eyesight was rated as same. Hearing was rated as same. Patient feels that their emotional and mental health rating is same. Patients states they are never, rarely angry. Patient states they are often unusually tired/fatigued. Pain experienced in the last 7 days has been some. Patient's pain rating has been 10/10. Patient states that he has experienced weight loss or gain in last 6 months.     Depression Screening:   PHQ-2 Score: 0      Fall Risk Screening:   In the past year, patient has experienced: history of falling in past year    Number of falls: 1  Injured during fall?: No    Feels unsteady when standing or walking?: Yes    Worried about falling?: Yes      Home Safety:  Patient has trouble with stairs inside or outside of their home. Patient has working smoke alarms and has no working carbon monoxide detector. Home safety hazards include: none.     Nutrition:   Current diet is Regular.     Medications:   Patient is currently taking over-the-counter supplements. OTC medications include: see medication list. Patient is able to  manage medications.     Activities of Daily Living (ADLs)/Instrumental Activities of Daily Living (IADLs):   Walk and transfer into and out of bed and chair?: Yes  Dress and groom yourself?: Yes    Bathe or shower yourself?: Yes    Feed yourself? Yes  Do your laundry/housekeeping?: No  Manage your money, pay your bills and track your expenses?: Yes  Make your own meals?: Yes    Do your own shopping?: Yes    Advance Care Planning:   Living will: Yes    Durable POA for healthcare: Yes    Advanced directive: Yes      Cognitive Screening:   Provider or family/friend/caregiver concerned regarding cognition?: No    Preventive Screenings      Cardiovascular Screening:    General: Screening Not Indicated and History Lipid Disorder      Diabetes Screening:     General: Screening Current      Colorectal Cancer Screening:     General: Screening Current      Prostate Cancer Screening:    General: Screening Not Indicated      Abdominal Aortic Aneurysm (AAA) Screening:    Risk factors include: tobacco use        Lung Cancer Screening:     General: Screening Not Indicated    Immunizations:  - Immunizations due: Tdap and Zoster (Shingrix)    Screening, Brief Intervention, and Referral to Treatment (SBIRT)     Screening  Typical number of drinks in a day: 0  Typical number of drinks in a week: 0  Interpretation: Low risk drinking behavior.    Single Item Drug Screening:  How often have you used an illegal drug (including marijuana) or a prescription medication for non-medical reasons in the past year? never    Single Item Drug Screen Score: 0  Interpretation: Negative screen for possible drug use disorder    Social Drivers of Health     Food Insecurity: No Food Insecurity (6/16/2025)    Nursing - Inadequate Food Risk Classification     Worried About Running Out of Food in the Last Year: Never true     Ran Out of Food in the Last Year: Never true     Ran Out of Food in the Last Year: Never true   Transportation Needs: No  "Transportation Needs (6/16/2025)    PRAPARE - Transportation     Lack of Transportation (Medical): No     Lack of Transportation (Non-Medical): No   Housing Stability: Low Risk  (6/16/2025)    Housing Stability Vital Sign     Unable to Pay for Housing in the Last Year: No     Number of Times Moved in the Last Year: 0     Homeless in the Last Year: No   Utilities: Not At Risk (6/16/2025)    [facility] Utilities     Threatened with loss of utilities: No     No results found.    Objective   /60   Pulse 67   Temp (!) 97.2 °F (36.2 °C) (Temporal)   Ht 5' 4.96\" (1.65 m)   Wt 53.6 kg (118 lb 3.2 oz)   SpO2 99%   BMI 19.69 kg/m²     Physical Exam  Constitutional:       Appearance: He is well-developed.   HENT:      Head: Normocephalic and atraumatic.      Right Ear: External ear normal.      Left Ear: External ear normal.      Nose: Nose normal.      Mouth/Throat:      Mouth: Mucous membranes are moist.     Eyes:      Conjunctiva/sclera: Conjunctivae normal.      Pupils: Pupils are equal, round, and reactive to light.       Cardiovascular:      Rate and Rhythm: Normal rate and regular rhythm.      Pulses: Normal pulses.      Heart sounds: Normal heart sounds.   Pulmonary:      Effort: Pulmonary effort is normal.      Breath sounds: Normal breath sounds.     Musculoskeletal:         General: Normal range of motion.      Cervical back: Normal range of motion and neck supple.     Skin:     General: Skin is warm and dry.      Capillary Refill: Capillary refill takes less than 2 seconds.     Neurological:      General: No focal deficit present.      Mental Status: He is alert and oriented to person, place, and time. Mental status is at baseline.      Deep Tendon Reflexes: Reflexes are normal and symmetric.     Psychiatric:         Mood and Affect: Mood normal.         Behavior: Behavior normal.         Thought Content: Thought content normal.         Judgment: Judgment normal.       Administrative Statements   I have spent " a total time of 33 minutes in caring for this patient on the day of the visit/encounter including Diagnostic results, Prognosis, Risks and benefits of tx options, Instructions for management, Patient and family education, Importance of tx compliance, Risk factor reductions, Impressions, Counseling / Coordination of care, Documenting in the medical record, Reviewing/placing orders in the medical record (including tests, medications, and/or procedures), and Obtaining or reviewing history  .

## 2025-06-16 NOTE — ASSESSMENT & PLAN NOTE
Stable on citalopram  Orders:    Comprehensive metabolic panel; Future    CBC and differential; Future    citalopram (CeleXA) 10 mg tablet; Take 1 tablet (10 mg total) by mouth daily

## 2025-06-23 ENCOUNTER — OFFICE VISIT (OUTPATIENT)
Dept: CARDIAC SURGERY | Facility: CLINIC | Age: 85
End: 2025-06-23
Payer: COMMERCIAL

## 2025-06-23 VITALS
RESPIRATION RATE: 15 BRPM | SYSTOLIC BLOOD PRESSURE: 119 MMHG | DIASTOLIC BLOOD PRESSURE: 75 MMHG | TEMPERATURE: 97.6 F | OXYGEN SATURATION: 97 % | WEIGHT: 114.86 LBS | HEART RATE: 71 BPM | HEIGHT: 68 IN | BODY MASS INDEX: 17.41 KG/M2

## 2025-06-23 DIAGNOSIS — Z98.890 HISTORY OF REPAIR OF HIATAL HERNIA: Primary | ICD-10-CM

## 2025-06-23 DIAGNOSIS — Z87.19 HISTORY OF REPAIR OF HIATAL HERNIA: Primary | ICD-10-CM

## 2025-06-23 PROCEDURE — 99213 OFFICE O/P EST LOW 20 MIN: CPT | Performed by: SURGERY

## 2025-06-23 NOTE — PROGRESS NOTES
Name: Raleigh Sandoval      : 1940      MRN: [id number]  Encounter Provider: Thoracic Oncology [facility]  Encounter Date: 2025   Encounter department: [facility] [place]  :  Assessment & Plan  History of repair of hiatal hernia  Mr. Sandoval is 3 months out from robotic hiatal hernia repair with Quinten fundoplication.  He is tolerating a regular diet without difficulty. He is up 4lbs since date of surgery, recommend he continues to eat 3-5 meals per day to keep up his weight. His incisions are well healed. He denies any complaints today. Instructed him to use simethicone prn flatulence/bloating if he wishes, although this does not bother him. He can remain off antiacid medications.  Patient is to return for a routine visit at 6 months post operatively. Patient instructed to call office sooner if experiencing return of pre-operative symptoms.          Thoracic History   Diagnosis: Large hiatal hernia  Procedures/Surgeries: 3/21/2025 robotic assisted laparoscopic hiatal hernia repair with Quinten fundoplication  Pathology: 3/21/2025 mesothelial lined fibromembranous tissue consistent with hernia sac    Problem   History of Repair of Hiatal Hernia        History of Present Illness     HPI Raleigh Sandoval is a 84 y.o. male who presents today for a post operative visit. Patient is approximately 3 months out from robotic hiatal hernia repair with Quinten fundoplication on 3/21/2025.  He is tolerating a regular diet without dysphagia or food sticking.    He denies abdominal pain, heartburn, regurgitation, nausea, vomiting, shortness of breath or chest pain.  He reports more flatulence since surgery which is not very bothersome for him.    He is not on any anti-acid medication.    No 30-day readmission from the time of hospital discharge.     Review of Systems   Constitutional:  Negative for chills, diaphoresis, fever and unexpected weight change.   HENT: Negative.    "  Respiratory:  Negative for cough, shortness of breath and wheezing.    Cardiovascular:  Negative for chest pain and leg swelling.   Gastrointestinal:  Negative for abdominal pain, diarrhea, nausea and vomiting.   Skin: Negative.    All other systems reviewed and are negative.     Medical History Reviewed by provider this encounter:     .  Past Medical History   Past Medical History[1]  Past Surgical History[2]  Family History[3]   reports that he quit smoking about 47 years ago. His smoking use included cigarettes. He started smoking about 63 years ago. He has a 8 pack-year smoking history. He has been exposed to tobacco smoke. He has never used smokeless tobacco. He reports that he does not currently use alcohol. He reports that he does not use drugs.  Current Outpatient Medications   Medication Instructions    aspirin 81 mg, Daily    atorvastatin (LIPITOR) 40 mg, Oral, Daily at bedtime    carvedilol (COREG) 6.25 mg, Oral, 2 times daily with meals    citalopram (CELEXA) 10 mg, Oral, Daily    Cyanocobalamin ([employer] VITAMIN B-12 PO) Take by mouth    Ferrous Sulfate (IRON) 325 (65 Fe) MG TABS 1 tablet, 2 times daily    finasteride (PROSCAR) 5 mg, Oral, Daily    furosemide (LASIX) 20 mg, Oral, Daily    lisinopril (ZESTRIL) 5 mg, Oral, Daily    Multiple Vitamin Essential TABS Take by mouth    Omega 3 1200 MG CAPS Daily    tamsulosin (FLOMAX) 0.8 mg, Oral, Daily at bedtime   Allergies[4]   Medications Ordered Prior to Encounter[5]   Social History[6]     Objective   /75 (BP Location: Left arm, Patient Position: Sitting, Cuff Size: Standard)   Pulse 71   Temp 97.6 °F (36.4 °C) (Temporal)   Resp 15   Ht 5' 8\" (1.727 m)   Wt 52.1 kg (114 lb 13.8 oz)   SpO2 97%   BMI 17.46 kg/m²     Pain Screening:  Pain Score: 0-No pain  ECOG    Physical Exam  Vitals reviewed.   Constitutional:       General: He is not in acute distress.     Appearance: Normal appearance. He is not ill-appearing.   HENT:      Head: " "Normocephalic.      Nose: Nose normal.      Mouth/Throat:      Mouth: Mucous membranes are moist.     Cardiovascular:      Rate and Rhythm: Normal rate.   Pulmonary:      Effort: Pulmonary effort is normal.      Breath sounds: Normal breath sounds.   Abdominal:      Palpations: Abdomen is soft.      Tenderness: There is no abdominal tenderness.      Comments: Well healed laparoscopic incisions     Musculoskeletal:         General: No swelling.     Neurological:      Mental Status: He is alert and oriented to person, place, and time.     Psychiatric:         Mood and Affect: Mood normal.              [1]   Past Medical History:  Diagnosis Date    Benign prostatic hyperplasia     BPH (benign prostatic hyperplasia)     Chronic pain disorder     back    Coronary artery disease     Gallstones     History of tracheostomy     per pt had it \"after CABG surgery--was admitted for 53 days including Rehab\"    Hx of CABG     per pt 7-8 yrs ago    Hypercholesterolemia     Hypertension     Moderate exercise     alot of walking-works FT and also walks dog 2 miles daily usually    Myocardial infarction (HCC)     per pt approx 7-8yrs ago --[facility] cardio Dr ASHWIN Castillo sees yearly    Pleural effusion on left 03/02/2020    Prediabetes     Shortness of breath     per pt\"climbing steps or walking long distance\"    Urinary retention     Wears glasses    [2]   Past Surgical History:  Procedure Laterality Date    CATARACT EXTRACTION Bilateral     COLONOSCOPY      CORONARY ARTERY BYPASS GRAFT      x3 LIMA-LAD, SVG-OM, SVG-DIAG with mitral valve repair by nicole    CYSTOSCOPY  12/13/2014    Diagnostic    CYSTOSCOPY  03/24/2023    [facility]    ESOPHAGOGASTRODUODENOSCOPY  04/07/2015    with biopsy. Dr Mazariegos    FL RETROGRADE PYELOGRAM  5/17/2023    MITRAL VALVE REPAIR      26 mm fatmata hernandez physi annuloplasty ring    NV CYSTO W/REMOVAL OF LESIONS SMALL N/A 5/17/2023    Procedure: (TURBT);  Surgeon: Kee Moya MD;  Location: [facility];  Service: Urology    OR CYSTO/PYELOSCOPY BX&/FULGURATION PELIVC LESION Right 5/17/2023    Procedure: URETEROSCOPY,WITH URETERAL BRUSHING;  Surgeon: Kee Moya MD;  Location: [facility];  Service: Urology    OR CYSTOURETHROSCOPY W/URETERAL CATHETERIZATION Right 5/17/2023    Procedure: CYSTO, URETHERAL DILATION, RETROGRADE PYELOGRAM W/ INSERTION STENT URETERAL;  Surgeon: Kee Moya MD;  Location: [facility];  Service: Urology    OR ESOPHAGOGASTRODUODENOSCOPY TRANSORAL DIAGNOSTIC N/A 3/21/2025    Procedure: ESOPHAGOGASTRODUODENOSCOPY (EGD);  Surgeon: Dio Amin DO;  Location: [facility];  Service: Thoracic    OR LAPS RPR PARAESPHGL HRNA INCL FUNDPLSTY W/O MESH N/A 3/21/2025    Procedure: REPAIR HERNIA PARAESOPHAGEAL LAPAROSCOPIC W ROBOTICS WITH PARTIAL FUNDOPLICATIOIN, MESH INSERTION;  Surgeon: iDo Amin DO;  Location: [facility];  Service: Thoracic    TRACHEOSTOMY      during his heart surgery   [3]   Family History  Problem Relation Name Age of Onset    Colon cancer Father      Kidney disease Father      Diabetes Mother      Heart disease Mother      Hypertension Mother      Cervical cancer Maternal Aunt      Breast cancer Paternal Aunt      Colon cancer Paternal Grandfather      Heart disease Sister      Heart disease Brother     [4] No Known Allergies  [5]   Current Outpatient Medications on File Prior to Visit   Medication Sig Dispense Refill    aspirin 81 mg chewable tablet Chew 81 mg in the morning.      atorvastatin (LIPITOR) 40 mg tablet Take 1 tablet (40 mg total) by mouth daily at bedtime 90 tablet 2    carvedilol (COREG) 6.25 mg tablet Take 1 tablet (6.25 mg total) by mouth 2 (two) times a day with meals 180 tablet 1    citalopram (CeleXA) 10 mg tablet Take 1 tablet (10 mg total) by mouth daily 90 tablet 2    Cyanocobalamin ([employer]S VITAMIN B-12 PO) Take by mouth      Ferrous Sulfate (IRON) 325 (65 Fe) MG TABS Take 1 tablet by mouth in the morning and 1  tablet in the evening.      finasteride (PROSCAR) 5 mg tablet Take 1 tablet (5 mg total) by mouth daily 90 tablet 1    furosemide (LASIX) 20 mg tablet Take 1 tablet (20 mg total) by mouth daily 90 tablet 1    lisinopril (ZESTRIL) 5 mg tablet Take 1 tablet (5 mg total) by mouth daily 90 tablet 2    Multiple Vitamin Essential TABS Take by mouth      Omega 3 1200 MG CAPS Take by mouth in the morning      tamsulosin (FLOMAX) 0.4 mg Take 2 capsules (0.8 mg total) by mouth daily at bedtime 180 capsule 1     No current facility-administered medications on file prior to visit.   [6]   Social History  Tobacco Use    Smoking status: Former     Current packs/day: 0.00     Average packs/day: 0.5 packs/day for 16.0 years (8.0 ttl pk-yrs)     Types: Cigarettes     Start date:      Quit date:      Years since quittin.5     Passive exposure: Past    Smokeless tobacco: Never   Vaping Use    Vaping status: Never Used   Substance and Sexual Activity    Alcohol use: Not Currently    Drug use: Never    Sexual activity: Not Currently     Comment: defer

## 2025-06-23 NOTE — ASSESSMENT & PLAN NOTE
Mr. Sandoval is 3 months out from robotic hiatal hernia repair with Quinten fundoplication.  He is tolerating a regular diet without difficulty. He is up 4lbs since date of surgery, recommend he continues to eat 3-5 meals per day to keep up his weight. His incisions are well healed. He denies any complaints today. Instructed him to use simethicone prn flatulence/bloating if he wishes, although this does not bother him. He can remain off antiacid medications.  Patient is to return for a routine visit at 6 months post operatively. Patient instructed to call office sooner if experiencing return of pre-operative symptoms.

## 2025-07-19 ENCOUNTER — APPOINTMENT (OUTPATIENT)
Dept: LAB | Facility: HOSPITAL | Age: 85
End: 2025-07-19
Payer: COMMERCIAL

## 2025-07-19 DIAGNOSIS — I50.9: ICD-10-CM

## 2025-07-19 DIAGNOSIS — E78.5 HYPERLIPIDEMIA, UNSPECIFIED HYPERLIPIDEMIA TYPE: ICD-10-CM

## 2025-07-19 DIAGNOSIS — F41.9 ANXIETY: ICD-10-CM

## 2025-07-19 DIAGNOSIS — Z00.00 HEALTH CARE MAINTENANCE: ICD-10-CM

## 2025-07-19 DIAGNOSIS — I25.5: ICD-10-CM

## 2025-07-19 DIAGNOSIS — E78.2 MIXED HYPERLIPIDEMIA: Chronic | ICD-10-CM

## 2025-07-19 LAB
ALBUMIN SERPL BCG-MCNC: 3.2 G/DL (ref 3.5–5)
ALP SERPL-CCNC: 61 U/L (ref 34–104)
ALT SERPL W P-5'-P-CCNC: 8 U/L (ref 7–52)
ANION GAP SERPL CALCULATED.3IONS-SCNC: 6 MMOL/L (ref 4–13)
AST SERPL W P-5'-P-CCNC: 14 U/L (ref 13–39)
BASOPHILS # BLD AUTO: 0.07 THOUSANDS/ÂΜL (ref 0–0.1)
BASOPHILS NFR BLD AUTO: 1 % (ref 0–1)
BILIRUB SERPL-MCNC: 0.51 MG/DL (ref 0.2–1)
BUN SERPL-MCNC: 20 MG/DL (ref 5–25)
CALCIUM ALBUM COR SERPL-MCNC: 9.6 MG/DL (ref 8.3–10.1)
CALCIUM SERPL-MCNC: 9 MG/DL (ref 8.4–10.2)
CHLORIDE SERPL-SCNC: 100 MMOL/L (ref 96–108)
CHOLEST SERPL-MCNC: 131 MG/DL (ref ?–200)
CO2 SERPL-SCNC: 32 MMOL/L (ref 21–32)
CREAT SERPL-MCNC: 1.31 MG/DL (ref 0.6–1.3)
EOSINOPHIL # BLD AUTO: 0.61 THOUSAND/ÂΜL (ref 0–0.61)
EOSINOPHIL NFR BLD AUTO: 12 % (ref 0–6)
ERYTHROCYTE [DISTWIDTH] IN BLOOD BY AUTOMATED COUNT: 13.5 % (ref 11.6–15.1)
GFR SERPL CREATININE-BSD FRML MDRD: 49 ML/MIN/1.73SQ M
GLUCOSE P FAST SERPL-MCNC: 88 MG/DL (ref 65–99)
HCT VFR BLD AUTO: 29.5 % (ref 36.5–49.3)
HDLC SERPL-MCNC: 48 MG/DL
HGB BLD-MCNC: 9.6 G/DL (ref 12–17)
IMM GRANULOCYTES # BLD AUTO: 0.01 THOUSAND/UL (ref 0–0.2)
IMM GRANULOCYTES NFR BLD AUTO: 0 % (ref 0–2)
LDLC SERPL CALC-MCNC: 72 MG/DL (ref 0–100)
LYMPHOCYTES # BLD AUTO: 0.94 THOUSANDS/ÂΜL (ref 0.6–4.47)
LYMPHOCYTES NFR BLD AUTO: 18 % (ref 14–44)
MCH RBC QN AUTO: 31.5 PG (ref 26.8–34.3)
MCHC RBC AUTO-ENTMCNC: 32.5 G/DL (ref 31.4–37.4)
MCV RBC AUTO: 97 FL (ref 82–98)
MONOCYTES # BLD AUTO: 0.63 THOUSAND/ÂΜL (ref 0.17–1.22)
MONOCYTES NFR BLD AUTO: 12 % (ref 4–12)
NEUTROPHILS # BLD AUTO: 3 THOUSANDS/ÂΜL (ref 1.85–7.62)
NEUTS SEG NFR BLD AUTO: 57 % (ref 43–75)
NRBC BLD AUTO-RTO: 0 /100 WBCS
PLATELET # BLD AUTO: 251 THOUSANDS/UL (ref 149–390)
PMV BLD AUTO: 9.7 FL (ref 8.9–12.7)
POTASSIUM SERPL-SCNC: 3.9 MMOL/L (ref 3.5–5.3)
PROT SERPL-MCNC: 6.5 G/DL (ref 6.4–8.4)
RBC # BLD AUTO: 3.05 MILLION/UL (ref 3.88–5.62)
SODIUM SERPL-SCNC: 138 MMOL/L (ref 135–147)
TRIGL SERPL-MCNC: 56 MG/DL (ref ?–150)
WBC # BLD AUTO: 5.26 THOUSAND/UL (ref 4.31–10.16)

## 2025-07-19 PROCEDURE — 80053 COMPREHEN METABOLIC PANEL: CPT

## 2025-07-19 PROCEDURE — 36415 COLL VENOUS BLD VENIPUNCTURE: CPT

## 2025-07-19 PROCEDURE — 80061 LIPID PANEL: CPT

## 2025-07-19 PROCEDURE — 85025 COMPLETE CBC W/AUTO DIFF WBC: CPT

## 2025-07-20 ENCOUNTER — RESULTS FOLLOW-UP (OUTPATIENT)
Dept: FAMILY MEDICINE CLINIC | Facility: CLINIC | Age: 85
End: 2025-07-20

## 2025-07-22 NOTE — TELEPHONE ENCOUNTER
----- Message from Gopi Victoria DO sent at 7/20/2025 11:07 PM EDT -----  Please call the patient regarding his abnormal result. Needs heme/onc consult for anemia hemoglobin low at 9.6, and nephrology for CKD stage 3. Stay wel hydrated and avoid NSAIDs.   ----- Message -----  From: Lab, Background User  Sent: 7/19/2025   8:34 AM EDT  To: Gopi Victoria DO

## 2025-07-24 DIAGNOSIS — I50.21 ACUTE SYSTOLIC CONGESTIVE HEART FAILURE (HCC): ICD-10-CM

## 2025-07-28 RX ORDER — FUROSEMIDE 20 MG/1
20 TABLET ORAL DAILY
Qty: 90 TABLET | Refills: 1 | Status: SHIPPED | OUTPATIENT
Start: 2025-07-28

## (undated) DEVICE — BAG DRAINAGE URINARY W TOWER

## (undated) DEVICE — TUBING SUCTION 5MM X 12 FT

## (undated) DEVICE — NEEDLE HYPO 23G X 1-1/2 IN

## (undated) DEVICE — TIP-UP FENESTRATED GRASPER: Brand: ENDOWRIST

## (undated) DEVICE — BLADELESS OBTURATOR: Brand: WECK VISTA

## (undated) DEVICE — EXIDINE 4 PCT

## (undated) DEVICE — CATH URET .038 10FR 50CM DUAL LUMEN

## (undated) DEVICE — DEFENDO AIR WATER SUCTION AND BIOPSY VALVE KIT FOR  OLYMPUS: Brand: DEFENDO AIR/WATER/SUCTION AND BIOPSY VALVE

## (undated) DEVICE — GLOVE SRG BIOGEL ECLIPSE 7.5

## (undated) DEVICE — GUIDEWIRE AMPLATZ SS .038 180CM

## (undated) DEVICE — VISUALIZATION SYSTEM: Brand: CLEARIFY

## (undated) DEVICE — CATH FOLEY COUNCIL 16FR 5ML 2 WAY LUBRICATH

## (undated) DEVICE — UTILITY MARKER,BLACK WITH LABELS: Brand: DEVON

## (undated) DEVICE — SUT MONOCRYL 4-0 PS-2 18 IN Y496G

## (undated) DEVICE — VESSEL SEALER EXTEND: Brand: ENDOWRIST

## (undated) DEVICE — AIR AND WATER TUBING/CAP SET FOR OLYMPUS® SCOPES: Brand: ERBE

## (undated) DEVICE — 60 ML SYRINGE,REGULAR TIP: Brand: MONOJECT

## (undated) DEVICE — GUIDEWIRE STRGHT TIP 0.035 IN  SOLO PLUS

## (undated) DEVICE — 3M™ STERI-STRIP™ COMPOUND BENZOIN TINCTURE 40 BAGS/CARTON 4 CARTONS/CASE C1544: Brand: 3M™ STERI-STRIP™

## (undated) DEVICE — TROCARS: Brand: KII® OPTICAL ACCESS SYSTEM

## (undated) DEVICE — SCD SEQUENTIAL COMPRESSION COMFORT SLEEVE MEDIUM KNEE LENGTH: Brand: KENDALL SCD

## (undated) DEVICE — ARM DRAPE

## (undated) DEVICE — SUT ETHIBOND 0 SH 30 IN X834H

## (undated) DEVICE — INSUFFLATION NEEDLE TO ESTABLISH PNEUMOPERITONEUM.: Brand: INSUFFLATION NEEDLE

## (undated) DEVICE — UROLOGIC DRAIN BAG: Brand: UNBRANDED

## (undated) DEVICE — SEAL

## (undated) DEVICE — ANTIBACTERIAL UNDYED BRAIDED (POLYGLACTIN 910), SYNTHETIC ABSORBABLE SUTURE: Brand: COATED VICRYL

## (undated) DEVICE — 4-PORT MANIFOLD: Brand: NEPTUNE 2

## (undated) DEVICE — CADIERE FORCEPS: Brand: ENDOWRIST

## (undated) DEVICE — CATH URETERAL 5FR X 70 CM FLEX TIP POLYUR BARD

## (undated) DEVICE — PREMIUM DRY TRAY LF: Brand: MEDLINE INDUSTRIES, INC.

## (undated) DEVICE — GAUZE SPONGES,16 PLY: Brand: CURITY

## (undated) DEVICE — FIRST STEP BEDSIDE KIT - STAND-UP POUCH, ENDOSCOPIC CLEANING PAD - 1 POUCH: Brand: FIRST STEP BEDSIDE KIT - STAND-UP POUCH, ENDOSCOPIC CLEANING PAD

## (undated) DEVICE — MEGA SUTURECUT ND: Brand: ENDOWRIST

## (undated) DEVICE — KIT, BETHLEHEM THORACIC ROBOT: Brand: CARDINAL HEALTH

## (undated) DEVICE — EXOFIN PRECISION PEN HIGH VISCOSITY TOPICAL SKIN ADHESIVE: Brand: EXOFIN PRECISION PEN, 1G

## (undated) DEVICE — REM POLYHESIVE ADULT PATIENT RETURN ELECTRODE: Brand: VALLEYLAB

## (undated) DEVICE — STERILE EMESIS BASIN                 070: Brand: CARDINAL HEALTH

## (undated) DEVICE — GLOVE SRG BIOGEL 8

## (undated) DEVICE — PACK TUR

## (undated) DEVICE — SPECIMEN CONTAINER STERILE PEEL PACK

## (undated) DEVICE — KIT ENDO BUTTON

## (undated) DEVICE — HF-RESECTION ELECTRODE PLASMABUTTON BUTTON, 24 FR., 12°-30°, ESG TURIS: Brand: OLYMPUS

## (undated) DEVICE — COLUMN DRAPE

## (undated) DEVICE — SINGLE-USE DIGITAL FLEXIBLE URETEROSCOPE: Brand: APTRA

## (undated) DEVICE — HF-RESECTION ELECTRODE PLASMALOOP LOOP, LARGE, 24 FR., 12°-30°, ESG TURIS: Brand: OLYMPUS

## (undated) DEVICE — Device

## (undated) DEVICE — PENROSE DRAIN, 18 X 3 8: Brand: CARDINAL HEALTH

## (undated) DEVICE — HEAVY DUTY TABLE COVER: Brand: CONVERTORS

## (undated) DEVICE — SUT ETHIBOND 2-0 SH/SH 36 IN X523H

## (undated) DEVICE — INTENDED FOR TISSUE SEPARATION, AND OTHER PROCEDURES THAT REQUIRE A SHARP SURGICAL BLADE TO PUNCTURE OR CUT.: Brand: BARD-PARKER SAFETY BLADES SIZE 11, STERILE